# Patient Record
Sex: FEMALE | Race: WHITE | Employment: OTHER | URBAN - NONMETROPOLITAN AREA
[De-identification: names, ages, dates, MRNs, and addresses within clinical notes are randomized per-mention and may not be internally consistent; named-entity substitution may affect disease eponyms.]

---

## 2017-02-09 PROBLEM — E55.9 VITAMIN D DEFICIENCY: Status: ACTIVE | Noted: 2017-02-09

## 2017-02-09 PROBLEM — N39.0 UTI (URINARY TRACT INFECTION): Status: ACTIVE | Noted: 2017-02-09

## 2017-02-09 PROBLEM — R41.89 COGNITIVE IMPAIRMENT: Status: ACTIVE | Noted: 2017-02-09

## 2017-02-09 PROBLEM — R41.82 MENTAL STATUS ALTERATION: Status: ACTIVE | Noted: 2017-02-09

## 2017-02-09 PROBLEM — F32.A DEPRESSION: Status: ACTIVE | Noted: 2017-02-09

## 2017-03-13 ENCOUNTER — OFFICE VISIT (OUTPATIENT)
Dept: PHYSICAL MEDICINE AND REHAB | Age: 67
End: 2017-03-13

## 2017-03-13 VITALS
BODY MASS INDEX: 33.33 KG/M2 | HEIGHT: 69 IN | WEIGHT: 225 LBS | SYSTOLIC BLOOD PRESSURE: 129 MMHG | DIASTOLIC BLOOD PRESSURE: 79 MMHG | HEART RATE: 69 BPM

## 2017-03-13 DIAGNOSIS — R40.4 TRANSIENT ALTERATION OF AWARENESS: Primary | ICD-10-CM

## 2017-03-13 PROCEDURE — G8417 CALC BMI ABV UP PARAM F/U: HCPCS | Performed by: PSYCHIATRY & NEUROLOGY

## 2017-03-13 PROCEDURE — 1111F DSCHRG MED/CURRENT MED MERGE: CPT | Performed by: PSYCHIATRY & NEUROLOGY

## 2017-03-13 PROCEDURE — 99213 OFFICE O/P EST LOW 20 MIN: CPT | Performed by: PSYCHIATRY & NEUROLOGY

## 2017-03-13 PROCEDURE — 3014F SCREEN MAMMO DOC REV: CPT | Performed by: PSYCHIATRY & NEUROLOGY

## 2017-03-13 PROCEDURE — G8427 DOCREV CUR MEDS BY ELIG CLIN: HCPCS | Performed by: PSYCHIATRY & NEUROLOGY

## 2017-03-13 PROCEDURE — 1123F ACP DISCUSS/DSCN MKR DOCD: CPT | Performed by: PSYCHIATRY & NEUROLOGY

## 2017-03-13 PROCEDURE — 1036F TOBACCO NON-USER: CPT | Performed by: PSYCHIATRY & NEUROLOGY

## 2017-03-13 PROCEDURE — G8400 PT W/DXA NO RESULTS DOC: HCPCS | Performed by: PSYCHIATRY & NEUROLOGY

## 2017-03-13 PROCEDURE — 4040F PNEUMOC VAC/ADMIN/RCVD: CPT | Performed by: PSYCHIATRY & NEUROLOGY

## 2017-03-13 PROCEDURE — 3017F COLORECTAL CA SCREEN DOC REV: CPT | Performed by: PSYCHIATRY & NEUROLOGY

## 2017-03-13 PROCEDURE — 1090F PRES/ABSN URINE INCON ASSESS: CPT | Performed by: PSYCHIATRY & NEUROLOGY

## 2017-03-13 PROCEDURE — G8484 FLU IMMUNIZE NO ADMIN: HCPCS | Performed by: PSYCHIATRY & NEUROLOGY

## 2017-06-01 ENCOUNTER — OFFICE VISIT (OUTPATIENT)
Dept: PHYSICAL MEDICINE AND REHAB | Age: 67
End: 2017-06-01

## 2017-06-01 VITALS
SYSTOLIC BLOOD PRESSURE: 120 MMHG | HEIGHT: 69 IN | HEART RATE: 74 BPM | DIASTOLIC BLOOD PRESSURE: 77 MMHG | BODY MASS INDEX: 33.47 KG/M2 | WEIGHT: 226 LBS

## 2017-06-01 DIAGNOSIS — R40.4 TRANSIENT ALTERATION OF AWARENESS: Primary | ICD-10-CM

## 2017-06-01 PROCEDURE — 1090F PRES/ABSN URINE INCON ASSESS: CPT | Performed by: PSYCHIATRY & NEUROLOGY

## 2017-06-01 PROCEDURE — G8427 DOCREV CUR MEDS BY ELIG CLIN: HCPCS | Performed by: PSYCHIATRY & NEUROLOGY

## 2017-06-01 PROCEDURE — 4040F PNEUMOC VAC/ADMIN/RCVD: CPT | Performed by: PSYCHIATRY & NEUROLOGY

## 2017-06-01 PROCEDURE — 99213 OFFICE O/P EST LOW 20 MIN: CPT | Performed by: PSYCHIATRY & NEUROLOGY

## 2017-06-01 PROCEDURE — 1036F TOBACCO NON-USER: CPT | Performed by: PSYCHIATRY & NEUROLOGY

## 2017-06-01 PROCEDURE — 3014F SCREEN MAMMO DOC REV: CPT | Performed by: PSYCHIATRY & NEUROLOGY

## 2017-06-01 PROCEDURE — G8417 CALC BMI ABV UP PARAM F/U: HCPCS | Performed by: PSYCHIATRY & NEUROLOGY

## 2017-06-01 PROCEDURE — 1123F ACP DISCUSS/DSCN MKR DOCD: CPT | Performed by: PSYCHIATRY & NEUROLOGY

## 2017-06-01 PROCEDURE — G8400 PT W/DXA NO RESULTS DOC: HCPCS | Performed by: PSYCHIATRY & NEUROLOGY

## 2017-06-01 PROCEDURE — 3017F COLORECTAL CA SCREEN DOC REV: CPT | Performed by: PSYCHIATRY & NEUROLOGY

## 2018-06-04 ENCOUNTER — HOSPITAL ENCOUNTER (OUTPATIENT)
Dept: WOMENS IMAGING | Age: 68
Discharge: HOME | End: 2018-06-04
Payer: MEDICARE

## 2018-06-04 DIAGNOSIS — Z12.31 VISIT FOR SCREENING MAMMOGRAM: ICD-10-CM

## 2018-06-04 DIAGNOSIS — Z78.0 POST-MENOPAUSAL: ICD-10-CM

## 2018-06-04 PROCEDURE — 77067 SCR MAMMO BI INCL CAD: CPT

## 2018-06-04 PROCEDURE — 77080 DXA BONE DENSITY AXIAL: CPT

## 2018-09-26 PROBLEM — N39.0 UTI (URINARY TRACT INFECTION): Status: RESOLVED | Noted: 2017-02-09 | Resolved: 2018-09-26

## 2019-06-26 ENCOUNTER — HOSPITAL ENCOUNTER (OUTPATIENT)
Dept: WOMENS IMAGING | Age: 69
Discharge: HOME | End: 2019-06-26
Payer: MEDICARE

## 2019-06-26 DIAGNOSIS — Z12.31 VISIT FOR SCREENING MAMMOGRAM: ICD-10-CM

## 2019-06-26 PROCEDURE — 77063 BREAST TOMOSYNTHESIS BI: CPT

## 2020-06-18 ENCOUNTER — HOSPITAL ENCOUNTER (OUTPATIENT)
Dept: MAMMOGRAPHY | Age: 70
Discharge: HOME | End: 2020-06-18
Payer: MEDICARE

## 2020-06-18 PROCEDURE — 77063 BREAST TOMOSYNTHESIS BI: CPT

## 2020-06-24 ENCOUNTER — HOSPITAL ENCOUNTER (OUTPATIENT)
Dept: WOMENS IMAGING | Age: 70
Discharge: HOME | End: 2020-06-24
Payer: MEDICARE

## 2020-06-24 PROCEDURE — 76642 ULTRASOUND BREAST LIMITED: CPT

## 2020-06-25 ENCOUNTER — HOSPITAL ENCOUNTER (OUTPATIENT)
Dept: WOMENS IMAGING | Age: 70
Discharge: HOME | End: 2020-06-25
Payer: MEDICARE

## 2020-06-25 PROCEDURE — 77080 DXA BONE DENSITY AXIAL: CPT

## 2020-06-26 ENCOUNTER — HOSPITAL ENCOUNTER (OUTPATIENT)
Dept: WOMENS IMAGING | Age: 70
Discharge: HOME | End: 2020-06-26
Payer: MEDICARE

## 2020-06-26 PROCEDURE — 19084 BX BREAST ADD LESION US IMAG: CPT

## 2020-06-26 PROCEDURE — 88112 CYTOPATH CELL ENHANCE TECH: CPT

## 2020-06-26 PROCEDURE — 77065 DX MAMMO INCL CAD UNI: CPT

## 2020-06-26 PROCEDURE — 88377 M/PHMTRC ALYS ISHQUANT/SEMIQ: CPT

## 2020-06-26 PROCEDURE — 2709999900 HC NON-CHARGEABLE SUPPLY

## 2020-06-26 PROCEDURE — 88360 TUMOR IMMUNOHISTOCHEM/MANUAL: CPT

## 2020-06-26 PROCEDURE — C1894 INTRO/SHEATH, NON-LASER: HCPCS

## 2020-06-26 PROCEDURE — A4648 IMPLANTABLE TISSUE MARKER: HCPCS

## 2020-06-26 PROCEDURE — 88305 TISSUE EXAM BY PATHOLOGIST: CPT

## 2020-06-26 PROCEDURE — 19083 BX BREAST 1ST LESION US IMAG: CPT

## 2020-06-26 PROCEDURE — 19000 PUNCTURE ASPIR CYST BREAST: CPT

## 2020-06-26 NOTE — PROGRESS NOTES
[facility] [address]  Pre-Biopsy Assessment      Patient Education    Written information about procedure Yes  left X2   Procedural steps explained Yes Ultrasound Biopsy   Post-op potential: bruising, hematoma, pain Yes    Self-care: activity, care of dressing Yes    Patient verbalized understanding Yes    Consent signed and witnessed Yes      Hormone Therapy Status: N/A    Recent Medication: Aspirin Last Dose: 6-24-20                                     Hormone Replacement Therapy: yes - for about 2 years last used age 47    Previous Breast Biopsy: yes - benign 2015    Previous Diagnosis Cancer: no    Hysterectomy:yes - ovaries removed had abnormal cells.     Emotional Status: Calm    Language or Physical Barriers: N/A    Comments: N/A      Electronically signed by Raiza Meng on 6/26/2020 at 9:33 AM

## 2020-06-30 ENCOUNTER — CLINICAL DOCUMENTATION (OUTPATIENT)
Dept: WOMENS IMAGING | Age: 70
End: 2020-06-30

## 2020-07-01 ENCOUNTER — TELEPHONE (OUTPATIENT)
Dept: CASE MANAGEMENT | Age: 70
End: 2020-07-01

## 2020-07-01 NOTE — TELEPHONE ENCOUNTER
Name: Fermin Vann  : 1950  MRN: [id number]    Oncology Navigation Follow-Up Note    Contact Type:  Telephone    Notes: Scheduled patient teaching on 2020 for new breast cancer diagnosis. Coming to [facility] after 9am appt with Dr. Sue Littlejohn. Asked she bring breast cancer packet. Verbalizes understanding of location and contact information.     Electronically signed by Parrish Watson RN on 2020 at 8:56 AM

## 2020-07-01 NOTE — PROGRESS NOTES
[facility] [address] [facility] [address]  Dept: [phone]  Dept Fax: [phone]  Loc: [phone]    Visit Date: 7/2/2020    Donnell Bronson is a 79 y.o. female who presentstoday for:  Chief Complaint   Patient presents with    Surgical Consult     NP- ref 88 Robdulce Prince left breast cancer- requesting Dr. Gibran Lara       HPI:     HPI-year-old white female who recently on mammograms was found to have 2 lesions in the left breast interestingly that she had mammograms in June of last year that were normal although in retrospect may have had beginning of some densities biopsies were performed showing poorly differentiated ductal carcinoma the breast she was ER DC positive but also HER-2/jordan positive. Her Ki-67 was greater than 40%. Patient briefly took birth control pills in her lifetime and briefly took hormonal replacement she cannot remember the exact ages but she said it was a limited period of time.   She has a aunt who had breast cancer otherwise no other family history she has had no nipple discharge denies any pain in the breast    Past Medical History:   Diagnosis Date    Hyperlipidemia       Past Surgical History:   Procedure Laterality Date    BREAST SURGERY      excision mass left breast    CHOLECYSTECTOMY  10/2014    HERNIA REPAIR  10/2014    x4    HYSTERECTOMY      SPLENECTOMY  10/2014    TONSILLECTOMY AND ADENOIDECTOMY      patient was 11years old   Lenetta Ni TUBAL LIGATION          Family History   Problem Relation Age of Onset    Heart Disease Father         cath stent blood too thin and bled    Other Other         blood clot    Breast Cancer Paternal Aunt 62    High Blood Pressure Sister     Cancer Brother 68        skin    Prostate Cancer Brother         had chemotherapy to treat     Prostate Cancer Brother 64        has had for 10 years    Ovarian Cancer Neg Hx     Diabetes Neg Hx     Stroke Neg Hx         Social distress. Breath sounds: Normal breath sounds. No stridor. No wheezing. Chest:      Chest wall: No tenderness. Musculoskeletal: Normal range of motion. Skin:     General: Skin is warm and dry. Coloration: Skin is not pale. Findings: No erythema or rash. Neurological:      Mental Status: She is alert and oriented to person, place, and time. Psychiatric:         Behavior: Behavior normal.         Thought Content:  Thought content normal.         Judgment: Judgment normal.            Results for orders placed or performed during the hospital encounter of 02/09/17   Rapid influenza A/B antigens   Result Value Ref Range    Flu A Antigen NEGATIVE NEGATIVE    Flu B Antigen NEGATIVE NEGATIVE   Urine Culture   Result Value Ref Range    Urine Culture, Routine No growth-preliminary  No growth      D-dimer, quantitative   Result Value Ref Range    D-Dimer, Quant 598.00 (H) 0.00 - 500.0 ng/ml FEU   CBC auto differential   Result Value Ref Range    WBC 12.0 (H) 4.8 - 10.8 thou/mm3    RBC 4.96 4.20 - 5.40 mill/mm3    Hemoglobin 14.7 12.0 - 16.0 gm/dl    Hematocrit 44.9 37.0 - 47.0 %    MCV 90.6 81.0 - 99.0 fL    MCH 29.6 27.0 - 31.0 pg    MCHC 32.7 (L) 33.0 - 37.0 gm/dl    RDW 13.9 11.5 - 14.5 %    Platelets 838 (H) 665 - 400 thou/mm3    MPV 9.4 7.4 - 10.4 mcm    RBC Morphology NORMAL     Seg Neutrophils 57.2 %    Lymphocytes 29.7 %    Monocytes 10.5 %    Eosinophils 2.0 %    Basophils 0.6 %    nRBC 0 /100 wbc    Segs Absolute 6.9 1.8 - 7.7 thou/mm3    Lymphocytes Absolute 3.6 1.0 - 4.8 thou/mm3    Monocytes Absolute 1.3 0.4 - 1.3 thou/mm3    Eosinophils Absolute 0.2 0.0 - 0.4 thou/mm3    Basophils Absolute 0.1 0.0 - 0.1 thou/mm3   Brain Natriuretic Peptide   Result Value Ref Range    Pro-BNP 43.6 0.0 - 900.0 pg/ml   Protime-INR   Result Value Ref Range    INR 0.86 0.85 - 1.13   Blood gas, arterial   Result Value Ref Range    pH, Blood Gas 7.39 7.35 - 7.45    PCO2 41 35 - 45 mmhg    PO2 61 (L) 71 - 104 mmhg    HCO3 24 23 - 28 mmol/l    Base Excess (Calculated) -0.6 -2.5 - 2.5 mmol/l    O2 Sat 91 %    DEVICE Room Air     Nelson Test N/A     Source: SHIRLENE Hutton     COLLECTED BY: [id number]    Comprehensive Metabolic Panel   Result Value Ref Range    Glucose 116 (H) 70 - 108 mg/dl    CREATININE 0.7 0.4 - 1.2 mg/dl    BUN 15 7 - 22 mg/dl    Sodium 139 135 - 145 meq/l    Potassium 5.0 3.5 - 5.2 meq/l    Chloride 99 98 - 111 meq/l    CO2 26 23 - 33 meq/l    Calcium 10.1 8.5 - 10.5 mg/dl    AST 19 5 - 40 U/L    Alkaline Phosphatase 74 38 - 126 U/L    Total Protein 8.1 (H) 6.1 - 8.0 gm/dl    Alb 4.0 3.5 - 5.1 gm/dl    Total Bilirubin 0.3 0.3 - 1.2 mg/dl    ALT 17 11 - 66 U/L   Troponin   Result Value Ref Range    Troponin T < 0.010 ng/ml   Lipase   Result Value Ref Range    Lipase 37.1 5.6 - 51.3 U/L   TSH without Reflex   Result Value Ref Range    TSH 1.380 0.400 - 4.20 mcIU/ml   Lactic acid, plasma   Result Value Ref Range    Lactic Acid 3.1 (H) 0.5 - 2.2 mmol/l   Ethanol   Result Value Ref Range    ETHYL ALCOHOL, SERUM < 0.01 0.00 % (gm/dl)   Urine Drug Screen   Result Value Ref Range    AMPHETAMINE+METHAMPHETAMINE URINE SCREEN Negative NEGATIVE    Barbiturate Quant, Ur Negative NEGATIVE    Benzodiazepine Quant, Ur Negative NEGATIVE    Cannabinoid Quant, Ur Negative NEGATIVE    Cocaine Metab Quant, Ur Negative NEGATIVE    Opiates, Urine Negative NEGATIVE    Oxycodone Negative NEGATIVE    PCP Quant, Ur Negative NEGATIVE   Microscopic Urinalysis   Result Value Ref Range    Glucose, Urine NEGATIVE NEGATIVE mg/dl    Bilirubin Urine NEGATIVE NEGATIVE    Ketones, Urine NEGATIVE NEGATIVE    Specific Gravity, UA 1.014 1.002 - 1.03    Blood, Urine SMALL (A) NEGATIVE    pH, UA 5.0 5.0 - 9.0    Protein, UA NEGATIVE NEGATIVE mg/dl    Urobilinogen, Urine 0.2 0.0 - 1.0 eu/dl    Nitrite, Urine NEGATIVE NEGATIVE    Leukocytes, UA MODERATE (A) NEGATIVE    Color, UA YELLOW YELLOW-STR    Character, Urine CLEAR [id number]    RBC, UA 0-2 0-2/hpf thou/mm3    MPV 9.0 7.4 - 10.4 mcm    RBC Morphology NORMAL     Seg Neutrophils 35.5 %    Lymphocytes 46.2 %    Monocytes 15.5 %    Eosinophils 2.7 %    Basophils 0.1 %    nRBC 0 /100 wbc    Segs Absolute 3.5 1.8 - 7.7 thou/mm3    Lymphocytes Absolute 4.5 1.0 - 4.8 thou/mm3    Monocytes Absolute 1.5 (H) 0.4 - 1.3 thou/mm3    Eosinophils Absolute 0.3 0.0 - 0.4 thou/mm3    Basophils Absolute 0.0 0.0 - 0.1 thou/mm3   Procalcitonin   Result Value Ref Range    Procalcitonin 0.05 0.01 - 0.09 ng/mL   Lactic Acid, Plasma   Result Value Ref Range    Lactic Acid 2.1 0.5 - 2.2 mmol/l   Comprehensive metabolic panel   Result Value Ref Range    Glucose 116 (H) 70 - 108 mg/dl    CREATININE 0.6 0.4 - 1.2 mg/dl    BUN 13 7 - 22 mg/dl    Sodium 140 135 - 145 meq/l    Potassium 4.2 3.5 - 5.2 meq/l    Chloride 99 98 - 111 meq/l    CO2 23 23 - 33 meq/l    Calcium 9.5 8.5 - 10.5 mg/dl    AST 17 5 - 40 U/L    Alkaline Phosphatase 64 38 - 126 U/L    Total Protein 7.0 6.1 - 8.0 gm/dl    Alb 3.6 3.5 - 5.1 gm/dl    Total Bilirubin 0.6 0.3 - 1.2 mg/dl    ALT 13 11 - 66 U/L   Magnesium   Result Value Ref Range    Magnesium 2.0 1.6 - 2.4 mg/dl   CBC auto differential   Result Value Ref Range    WBC 9.8 4.8 - 10.8 thou/mm3    RBC 4.58 4.20 - 5.40 mill/mm3    Hemoglobin 13.7 12.0 - 16.0 gm/dl    Hematocrit 41.4 37.0 - 47.0 %    MCV 90.3 81.0 - 99.0 fL    MCH 29.9 27.0 - 31.0 pg    MCHC 33.1 33.0 - 37.0 gm/dl    RDW 13.6 11.5 - 14.5 %    Platelets 372 239 - 635 thou/mm3    MPV 9.3 7.4 - 10.4 mcm    RBC Morphology NORMAL     Seg Neutrophils 36.4 %    Lymphocytes 42.9 %    Monocytes 15.5 %    Eosinophils 3.9 %    Basophils 1.3 %    nRBC 4 /100 wbc    Segs Absolute 3.6 1.8 - 7.7 thou/mm3    Lymphocytes Absolute 4.2 1.0 - 4.8 thou/mm3    Monocytes Absolute 1.5 (H) 0.4 - 1.3 thou/mm3    Eosinophils Absolute 0.4 0.0 - 0.4 thou/mm3    Basophils Absolute 0.1 0.0 - 0.1 thou/mm3   TSH with Reflex   Result Value Ref Range    TSH 2.480 0.400 - 4.20 mcIU/ml Phosphorus   Result Value Ref Range    Phosphorus 3.1 2.4 - 4.7 mg/dl   Lactic Acid, Plasma   Result Value Ref Range    Lactic Acid 2.7 (H) 0.5 - 2.2 mmol/l   Anion Gap   Result Value Ref Range    Anion Gap 18.0 (H) 8.0 - 16.0 meq/l   Glomerular Filtration Rate, Estimated   Result Value Ref Range    Est, Glom Filt Rate >90 ml/min/1.73m2   Lactic Acid, Plasma   Result Value Ref Range    Lactic Acid 2.4 (H) 0.5 - 2.2 mmol/l   POCT Glucose   Result Value Ref Range    POC Glucose 97 70 - 108 mg/dl   EKG Chest Pain   Result Value Ref Range    Ventricular Rate 82 BPM    Atrial Rate 82 BPM    P-R Interval 178 ms    QRS Duration 92 ms    Q-T Interval 360 ms    QTc Calculation (Bazett) 420 ms    P Axis 32 degrees    R Axis 6 degrees    T Axis 25 degrees   Echocardiogram 2D/ M-Mode/ Colorflow/ Do   Result Value Ref Range    Left Ventricular Ejection Fraction 55     LVEF MODALITY ECHO        Assessment:     Poorly differentiated ductal carcinoma the breast I called   and wanted to know how far apart the 2 lesions were he stated they are close together but from anterior to posterior roughly make up 5 cm long discussion with patient regarding the history of breast cancer treatment and her options of attempted lumpectomy with radiation treatment or mastectomy we also discussed sentinel lymph node biopsy would be performed with either and that she may need radiation to the axilla this patient would most be benefited with a mastectomy however she is going to be presented at breast conference patient is leaning towards preference for mastectomy and sentinel node biopsy will schedule as such    Plan:     1. Schedule Bhargavi for full mastectomy and sentinel lymph node biopsy 2. The risks, benefits and alternatives were discussed with [facility]. She understands and wishes to proceed with surgical intervention. 3. Restrictions discussed with [facility] and she expresses understanding.   4. She is advised to call back directly if there are further questions/concerns, or if her symptoms worsen prior to surgery.             Electronicallysigned by Dhruv Sandoval MD on 6/30/2020 at 11:28 PM

## 2020-07-02 ENCOUNTER — CLINICAL DOCUMENTATION (OUTPATIENT)
Dept: CASE MANAGEMENT | Age: 70
End: 2020-07-02

## 2020-07-02 ENCOUNTER — TELEPHONE (OUTPATIENT)
Dept: SURGERY | Age: 70
End: 2020-07-02

## 2020-07-02 ENCOUNTER — OFFICE VISIT (OUTPATIENT)
Dept: SURGERY | Age: 70
End: 2020-07-02
Payer: MEDICARE

## 2020-07-02 VITALS
RESPIRATION RATE: 14 BRPM | TEMPERATURE: 97.4 F | DIASTOLIC BLOOD PRESSURE: 70 MMHG | BODY MASS INDEX: 34.86 KG/M2 | SYSTOLIC BLOOD PRESSURE: 110 MMHG | OXYGEN SATURATION: 95 % | HEIGHT: 68 IN | WEIGHT: 230 LBS | HEART RATE: 81 BPM

## 2020-07-02 PROCEDURE — 99203 OFFICE O/P NEW LOW 30 MIN: CPT | Performed by: SURGERY

## 2020-07-02 RX ORDER — ALENDRONATE SODIUM 35 MG/1
35 TABLET ORAL
COMMUNITY

## 2020-07-02 NOTE — PROGRESS NOTES
contacted by Tammi Cardenas, knows her well. Notified Saray Andino. Biopsy site status: Denies any concerns with healing at this time. Continuum of Care: Diagnosis/Active Treatment    Notes: Teaching completed and verbalizes understanding. Additional information discussed re: hormone receptors, Triple Positive Breast Cancer, chemotherapy, oncotype dx testing, amended report from Dr. Gregoria Mays measured largest site 3 cm, instead of 2.7 cm. I was notified by Erma More. All questions addressed, Ana took notes. Encouraged patient to call anytime with questions or concerns. Emotional support offered. Contact information given. Already has all provider cards from [facility]. Will continue to follow through continuum of care.      Electronically signed by Gina Brown RN on 7/2/2020 at 11:58 AM

## 2020-07-02 NOTE — TELEPHONE ENCOUNTER
Scheduled Lonita Risk for a left sentinel LN injection on Fri 7/17 at 10am & she will arrive to main radiology nuc med at 9:15. Left simple mastectomy & sentinel LN biopsy is at 12:30. Lonita Risk will be npo after midnight, consent was signed & antibacterial soap was given. Also given were orders for covid & h&h. MRI bilateral breast w wo contrast is scheduled on Wed 7/8 at 2:00pm & she will arrive at 1:30. Creatinine will be done on arrival. Spoke to Adal Burgos, nurse reviewer at [facility] & she authorized the MRI good from 7/2/20-8/30/2020. The auth# is [id number]. All info was given to Zak Natarajan & she voiced understanding.

## 2020-07-03 ASSESSMENT — ENCOUNTER SYMPTOMS
BACK PAIN: 0
CHEST TIGHTNESS: 0
STRIDOR: 0
FACIAL SWELLING: 0
ALLERGIC/IMMUNOLOGIC NEGATIVE: 1
EYES NEGATIVE: 1
TROUBLE SWALLOWING: 0
COLOR CHANGE: 0
SHORTNESS OF BREATH: 0
COUGH: 0
PHOTOPHOBIA: 0
EYE PAIN: 0
EYE ITCHING: 0
SINUS PAIN: 0
APNEA: 0
EYE REDNESS: 0
WHEEZING: 0
CHOKING: 0
VOICE CHANGE: 0
RHINORRHEA: 0
SORE THROAT: 0
ABDOMINAL PAIN: 1
SINUS PRESSURE: 0
EYE DISCHARGE: 0

## 2020-07-08 ENCOUNTER — HOSPITAL ENCOUNTER (OUTPATIENT)
Dept: MRI IMAGING | Age: 70
Discharge: HOME | End: 2020-07-08
Payer: MEDICARE

## 2020-07-08 ENCOUNTER — HOSPITAL ENCOUNTER (OUTPATIENT)
Age: 70
Discharge: HOME | End: 2020-07-08
Payer: MEDICARE

## 2020-07-08 LAB
HCT VFR BLD CALC: 45.9 % (ref 37–47)
HEMOGLOBIN: 14.7 GM/DL (ref 12–16)
POC CREATININE WHOLE BLOOD: 0.7 MG/DL (ref 0.5–1.2)

## 2020-07-08 PROCEDURE — 82565 ASSAY OF CREATININE: CPT

## 2020-07-08 PROCEDURE — 6360000004 HC RX CONTRAST MEDICATION: Performed by: SURGERY

## 2020-07-08 PROCEDURE — 77049 MRI BREAST C-+ W/CAD BI: CPT

## 2020-07-08 PROCEDURE — A9579 GAD-BASE MR CONTRAST NOS,1ML: HCPCS | Performed by: SURGERY

## 2020-07-08 PROCEDURE — 85014 HEMATOCRIT: CPT

## 2020-07-08 PROCEDURE — 36415 COLL VENOUS BLD VENIPUNCTURE: CPT

## 2020-07-08 PROCEDURE — 85018 HEMOGLOBIN: CPT

## 2020-07-08 RX ADMIN — GADOTERIDOL 20 ML: 279.3 INJECTION, SOLUTION INTRAVENOUS at 16:08

## 2020-07-10 ENCOUNTER — HOSPITAL ENCOUNTER (OUTPATIENT)
Age: 70
Discharge: HOME | End: 2020-07-10
Payer: MEDICARE

## 2020-07-10 ENCOUNTER — CLINICAL DOCUMENTATION (OUTPATIENT)
Dept: CASE MANAGEMENT | Age: 70
End: 2020-07-10

## 2020-07-10 ENCOUNTER — TELEPHONE (OUTPATIENT)
Dept: ONCOLOGY | Age: 70
End: 2020-07-10

## 2020-07-10 ENCOUNTER — CLINICAL DOCUMENTATION (OUTPATIENT)
Dept: PHYSICAL THERAPY | Age: 70
End: 2020-07-10

## 2020-07-10 PROBLEM — C50.212 MALIGNANT NEOPLASM OF UPPER-INNER QUADRANT OF LEFT BREAST IN FEMALE, ESTROGEN RECEPTOR POSITIVE (HCC): Chronic | Status: ACTIVE | Noted: 2020-07-10

## 2020-07-10 PROBLEM — Z17.0 MALIGNANT NEOPLASM OF UPPER-INNER QUADRANT OF LEFT BREAST IN FEMALE, ESTROGEN RECEPTOR POSITIVE (HCC): Chronic | Status: ACTIVE | Noted: 2020-07-10

## 2020-07-10 PROCEDURE — U0002 COVID-19 LAB TEST NON-CDC: HCPCS

## 2020-07-10 NOTE — PROGRESS NOTES
Name: Camelia Winters  : 1950  MRN: [id number]    Oncology Navigation Follow-Up Note    Contact Type: [facility]    Subjective: Arrived with friend Ana to meet with Breast Team. See recommendations under Media. Objective: Has Triple Positive left breast cancer. Assistance Needed: Trying to set up family, niece to assist with care during chemo as needed. Care coordination to move new patient consult with Dr. Zay Michelle for next week, to discuss neoadjuvant chemotherapy. Dr. Garcia President will have office change mastectomy 2020 to port placement with Dr. Juancarlos Mendez. Patient in agreement with recommendations from Breast Team.    Receptive to Advanced Care Planning / Palliative Care:  deferred    Referrals: Pending referrals to genetics and oncology rehab if patient agrees in future. Education: Breast Clinic Recommendations. Will call patient with new appt Dr. Zay Michelle today. Notes: Teaching completed and verbalizes understanding. All questions addressed. Emotional support offered. Arm measurements completed by Henrietta Felder PT assistant Will follow through continuum of care.      Electronically signed by Jaime Heard RN on 7/10/2020 at 8:22 AM

## 2020-07-10 NOTE — PROGRESS NOTES
[facility]  OUTPATIENT REHABILITATION  PHYSICAL THERAPY  INTEGRATED BREAST CANCER CLINIC SCREEN      Date: 7/10/2020  Patient Name: Joan Harry        CSN: [id number]   : 1950  (79 y.o.)  Gender: female     Location Left   Type IDC   Hormone Type ER+  IA+   HER2-jordan  Amplified       Strength/ROM Right Left   Shoulder Flexion PROM 160 165   Shoulder Abduction PROM 150 170   Shoulder Strength 4+ 4+   Elbow Strength 4+ 4+   Hand Dominance R        Location Right (cm) Left (cm)   Met Heads 19.8 19.2   Ulnar Styloid Process 19 19   10 cm distal to Lat. Epicondyle 30.4 30.4   Antecubital Fossa 32 31.5   10 cm proximal to Lat.  Epicondyle 41 39.5   Axilla 44 41.5   Total 186.2 181.1     Kathryn Shaw PTA [id number]

## 2020-07-10 NOTE — TELEPHONE ENCOUNTER
Name: Saravanan Jean  : 1950  MRN: [id number]    Oncology Navigation Follow-Up Note    Contact Type:  Telephone    Notes: Called patient with new appt with Dr. Earnest Hansen to discuss chemotherapy 2020 at 4 pm. Unique Tracey understanding.     Electronically signed by Bertha Blanc RN on 7/10/2020 at 9:38 AM

## 2020-07-11 LAB
PERFORMING LAB: NORMAL
REPORT: NORMAL
SARS-COV-2: NOT DETECTED

## 2020-07-13 ENCOUNTER — HOSPITAL ENCOUNTER (OUTPATIENT)
Dept: WOMENS IMAGING | Age: 70
Discharge: HOME | End: 2020-07-13
Payer: MEDICARE

## 2020-07-13 PROCEDURE — 76642 ULTRASOUND BREAST LIMITED: CPT

## 2020-07-14 ENCOUNTER — OFFICE VISIT (OUTPATIENT)
Dept: ONCOLOGY | Age: 70
End: 2020-07-14
Payer: MEDICARE

## 2020-07-14 ENCOUNTER — HOSPITAL ENCOUNTER (OUTPATIENT)
Dept: INFUSION THERAPY | Age: 70
Discharge: HOME | End: 2020-07-14
Payer: MEDICARE

## 2020-07-14 ENCOUNTER — OFFICE VISIT (OUTPATIENT)
Dept: SURGERY | Age: 70
End: 2020-07-14
Payer: MEDICARE

## 2020-07-14 ENCOUNTER — TELEPHONE (OUTPATIENT)
Dept: ONCOLOGY | Age: 70
End: 2020-07-14

## 2020-07-14 VITALS
SYSTOLIC BLOOD PRESSURE: 122 MMHG | HEIGHT: 68 IN | BODY MASS INDEX: 34.53 KG/M2 | TEMPERATURE: 97.1 F | WEIGHT: 227.8 LBS | RESPIRATION RATE: 18 BRPM | OXYGEN SATURATION: 92 % | DIASTOLIC BLOOD PRESSURE: 80 MMHG | HEART RATE: 88 BPM

## 2020-07-14 VITALS
TEMPERATURE: 97.8 F | WEIGHT: 227.6 LBS | RESPIRATION RATE: 18 BRPM | SYSTOLIC BLOOD PRESSURE: 149 MMHG | DIASTOLIC BLOOD PRESSURE: 75 MMHG | OXYGEN SATURATION: 94 % | HEIGHT: 68 IN | HEART RATE: 78 BPM | BODY MASS INDEX: 34.49 KG/M2

## 2020-07-14 PROCEDURE — 99212 OFFICE O/P EST SF 10 MIN: CPT | Performed by: SURGERY

## 2020-07-14 PROCEDURE — 99211 OFF/OP EST MAY X REQ PHY/QHP: CPT

## 2020-07-14 PROCEDURE — 99205 OFFICE O/P NEW HI 60 MIN: CPT | Performed by: INTERNAL MEDICINE

## 2020-07-14 RX ORDER — LEVOTHYROXINE SODIUM 0.03 MG/1
50 TABLET ORAL DAILY
COMMUNITY

## 2020-07-14 NOTE — PROGRESS NOTES
[facility], [address] [facility] [address]  [place] antonio Gabriel  Dept: [phone]  Dept Fax: [phone]: [phone]    Visit Date:7/14/2020     Ken Sanabria is a 79 y.o. female who presents today for:   Chief Complaint   Patient presents with    New Patient     left breast CA        HPI:   Mrs. Ashish Doherty is a 68-year-old female with newly diagnosed triple positive left breast cancer. She had annual screening mammogram on June 18, 2020 that showed 2 lesions in the left breast.  Subsequently she underwent breast ultrasound followed by ultrasound guided biopsy of those 2 lesions. The largest of these lesions is a lobulated mixed cystic and    solid appearing mass measuring approximately 3 x 2.1 x 2.7 cm. This is located at the anterior depth upper inner quadrant. Aspiration of the fluid component of this lesion and biopsy of    the solid component of this lesion was performed on 6/26/2020. The smaller adjacent lesion is located at the middle depth of the     upper inner quadrant left breast measuring approximately 1.6 x    1.1 x 1.6 cm. Biopsy of this lesion was performed on 6/26/2020. Final pathology report showed:   A-B.  Left breast, upper inner quadrant, anterior and middle depth,   barbell and tribell clips, multiple core needle biopsies:   [place], poorly differentiated ductal carcinoma, Pepe score 3.    Estrogen Receptor: (Clone SP1), [employer]    Positive 100 % of cells (>10% of cells)   Intensity of Staining:    Strong   Progesterone Receptor: (Clone 1E2), [employer]    Positive 90 % of cells   Intensity of Staining:    Strong   Ki-67 (clone 30-9)       Percentage of positive nuclei: 42 %               Favorable <10%               Borderline 10-20%               Unfavorable >20%          2018 ASCO/ CAP   Inform HER2 Dual NEW        HER2 dual NEW    [employer]        Group #   ( X  Group 1:         HER2/CEP17 Ratio >=.0 and >=.0 HER2    )                    signals/cell                         HER2 NEW POSITIVE     On 2020 the patient had breast MRI showin. A known biopsy-proven mass demonstrated within the upper     inner left breast anterior to middle depth. This measures 3 cm x     4.1 cm x 3.1 cm middle depth located 4  cm from the nipple, 0.9    cm from the skin, and 6.5 cm from the chest wall.  This shows    heterogeneous enhancement and delayed washout type vascular    enhancement. 2. There are scattered small foci of enhancement demonstrated    bilaterally. The dominant focus of enhancement within the right    breast is located within the lower inner right breast anterior    depth on axial image 105 series 9. Recommend further evaluation    with second look ultrasound. If there is no sonographic    correlate, recommend 6 month follow-up breast MRI to document    stability.         3. Among the small foci of enhancement within the left breast,    the most prominent is located within the lower outer quadrant    posterior depth as seen on axial image 91 series 9. Recommend    further evaluation with second look ultrasound. If there is no    sonographic correlate, recommend 6 month follow-up breast MRI to    document stability. Patient past medical history significant for hypothyroidism and osteoporosis. Patient denies having any new complaints. Specifically she denies having any headaches, no difficulty with balance, no falls. She denies having any focal neurological deficits. No shortness of breath no cough no abdominal pain. She denies having any bone pain. Patient past medical history significant for hypothyroidism and osteoporosis.    HPI   Past Medical History:   Diagnosis Date    Breast cancer ([facility] [address]     left-invasive ductal    Hyperlipidemia     Numbness and tingling     left foot-chronic nerve damage      Past Surgical History:   Procedure Laterality Date    ABDOMINAL MG CAPS Take by mouth daily      Cyanocobalamin (VITAMIN B12 PO) Take 1,000 mcg by mouth daily       ondansetron (ZOFRAN ODT) 4 MG disintegrating tablet Take 1 tablet by mouth every 8 hours as needed for Nausea 20 tablet 0     No current facility-administered medications for this visit. Allergies   Allergen Reactions    Aluminum-Containing Compounds Anaphylaxis      Health Maintenance   Topic Date Due    Hepatitis C screen  1950    Meningococcal (ACWY) vaccine (1 - Risk start before 7 months 4-dose series) 1950    Hib vaccine (1 of 1 - Risk 1-dose series) 09/21/1951    Meningococcal B vaccine (1 of 4 - Increased Risk Bexsero 2-dose series) 06/21/1960    DTaP/Tdap/Td vaccine (1 - Tdap) 06/21/1969    Lipid screen  06/21/1990    Diabetes screen  06/21/1990    Shingles Vaccine (1 of 2) 06/21/2000    Colon cancer screen colonoscopy  06/21/2000    Annual Wellness Visit (AWV)  06/23/2019    Pneumococcal 65+ yrs at Risk Vaccine (2 of 2 - PPSV23) 11/20/2019    Flu vaccine (1) 09/01/2020    Breast cancer screen  07/16/2021    DEXA (modify frequency per FRAX score)  Completed    Hepatitis A vaccine  Aged Out    Hepatitis B vaccine  Aged Out        Subjective:   Review of Systems   Constitutional: Negative for activity change, appetite change, fatigue and fever. HENT: Negative for congestion, dental problem, facial swelling, hearing loss, mouth sores, nosebleeds, sore throat, tinnitus and trouble swallowing. Eyes: Negative for discharge and visual disturbance. Respiratory: Negative for cough, chest tightness, shortness of breath and wheezing. Cardiovascular: Negative for chest pain, palpitations and leg swelling. Gastrointestinal: Negative for abdominal distention, abdominal pain, blood in stool, constipation, diarrhea, nausea, rectal pain and vomiting. Endocrine: Negative for cold intolerance, polydipsia and polyuria.    Genitourinary: Negative for decreased urine volume, difficulty urinating, dysuria, flank pain, hematuria and urgency. Musculoskeletal: Positive for arthralgias. Negative for back pain, gait problem, joint swelling, myalgias and neck stiffness. Skin: Negative for color change, rash and wound. Neurological: Negative for dizziness, tremors, seizures, speech difficulty, weakness, light-headedness, numbness and headaches. Hematological: Negative for adenopathy. Does not bruise/bleed easily. Psychiatric/Behavioral: Negative for confusion and sleep disturbance. The patient is not nervous/anxious. Objective:   Physical Exam  Vitals signs reviewed. Constitutional:       General: She is not in acute distress. Appearance: She is well-developed. HENT:      Head: Normocephalic. Mouth/Throat:      Pharynx: No oropharyngeal exudate. Eyes:      General: No scleral icterus. Right eye: No discharge. Left eye: No discharge. Pupils: Pupils are equal, round, and reactive to light. Neck:      Musculoskeletal: Normal range of motion and neck supple. Thyroid: No thyromegaly. Vascular: No JVD. Trachea: No tracheal deviation. Cardiovascular:      Rate and Rhythm: Normal rate. Heart sounds: Normal heart sounds. No murmur. No friction rub. No gallop. Pulmonary:      Effort: Pulmonary effort is normal. No respiratory distress. Breath sounds: Normal breath sounds. No stridor. No wheezing or rales. Chest:      Chest wall: No tenderness. Breasts:         Left: Mass (Simply 4 cm in size palpable mass in the left upper inner quadrant) present. Abdominal:      General: Bowel sounds are normal. There is no distension. Palpations: Abdomen is soft. There is no mass. Tenderness: There is no abdominal tenderness. There is no rebound. Musculoskeletal: Normal range of motion. Comments: Good range of motion in all four extremities. Lymphadenopathy:      Cervical: No cervical adenopathy.    Skin: General: Skin is warm. Findings: No erythema or rash. Neurological:      Mental Status: She is alert and oriented to person, place, and time. Cranial Nerves: No cranial nerve deficit. Motor: No abnormal muscle tone. Deep Tendon Reflexes: Reflexes are normal and symmetric. Psychiatric:         Behavior: Behavior normal.         Thought Content: Thought content normal.         Judgment: Judgment normal.         BP (!) 149/75 (Site: Right Upper Arm, Position: Sitting, Cuff Size: Medium Adult)   Pulse 78   Temp 97.8 °F (36.6 °C) (Infrared)   Resp 18   Ht 5' 8\" (1.727 m)   Wt 227 lb 9.6 oz (103.2 kg)   SpO2 94%   BMI 34.61 kg/m²      ECOG status is 0    Imaging studies and labs:   Dexa Bone Density Axial Skeleton    Result Date: 6/25/2020  #[id number] - DEXA BONE DENSITY AXIAL SKELETON # BONE DENSITY EVALUATION: 06/25/2020 CLINICAL DATA: Post menopausal and follow-up after initiating bone preserving medications. RISK FACTORS:  race. COMPARISON: 06/04/2018 Right femur neck using a Hologic unit  from [facility] with reported medium fracture risk, BMD of 0.720g/cm2, T-score of -1.20, and Z-score of 0.50.  06/04/2018 AP L1-L4 region of spine using a Hologic unit  from [facility] with reported medium fracture risk, BMD  of 0.908g/cm2, T-score of -1.30, and Z-score of 0.70.  06/04/2018 Left femur neck using a Hologic unit  from [facility] with reported normal fracture risk, BMD of 0.760g/cm2, T-score of -0.80, and Z-score of 0.90. FINDINGS: Bone density evaluation was performed 06/25/2020 on the right femur neck using a Hologic unit. The BMD average for the exam is 0.741  g/cm2. The T-score is -1.00 and the Z-score is 0.80. Since the previous similar exam of 06/04/2018, there has been a +0.021 or +2.9% change in the BMD value  which represents no significant interval change in bone density.  This matches the [facility]'s criteria for normal bone density and places the patient within normal limits of fracture risk. An additional bone density evaluation was performed 06/25/2020 on  the left femur neck using a Hologic unit. The BMD average for the exam is 0.686  g/cm2. The T-score is -1.50 and the Z-score is  0.30. Since the previous similar exam of 06/04/2018, there has been a -0.074 or -9.7% change in the BMD value  which represents no significant interval change in bone density. This matches the [address] [facility] [employer] [employer]'s criteria for osteopenia and places the patient at a medium risk for fracture. An additional bone density evaluation was performed 06/25/2020 on  the AP L1-L4 region of spine using a Hologic unit. The BMD average for the exam is 0.961  g/cm2. The T-score is -0.80 and the Z-score is 1.30. Since the previous similar exam of 06/04/2018, there has been a +0.053 or +5.8% change in the BMD value  which represents no significant interval change in bone density. This matches the [address] [facility] [employer]'s criteria for normal bone density and places the patient within normal limits of fracture risk. BMD change vs previous is -3.6% for the hips. BMD change vs previous is 5.9% for the spine. This is based on dissimilar scan types or analysis methods. IMPRESSION: OSTEOPENIA Patient is at medium risk for fracture. Recheck of BMD in 1-2 years and patient consult w/primary care provider are recommended. Damian Ford M.D., rd/herrera:6/25/2020 13:00:55  Imaging Technologist: Randall HOLGUIN)(LYNDA), [facility]      Mri Breast Bilateral W Wo Contrast    Result Date: 7/10/2020  #[id number] - MRI BREAST BILATERAL W WO CONTRAST BREAST MRI OF BOTH BREASTS : 7/8/2020 CLINICAL: Newly diagnosed invasive ductal carcinoma of left breast.  Interpretation of this MRI was correlated with available mammograms and ultrasounds.  Axial and coronal T2 STIR, sagittal T1, axial T1 fat saturated pre and post contrast dynamic imaging was performed of both breasts. Images were reviewed at a [employer] 3D workstation and time intensity curves were analyzed. The breast parenchyma is heterogeneously dense. There is mild background enhancement. There is a known biopsy-proven mass demonstrated within the upper  inner left breast anterior to middle depth. This measures 3 cm x  4.1 cm x 3.1 cm middle depth located 4  cm from the nipple, 0.9 cm from the skin, and 6.5 cm from the chest wall. This shows heterogeneous enhancement and delayed washout type vascular enhancement. The abnormality is best seen on the post contrast subtraction axial 56 slice. This correlates as palpated. This measures approximately 3.6 x 3.1 cm. There are scattered small foci of enhancement demonstrated bilaterally. The dominant focus of enhancement within the right breast is located within the lower inner right breast anterior depth on axial image 105 series 9. Recommend further evaluation with second look ultrasound. If there is no sonographic correlate, recommend 6 month follow-up breast MRI to document stability. Among the small foci of enhancement within the left breast, the most prominent is located within the lower outer quadrant posterior depth as seen on axial image 91 series 9. Recommend further evaluation with second look ultrasound. If there is no sonographic correlate, recommend 6 month follow-up breast MRI to document stability. Multiple rounded hyperintense T2 weighted lesions are demonstrated within the left upper quadrant on the coronal STIR series of images. This is seen on coronal series image 27. These correlate with previous redemonstrated splenules on prior CT examination from January 2016. No acute osseous findings are demonstrated. No lymphadenopathy is  seen. IMPRESSION: INCOMPLETE: NEEDS ADDITIONAL IMAGING 1. There is a known biopsy-proven mass demonstrated within the upper inner left breast anterior to middle depth.  2. There are scattered small foci of enhancement demonstrated bilaterally. The dominant focus of enhancement within the right breast is located within the lower inner right breast anterior depth on axial image 105 series 9. Recommend further evaluation with second look ultrasound. If there is no sonographic correlate, recommend 6 month follow-up breast MRI to document stability. 3. Among the small foci of enhancement within the left breast, the most prominent is located within the lower outer quadrant posterior depth as seen on axial image 91 series 9. Recommend further evaluation with second look ultrasound. If there is no sonographic correlate, recommend 6 month follow-up breast MRI to document stability. Recommend further evaluation with second look ultrasound. The patient has been or will be contacted. Tony Murillo M.D.  rd/:7/10/2020 00:25:50  copy to: Yo Campa MD, [facility], ph: [phone], fax: [phone] Imaging Technologist: Alphonso SANCHEZ(SHIRLENE)()[facility] letter sent: Additional Tests Needed  MRI BI-RADS: 0: Incomplete: Needs Additional Imaging   [id number]     [facility] Digital Diagnostic W Or Wo Cad Left    Result Date: 6/30/2020  IMPRESSION: Mammogram BI-RADS: Post Procedure Imaging for Marker Placement 1. There was a successful marker clip placement in the left breast upper inner aspect anterior depth. 2. There was a successful marker clip placement in the left breast upper inner aspect middle depth. 3. Of note, the biopsy lobulated masses by mammography measure in  aggregate approximately 4.3 x 3.2 x 2.5 cm in AP, transverse and  craniocaudal dimensions respectively. #[id number] - [facility] DIGITAL DIAGNOSTIC W OR WO CAD LEFT UNILATERAL LEFT DIGITAL DIAGNOSTIC MAMMOGRAM WITH CAD: 6/26/2020 CLINICAL: Post bx clip placement. Mass left breast UIQ anterior depth- barbell clip Mass left breastUIQ middle depth- tribell clip.   Comparison is made to exams dated:  6/26/2020 ultrasound biopsy, 6/26/2020 ultrasound biopsy, 6/26/2020 aspiration - [facility], and 6/18/2020 mammogram - [facility]. The tissue of the left breast is heterogeneously dense. This may lower the sensitivity of mammography. Current study was also evaluated with a Computer Aided Detection (CAD) system. There is a marker clip in the appropriate position in the left breast upper inner aspect anterior depth. This marker clip placement is at biopsy site. There also is a marker clip in the appropriate position in the left breast upper inner aspect middle depth. This marker clip placement is at biopsy site. Trudy Barker M.D.          rd/:6/30/2020 03:69:38  Imaging Technologist: Bhavin Liu RT(R)(LYNDA), [facility]      Us Breast Limited Left    Result Date: 7/13/2020  IMPRESSION: Ultrasound BI-RADS: 4B: Moderate suspicion for malignancy The 0.6 cm x 0.5 cm x 0.9 cm oval lesion in the left breast appears to have an intermediate suspicion for malignancy. An ultrasound guided biopsy is recommended. The Breast Care Navigator was notified. The results were reviewed with the patient. The patient has been or will be contacted. #[id number] - US BREAST LIMITED LEFT ULTRASOUND OF LEFT BREAST: 7/13/2020 CLINICAL: Abnormal Breast MRI, known left breast cancer. Comparison is made to exam dated:  7/8/2020 breast MRI - [facility]. Real-time ultrasound of the left breast was performed on the areas of interest.  Gray scale images of the real-time examination were reviewed. There is a 0.6 cm x 0.5 cm x 0.9 cm oval lesion with an irregular  margin in the left breast lower outer aspect posterior depth 7 cm from the nipple. This oval lesion is hypoechoic. This correlates with breast MRI findings. Dr. Jessica burgos/herrera:7/13/2020 10:19:42  copy to:  Magda Richmond MD, [facility], ph: [phone], fax: [phone] Imaging real-time examination were reviewed. No abnormalities were seen sonographically in the left axilla. There is a lobulated mass with irregular indistinct margins demonstrated within the upper medial left breast anterior depth measuring approximately 1.1 x 1.6 x 1.4 cm. There is an adjacent heterogeneous lobulated mass with both solid  and fluid components. This may represent a mass with adjacent hematoma. This measures approximately 2.7 x 1.9 x 2.3 cm. These masses are highly concerning for malignancy. Recommend biopsy of these 2 lesions along with aspiration of the fluid component of the larger lobulated mass. Wang Mariscal M.D.  rd/:6/24/2020 27:27:66  Imaging Technologist: Hallie Chung RT(R)(M), [id number] [employer] letter sent: Additional Tests Needed     [id number]     Us Breast Limited Right    Result Date: 7/13/2020  IMPRESSION: Ultrasound BI-RADS: 3: Probably Benign There is no ultrasonographic abnormality to correspond with the right breast MRI abnormality. Given the benign appearance on the  MRI, a 6 month follow up MRI is recommended. A follow-up breast MRI in 6 months is recommended to demonstrate stability. The patient has been or will be contacted. #[id number] - US BREAST LIMITED RIGHT ULTRASOUND OF RIGHT BREAST: 7/13/2020 CLINICAL: Abnormal MRI. Comparison is made to exam dated:  7/8/2020 breast MRI - [address] [address]. Real-time ultrasound of the right breast was performed on the areas of interest.  Gray scale images of the real-time examination were reviewed. There is no ultrasonographic abnormality to correspond with the right breast MRI abnormality. Dr. Nikki burgos/herrera:7/13/2020 10:17:19  copy to:  Paras Puga MD, . [facility], ph: [phone], fax: [phone] Imaging Technologist: Kenneth ELAINER)(M)RDMS, [id number] [facility] letter sent: 6 Month Followup Recommended     [id number]     [facility] Us Breast Cyst Aspiration Left    Result Date: 6/26/2020  IMPRESSION: ASPIRATION Aspiration of the fluid component of the lobulated mass located in the left breast anterior depth upper inner quadrant was successful. Post imaging revealed the abnormality to be smaller in size. #[id number] - [facility] US BREAST CYST ASPIRATION LEFT ASPIRATION LEFT BREAST: 6/26/2020 CLINICAL: Left breast mass with fluid UIQ anterior depth. Correlation is made to exam dated:  6/24/2020 ultrasound - [address] An aspiration was performed for the concerning fluid component of  the cystic/solid mass located in the left breast upper inner quadrant anterior depth. This was described on the previous ultrasound report. The skin was prepped in the usual manner. Local anesthetic was administered to the access site. The abnormality was approached from the medial aspect. A 18 gauge needle was percutaneously placed into the abnormality under guidance. Once the needle was documented to be in the correct location, 2 cc of bloody fluid was aspirated. A sterile dressing  was applied to the access site. As a separate procedure in a separate room with a dedicated machine, post procedure imaging demonstrates the abnormality to be smaller in size. The aspirated fluid was sent for cytological analysis. Karis Collet M.D.  rd/:6/26/2020 99:90:08  Imaging Technologist: Sary Young RT(R)(M), [address]    [id number]     [facility] Us Guided Breast Biopsy W Loc Device 1st Lesion Left    Result Date: 6/26/2020  THIS REPORT HAS BEEN AMENDED. AMENDMENT: 7/3/2020   Karis Collet, M.D. The pathology report has been reviewed, revealing INVASIVE DUCTAL  CARCINOMA. Surgical consultation and oncologic is recommended. IMPRESSION: ULTRASOUND GUIDED BIOPSY Ultrasound guided biopsy of the lesion in the left breast upper inner aspect anterior depth was successful. Waiting for pathology results. A final report will be issued when these become available.   #[id number] - [facility] GUID NDL BIOPSY LEFT ULTRASOUND GUIDED BIOPSY LEFT BREAST WITH MARKING DEVICE INSERTED: 6/26/2020 CLINICAL: Mass left breast UIQ anterior depth- Barbell clip. Correlation is made to exam dated:  6/24/2020 ultrasound - [address] An ultrasound guided biopsy using real-time ultrasound was performed for the concerning lobulated lesion located in the left  breast upper inner aspect anterior depth. This was described on  the previous ultrasound report. The skin was prepped in the usual manner. Local anesthetic was administered to the access site. A skin nick was made in the breast.  The abnormality was approached from the medial aspect. A 14 gauge biopsy needle was placed adjacent to the abnormality through an introducer device under ultrasound guidance. Once the needle was documented to be in the correct location, three specimens were obtained using [employer]. A barbell clip was inserted into the biopsy cavity. A skin closure strip and a sterile dressing were applied to the access site. As a separate procedure in a separate room with a dedicated machine, post procedure imaging demonstrates the clip at the targeted area. The specimens were sent to the laboratory for pathological analysis. Mignon Vicente M.D.  rd/pensneha:6/26/2020 12:27:19  Imaging Technologist: Sweetie To RT(R)(M), [address] [address]    [id number]     Us Breast Biopsy W Loc Device Each Addl Lesion Left    Result Date: 6/26/2020  THIS REPORT HAS BEEN AMENDED. AMENDMENT: 7/3/2020   Mignon Vicente M.D. The pathology report has been reviewed, revealing INVASIVE DUCTAL  CARCINOMA. Surgical consultation and oncologic is recommended. IMPRESSION: ULTRASOUND GUIDED BIOPSY Ultrasound guided biopsy of the lesion in the left breast upper inner aspect middle depth was successful. Waiting for pathology results. A final report will be issued when these become available.   #[id number] - US BREAST BIOPSY W LOC DEVICE EACH ADDL LESION LEFT ULTRASOUND GUIDED BIOPSY LEFT BREAST WITH MARKING DEVICE INSERTED: 6/26/2020 CLINICAL: Mass left breast UIQ middle depth- Tribell clip. Correlation is made to exam dated:  6/24/2020 ultrasound - [facility]. An ultrasound guided biopsy using real-time ultrasound was performed for the concerning oval lesion located in the left breast upper inner aspect middle depth. This was described on the previous ultrasound report. The skin was prepped in the usual manner. Local anesthetic was administered to the access site. A skin nick was made in the breast.  The abnormality was approached from the medial aspect. A 14 gauge biopsy needle was placed adjacent to the abnormality through an introducer device under ultrasound guidance. Once the needle was documented to be in the correct location, three specimens were obtained using Sertera. A tribell clip was inserted into the biopsy cavity. A skin closure strip and a sterile dressing were applied to the access site. As a separate procedure in a separate room with a dedicated machine, post procedure imaging demonstrates the clip at the targeted area. The specimens were sent to the laboratory for pathological analysis. Neva Zuniga M.D.  Earnest Gash 86:44:86  Imaging Technologist: Nam Awad RT(R)(M), [facility]    [id number] [place] [address] Digital Screen Self Referral W Or Wo Cad Bilateral    Result Date: 6/18/2020  IMPRESSION: Mammogram BI-RADS: 0: Incomplete: Needs Additional Imaging 1. The 2.5 cm oval equal density mass in the left breast lower inner aspect anterior depth appears indeterminate. An ultrasound  is recommended. 2. The 1.5 cm oval equal density mass in the left breast lower inner aspect middle depth appears indeterminate. An ultrasound is recommended. 3. The patient has been or will be contacted.   #[id number] - [facility] JIGNA DIGITAL SCREEN SELF REFERRAL W OR WO CAD BILATERAL BILATERAL DIGITAL SCREENING MAMMOGRAM 3D/2D WITH CAD: 6/18/2020 CLINICAL: Self referred screening mammogram. Tomosynthesis. Comparison is made to exams dated:  6/26/2019 mammogram and 6/4/2018 mammogram - [facility] Handy. The tissue of both breasts is heterogeneously dense. This may lower the sensitivity of mammography. Current study was also evaluated with a Computer Aided Detection (CAD) system. There are benign nodules both breasts. There also are benign scattered calcifications both breasts. Additionally, there are benign vascular calcifications right breast.  There is a 2.5 cm oval equal density mass with an obscured, circumscribed, and macrolobulated margin in the left breast lower  inner aspect anterior depth. This correlates as palpated. There also is a 1.5 cm oval equal density mass with an obscured and circumscribed margin in the left breast lower inner aspect middle depth. This correlates as palpated. No other significant masses, calcifications, or other findings are seen in either breast.  Riki Durand M.D.          pgk/:6/18/2020 13:05:06  Imaging Technologist: Rene SANCHEZ(SHIRLENE)(LYNDA), [facility] [address] [facility] letter sent: Additional Tests Needed  [id number]     Lab Results   Component Value Date    WBC 9.8 02/10/2017    HGB 14.7 07/08/2020    HCT 45.9 07/08/2020    MCV 90.3 02/10/2017     02/10/2017       Chemistry        Component Value Date/Time     02/10/2017 0608    K 4.2 02/10/2017 0608    CL 99 02/10/2017 0608    CO2 23 02/10/2017 0608    BUN 13 02/10/2017 0608    CREATININE 0.6 02/10/2017 0608        Component Value Date/Time    CALCIUM 9.5 02/10/2017 0608    ALKPHOS 64 02/10/2017 0608    AST 17 02/10/2017 0608    ALT 13 02/10/2017 0608    BILITOT 0.6 02/10/2017 0608            Assessment:   1. Newly diagnosed triple positive left breast cancer. Clinical stage IB (cT2, cN0, cM0, G3, ER+, DE+, HER2+). I presented her with information on the biology of Her-2 positive breast cancer.   Her2 positive BC tends to be more aggressive, however we have very effective treatments that significantly change prognosis of patients with HER-2 positive BC. Systemic chemotherapy is recommended for every patient with HER-2/jordan positive HER-2 more bigger than 5 mm in size. For tumors larger than 2 cm in size recommendation is to consider neoadjuvant chemotherapy. The patient was informed that outcome, defined as overall survival, is the same whether chemotherapy is given before or after breast surgery. Patients with early-stage breast cancer are appropriate candidates for neoadjuvant therapy if breast-conserving surgery is not possible due to a high tumor-to-breast ratio, or if the expected postoperative cosmetic outcome would be suboptimal due to tumor location. In addition, patients with Her2 positive BC may be offered neoadjuvant therapy since these patients would receive chemotherapy in their treatment course anyway, and Her2 positive BC is associated with a high likelihood of response. Additionally neoadjuvant chemotherapy provides a prognostic information. Patients who achieved complete pathologic response to neoadjuvant chemotherapy have improved outcome. For those who still have residual cancer after neoadjuvant chemotherapy, the new recommendation is to proceed with 1 year of adjuvant TDM 1. The chemotherapy schedule, possible side effects were reviewed with the patient. Standard neoadjuvant therapy for patients with HER2-positive disease consists of chemotherapy and HER2-directed therapy, specifically trastuzumab and pertuzumab. Taxotere/Carbo/Herceptin/Pertuzumab is preferred regimen given that it avoids the risks and toxicities associated with anthracyclines. TCHP is given IV every three weeks for six cycles. In preparation for systemic chemotherapy the patient will need 2D echo and my recommendation is to place the port.   The patient is scheduled to have ultrasound of the breast to clarify and finding on breast MRI and possibly biopsy. .       Diagnosis Orders   1. Malignant neoplasm of upper-inner quadrant of left breast in female, estrogen receptor positive ([facility] [address]  Echocardiogram transthoracic   2. Encounter for chemotherapy management  Echocardiogram transthoracic        Plan:   Return in about 13 days (around 7/27/2020). Orders Placed:   Orders Placed This Encounter   Procedures    Echocardiogram transthoracic     Standing Status:   Future     Standing Expiration Date:   7/14/2021     Order Specific Question:   Reason for exam:     Answer:   cardiotoxic chemo        Medications Prescribed:   No orders of the defined types were placed in this encounter. I spent 60 minutes face-to-face with the patient and her family. Greater than 50% of time was spent on counseling and coordinating her care. Face to face counseling included prognosis, risks, benefits of treatment, compliance and risk reduction.        Electronically signed by Salomon Coughlin MD on 7/14/20 at [address] [place] [address] EDT

## 2020-07-14 NOTE — TELEPHONE ENCOUNTER
Met with patient to discuss financial navigation program. Patient may need breast cancer rafael to cover cost of chemotherapy. Will keep watching to see if any grants become available. Patient will call [phone] if she has any questions or needs further financial assistance.

## 2020-07-14 NOTE — PATIENT INSTRUCTIONS
2 D ECHO before 07/27/2020  2. Chemo teaching before RTC  3. RTC on July 27, 2020 for labs: CBC, BMP, LFTs and to start treatment with Taxotere/carbo/Herceptin/Pertuzumab    Patient Education        docetaxel  Pronunciation:  helen crow TAX el  Brand:  Taxotere  What is the most important information I should know about docetaxel? Docetaxel can cause severe side effects including death, especially if you receive high doses, if you have liver disease, or if you have non-small cell lung cancer and you have been treated in the past with chemotherapy that contains platinum (cisplatin, carboplatin, oxaliplatin). You may have a life-threatening allergic reaction. Get emergency medical help if you have hives, a red skin rash, trouble breathing, or swelling of your face, lips, tongue, or throat. You may have swelling in your intestines, which could cause death quickly. Call your doctor right away if you have stomach pain or tenderness, diarrhea, or fever. You will be given steroid medication to help prevent fluid retention. Tell your doctor if you have swelling in your lower legs, rapid weight gain, or shortness of breath. What is docetaxel? Docetaxel is used to treat breast cancer, lung cancer, prostate cancer, stomach cancer, and head/neck cancer. Docetaxel may also be used for purposes not listed in this medication guide. What should I discuss with my healthcare provider before receiving docetaxel? You should not receive this medicine if you have:  · a low white blood cell (WBC) count; or  · a history of severe allergic reaction to docetaxel or to any medicine that contains polysorbate 80. Tell your doctor about your complete health history and all medications you have used.  Docetaxel can cause severe side effects including death, especially:  · if you receive high doses;  · if you have liver disease; or  · if you have non-small cell lung cancer and you have been treated in the past with chemotherapy that contains platinum (cisplatin, carboplatin, oxaliplatin). Tell your doctor if you have ever had:  · liver disease;  · heart disease;  · fluid retention; or  · if you need to limit your alcohol intake. Using docetaxel may increase your risk of developing other types of cancer, such as leukemia. This risk may continue for several months or years after you have received docetaxel. Ask your doctor about your specific risk. Docetaxel can harm an unborn baby or cause birth defects  if the mother or the father is using this medicine. · If you are a woman, do not use docetaxel if you are pregnant. You may need to have a negative pregnancy test before starting this treatment. Use effective birth control to prevent pregnancy while you are using this medicine and for at least 6 months after your last dose. · If you are a man, use effective birth control if your sex partner is able to get pregnant. Keep using birth control for at least 3 months after your last dose. · Tell your doctor right away if a pregnancy occurs while either the mother or the father is using docetaxel. Do not breastfeed while using this medicine,  and for at least 1 week after your last dose. How is docetaxel given? Docetaxel is given as an infusion into a vein. A healthcare provider will give you this injection. Docetaxel can be harmful if it gets on your skin during an IV infusion. If this happens, wash right away with soap and water. Tell your caregivers if you feel any burning, pain, or swelling around the IV needle when docetaxel is injected. You may need frequent medical tests to be sure this medicine is not causing harmful effects. Your cancer treatments may be delayed based on the results. Your vision may also need to be checked. You will be given steroid medication to help prevent fluid retention. Keep using the steroid for as long as your doctor has prescribed. What happens if I miss a dose?   Call your doctor for instructions if you miss an appointment for your docetaxel injection, or if you miss a dose of your steroid medication. What happens if I overdose? Seek emergency medical attention or call the Poison Help line at [phone]. What should I avoid while receiving docetaxel? Avoid driving or hazardous activity until you know how this medicine will affect you. Your reactions could be impaired. Avoid being near people who are sick or have infections. Tell your doctor at once if you develop signs of infection. Avoid activities that may increase your risk of bleeding or injury. Use extra care to prevent bleeding while shaving or brushing your teeth. Docetaxel contains alcohol and may cause a drunken feeling when the medicine is injected into your vein. Avoid drinking alcohol on the day of your docetaxel injection. This medicine can pass into body fluids (urine, feces, vomit). For at least 48 hours after you receive a dose, avoid allowing your body fluids to come into contact with your hands or other surfaces. Caregivers should wear rubber gloves while cleaning up a patient's body fluids, handling contaminated trash or laundry or changing diapers. Wash hands before and after removing gloves. Wash soiled clothing and linens separately from other laundry. What are the possible side effects of docetaxel? Get emergency medical help if you have signs of an allergic reaction:  hives, red skin rash; wheezing, chest tightness, trouble breathing; feeling like you might pass out; swelling of your face, lips, tongue, or throat. You may have swelling in your intestines, which could cause death quickly. Call your doctor right away if you have stomach pain or tenderness, diarrhea, or fever.   Also call your doctor at once if you have:  · pain, burning, irritation, or skin changes where the injection was given;  · sudden vision problems including blurred vision or loss of vision;  · redness or peeling of the skin on your arms or legs;  · numbness, burning, or tingling in your hands or feet;  · muscle weakness in your arms, legs, feet, or hands;  · a feeling of being drunk --confusion, stumbling, extreme drowsiness;  · fluid retention --swelling in your lower legs, rapid weight gain, shortness of breath;  · liver problems --upper stomach pain, loss of appetite, dark urine, raphael-colored stools, jaundice (yellowing of the skin or eyes); or  · low blood cell counts --fever, chills, tiredness, mouth sores, skin sores, easy bruising, unusual bleeding, pale skin, cold hands and feet, feeling light-headed or short of breath. Side effects may be more likely in older adults. Common side effects may include:  · allergic reactions;  · swelling, warmth, tenderness, redness, dryness or darkened skin where the injection was given;  · low blood cell counts, infections;  · mouth sores, altered sense of taste;  · nausea, vomiting, loss of appetite;  · constipation, diarrhea;  · feeling short of breath;  · eye redness, watery eyes;  · feeling weak or tired;  · swelling in your hands, feet, or face;  · muscle or joint pain;  · hair loss (may be permanent in some cases); or  · fingernail or toenail changes. This is not a complete list of side effects and others may occur. Call your doctor for medical advice about side effects. You may report side effects to FDA at 5-691-FDA-8008. What other drugs will affect docetaxel? Docetaxel contains alcohol. Using other drugs that can make you sleepy can worsen the feeling of being drunk. Ask your doctor before using opioid medication, a sleeping pill, a muscle relaxer, or medicine for anxiety or seizures. Other drugs may affect docetaxel, including prescription and over-the-counter medicines, vitamins, and herbal products. Tell your doctor about all your current medicines and any medicine you start or stop using. Where can I get more information?   Your doctor or pharmacist can provide more information about docetaxel. Remember, keep this and all other medicines out of the reach of children, never share your medicines with others, and use this medication only for the indication prescribed. Every effort has been made to ensure that the information provided by [address] is accurate, up-to-date, and complete, but no guarantee is made to that effect. Drug information contained herein may be time sensitive. [facility] information has been compiled for use by healthcare practitioners and consumers in the [place] and therefore [facility] does not warrant that uses outside of the [place] are appropriate, unless specifically indicated otherwise. [facility]'s drug information does not endorse drugs, diagnose patients or recommend therapy. [facility]'s drug information is an informational resource designed to assist licensed healthcare practitioners in caring for their patients and/or to serve consumers viewing this service as a supplement to, and not a substitute for, the expertise, skill, knowledge and judgment of healthcare practitioners. The absence of a warning for a given drug or drug combination in no way should be construed to indicate that the drug or drug combination is safe, effective or appropriate for any given patient. [facility] does not assume any responsibility for any aspect of healthcare administered with the aid of information [facility] provides. The information contained herein is not intended to cover all possible uses, directions, precautions, warnings, drug interactions, allergic reactions, or adverse effects. If you have questions about the drugs you are taking, check with your doctor, nurse or pharmacist.  Copyright 1645-9134 [address]: 18.01. Revision date: 8/12/2019. Care instructions adapted under license by [facility] ([facility]).  If you have questions about a medical condition or this instruction, always ask your healthcare professional. [address] any pregnant during treatment. It is not known whether carboplatin passes into breast milk or if it could harm a nursing baby. You should not breast-feed while being treated with carboplatin. How is carboplatin given? Carboplatin is injected into a vein through an IV. You will receive this injection in a clinic or hospital setting. Carboplatin is usually given once every 4 weeks. Follow your doctor's instructions. You may be given other medications to prevent nausea or vomiting while you are receiving carboplatin. Carboplatin can lower blood cells that help your body fight infections and help your blood to clot. This can make it easier for you to bleed from an injury or get sick from being around others who are ill. Your blood may need to be tested often. Your kidney and liver function may also need to be tested. You may need to receive blood transfusions while you are being treated with carboplatin. What happens if I miss a dose? Call your doctor if you miss an appointment for your carboplatin injection. What happens if I overdose? Seek emergency medical attention or call the [facility] Help line at [phone]. What should I avoid while using carboplatin? Avoid being near people who are sick or have infections. Tell your doctor at once if you develop signs of infection. Carboplatin can cause side effects that may impair your vision. Be careful if you drive or do anything that requires you to be able to see clearly. This medicine can pass into body fluids (urine, feces, vomit). For at least 48 hours after you receive a dose, avoid allowing your body fluids to come into contact with your hands or other surfaces. Caregivers should wear rubber gloves while cleaning up a patient's body fluids, handling contaminated trash or laundry or changing diapers. Wash hands before and after removing gloves. Wash soiled clothing and linens separately from other laundry.   What are the possible side effects of carboplatin? Get emergency medical help if you have signs of an allergic reaction:  hives; difficulty breathing; swelling of your face, lips, tongue, or throat. Call your doctor at once if you have:  · pale skin, feeling light-headed or short of breath, rapid heart rate, trouble concentrating;  · easy bruising, unusual bleeding (nose, mouth, vagina, or rectum), purple or red pinpoint spots under your skin;  · fever, chills, body aches, flu symptoms, sores in your mouth and throat;  · severe or ongoing vomiting;  · stomach pain, dark urine, raphael-colored stools, jaundice (yellowing of the skin or eyes);  · numbness or tingly feeling in your hands or feet;  · hearing or vision problems;  · skin changes where the medicine was injected; or  · low magnesium (confusion, uneven heart rate, jerking muscle movements, muscle weakness or limp feeling). Common side effects may include:  · nausea, vomiting, loss of appetite;  · tired feeling;  · temporary hair loss; or  · pain, swelling or redness where the medicine was injected. This is not a complete list of side effects and others may occur. Call your doctor for medical advice about side effects. You may report side effects to FDA at 5-900-FDA-2087. What other drugs will affect carboplatin? Carboplatin can harm your kidneys. This effect is increased when you also use certain other medicines, including: antivirals, chemotherapy, injected antibiotics, medicine for bowel disorders, medicine to prevent organ transplant rejection, and some pain or arthritis medicines (including aspirin, Tylenol, Advil, and Aleve). This list is not complete. Other drugs may interact with carboplatin, including prescription and over-the-counter medicines, vitamins, and herbal products. Not all possible interactions are listed in this medication guide. Where can I get more information? Your doctor or pharmacist can provide more information about carboplatin.   Remember, keep this and all other information. Patient Education        pertuzumab  Pronunciation:  per TOO zoo mab  Brand:  Jacque  What is the most important information I should know about pertuzumab? Pertuzumab can cause birth defects or death to the unborn baby. Do not use if you are pregnant. Avoid getting pregnant while using this medicine and for at least 7 months after your last dose. Pertuzumab can cause life-threatening heart problems. Call your doctor at once if you have new or worsening cough, shortness of breath, or swelling in your lower legs. What is pertuzumab? Pertuzumab is used together with other medicines (trastuzumab and docetaxel) to treat HER2-positive breast cancer. Pertuzumab may also be used for purposes not listed in this medication guide. What should I discuss with my healthcare provider before receiving pertuzumab? You should not use pertuzumab if you are allergic to it, or if you are pregnant or breast-feeding. Tell your doctor if you have ever had:  · heart disease, congestive heart failure, heart rhythm disorder;  · untreated or uncontrolled high blood pressure (hypertension);  · a heart attack; or  · radiation treatment in your chest area. Do not use pertuzumab if you are pregnant. It can cause birth defects or death to the unborn baby. Use effective birth control to prevent pregnancy while you are using pertuzumab with trastuzumab, and for at least 7 months after your treatment ends. Tell your doctor right away if you become pregnant. If you are pregnant, your name may be listed on a pregnancy registry to track the effects of pertuzumab on the baby. Do not breast-feed while you are using pertuzumab. How is pertuzumab given? Pertuzumab is given as an infusion into a vein. A healthcare provider will give you this injection. Pertuzumab is usually given once every 3 weeks. Follow your doctor's instructions. Pertuzumab can cause life-threatening heart problems.  Your heart function may need to be checked before you start using pertuzumab and during your treatment. What happens if I miss a dose? Call your doctor for instructions if you miss an appointment for your pertuzumab injection. What happens if I overdose? Since this medication is given by a healthcare professional in a medical setting, an overdose is unlikely to occur. What should I avoid while receiving pertuzumab? This medicine can pass into body fluids (urine, feces, vomit). For at least 48 hours after you receive a dose, avoid allowing your body fluids to come into contact with your hands or other surfaces. Caregivers should wear rubber gloves while cleaning up a patient's body fluids, handling contaminated trash or laundry or changing diapers. Wash hands before and after removing gloves. Wash soiled clothing and linens separately from other laundry. What are the possible side effects of pertuzumab? Get emergency medical help if you have signs of an allergic reaction: hives; difficult breathing; swelling of your face, lips, tongue, or throat. Some side effects may occur during the injection. Tell your caregiver right away if you feel weak, tired, or nauseated, or if you have a fast heartbeat, headache, fever, chills, muscle pain, or an unusual taste in your mouth. Call your doctor at once if you have:  · dizziness, pounding heartbeats or fluttering in your chest;  · new or worsening cough or shortness of breath;  · swelling in your lower legs;  · low blood cell counts --fever, chills, mouth sores, skin sores, easy bruising, unusual bleeding, pale skin, cold hands and feet, feeling light-headed; or  · signs of tumor cell breakdown --confusion, weakness, muscle cramps, nausea, vomiting, fast or slow heart rate, decreased urination, tingling in your hands and feet or around your mouth. Your cancer treatments may be delayed or permanently discontinued if you have certain side effects.   Common side effects are more likely to occur, such as:  · nausea, diarrhea;  · temporary hair loss;  · fever, low blood cell counts;  · tiredness;  · rash; or  · numbness, tingling, or burning pain in your hands or feet. This is not a complete list of side effects and others may occur. Call your doctor for medical advice about side effects. You may report side effects to FDA at 8-153-LPX-2169. What other drugs will affect pertuzumab? Other drugs may affect pertuzumab, including prescription and over-the-counter medicines, vitamins, and herbal products. Tell your doctor about all your current medicines and any medicine you start or stop using. Where can I get more information? Your doctor or pharmacist can provide more information about pertuzumab. Remember, keep this and all other medicines out of the reach of children, never share your medicines with others, and use this medication only for the indication prescribed. Every effort has been made to ensure that the information provided by [address] is accurate, up-to-date, and complete, but no guarantee is made to that effect. Drug information contained herein may be time sensitive. [employer] information has been compiled for use by healthcare practitioners and consumers in the [place] and therefore [employer] does not warrant that uses outside of the [place] are appropriate, unless specifically indicated otherwise. [employer] drug information does not endorse drugs, diagnose patients or recommend therapy. [employer] drug information is an informational resource designed to assist licensed healthcare practitioners in caring for their patients and/or to serve consumers viewing this service as a supplement to, and not a substitute for, the expertise, skill, knowledge and judgment of healthcare practitioners.  The absence of a warning for a given drug or drug combination in no way should be construed to indicate that the drug or drug combination is safe, effective or appropriate for any given patient. [facility] does not assume any responsibility for any aspect of healthcare administered with the aid of information [facility] provides. The information contained herein is not intended to cover all possible uses, directions, precautions, warnings, drug interactions, allergic reactions, or adverse effects. If you have questions about the drugs you are taking, check with your doctor, nurse or pharmacist.  Copyright 6472-6587 [address] [place]. Version: 4.01. Revision date: 10/16/2018. Care instructions adapted under license by [facility] ([facility]). If you have questions about a medical condition or this instruction, always ask your healthcare professional. [address] any warranty or liability for your use of this information.

## 2020-07-16 ENCOUNTER — TELEPHONE (OUTPATIENT)
Dept: SURGERY | Age: 70
End: 2020-07-16

## 2020-07-16 ENCOUNTER — TELEPHONE (OUTPATIENT)
Dept: SPIRITUAL SERVICES | Facility: CLINIC | Age: 70
End: 2020-07-16

## 2020-07-16 ENCOUNTER — HOSPITAL ENCOUNTER (OUTPATIENT)
Dept: WOMENS IMAGING | Age: 70
Discharge: HOME | End: 2020-07-16
Payer: MEDICARE

## 2020-07-16 PROCEDURE — 88341 IMHCHEM/IMCYTCHM EA ADD ANTB: CPT

## 2020-07-16 PROCEDURE — 88305 TISSUE EXAM BY PATHOLOGIST: CPT

## 2020-07-16 PROCEDURE — 77065 DX MAMMO INCL CAD UNI: CPT

## 2020-07-16 PROCEDURE — 88342 IMHCHEM/IMCYTCHM 1ST ANTB: CPT

## 2020-07-16 PROCEDURE — 88360 TUMOR IMMUNOHISTOCHEM/MANUAL: CPT

## 2020-07-16 PROCEDURE — C1894 INTRO/SHEATH, NON-LASER: HCPCS

## 2020-07-16 NOTE — PROGRESS NOTES
The patient was counseled at length about the risks of ema Covid-19 during their perioperative period and any recovery window from their procedure. The patient was made aware that ema Covid-19  may worsen their prognosis for recovering from their procedure  and lend to a higher morbidity and/or mortality risk. All material risks, benefits, and reasonable alternatives including postponing the procedure were discussed. The patient does wish to proceed with the procedure at this time. Formulation and discussion of sedation / procedure plans, risks, benefits, side effects and alternatives with patient and/or responsible adult completed.     Electronically signed by Sho Ray MD on 7/16/2020 at 12:28 PM

## 2020-07-16 NOTE — PROGRESS NOTES
[facility] [address]  Pre-Biopsy Assessment      Patient Education    Written information about procedure Yes  left   Procedural steps explained Yes Ultrasound Biopsy   Post-op potential: bruising, hematoma, pain Yes    Self-care: activity, care of dressing No    Patient verbalized understanding Yes    Consent signed and witnessed Yes      Hormone Therapy Status: hx of 2 years usage, stopped at age 47    Recent Medication: Aspirin Last Dose: yesterday                                     Hormone Replacement Therapy: no    Previous Breast Biopsy: yes - recent diagnosis of breast cancer    Previous Diagnosis Cancer: no    Hysterectomy:yes - age 59 for abn cells    Emotional Status: Calm    Language or Physical Barriers: none    Comments: none      Electronically signed by Román Bull on 7/16/2020 at 12:22 PM

## 2020-07-16 NOTE — TELEPHONE ENCOUNTER
Spoke to Phillip Leos she is having a port insertion done tomorrow, no mastectomy. I told her to arrive to the hospital at 11:15 & reminded her to be npo after midnight.  Phillip Leos will sign the consent on arrival.

## 2020-07-16 NOTE — TELEPHONE ENCOUNTER
As a follow up to a referral made by Leo Galeazzi at the [facility]. [employer], for spiritual care, a phone call was made to Leela Frazier. No one answered the call, but a message was left on pt's phone machine. Follow up spiritual care. Another call will be made later.

## 2020-07-16 NOTE — PROGRESS NOTES
Breast Biopsy Flowsheet/Post-Operative Care    Date of Procedure: 7/16/2020  Physician: Dr. Zabrina Hale   Technologist: Breanna Otto RT(R)(M)    Biopsy:ultrasound guided breast biopsy  Lesion type: Non-palpable  Breast: left    Clock face position: Site #1: LOQ        Primary Method of Detection: MRI      Microcalcification's: no   Distribution:n/a    Asymmetry: asymmetric    Biopsy Method:   Sertera:    Site # 1    Gauge: 14    # of Passes: 4     Clip: U      Pre-Op Assessment: (BI-RADS)   4. Suspicious Abnormality    Patient Tolerated Procedure: good  Complications: n/a  Comments: n/a    Post Operative Care  Steri strips: Yes  Dressing: Gauze, Tape   Ice Applied to Site:  Yes  Evidence of Bleeding:  No    Pain Verbalized: No      Written Discharge Instructions: Yes  Condition at Discharge: good  Time of Discharge: 1300    Electronically signed by Hue Romero on 7/16/2020 at 1:13 PM

## 2020-07-17 ENCOUNTER — HOSPITAL ENCOUNTER (OUTPATIENT)
Age: 70
Setting detail: OUTPATIENT SURGERY
Discharge: HOME | End: 2020-07-17
Attending: SURGERY | Admitting: SURGERY
Payer: MEDICARE

## 2020-07-17 ENCOUNTER — ANESTHESIA (OUTPATIENT)
Dept: OPERATING ROOM | Age: 70
End: 2020-07-17
Payer: MEDICARE

## 2020-07-17 ENCOUNTER — CLINICAL DOCUMENTATION (OUTPATIENT)
Dept: WOMENS IMAGING | Age: 70
End: 2020-07-17

## 2020-07-17 ENCOUNTER — APPOINTMENT (OUTPATIENT)
Dept: GENERAL RADIOLOGY | Age: 70
End: 2020-07-17
Attending: SURGERY
Payer: MEDICARE

## 2020-07-17 ENCOUNTER — ANESTHESIA EVENT (OUTPATIENT)
Dept: OPERATING ROOM | Age: 70
End: 2020-07-17
Payer: MEDICARE

## 2020-07-17 VITALS
OXYGEN SATURATION: 93 % | SYSTOLIC BLOOD PRESSURE: 161 MMHG | WEIGHT: 226 LBS | HEART RATE: 74 BPM | RESPIRATION RATE: 16 BRPM | BODY MASS INDEX: 34.25 KG/M2 | DIASTOLIC BLOOD PRESSURE: 77 MMHG | HEIGHT: 68 IN | TEMPERATURE: 98.3 F

## 2020-07-17 VITALS — DIASTOLIC BLOOD PRESSURE: 70 MMHG | OXYGEN SATURATION: 96 % | SYSTOLIC BLOOD PRESSURE: 149 MMHG

## 2020-07-17 PROCEDURE — 36561 INSERT TUNNELED CV CATH: CPT | Performed by: SURGERY

## 2020-07-17 PROCEDURE — 71045 X-RAY EXAM CHEST 1 VIEW: CPT

## 2020-07-17 PROCEDURE — C1788 PORT, INDWELLING, IMP: HCPCS | Performed by: SURGERY

## 2020-07-17 PROCEDURE — 2580000003 HC RX 258: Performed by: SURGERY

## 2020-07-17 PROCEDURE — 6360000002 HC RX W HCPCS: Performed by: NURSE ANESTHETIST, CERTIFIED REGISTERED

## 2020-07-17 PROCEDURE — 3600000002 HC SURGERY LEVEL 2 BASE: Performed by: SURGERY

## 2020-07-17 PROCEDURE — 7100000011 HC PHASE II RECOVERY - ADDTL 15 MIN: Performed by: SURGERY

## 2020-07-17 PROCEDURE — 77001 FLUOROGUIDE FOR VEIN DEVICE: CPT

## 2020-07-17 PROCEDURE — 7100000010 HC PHASE II RECOVERY - FIRST 15 MIN: Performed by: SURGERY

## 2020-07-17 PROCEDURE — 2709999900 HC NON-CHARGEABLE SUPPLY: Performed by: SURGERY

## 2020-07-17 PROCEDURE — 6360000002 HC RX W HCPCS: Performed by: SURGERY

## 2020-07-17 PROCEDURE — 77001 FLUOROGUIDE FOR VEIN DEVICE: CPT | Performed by: SURGERY

## 2020-07-17 PROCEDURE — 3700000000 HC ANESTHESIA ATTENDED CARE: Performed by: SURGERY

## 2020-07-17 PROCEDURE — 2500000003 HC RX 250 WO HCPCS: Performed by: NURSE ANESTHETIST, CERTIFIED REGISTERED

## 2020-07-17 PROCEDURE — 3600000012 HC SURGERY LEVEL 2 ADDTL 15MIN: Performed by: SURGERY

## 2020-07-17 PROCEDURE — 3700000001 HC ADD 15 MINUTES (ANESTHESIA): Performed by: SURGERY

## 2020-07-17 PROCEDURE — 2500000003 HC RX 250 WO HCPCS: Performed by: SURGERY

## 2020-07-17 DEVICE — PORT INFUS SGL LUMN ATTCH POLYUR OPN END CATH 8FR POWERPRT: Type: IMPLANTABLE DEVICE | Site: CHEST | Status: FUNCTIONAL

## 2020-07-17 RX ORDER — OXYCODONE HYDROCHLORIDE AND ACETAMINOPHEN 5; 325 MG/1; MG/1
1 TABLET ORAL EVERY 6 HOURS PRN
Qty: 12 TABLET | Refills: 0 | Status: SHIPPED | OUTPATIENT
Start: 2020-07-17 | End: 2020-07-20

## 2020-07-17 RX ORDER — FENTANYL CITRATE 50 UG/ML
INJECTION, SOLUTION INTRAMUSCULAR; INTRAVENOUS PRN
Status: DISCONTINUED | OUTPATIENT
Start: 2020-07-17 | End: 2020-07-17 | Stop reason: SDUPTHER

## 2020-07-17 RX ORDER — CEFAZOLIN SODIUM 1 G/3ML
INJECTION, POWDER, FOR SOLUTION INTRAMUSCULAR; INTRAVENOUS PRN
Status: DISCONTINUED | OUTPATIENT
Start: 2020-07-17 | End: 2020-07-17 | Stop reason: SDUPTHER

## 2020-07-17 RX ORDER — LIDOCAINE HCL/PF 100 MG/5ML
SYRINGE (ML) INJECTION PRN
Status: DISCONTINUED | OUTPATIENT
Start: 2020-07-17 | End: 2020-07-17 | Stop reason: SDUPTHER

## 2020-07-17 RX ORDER — SODIUM CHLORIDE 9 MG/ML
INJECTION, SOLUTION INTRAVENOUS CONTINUOUS
Status: DISCONTINUED | OUTPATIENT
Start: 2020-07-17 | End: 2020-07-17 | Stop reason: HOSPADM

## 2020-07-17 RX ORDER — MIDAZOLAM HYDROCHLORIDE 1 MG/ML
INJECTION INTRAMUSCULAR; INTRAVENOUS PRN
Status: DISCONTINUED | OUTPATIENT
Start: 2020-07-17 | End: 2020-07-17 | Stop reason: SDUPTHER

## 2020-07-17 RX ORDER — HEPARIN SODIUM (PORCINE) LOCK FLUSH IV SOLN 100 UNIT/ML 100 UNIT/ML
SOLUTION INTRAVENOUS PRN
Status: DISCONTINUED | OUTPATIENT
Start: 2020-07-17 | End: 2020-07-17 | Stop reason: ALTCHOICE

## 2020-07-17 RX ORDER — LIDOCAINE HYDROCHLORIDE AND EPINEPHRINE 10; 10 MG/ML; UG/ML
INJECTION, SOLUTION INFILTRATION; PERINEURAL PRN
Status: DISCONTINUED | OUTPATIENT
Start: 2020-07-17 | End: 2020-07-17 | Stop reason: ALTCHOICE

## 2020-07-17 RX ORDER — PROPOFOL 10 MG/ML
INJECTION, EMULSION INTRAVENOUS PRN
Status: DISCONTINUED | OUTPATIENT
Start: 2020-07-17 | End: 2020-07-17 | Stop reason: SDUPTHER

## 2020-07-17 RX ADMIN — MIDAZOLAM HYDROCHLORIDE 0.5 MG: 1 INJECTION, SOLUTION INTRAMUSCULAR; INTRAVENOUS at 12:28

## 2020-07-17 RX ADMIN — MIDAZOLAM HYDROCHLORIDE 1 MG: 1 INJECTION, SOLUTION INTRAMUSCULAR; INTRAVENOUS at 12:20

## 2020-07-17 RX ADMIN — FENTANYL CITRATE 25 MCG: 50 INJECTION, SOLUTION INTRAMUSCULAR; INTRAVENOUS at 12:25

## 2020-07-17 RX ADMIN — Medication 50 MG: at 12:22

## 2020-07-17 RX ADMIN — CEFAZOLIN 1000 MG: 1 INJECTION, POWDER, FOR SOLUTION INTRAMUSCULAR; INTRAVENOUS; PARENTERAL at 12:30

## 2020-07-17 RX ADMIN — SODIUM CHLORIDE: 9 INJECTION, SOLUTION INTRAVENOUS at 11:55

## 2020-07-17 RX ADMIN — PROPOFOL 20 MG: 10 INJECTION, EMULSION INTRAVENOUS at 12:31

## 2020-07-17 RX ADMIN — FENTANYL CITRATE 50 MCG: 50 INJECTION, SOLUTION INTRAMUSCULAR; INTRAVENOUS at 12:21

## 2020-07-17 RX ADMIN — PROPOFOL 30 MG: 10 INJECTION, EMULSION INTRAVENOUS at 12:29

## 2020-07-17 ASSESSMENT — PULMONARY FUNCTION TESTS
PIF_VALUE: 0
PIF_VALUE: 1
PIF_VALUE: 0
PIF_VALUE: 1
PIF_VALUE: 0
PIF_VALUE: 2
PIF_VALUE: 0
PIF_VALUE: 1
PIF_VALUE: 0
PIF_VALUE: 1

## 2020-07-17 ASSESSMENT — PAIN SCALES - GENERAL
PAINLEVEL_OUTOF10: 0

## 2020-07-17 NOTE — PROGRESS NOTES
Contact Type :    Telephone  Notes :  After consulting with Dr. Monet Barker,  Benjamin Kwan was contacted by telephone. She was informed of the biopsy results from 7/16/2020. She was informed that Dr. Fay Barrientos, her nurse navigator, and Orion Garcia all have the results as well. She voiced understanding. She states she feels good and does not have any problems with her biopsy site.

## 2020-07-17 NOTE — ANESTHESIA PRE PROCEDURE
Department of Anesthesiology  Preprocedure Note       Name:  Donnell Bronson   Age:  79 y.o.  :  1950                                          MRN:  [id number]         Date:  2020      Surgeon: Jose Sheikh):  Rosaura Mota MD    Procedure: Procedure(s):  SINGLE LUMEN SMART PORT INSERTION RIGHT SUBCLAVIAN    Medications prior to admission:   Prior to Admission medications    Medication Sig Start Date End Date Taking? Authorizing Provider   levothyroxine (SYNTHROID) 25 MCG tablet Take 25 mcg by mouth Daily    Historical Provider, MD   alendronate (FOSAMAX) 35 MG tablet Take 35 mg by mouth every 7 days    Historical Provider, MD   aspirin EC 81 MG EC tablet Take 1 tablet by mouth daily 17   Gregoria Martinez MD   ondansetron (ZOFRAN ODT) 4 MG disintegrating tablet Take 1 tablet by mouth every 8 hours as needed for Nausea  Patient not taking: Reported on 2020   Karine Hines MD   PARoxetine (PAXIL) 10 MG tablet Take 10 mg by mouth every morning    Historical Provider, MD   Ascorbic Acid (VITAMIN C) 500 MG tablet Take 500 mg by mouth daily    Historical Provider, MD   Coenzyme Q10-Fish Oil-Vit E (CO-Q 10 OMEGA-3 FISH OIL PO) Take by mouth daily    Historical Provider, MD   Vitamin D (CHOLECALCIFEROL) 1000 UNITS CAPS capsule Take by mouth daily 2 tab    Historical Provider, MD   Cinnamon 500 MG CAPS Take by mouth daily    Historical Provider, MD   Cyanocobalamin (VITAMIN B12 PO) Take 1,000 mcg by mouth daily     Historical Provider, MD       Current medications:    Current Facility-Administered Medications   Medication Dose Route Frequency Provider Last Rate Last Dose    0.9 % sodium chloride infusion   Intravenous Continuous Rosaura Mota MD           Allergies:     Allergies   Allergen Reactions    Aluminum-Containing Compounds Anaphylaxis       Problem List:    Patient Active Problem List   Diagnosis Code    Left leg numbness R20.0    Mental status alteration R41.82    Cognitive impairment  judgement  recent memory R41.89    Depression F32.9    Vitamin D deficiency E55.9    Altered mental status R41.82    Malignant neoplasm of upper-inner quadrant of left breast in female, estrogen receptor positive ([facility] C50.212, Z17.0       Past Medical History:        Diagnosis Date    Breast cancer ([facility] 2020    left-invasive ductal    Hyperlipidemia     Numbness and tingling     left foot-chronic nerve damage       Past Surgical History:        Procedure Laterality Date    ABDOMINAL EXPLORATION SURGERY  2014    Dr butterfield-lysis of adhesions    BREAST SURGERY Left 06/26/2020    biopsy of left breast    CHOLECYSTECTOMY  10/2014    Dr Baldwin Bullion  03/30/2016    Dr Michaela Fernandez  10/2014    x4 abdominal wall    HYSTERECTOMY      INCISIONAL HERNIA REPAIR  2014    Dr Helen Torrez LIPECTOMY  2014    Dr Pyhllis Hoffman LEFT  7/16/2020    [address] LEFT 7/16/2020 MD Michael Bass 51  [facility]      patient was 11years old    TUBAL LIGATION         Social History:    Social History     Tobacco Use    Smoking status: Never Smoker    Smokeless tobacco: Never Used   Substance Use Topics    Alcohol use: No     Alcohol/week: 0.0 standard drinks                                Counseling given: Not Answered      Vital Signs (Current):   Vitals:    07/17/20 1121   BP: (!) 140/74   Pulse: 79   Resp: 16   Temp: 98 °F (36.7 °C)   TempSrc: Temporal   SpO2: 94%   Weight: 226 lb (102.5 kg)                                              BP Readings from Last 3 Encounters:   07/17/20 (!) 140/74   07/14/20 (!) 149/75   07/14/20 122/80       NPO Status:                                                                                 BMI:   Wt Readings from Last 3 Encounters:   07/17/20 226 lb (102.5 kg)   07/14/20 227 lb 9.6 oz (103.2 kg)   07/14/20 227 lb 12.8 oz Ricardo Patel MD   7/17/2020

## 2020-07-17 NOTE — PROGRESS NOTES
Patients son demanding that patient be discharged right now, patient educated on discharge instructions, follow up appointment and medications. Patient voiced good understanding. IV removed without difficulty, dressing applied. Patient offered walker and son states, Josefina Markham is a grown woman, she can walk if she wants to\". Patient and son advised that we are not responsible for an injuries that may occur with her walking to lobby without assistance. Patient and and son voice good understanding and leave unit with all personal belongings.

## 2020-07-17 NOTE — ANESTHESIA POSTPROCEDURE EVALUATION
Department of Anesthesiology  Postprocedure Note    Patient: Zoë Bundy  MRN: [id number]  YOB: 1950  Date of evaluation: 7/17/2020  Time:  1:34 PM     Procedure Summary     Date:  07/17/20 Room / Location:  [address]    Anesthesia Start:  1215 Anesthesia Stop:  1250    Procedure:  SINGLE LUMEN SMART PORT INSERTION RIGHT SUBCLAVIAN (Right Chest) Diagnosis:  (LEFT BREAST CANCER)    Surgeon: Hussein Ramos MD Responsible Provider:  Keerthi Flor MD    Anesthesia Type:  MAC ASA Status:  2          Anesthesia Type: MAC    Lisa Phase I: Lisa Score: 10    Lisa Phase II: Lisa Score: 10    Last vitals: Reviewed and per EMR flowsheets.        Anesthesia Post Evaluation    Patient location during evaluation: bedside  Patient participation: complete - patient participated  Level of consciousness: awake  Airway patency: patent  Nausea & Vomiting: no vomiting and no nausea  Complications: no  Cardiovascular status: hemodynamically stable  Respiratory status: acceptable  Hydration status: stable

## 2020-07-17 NOTE — PROGRESS NOTES
Patient admitted to [facility] room 2 with son at bedside. Bed in low position side rails up call light in reach. Patient denies questions at this time.

## 2020-07-18 NOTE — OP NOTE
[address] [place] [address]                                OPERATIVE REPORT    PATIENT NAME: Venkat Barker                     :        1950  MED REC NO:   [id number]                           ROOM:  ACCOUNT NO:   [de-identified]                           ADMIT DATE: 2020  PROVIDER:     Christoph Kathleen MD    DATE OF PROCEDURE:  2020    PREOPERATIVE DIAGNOSIS:  Left breast cancer, in need of neoadjuvant  chemotherapy. POSTOPERATIVE DIAGNOSIS:  Left breast cancer, in need of neoadjuvant  chemotherapy. OPERATIONS:  1. Right subclavian vein MediPort placement. 2.  Intraoperative fluoroscopy. SURGEON:  Christoph Thompson. MD Porsha    ANESTHESIA:  Diprivan anesthesia and local.    COMPLICATIONS:  None. BLOOD LOSS:  5 mL. INDICATIONS FOR PROCEDURE:  The patient is a 72-year-old white female  with left breast cancer which is HER2/jordan positive. She is felt to need  neoadjuvant treatment prior to surgery and is here for MediPort  placement. DESCRIPTION OF PROCEDURE:  The patient was brought into the operating  suite and placed supine on the operating room table. After adequate  Diprivan anesthesia was administered, the patient's right chest wall was  prepped and draped in the usual sterile fashion. The patient was placed  in Trendelenburg position. Xylocaine was infiltrated into the right  infraclavicular space and a needle was easily placed into the right  subclavian vein and a guidewire was passed through the needle and  fluoroscopy was used to verify good placement of the guidewire. The  patient was brought out of Trendelenburg. Xylocaine was used to  infiltrate into the chest wall. An incision was made, a subcutaneous  pocket was placed. The catheter was passed through the subcutaneous  tunnel to the pocket, cut to the appropriate length. The catheter was  attached to the  [facility].   We then placed an introducer and peel-away  sheath over the guidewire, removed the guidewire and introducer, and  passed the catheter through the peel-away sheath. We then checked  fluoroscopy and appeared to be a good placement near the right atria. We aspirated good blood from the MediPort, it was flushed with heparin. The wound was then closed with interrupted 3-0 Vicryl and 4-0 Vicryl was  used to close the skin. Steri-Strips were applied. CHELSEA CALDERA  [id number] Wallace Calderon MD    D: 07/17/2020 17:58:57       T: 07/17/2020 18:08:47     [email or web address]  Job#: [id number]     Doc#: [id number]    CC:

## 2020-07-20 RX ORDER — PALONOSETRON 0.05 MG/ML
0.25 INJECTION, SOLUTION INTRAVENOUS ONCE
Status: CANCELLED | OUTPATIENT
Start: 2020-07-27

## 2020-07-20 RX ORDER — HEPARIN SODIUM (PORCINE) LOCK FLUSH IV SOLN 100 UNIT/ML 100 UNIT/ML
500 SOLUTION INTRAVENOUS PRN
Status: CANCELLED | OUTPATIENT
Start: 2020-07-27

## 2020-07-20 RX ORDER — EPINEPHRINE 1 MG/ML
0.3 INJECTION, SOLUTION, CONCENTRATE INTRAVENOUS PRN
Status: CANCELLED | OUTPATIENT
Start: 2020-07-27

## 2020-07-20 RX ORDER — DIPHENHYDRAMINE HYDROCHLORIDE 50 MG/ML
50 INJECTION INTRAMUSCULAR; INTRAVENOUS ONCE
Status: CANCELLED | OUTPATIENT
Start: 2020-07-27

## 2020-07-20 RX ORDER — SODIUM CHLORIDE 9 MG/ML
INJECTION, SOLUTION INTRAVENOUS CONTINUOUS
Status: CANCELLED | OUTPATIENT
Start: 2020-07-27

## 2020-07-20 RX ORDER — MEPERIDINE HYDROCHLORIDE 50 MG/ML
12.5 INJECTION INTRAMUSCULAR; INTRAVENOUS; SUBCUTANEOUS ONCE
Status: CANCELLED | OUTPATIENT
Start: 2020-07-27

## 2020-07-20 RX ORDER — SODIUM CHLORIDE 0.9 % (FLUSH) 0.9 %
10 SYRINGE (ML) INJECTION PRN
Status: CANCELLED | OUTPATIENT
Start: 2020-07-27

## 2020-07-20 RX ORDER — SODIUM CHLORIDE 9 MG/ML
20 INJECTION, SOLUTION INTRAVENOUS ONCE
Status: CANCELLED | OUTPATIENT
Start: 2020-07-27

## 2020-07-20 RX ORDER — SODIUM CHLORIDE 0.9 % (FLUSH) 0.9 %
5 SYRINGE (ML) INJECTION PRN
Status: CANCELLED | OUTPATIENT
Start: 2020-07-27

## 2020-07-20 RX ORDER — METHYLPREDNISOLONE SODIUM SUCCINATE 125 MG/2ML
125 INJECTION, POWDER, LYOPHILIZED, FOR SOLUTION INTRAMUSCULAR; INTRAVENOUS ONCE
Status: CANCELLED | OUTPATIENT
Start: 2020-07-27

## 2020-07-21 ENCOUNTER — HOSPITAL ENCOUNTER (OUTPATIENT)
Dept: INFUSION THERAPY | Age: 70
Discharge: HOME | End: 2020-07-21
Payer: MEDICARE

## 2020-07-21 PROCEDURE — 99212 OFFICE O/P EST SF 10 MIN: CPT

## 2020-07-21 NOTE — PLAN OF CARE
Care plan reviewed with patient. Patient verbalized understanding of the plan of care and contribute to goal setting. Problem: Intellectual/Education/Knowledge Deficit  Goal: Teaching initiated upon admission  Outcome: Met This Shift  Note: Patient verbalizes understanding to verbal information given on carboplatin, Taxotere, Herceptin and Perjeta,action and possible side effects. Aware to call MD if develop complications. Intervention: Verbal/written education provided  Note: Chemotherapy Teaching     Nausea [x]   Anorexia []   MAF/[place] diet [x]   The importance of fluids and nutrition [x]   Monitoring labs and lab values [x]   Neutropenia []   Signs / Symptoms of infection (fever, chills) [x]   Thrombocytopenia and bleeding [x]   Alopecia []   Fatigue [x]   Importance of oral hygiene [x]   When to call the physician [x]   Use of supportive services []   Importance of notifying nurse if any pain, redness, or change in IV site [x]   Indications and side effects of medications: carboplatin, Taxotere, Herceptin and Perjeta [x]   Verbalizes understanding of medications being administered, side effects, and notifying physician if any complications. [x]   Other: []         Problem: Discharge Planning  Goal: Knowledge of discharge instructions  Description: Knowledge of discharge instructions  Outcome: Met This Shift  Note: Verbalized understanding of discharge instructions, follow ups and when to call doctor   Intervention: Discharge to appropriate level of care  Note: Discuss discharge instructions, follow ups and when to call doctor. Problem: Falls - Risk of:  Goal: Will remain free from falls  Description: Will remain free from falls  Outcome: Met This Shift  Note: Free from falls while in infusion center.  Verbalized understanding of fall prevention and to ask for assistance with ambulation   Intervention: Assess risk factors for falls  Description: Assess risk factors for falls  Note: Assess for fall risk, instruct to ask for assistance with ambulation

## 2020-07-21 NOTE — PROGRESS NOTES
Patient and family instructed on chemotherapy specifically the drugs carboplatin, hercetpin, taxol and carboplatin and  chemotherapy regimen. The teaching folder along with the following - chemotherapy drug information sheets,chemotherapy side effects handouts,[place] diet,Importance to hydration,when to call physician sheet,reduce risk infection sheet,bleeding precaution. All questions answered satisfactory and support given. Patient given a tour of facility. Approximately 60 minutes spent with patient and family.

## 2020-07-22 ENCOUNTER — HOSPITAL ENCOUNTER (OUTPATIENT)
Dept: NON INVASIVE DIAGNOSTICS | Age: 70
Discharge: HOME | End: 2020-07-22
Payer: MEDICARE

## 2020-07-22 LAB
LV EF: 58 %
LVEF MODALITY: NORMAL

## 2020-07-22 PROCEDURE — 93306 TTE W/DOPPLER COMPLETE: CPT

## 2020-07-23 ASSESSMENT — ENCOUNTER SYMPTOMS
TROUBLE SWALLOWING: 0
BACK PAIN: 0
SHORTNESS OF BREATH: 0
BLOOD IN STOOL: 0
ABDOMINAL DISTENTION: 0
VOMITING: 0
ABDOMINAL PAIN: 0
COUGH: 0
FACIAL SWELLING: 0
WHEEZING: 0
SORE THROAT: 0
RECTAL PAIN: 0
COLOR CHANGE: 0
EYE DISCHARGE: 0
DIARRHEA: 0
NAUSEA: 0
CHEST TIGHTNESS: 0
CONSTIPATION: 0

## 2020-07-24 RX ORDER — HEPARIN SODIUM (PORCINE) LOCK FLUSH IV SOLN 100 UNIT/ML 100 UNIT/ML
500 SOLUTION INTRAVENOUS PRN
Status: CANCELLED | OUTPATIENT
Start: 2020-07-24

## 2020-07-24 RX ORDER — SODIUM CHLORIDE 0.9 % (FLUSH) 0.9 %
20 SYRINGE (ML) INJECTION PRN
Status: CANCELLED | OUTPATIENT
Start: 2020-07-24

## 2020-07-24 RX ORDER — SODIUM CHLORIDE 0.9 % (FLUSH) 0.9 %
10 SYRINGE (ML) INJECTION PRN
Status: CANCELLED | OUTPATIENT
Start: 2020-07-24

## 2020-07-27 ENCOUNTER — OFFICE VISIT (OUTPATIENT)
Dept: ONCOLOGY | Age: 70
End: 2020-07-27
Payer: MEDICARE

## 2020-07-27 ENCOUNTER — HOSPITAL ENCOUNTER (OUTPATIENT)
Dept: INFUSION THERAPY | Age: 70
Discharge: HOME | End: 2020-07-27
Payer: MEDICARE

## 2020-07-27 VITALS
BODY MASS INDEX: 34.77 KG/M2 | DIASTOLIC BLOOD PRESSURE: 65 MMHG | OXYGEN SATURATION: 93 % | RESPIRATION RATE: 18 BRPM | TEMPERATURE: 98.1 F | SYSTOLIC BLOOD PRESSURE: 123 MMHG | HEIGHT: 68 IN | HEART RATE: 72 BPM | WEIGHT: 229.4 LBS

## 2020-07-27 VITALS
HEART RATE: 90 BPM | OXYGEN SATURATION: 92 % | DIASTOLIC BLOOD PRESSURE: 76 MMHG | WEIGHT: 229.4 LBS | BODY MASS INDEX: 34.77 KG/M2 | SYSTOLIC BLOOD PRESSURE: 139 MMHG | TEMPERATURE: 98.1 F | HEIGHT: 68 IN | RESPIRATION RATE: 18 BRPM

## 2020-07-27 DIAGNOSIS — C50.212 MALIGNANT NEOPLASM OF UPPER-INNER QUADRANT OF LEFT BREAST IN FEMALE, ESTROGEN RECEPTOR POSITIVE (HCC): Primary | ICD-10-CM

## 2020-07-27 DIAGNOSIS — Z17.0 MALIGNANT NEOPLASM OF UPPER-INNER QUADRANT OF LEFT BREAST IN FEMALE, ESTROGEN RECEPTOR POSITIVE (HCC): Primary | ICD-10-CM

## 2020-07-27 LAB
ALBUMIN SERPL-MCNC: 3.8 G/DL (ref 3.5–5.1)
ALP BLD-CCNC: 61 U/L (ref 38–126)
ALT SERPL-CCNC: 19 U/L (ref 11–66)
AST SERPL-CCNC: 23 U/L (ref 5–40)
BILIRUB SERPL-MCNC: 0.4 MG/DL (ref 0.3–1.2)
BILIRUBIN DIRECT: < 0.2 MG/DL (ref 0–0.3)
BUN BLDV-MCNC: 12 MG/DL (ref 7–22)
CHLORIDE, WHOLE BLOOD: 108 MEQ/L (ref 98–109)
CO2: 26 MEQ/L (ref 23–33)
CREATININE, WHOLE BLOOD: 0.9 MG/DL (ref 0.5–1.2)
GFR, ESTIMATED: 66 ML/MIN/1.73M2
GLUCOSE, WHOLE BLOOD: 144 MG/DL (ref 70–108)
HCT VFR BLD CALC: 45.9 % (ref 37–47)
HEMOGLOBIN: 14.5 GM/DL (ref 12–16)
IONIZED CALCIUM, WHOLE BLOOD: 1.26 MMOL/L (ref 1.12–1.32)
MCH RBC QN AUTO: 30.8 PG (ref 26–33)
MCHC RBC AUTO-ENTMCNC: 31.6 GM/DL (ref 32.2–35.5)
MCV RBC AUTO: 98 FL (ref 81–99)
PDW BLD-RTO: 12.6 % (ref 11.5–14.5)
PLATELET # BLD: 384 THOU/MM3 (ref 130–400)
PMV BLD AUTO: 10.5 FL (ref 9.4–12.4)
POTASSIUM, WHOLE BLOOD: 4.6 MEQ/L (ref 3.5–4.9)
RBC # BLD: 4.71 MILL/MM3 (ref 4.2–5.4)
SEG NEUTROPHILS: 42 % (ref 43–75)
SEGMENTED NEUTROPHILS ABSOLUTE COUNT: 3.5 THOU/MM3 (ref 1.8–7.7)
SODIUM, WHOLE BLOOD: 144 MEQ/L (ref 138–146)
TOTAL PROTEIN: 7.1 G/DL (ref 6.1–8)
WBC # BLD: 8.4 THOU/MM3 (ref 4.8–10.8)

## 2020-07-27 PROCEDURE — 96417 CHEMO IV INFUS EACH ADDL SEQ: CPT

## 2020-07-27 PROCEDURE — 80076 HEPATIC FUNCTION PANEL: CPT

## 2020-07-27 PROCEDURE — 85027 COMPLETE CBC AUTOMATED: CPT

## 2020-07-27 PROCEDURE — 6360000002 HC RX W HCPCS: Performed by: INTERNAL MEDICINE

## 2020-07-27 PROCEDURE — 80047 BASIC METABLC PNL IONIZED CA: CPT

## 2020-07-27 PROCEDURE — 99215 OFFICE O/P EST HI 40 MIN: CPT | Performed by: INTERNAL MEDICINE

## 2020-07-27 PROCEDURE — 82374 ASSAY BLOOD CARBON DIOXIDE: CPT

## 2020-07-27 PROCEDURE — 99211 OFF/OP EST MAY X REQ PHY/QHP: CPT

## 2020-07-27 PROCEDURE — 2580000003 HC RX 258: Performed by: INTERNAL MEDICINE

## 2020-07-27 PROCEDURE — 96367 TX/PROPH/DG ADDL SEQ IV INF: CPT

## 2020-07-27 PROCEDURE — 96413 CHEMO IV INFUSION 1 HR: CPT

## 2020-07-27 PROCEDURE — 96377 APPLICATON ON-BODY INJECTOR: CPT

## 2020-07-27 PROCEDURE — 36415 COLL VENOUS BLD VENIPUNCTURE: CPT

## 2020-07-27 PROCEDURE — 96415 CHEMO IV INFUSION ADDL HR: CPT

## 2020-07-27 PROCEDURE — 84520 ASSAY OF UREA NITROGEN: CPT

## 2020-07-27 PROCEDURE — 96375 TX/PRO/DX INJ NEW DRUG ADDON: CPT

## 2020-07-27 RX ORDER — IBUPROFEN 800 MG/1
800 TABLET ORAL EVERY 6 HOURS PRN
Status: ON HOLD | COMMUNITY
End: 2020-10-21 | Stop reason: HOSPADM

## 2020-07-27 RX ORDER — DEXAMETHASONE 4 MG/1
8 TABLET ORAL
Qty: 6 TABLET | Refills: 3 | Status: SHIPPED | OUTPATIENT
Start: 2020-07-27 | End: 2020-08-27 | Stop reason: SDUPTHER

## 2020-07-27 RX ORDER — SODIUM CHLORIDE 0.9 % (FLUSH) 0.9 %
10 SYRINGE (ML) INJECTION PRN
Status: DISCONTINUED | OUTPATIENT
Start: 2020-07-27 | End: 2020-07-28 | Stop reason: HOSPADM

## 2020-07-27 RX ORDER — LIDOCAINE AND PRILOCAINE 25; 25 MG/G; MG/G
CREAM TOPICAL
Qty: 30 G | Refills: 1 | Status: SHIPPED | OUTPATIENT
Start: 2020-07-27 | End: 2022-03-04

## 2020-07-27 RX ORDER — SODIUM CHLORIDE 9 MG/ML
20 INJECTION, SOLUTION INTRAVENOUS ONCE
Status: COMPLETED | OUTPATIENT
Start: 2020-07-27 | End: 2020-07-27

## 2020-07-27 RX ORDER — AMOXICILLIN 500 MG/1
500 CAPSULE ORAL 3 TIMES DAILY
COMMUNITY
End: 2020-08-04 | Stop reason: ALTCHOICE

## 2020-07-27 RX ORDER — LOPERAMIDE HYDROCHLORIDE 2 MG/1
2 CAPSULE ORAL 4 TIMES DAILY PRN
Qty: 60 CAPSULE | Refills: 2 | Status: SHIPPED | OUTPATIENT
Start: 2020-07-27 | End: 2020-09-08 | Stop reason: SDUPTHER

## 2020-07-27 RX ORDER — PALONOSETRON 0.05 MG/ML
0.25 INJECTION, SOLUTION INTRAVENOUS ONCE
Status: COMPLETED | OUTPATIENT
Start: 2020-07-27 | End: 2020-07-27

## 2020-07-27 RX ORDER — DIPHENOXYLATE HYDROCHLORIDE AND ATROPINE SULFATE 2.5; .025 MG/1; MG/1
1 TABLET ORAL 4 TIMES DAILY
Qty: 56 TABLET | Refills: 1 | Status: SHIPPED | OUTPATIENT
Start: 2020-07-27 | End: 2020-08-27 | Stop reason: SDUPTHER

## 2020-07-27 RX ORDER — HEPARIN SODIUM (PORCINE) LOCK FLUSH IV SOLN 100 UNIT/ML 100 UNIT/ML
500 SOLUTION INTRAVENOUS PRN
Status: DISCONTINUED | OUTPATIENT
Start: 2020-07-27 | End: 2020-07-28 | Stop reason: HOSPADM

## 2020-07-27 RX ADMIN — Medication 500 UNITS: at 16:50

## 2020-07-27 RX ADMIN — Medication 10 ML: at 16:50

## 2020-07-27 RX ADMIN — DOCETAXEL ANHYDROUS 160 MG: 10 INJECTION, SOLUTION INTRAVENOUS at 14:32

## 2020-07-27 RX ADMIN — CARBOPLATIN 660 MG: 10 INJECTION, SOLUTION INTRAVENOUS at 15:38

## 2020-07-27 RX ADMIN — SODIUM CHLORIDE 150 MG: 9 INJECTION, SOLUTION INTRAVENOUS at 10:57

## 2020-07-27 RX ADMIN — TRASTUZUMAB 826 MG: 150 INJECTION, POWDER, LYOPHILIZED, FOR SOLUTION INTRAVENOUS at 12:47

## 2020-07-27 RX ADMIN — Medication 20 ML: at 10:20

## 2020-07-27 RX ADMIN — PEGFILGRASTIM 6 MG: KIT SUBCUTANEOUS at 16:53

## 2020-07-27 RX ADMIN — PALONOSETRON 0.25 MG: 0.05 INJECTION, SOLUTION INTRAVENOUS at 11:33

## 2020-07-27 RX ADMIN — DEXAMETHASONE SODIUM PHOSPHATE 12 MG: 4 INJECTION, SOLUTION INTRAMUSCULAR; INTRAVENOUS at 10:36

## 2020-07-27 RX ADMIN — PERTUZUMAB 840 MG: 30 INJECTION, SOLUTION, CONCENTRATE INTRAVENOUS at 11:35

## 2020-07-27 RX ADMIN — SODIUM CHLORIDE 20 ML/HR: 9 INJECTION, SOLUTION INTRAVENOUS at 10:20

## 2020-07-27 ASSESSMENT — ENCOUNTER SYMPTOMS
TROUBLE SWALLOWING: 0
EYE DISCHARGE: 0
ABDOMINAL PAIN: 0
SHORTNESS OF BREATH: 0
DIARRHEA: 0
BLOOD IN STOOL: 0
NAUSEA: 0
WHEEZING: 0
BACK PAIN: 0
CHEST TIGHTNESS: 0
CONSTIPATION: 0
COLOR CHANGE: 0
VOMITING: 0
COUGH: 0
ABDOMINAL DISTENTION: 0
RECTAL PAIN: 0
FACIAL SWELLING: 0
SORE THROAT: 0

## 2020-07-27 NOTE — PROGRESS NOTES
Patient assessed for the following post chemotherapy:    Dizziness   No  Lightheadedness  No      Acute nausea/vomiting No  Headache   No  Chest pain/pressure  No  Rash/itching   No  Shortness of breath  No    Patient kept for 20 minutes observation post infusion chemotherapy. Patient tolerated chemotherapy treatment Shilpa oRdriguezcarvandana Bower Major Ni /Carboplatin without any complications. Last vital signs:   /65   Pulse 72   Temp 98.1 °F (36.7 °C) (Infrared)   Resp 18   Ht 5' 8\" (1.727 m)   Wt 229 lb 6.4 oz (104.1 kg)   SpO2 93%   BMI 34.88 kg/m²     Patient instructed if experience any of the above symptoms following today's infusion, she is to notify MD immediately or go to the emergency department. Discharge instructions given to patient. Verbalizes understanding. Ambulated off unit per self, accompanied by daughter, with belongings.

## 2020-07-27 NOTE — PROGRESS NOTES
[facility]  [address]  Odinmarco antonio Dykes  Dept: [phone]  Dept Fax: [phone]: [phone]    Visit Date:7/27/2020     Storm Rubinstein is a 79 y.o. female who presents today for:   Chief Complaint   Patient presents with    Follow-up     left breast CA        HPI:   Mrs. Dionna Rossi is a 68-year-old female with newly diagnosed triple positive left breast cancer. She had annual screening mammogram on June 18, 2020 that showed 2 lesions in the left breast.  Subsequently she underwent breast ultrasound followed by ultrasound guided biopsy of those 2 lesions. The largest of these lesions is a lobulated mixed cystic and    solid appearing mass measuring approximately 3 x 2.1 x 2.7 cm. This is located at the anterior depth upper inner quadrant. Aspiration of the fluid component of this lesion and biopsy of    the solid component of this lesion was performed on 6/26/2020. The smaller adjacent lesion is located at the middle depth of the     upper inner quadrant left breast measuring approximately 1.6 x    1.1 x 1.6 cm. Biopsy of this lesion was performed on 6/26/2020. Final pathology report showed:   A-B.  Left breast, upper inner quadrant, anterior and middle depth,   barbell and tribell clips, multiple core needle biopsies:   [place] Median, poorly differentiated ductal carcinoma, [place] score 3.    Estrogen Receptor: (Clone SP1), [employer]    Positive 100 % of cells (>10% of cells)   Intensity of Staining:    Strong   Progesterone Receptor: (Clone 1E2), [employer]    Positive 90 % of cells   Intensity of Staining:    Strong   Ki-67 (clone 30-9)       Percentage of positive nuclei: 42 %               Favorable <10%               Borderline 10-20%               Unfavorable >20%          2018 ASCO/ CAP   Inform HER2 Dual NEW        HER2 dual NEW    [employer]        Group #   ( X  Group 1:         HER2/CEP17 Ratio >=.0 and >=.0 HER2    )                    signals/cell                         HER2 NEW POSITIVE     On 2020 the patient had breast MRI showin. A known biopsy-proven mass demonstrated within the upper     inner left breast anterior to middle depth. This measures 3 cm x     4.1 cm x 3.1 cm middle depth located 4  cm from the nipple, 0.9    cm from the skin, and 6.5 cm from the chest wall.  This shows    heterogeneous enhancement and delayed washout type vascular    enhancement. 2. There are scattered small foci of enhancement demonstrated    bilaterally. The dominant focus of enhancement within the right    breast is located within the lower inner right breast anterior    depth on axial image 105 series 9. Recommend further evaluation    with second look ultrasound. If there is no sonographic    correlate, recommend 6 month follow-up breast MRI to document    stability.         3. Among the small foci of enhancement within the left breast,    the most prominent is located within the lower outer quadrant    posterior depth as seen on axial image 91 series 9. Recommend    further evaluation with second look ultrasound. If there is no    sonographic correlate, recommend 6 month follow-up breast MRI to    document stability. Patient past medical history significant for hypothyroidism and osteoporosis. Patient denies having any new complaints. Specifically she denies having any headaches, no difficulty with balance, no falls. She denies having any focal neurological deficits. No shortness of breath no cough no abdominal pain. She denies having any bone pain. Interim history on 2020:  Since last visit with me the patient underwent a biopsy of additional nodule in her breast that was seen on an MRI of the breast. The biopsy showed triple negative breast cancer. In preparation for chemotherapy the patient underwent successful and uncomplicated Mediport placement.   Patient denies having any new complaints since last visit with me  HPI   Past Medical History:   Diagnosis Date    Breast cancer ([address] 2020    left-invasive ductal    Hyperlipidemia     Numbness and tingling     left foot-chronic nerve damage      Past Surgical History:   Procedure Laterality Date    ABDOMINAL EXPLORATION SURGERY  2014    Dr butterfield-lysis of adhesions    BREAST SURGERY Left 06/26/2020    biopsy of left breast    CHOLECYSTECTOMY  10/2014    Dr Cha Wolf  03/30/2016    Dr Nancy Casiano  10/2014    x4 abdominal wall    HYSTERECTOMY      INCISIONAL HERNIA REPAIR  2014    Dr Peck Rigby LIPECTOMY  2014    Dr Elmer Portillo LEFT  7/16/2020    [address] LEFT 7/16/2020 Triston Puentes MD [facility]    PORT SURGERY Right 7/17/2020    SINGLE LUMEN SMART PORT INSERTION RIGHT SUBCLAVIAN performed by Susan More MD at [address] [facility]  [facility]      patient was 11[facility]    TUBAL LIGATION        Family History   Problem Relation Age of Onset    Heart Disease Father         cath stent blood to thin and bled    Heart Attack Mother     Other Other         blood clot    Breast Cancer Paternal Aunt 62    High Blood Pressure Sister     Cancer Brother 68        skin    Prostate Cancer Brother         had chemotherapy to treat     Prostate Cancer Brother 64        has had for 10 years    Ovarian Cancer Neg Hx     Diabetes Neg Hx     Stroke Neg Hx       Social History     Tobacco Use    Smoking status: Never Smoker    Smokeless tobacco: Never Used   Substance Use Topics    Alcohol use: No     Alcohol/week: 0.0 standard drinks      Current Outpatient Medications   Medication Sig Dispense Refill    dexamethasone (DECADRON) 4 MG tablet Take 2 tablets by mouth daily (with breakfast) for 3 doses 6 tablet 3    loperamide (IMODIUM) 2 MG capsule Take 1 capsule by mouth 4 times daily as needed for Diarrhea 60 capsule 2    diphenoxylate-atropine (DIPHENATOL) 2.5-0.025 MG per tablet Take 1 tablet by mouth 4 times daily for 14 days. 56 tablet 1    amoxicillin (AMOXIL) 500 MG capsule Take 500 mg by mouth 3 times daily      ibuprofen (ADVIL;MOTRIN) 800 MG tablet Take 800 mg by mouth every 6 hours as needed for Pain      lidocaine-prilocaine (EMLA) 2.5-2.5 % cream Apply topically as needed. 30 g 1    levothyroxine (SYNTHROID) 25 MCG tablet Take 25 mcg by mouth Daily      alendronate (FOSAMAX) 35 MG tablet Take 35 mg by mouth every 7 days      aspirin EC 81 MG EC tablet Take 1 tablet by mouth daily 30 tablet 3    ondansetron (ZOFRAN ODT) 4 MG disintegrating tablet Take 1 tablet by mouth every 8 hours as needed for Nausea 20 tablet 0    PARoxetine (PAXIL) 10 MG tablet Take 10 mg by mouth every morning      Coenzyme Q10-Fish Oil-Vit E (CO-Q 10 OMEGA-3 FISH OIL PO) Take by mouth daily      Vitamin D (CHOLECALCIFEROL) 1000 UNITS CAPS capsule Take by mouth daily 2 tab      Cinnamon 500 MG CAPS Take by mouth daily      Cyanocobalamin (VITAMIN B12 PO) Take 1,000 mcg by mouth daily        No current facility-administered medications for this visit.        Allergies   Allergen Reactions    Aluminum-Containing Compounds Anaphylaxis      Health Maintenance   Topic Date Due    Hepatitis C screen  1950    Meningococcal (ACWY) vaccine (1 - Risk start before 7 months 4-dose series) 1950    Hib vaccine (1 of 1 - Risk 1-dose series) 09/21/1951    Meningococcal B vaccine (1 of 4 - Increased Risk Bexsero 2-dose series) 06/21/1960    DTaP/Tdap/Td vaccine (1 - Tdap) 06/21/1969    Lipid screen  06/21/1990    Diabetes screen  06/21/1990    Shingles Vaccine (1 of 2) 06/21/2000    Colon cancer screen colonoscopy  06/21/2000    Annual Wellness Visit (AWV)  06/23/2019    Pneumococcal 65+ yrs at Risk Vaccine (2 of 2 - PPSV23) 11/20/2019    Flu vaccine (1) 09/01/2020    Breast cancer screen  07/16/2021    DEXA (modify frequency per FRAX score)  Completed    Hepatitis A vaccine  Aged Out    Hepatitis B vaccine  Aged Out        Subjective:   Review of Systems   Constitutional: Negative for activity change, appetite change, fatigue and fever. HENT: Negative for congestion, dental problem, facial swelling, hearing loss, mouth sores, nosebleeds, sore throat, tinnitus and trouble swallowing. Eyes: Negative for discharge and visual disturbance. Respiratory: Negative for cough, chest tightness, shortness of breath and wheezing. Cardiovascular: Negative for chest pain, palpitations and leg swelling. Gastrointestinal: Negative for abdominal distention, abdominal pain, blood in stool, constipation, diarrhea, nausea, rectal pain and vomiting. Endocrine: Negative for cold intolerance, polydipsia and polyuria. Genitourinary: Negative for decreased urine volume, difficulty urinating, dysuria, flank pain, hematuria and urgency. Musculoskeletal: Positive for arthralgias. Negative for back pain, gait problem, joint swelling, myalgias and neck stiffness. Skin: Negative for color change, rash and wound. Neurological: Negative for dizziness, tremors, seizures, speech difficulty, weakness, light-headedness, numbness and headaches. Hematological: Negative for adenopathy. Does not bruise/bleed easily. Psychiatric/Behavioral: Negative for confusion and sleep disturbance. The patient is not nervous/anxious. Objective:   Physical Exam  Vitals signs reviewed. Constitutional:       General: She is not in acute distress. Appearance: She is well-developed. HENT:      Head: Normocephalic. Mouth/Throat:      Pharynx: No oropharyngeal exudate. Eyes:      General: No scleral icterus. Right eye: No discharge. Left eye: No discharge. Pupils: Pupils are equal, round, and reactive to light.    Neck:      Musculoskeletal: Normal range of motion and neck      Mri Breast Bilateral W Wo Contrast  Result Date: 7/10/2020  #[id number] - MRI BREAST BILATERAL W WO CONTRAST BREAST MRI OF BOTH BREASTS : 7/8/2020 CLINICAL: Newly diagnosed invasive ductal carcinoma of left breast.  Interpretation of this MRI was correlated with available mammograms and ultrasounds. Axial and coronal T2 STIR, sagittal T1, axial T1 fat saturated pre and post contrast dynamic imaging was performed of both breasts. Images were reviewed at a Smartvue 3D workstation and time intensity curves were analyzed. The breast parenchyma is heterogeneously dense. There is mild background enhancement. There is a known biopsy-proven mass demonstrated within the upper  inner left breast anterior to middle depth. This measures 3 cm x  4.1 cm x 3.1 cm middle depth located 4  cm from the nipple, 0.9 cm from the skin, and 6.5 cm from the chest wall. This shows heterogeneous enhancement and delayed washout type vascular enhancement. The abnormality is best seen on the post contrast subtraction axial 56 slice. This correlates as palpated. This measures approximately 3.6 x 3.1 cm. There are scattered small foci of enhancement demonstrated bilaterally. The dominant focus of enhancement within the right breast is located within the lower inner right breast anterior depth on axial image 105 series 9. Recommend further evaluation with second look ultrasound. If there is no sonographic correlate, recommend 6 month follow-up breast MRI to document stability. Among the small foci of enhancement within the left breast, the most prominent is located within the lower outer quadrant posterior depth as seen on axial image 91 series 9. Recommend further evaluation with second look ultrasound. If there is no sonographic correlate, recommend 6 month follow-up breast MRI to document stability.  Multiple rounded hyperintense T2 weighted lesions are demonstrated within the left upper quadrant on the coronal STIR series of images. This is seen on coronal series image 27. These correlate with previous redemonstrated splenules on prior CT examination from January 2016. No acute osseous findings are demonstrated. No lymphadenopathy is  seen. [facility] Digital Diagnostic W Or Wo Cad Left    Us Breast Limited Left    Result Date: 7/13/2020  IMPRESSION: Ultrasound BI-RADS: 4B: Moderate suspicion for malignancy The 0.6 cm x 0.5 cm x 0.9 cm oval lesion in the left breast appears to have an intermediate suspicion for malignancy. An ultrasound guided biopsy is recommended. The Breast Care Navigator was notified. The results were reviewed with the patient. The patient has been or will be contacted. #[id number] - US BREAST LIMITED LEFT ULTRASOUND OF LEFT BREAST: 7/13/2020 CLINICAL: Abnormal Breast MRI, known left breast cancer. Comparison is made to exam dated:  7/8/2020 breast MRI - [facility]. Real-time ultrasound of the left breast was performed on the areas of interest.  Gray scale images of the real-time examination were reviewed. There is a 0.6 cm x 0.5 cm x 0.9 cm oval lesion with an irregular  margin in the left breast lower outer aspect posterior depth 7 cm from the nipple. This oval lesion is hypoechoic. This correlates with breast MRI findings. Dr. June Livers  nn/penrad:7/13/2020 10:19:42  copy to:  Noemi Castillo MD, [facility], ph: [phone], fax: [phone] Imaging Technologist: Sherley Pham RT(R)(M)[facility] letter sent:   Us Breast Biopsy W Loc Device Each Addl Lesion Left      Lab Results   Component Value Date    WBC 8.4 07/27/2020    HGB 14.5 07/27/2020    HCT 45.9 07/27/2020    MCV 98 07/27/2020     07/27/2020       Chemistry        Component Value Date/Time     07/27/2020 0908     02/10/2017 0608    K 4.6 07/27/2020 0908    K 4.2 02/10/2017 0608    CL 99 02/10/2017 0608    CO2 26 07/27/2020 0907    BUN 12 07/27/2020 0907 CREATININE 0.9 07/27/2020 0908    CREATININE 0.6 02/10/2017 0608        Component Value Date/Time    CALCIUM 9.5 02/10/2017 0608    ALKPHOS 61 07/27/2020 0907    AST 23 07/27/2020 0907    ALT 19 07/27/2020 0907    BILITOT 0.4 07/27/2020 0907        On July 16, 2020 the patient underwent biopsy of left breast mass, lower outer quadrant, U-Clip, core needle biopsies:    Invasive ductal carcinoma, [place] grade 2 of 3. BREAST BIOMARKERS*   Estrogen Receptor:  Negative (<1% of cells staining)   Progesterone Receptor: Negative (<1% of cells staining)   Ki-67 Percentage of positive nuclei:  40%     HER2 Immunohistochemistry  Negative (0) - No staining observed OR incomplete, faint/barely     perceptible membrane staining in <10% of invasive tumor     Assessment:   1. Newly diagnosed triple positive left breast cancer. Clinical stage IB (cT2, cN0, cM0, G3, ER+, OK+, HER2+) with separate focus of triple negative breast cancer located in the lower outer quadrant. Systemic chemotherapy is recommended for every patient with HER-2/jordan positive HER-2 more bigger than 5 mm in size. For tumors larger than 2 cm in size recommendation is to consider neoadjuvant chemotherapy. The patient was informed that outcome, defined as overall survival, is the same whether chemotherapy is given before or after breast surgery. Patients with early-stage breast cancer are appropriate candidates for neoadjuvant therapy if breast-conserving surgery is not possible due to a high tumor-to-breast ratio, or if the expected postoperative cosmetic outcome would be suboptimal due to tumor location. In addition, patients with Her2 positive BC may be offered neoadjuvant therapy since these patients would receive chemotherapy in their treatment course anyway, and Her2 positive BC is associated with a high likelihood of response. Additionally neoadjuvant chemotherapy provides a prognostic information.   Patients who achieved complete pathologic response to neoadjuvant chemotherapy have improved outcome. For those who still have residual cancer after neoadjuvant chemotherapy, the new recommendation is to proceed with 1 year of adjuvant TDM 1. A chemotherapy regimen that the patient will be treated with containes Taxotere and Rebecca which is very effective combination to treat triple negative breast cancer. 2.  Neoadjuvant chemotherapy withTaxotere/carbo/Herceptin and Pertuzumab. In preparation for systemic chemotherapy the patient had 2D echo that showed normal left ventricular ejection fraction. She also underwent successful and uncomplicated Mediport placement. The possible side effects were reviewed with the patient. Side effects of Taxotere  include:  Low blood counts: increased risk for infection, anemia and/or bleeding. Hair loss  Pain in the joints and muscles. Numbness and tingling of the hands and feet   Nausea, vomiting, diarrhea  Mouth sores Swelling of the feet or ankles (edema). Increases in blood tests measuring liver function. Nail changes (discoloration of nail beds - rare) (see skin reactions). . The main side effect from combination of Herceptin and Pertuzumab is diarrhea. The patient is placed on dexamethasone 8 mg daily for 3 days starting tomorrow to protect from acute chemotherapy-induced  Nausea. Instructed to use Imodium after a second watery bowel movement  Patient was instructed to call us with uncontrolled nausea, vomiting, uncontrolled diarrhea and fever above 100.3 degrees       Diagnosis Orders   1. Malignant neoplasm of upper-inner quadrant of left breast in female, estrogen receptor positive (HCC)  diphenoxylate-atropine (DIPHENATOL) 2.5-0.025 MG per tablet    POC PANEL BMP W/IOCA    Hepatic Function Panel    CBC    Absolute Neutrophil   2. Encounter for chemotherapy management     3. HER2-positive carcinoma of breast ([facility] [address]          Plan:   Return in about 3 weeks (around 8/17/2020).      Orders Placed: Orders Placed This Encounter   Procedures    POC PANEL BMP W/IOCA     Standing Status:   Standing     Number of Occurrences:   4     Standing Expiration Date:   7/27/2021    Hepatic Function Panel     Standing Status:   Standing     Number of Occurrences:   4     Standing Expiration Date:   7/27/2021    CBC     Standing Status:   Standing     Number of Occurrences:   4     Standing Expiration Date:   7/27/2021    Absolute Neutrophil     Standing Status:   Standing     Number of Occurrences:   4     Standing Expiration Date:   7/27/2021        Medications Prescribed:   Orders Placed This Encounter   Medications    dexamethasone (DECADRON) 4 MG tablet     Sig: Take 2 tablets by mouth daily (with breakfast) for 3 doses     Dispense:  6 tablet     Refill:  3    loperamide (IMODIUM) 2 MG capsule     Sig: Take 1 capsule by mouth 4 times daily as needed for Diarrhea     Dispense:  60 capsule     Refill:  2    diphenoxylate-atropine (DIPHENATOL) 2.5-0.025 MG per tablet     Sig: Take 1 tablet by mouth 4 times daily for 14 days. Dispense:  56 tablet     Refill:  1    lidocaine-prilocaine (EMLA) 2.5-2.5 % cream     Sig: Apply topically as needed. Dispense:  30 g     Refill:  1             I spent 40 minutes face-to-face with the patient and her family. Greater than 50% of time was spent on counseling and coordinating her care. Face to face counseling included prognosis, risks, benefits of treatment, compliance and risk reduction.        Electronically signed by Elliot Mckeon MD on 7/14/20 at [address] [place] [address] EDT

## 2020-07-27 NOTE — ONCOLOGY
Chemotherapy Administration    Pre-assessment Data: Antineoplastic Agents  Other:   See toxicity flow sheet for assessment [x]     Physician Notification of Concerns Related to Chemotherapy Administration:   Physician Notified Zbigniew Shayla / Time of Notification      Interventions:   Lab work assessed  [x]   Height / Weight verified for dose [x]   Current MAR reviewed [x]   Emergency drugs available as appropriate [x]   Anaphylaxis assessment completed [x]   Pre-medications administered as ordered [x]   Blood return noted upon initiation of chemotherapy [x]   Blood return noted each 1-2ml of a vesicant medication if given IV push []   Blood return noted each 2-3ml of a non-vesicant medication if given IV push []   Monitor for signs / symptoms of hypersensitivity reaction [x]   Chemotherapy orders (drug/dose/rate) verified by 2 Chemo certified RNs [x]   Monitor IV site and blood return throughout the infusion of the medication [x]   Document IV site checks on the IV assessment form [x]   Document chemotherapy teaching on the Patient Education tab [x]   Document patient verbalizes understanding of medications being administered [x]   If IV infiltration, see ONS Guidelines []   Other:     Perjeta/Herceptin/Taxoetere/Carboplatin []

## 2020-07-27 NOTE — PLAN OF CARE
Problem: Intellectual/Education/Knowledge Deficit  Goal: Teaching initiated upon admission  Outcome: Met This Shift  Note: Patient verbalizes understanding to verbal information given on Perjeta /Herceptin /Taxotere /Carboplatin ,action and possible side effects. Aware to call MD if develop complications. Intervention: Verbal/written education provided  Note: Chemotherapy Teaching     What is Chemotherapy   Drug action [x]   Method of Administration [x]   Handouts given []     Side Effects  Nausea/vomiting [x]   Diarrhea [x]   Fatigue [x]   Signs / Symptoms of infection [x]   Neutropenia [x]   Thrombocytopenia [x]   Alopecia [x]   neuropathy [x]   [place] diet &  the importance of fluids [x]       Micellaneous  Importance of nutrition [x]   Importance of oral hygiene [x]   When to call the MD [x]   Monitoring labs [x]   Use of supportive services []     Explanation of Drug Regimen / Frequency  Perjeta/Herceptin/Taxotere/Carboplatin:  D1C1     Comments  Verbalized understanding to drug,action,side effects and when to call MD         Problem: Discharge Planning  Goal: Knowledge of discharge instructions  Description: Knowledge of discharge instructions  Outcome: Met This Shift  Note: Verbalized understanding of discharge instructions, follow-up appointments, and when to call the physician. Intervention: Discharge to appropriate level of care  Note: Discuss understanding of discharge instructions,follow-up appointments, and when to call the physician. Problem: Falls - Risk of:  Goal: Will remain free from falls  Description: Will remain free from falls  Outcome: Met This Shift  Note: Patient free from falls while in O.P. Oncology. Intervention: Assess risk factors for falls  Description: Assess risk factors for falls  Note: Patient assessed for fall risk on admission to [address] [facility]. Fall band placed on patient. Discussed the need to use the call light for assistance prior to getting up out of chair/bed. Problem: Infection - Central Venous Catheter-Associated Bloodstream Infection:  Goal: Will show no infection signs and symptoms  Description: Will show no infection signs and symptoms  Outcome: Met This Shift  Note: Mediport site with no redness or warmth. Skin over port site intact with no signs of breakdown noted. Patient verbalizes signs/symptoms of port infection and when to notify the physician. Intervention: Infection risk assessment  Description: Infection risk assessment  Note: Discuss port maintenance, infection prevention, signs and when to call Dr Chauncey Feliz reviewed with patient and daughter. Patient and daughter verbalize understanding of the plan of care and contribute to goal setting.

## 2020-07-30 ENCOUNTER — TELEPHONE (OUTPATIENT)
Dept: SPIRITUAL SERVICES | Facility: CLINIC | Age: 70
End: 2020-07-30

## 2020-07-30 ENCOUNTER — TELEPHONE (OUTPATIENT)
Dept: INFUSION THERAPY | Age: 70
End: 2020-07-30

## 2020-07-30 NOTE — TELEPHONE ENCOUNTER
Tolerated chemotherapy treatment with minimal complications. Had start of loose stools today so took imodium. Eating and drinking adequately. Noticed little blood on tissue from hemhorroids- no blood noticed in stools. Will monitor and call if develop problems.

## 2020-07-30 NOTE — TELEPHONE ENCOUNTER
[place] [address] PROGRESS NOTE      Patient: Stanley Santa        YOB: 1950  Age: 79 y.o. Gender: female            Assessment:  Simin Villasenor is a 79year old female who was referred for spiritual care out patient services by  Her provider Kali Bey, at [facility]. This  called Simin Villasenor who was at home and answered the call. Simin Villasenor was in good spirits and talked about filling out the hospital papers thinking she would like spiritual care to contact her if she was a patient at the hospital.  I let her know of our out-patient services. She was pleased. She spoke of her chemo treatment that have started about 5 days ago and is feeling OK because, she said, Jo Valencia gave me extra stuff to reduce the side effects. \"  Pt shared she will have chemo every three weeks. Interventions:   provided a listening ear, words of encouragement and words of blessings as a prayer. Simin Villasenor belongs to [employer], she stated,' I am just taking it one day at a time. \"    Outcomes:  Pt was encouraged by the phone call and the care of the spiritual care staff. Plan:    1. A follow up call will be made after her next chemo treatment to see how she is doing.       Electronically signed by Abdiaziz Wyatt, on 7/30/2020 at 4:55 PM.  [facility]  [phone]

## 2020-08-04 ENCOUNTER — OFFICE VISIT (OUTPATIENT)
Dept: ONCOLOGY | Age: 70
End: 2020-08-04
Payer: MEDICARE

## 2020-08-04 ENCOUNTER — HOSPITAL ENCOUNTER (OUTPATIENT)
Dept: INFUSION THERAPY | Age: 70
Discharge: HOME | End: 2020-08-04
Payer: MEDICARE

## 2020-08-04 ENCOUNTER — TELEPHONE (OUTPATIENT)
Dept: ONCOLOGY | Age: 70
End: 2020-08-04

## 2020-08-04 VITALS
HEART RATE: 80 BPM | DIASTOLIC BLOOD PRESSURE: 67 MMHG | HEIGHT: 68 IN | TEMPERATURE: 97.9 F | OXYGEN SATURATION: 93 % | WEIGHT: 221 LBS | SYSTOLIC BLOOD PRESSURE: 118 MMHG | RESPIRATION RATE: 16 BRPM | BODY MASS INDEX: 33.49 KG/M2

## 2020-08-04 VITALS
BODY MASS INDEX: 33.49 KG/M2 | TEMPERATURE: 97.9 F | SYSTOLIC BLOOD PRESSURE: 118 MMHG | OXYGEN SATURATION: 93 % | WEIGHT: 221 LBS | RESPIRATION RATE: 16 BRPM | DIASTOLIC BLOOD PRESSURE: 67 MMHG | HEART RATE: 80 BPM | HEIGHT: 68 IN

## 2020-08-04 DIAGNOSIS — C50.212 MALIGNANT NEOPLASM OF UPPER-INNER QUADRANT OF LEFT BREAST IN FEMALE, ESTROGEN RECEPTOR POSITIVE (HCC): Primary | ICD-10-CM

## 2020-08-04 DIAGNOSIS — Z17.0 MALIGNANT NEOPLASM OF UPPER-OUTER QUADRANT OF LEFT BREAST IN FEMALE, ESTROGEN RECEPTOR POSITIVE (HCC): ICD-10-CM

## 2020-08-04 DIAGNOSIS — E55.9 VITAMIN D DEFICIENCY: ICD-10-CM

## 2020-08-04 DIAGNOSIS — C50.412 MALIGNANT NEOPLASM OF UPPER-OUTER QUADRANT OF LEFT BREAST IN FEMALE, ESTROGEN RECEPTOR POSITIVE (HCC): ICD-10-CM

## 2020-08-04 DIAGNOSIS — Z17.0 MALIGNANT NEOPLASM OF UPPER-INNER QUADRANT OF LEFT BREAST IN FEMALE, ESTROGEN RECEPTOR POSITIVE (HCC): Primary | ICD-10-CM

## 2020-08-04 LAB
BUN BLDV-MCNC: 18 MG/DL (ref 7–22)
CHLORIDE, WHOLE BLOOD: 101 MEQ/L (ref 98–109)
CO2: 21 MEQ/L (ref 23–33)
CREATININE, WHOLE BLOOD: 0.8 MG/DL (ref 0.5–1.2)
GFR, ESTIMATED: 75 ML/MIN/1.73M2
GLUCOSE, WHOLE BLOOD: 216 MG/DL (ref 70–108)
HCT VFR BLD CALC: 42.5 % (ref 37–47)
HEMOGLOBIN: 13.9 GM/DL (ref 12–16)
IONIZED CALCIUM, WHOLE BLOOD: 1.22 MMOL/L (ref 1.12–1.32)
MCH RBC QN AUTO: 31.2 PG (ref 26–33)
MCHC RBC AUTO-ENTMCNC: 32.7 GM/DL (ref 32.2–35.5)
MCV RBC AUTO: 95 FL (ref 81–99)
PDW BLD-RTO: 12.6 % (ref 11.5–14.5)
PLATELET # BLD: 435 THOU/MM3 (ref 130–400)
PMV BLD AUTO: 11.5 FL (ref 9.4–12.4)
POTASSIUM, WHOLE BLOOD: 4.5 MEQ/L (ref 3.5–4.9)
RBC # BLD: 4.46 MILL/MM3 (ref 4.2–5.4)
SODIUM, WHOLE BLOOD: 135 MEQ/L (ref 138–146)
WBC # BLD: 34.2 THOU/MM3 (ref 4.8–10.8)

## 2020-08-04 PROCEDURE — 6360000002 HC RX W HCPCS: Performed by: INTERNAL MEDICINE

## 2020-08-04 PROCEDURE — 2580000003 HC RX 258: Performed by: INTERNAL MEDICINE

## 2020-08-04 PROCEDURE — 82374 ASSAY BLOOD CARBON DIOXIDE: CPT

## 2020-08-04 PROCEDURE — 99211 OFF/OP EST MAY X REQ PHY/QHP: CPT

## 2020-08-04 PROCEDURE — 85027 COMPLETE CBC AUTOMATED: CPT

## 2020-08-04 PROCEDURE — 84520 ASSAY OF UREA NITROGEN: CPT

## 2020-08-04 PROCEDURE — 80047 BASIC METABLC PNL IONIZED CA: CPT

## 2020-08-04 PROCEDURE — 99214 OFFICE O/P EST MOD 30 MIN: CPT | Performed by: INTERNAL MEDICINE

## 2020-08-04 PROCEDURE — 36591 DRAW BLOOD OFF VENOUS DEVICE: CPT

## 2020-08-04 RX ORDER — HEPARIN SODIUM (PORCINE) LOCK FLUSH IV SOLN 100 UNIT/ML 100 UNIT/ML
500 SOLUTION INTRAVENOUS PRN
Status: DISCONTINUED | OUTPATIENT
Start: 2020-08-04 | End: 2020-08-05 | Stop reason: HOSPADM

## 2020-08-04 RX ORDER — SODIUM CHLORIDE 0.9 % (FLUSH) 0.9 %
10 SYRINGE (ML) INJECTION PRN
Status: DISCONTINUED | OUTPATIENT
Start: 2020-08-04 | End: 2020-08-05 | Stop reason: HOSPADM

## 2020-08-04 RX ORDER — SODIUM CHLORIDE 0.9 % (FLUSH) 0.9 %
20 SYRINGE (ML) INJECTION PRN
Status: DISCONTINUED | OUTPATIENT
Start: 2020-08-04 | End: 2020-08-05 | Stop reason: HOSPADM

## 2020-08-04 RX ORDER — SODIUM CHLORIDE 0.9 % (FLUSH) 0.9 %
10 SYRINGE (ML) INJECTION PRN
Status: CANCELLED | OUTPATIENT
Start: 2020-08-04

## 2020-08-04 RX ORDER — METRONIDAZOLE 500 MG/1
500 TABLET ORAL 3 TIMES DAILY
Qty: 30 TABLET | Refills: 1 | Status: SHIPPED | OUTPATIENT
Start: 2020-08-04 | End: 2020-08-14

## 2020-08-04 RX ORDER — SODIUM CHLORIDE 0.9 % (FLUSH) 0.9 %
20 SYRINGE (ML) INJECTION PRN
Status: CANCELLED | OUTPATIENT
Start: 2020-08-04

## 2020-08-04 RX ORDER — HEPARIN SODIUM (PORCINE) LOCK FLUSH IV SOLN 100 UNIT/ML 100 UNIT/ML
500 SOLUTION INTRAVENOUS PRN
Status: CANCELLED | OUTPATIENT
Start: 2020-08-04

## 2020-08-04 RX ORDER — HYDROCORTISONE 25 MG/G
CREAM TOPICAL
Qty: 28 G | Refills: 1 | Status: ON HOLD | OUTPATIENT
Start: 2020-08-04 | End: 2020-10-10

## 2020-08-04 RX ADMIN — Medication 500 UNITS: at 14:12

## 2020-08-04 RX ADMIN — Medication 10 ML: at 14:12

## 2020-08-04 RX ADMIN — Medication 10 ML: at 13:18

## 2020-08-04 RX ADMIN — Medication 20 ML: at 13:19

## 2020-08-04 ASSESSMENT — ENCOUNTER SYMPTOMS
DIARRHEA: 1
TROUBLE SWALLOWING: 0
BLOOD IN STOOL: 0
ABDOMINAL PAIN: 0
COLOR CHANGE: 0
SHORTNESS OF BREATH: 0
CHEST TIGHTNESS: 0
RECTAL PAIN: 0
FACIAL SWELLING: 0
SORE THROAT: 0
VOMITING: 0
CONSTIPATION: 0
EYE DISCHARGE: 0
WHEEZING: 0
NAUSEA: 0
COUGH: 0
BACK PAIN: 0
ABDOMINAL DISTENTION: 0

## 2020-08-04 NOTE — TELEPHONE ENCOUNTER
Patient called and stated that she has had diarrhea since Friday and the medicine is not working,  Patient also stated that she has bleeding form her rectum. patient did stated that she does hemorrhoids and has a raspy voice,, there is no fever and no pain.

## 2020-08-04 NOTE — TELEPHONE ENCOUNTER
Left a voice message for patient to call back. Per Dr Jeff Beckman she would like patient to be seen today.

## 2020-08-04 NOTE — PROGRESS NOTES
[facility] [facility] [address], [address] [facility] [address]  [place] antonio Gabriel  Dept: [phone]  Dept Fax: [phone]: [phone]    Visit Date:8/4/2020     Jolene Ruby is a 79 y.o. female who presents today for:   Chief Complaint   Patient presents with    Diarrhea     started last Thursday-hemorrhoids bleeding        HPI:   Mrs. Bello Meredith is a 77-year-old female with newly diagnosed triple positive left breast cancer. She had annual screening mammogram on June 18, 2020 that showed 2 lesions in the left breast.  Subsequently she underwent breast ultrasound followed by ultrasound guided biopsy of those 2 lesions. The largest of these lesions is a lobulated mixed cystic and    solid appearing mass measuring approximately 3 x 2.1 x 2.7 cm. This is located at the anterior depth upper inner quadrant. Aspiration of the fluid component of this lesion and biopsy of    the solid component of this lesion was performed on 6/26/2020. The smaller adjacent lesion is located at the middle depth of the     upper inner quadrant left breast measuring approximately 1.6 x    1.1 x 1.6 cm. Biopsy of this lesion was performed on 6/26/2020. Final pathology report showed:   A-B.  Left breast, upper inner quadrant, anterior and middle depth,   barbell and tribell clips, multiple core needle biopsies:   Anamika Buckner, poorly differentiated ductal carcinoma, Pepe score 3.    Estrogen Receptor: (Clone SP1), [employer]    Positive 100 % of cells (>10% of cells)   Intensity of Staining:    Strong   Progesterone Receptor: (Clone 1E2), [employer]    Positive 90 % of cells   Intensity of Staining:    Strong   Ki-67 (clone 30-9)       Percentage of positive nuclei: 42 %               Favorable <10%               Borderline 10-20%               Unfavorable >20%          2018 ASCO/ CAP   Inform HER2 Dual NEW        HER2 dual NEW    [employer]        Group #   ( X  Group 1: controlled with Imodium. She also reports abdominal discomfort and cramping. She denies having any fevers, no melena  She admits to drinking plenty of water. She denies having any lightheadedness dizziness. Diarrhea    Associated symptoms include arthralgias. Pertinent negatives include no abdominal pain, coughing, fever, headaches, myalgias or vomiting.       Past Medical History:   Diagnosis Date    Breast cancer ([address] 2020    left-invasive ductal    Hyperlipidemia     Numbness and tingling     left foot-chronic nerve damage      Past Surgical History:   Procedure Laterality Date    ABDOMINAL EXPLORATION SURGERY  2014    Dr butterfield-lysis of adhesions    BREAST SURGERY Left 06/26/2020    biopsy of left breast    CHOLECYSTECTOMY  10/2014    Dr Kt Shay  03/30/2016    Dr Opal Campbell  10/2014    x4 abdominal wall    HYSTERECTOMY      INCISIONAL HERNIA REPAIR  2014    Dr Yaharia Tony LIPECTOMY  2014    Dr Pamella Whyte LEFT  7/16/2020    [address] LEFT 7/16/2020 Shaylee Garrido MD [facility]    PORT SURGERY Right 7/17/2020    SINGLE LUMEN SMART PORT INSERTION RIGHT SUBCLAVIAN performed by Esther Angulo MD at [address] [facility]  [facility]      patient was 11years old    TUBAL LIGATION        Family History   Problem Relation Age of Onset    Heart Disease Father         cath stent blood to thin and bled    Heart Attack Mother     Other Other         blood clot    Breast Cancer Paternal Aunt 62    High Blood Pressure Sister     Cancer Brother 68        skin    Prostate Cancer Brother         had chemotherapy to treat     Prostate Cancer Brother 64        has had for 10 years    Ovarian Cancer Neg Hx     Diabetes Neg Hx     Stroke Neg Hx       Social History     Tobacco Use    Smoking status: Never Smoker    Smokeless tobacco: Never Used   Substance Use Topics    Alcohol use: No     Alcohol/week: 0.0 standard drinks      Current Outpatient Medications   Medication Sig Dispense Refill    hydrocortisone (ANUSOL-HC) 2.5 % CREA rectal cream Apply twice a day 28 g 1    ibuprofen (ADVIL;MOTRIN) 800 MG tablet Take 800 mg by mouth every 6 hours as needed for Pain      lidocaine-prilocaine (EMLA) 2.5-2.5 % cream Apply topically as needed. 30 g 1    levothyroxine (SYNTHROID) 25 MCG tablet Take 25 mcg by mouth Daily      alendronate (FOSAMAX) 35 MG tablet Take 35 mg by mouth every 7 days      aspirin EC 81 MG EC tablet Take 1 tablet by mouth daily 30 tablet 3    ondansetron (ZOFRAN ODT) 4 MG disintegrating tablet Take 1 tablet by mouth every 8 hours as needed for Nausea 20 tablet 0    PARoxetine (PAXIL) 10 MG tablet Take 10 mg by mouth every morning      Coenzyme Q10-Fish Oil-Vit E (CO-Q 10 OMEGA-3 FISH OIL PO) Take by mouth daily      Vitamin D (CHOLECALCIFEROL) 1000 UNITS CAPS capsule Take by mouth daily 2 tab      Cinnamon 500 MG CAPS Take by mouth daily      Cyanocobalamin (VITAMIN B12 PO) Take 1,000 mcg by mouth daily        No current facility-administered medications for this visit.        Allergies   Allergen Reactions    Aluminum-Containing Compounds Anaphylaxis      Health Maintenance   Topic Date Due    Hepatitis C screen  1950    Meningococcal (ACWY) vaccine (1 - Risk start before 7 months 4-dose series) 1950    Hib vaccine (1 of 1 - Risk 1-dose series) 09/21/1951    Meningococcal B vaccine (1 of 4 - Increased Risk Bexsero 2-dose series) 06/21/1960    DTaP/Tdap/Td vaccine (1 - Tdap) 06/21/1969    Lipid screen  06/21/1990    Diabetes screen  06/21/1990    Shingles Vaccine (1 of 2) 06/21/2000    Colon cancer screen colonoscopy  06/21/2000    Annual Wellness Visit (AWV)  06/23/2019    Pneumococcal 65+ yrs at Risk Vaccine (2 of 2 - PPSV23) 11/20/2019    Flu vaccine (1) 09/01/2020    Breast cancer screen  07/16/2021    TORRIE thyromegaly. Vascular: No JVD. Trachea: No tracheal deviation. Cardiovascular:      Rate and Rhythm: Normal rate. Heart sounds: Normal heart sounds. No murmur. No friction rub. No gallop. Pulmonary:      Effort: Pulmonary effort is normal. No respiratory distress. Breath sounds: Normal breath sounds. No stridor. No wheezing or rales. Chest:      Chest wall: No tenderness. Breasts:         Left: Mass (Simply 4 cm in size palpable mass in the left upper inner quadrant) present. Abdominal:      General: Bowel sounds are normal. There is no distension. Palpations: Abdomen is soft. There is no mass. Tenderness: There is no abdominal tenderness. There is no rebound. Musculoskeletal: Normal range of motion. Comments: Good range of motion in all four extremities. Lymphadenopathy:      Cervical: No cervical adenopathy. Skin:     General: Skin is warm. Findings: No erythema or rash. Neurological:      Mental Status: She is alert and oriented to person, place, and time. Cranial Nerves: No cranial nerve deficit. Motor: No abnormal muscle tone. Deep Tendon Reflexes: Reflexes are normal and symmetric. Psychiatric:         Behavior: Behavior normal.         Thought Content: Thought content normal.         Judgment: Judgment normal.         /67 (Site: Right Upper Arm, Position: Sitting, Cuff Size: Medium Adult)   Pulse 80   Temp 97.9 °F (36.6 °C) (Infrared)   Resp 16   Ht 5' 8\" (1.727 m)   Wt 221 lb (100.2 kg)   SpO2 93%   BMI 33.60 kg/m²      ECOG status is 0    Imaging studies and labs:   Dexa Bone Density Axial Skeleton  Result Date: 6/25/2020  OSTEOPENIA Patient is at medium risk for fracture. Recheck of BMD in 1-2 years and patient consult w/primary care provider are recommended.   Jennifer Reese M.D., rd/herrera:6/25/2020 13:00:55  Imaging Technologist: Cecelia Kwan RT(R)(M), [id number] [place] [address]      Mri Breast Bilateral W Wo Contrast  Result Date: 7/10/2020  #[id number] - MRI BREAST BILATERAL W WO CONTRAST BREAST MRI OF BOTH BREASTS : 7/8/2020 CLINICAL: Newly diagnosed invasive ductal carcinoma of left breast.  Interpretation of this MRI was correlated with available mammograms and ultrasounds. Axial and coronal T2 STIR, sagittal T1, axial T1 fat saturated pre and post contrast dynamic imaging was performed of both breasts. Images were reviewed at a [employer] 3D workstation and time intensity curves were analyzed. The breast parenchyma is heterogeneously dense. There is mild background enhancement. There is a known biopsy-proven mass demonstrated within the upper  inner left breast anterior to middle depth. This measures 3 cm x  4.1 cm x 3.1 cm middle depth located 4  cm from the nipple, 0.9 cm from the skin, and 6.5 cm from the chest wall. This shows heterogeneous enhancement and delayed washout type vascular enhancement. The abnormality is best seen on the post contrast subtraction axial 56 slice. This correlates as palpated. This measures approximately 3.6 x 3.1 cm. There are scattered small foci of enhancement demonstrated bilaterally. The dominant focus of enhancement within the right breast is located within the lower inner right breast anterior depth on axial image 105 series 9. Recommend further evaluation with second look ultrasound. If there is no sonographic correlate, recommend 6 month follow-up breast MRI to document stability. Among the small foci of enhancement within the left breast, the most prominent is located within the lower outer quadrant posterior depth as seen on axial image 91 series 9. Recommend further evaluation with second look ultrasound. If there is no sonographic correlate, recommend 6 month follow-up breast MRI to document stability. Multiple rounded hyperintense T2 weighted lesions are demonstrated within the left upper quadrant on the coronal STIR series of images.  This is seen on coronal series image 27. These correlate with previous redemonstrated splenules on prior CT examination from January 2016. No acute osseous findings are demonstrated. No lymphadenopathy is  seen. Madisyn Digital Diagnostic W Or Wo Cad Left    Us Breast Limited Left    Result Date: 7/13/2020  IMPRESSION: Ultrasound BI-RADS: 4B: Moderate suspicion for malignancy The 0.6 cm x 0.5 cm x 0.9 cm oval lesion in the left breast appears to have an intermediate suspicion for malignancy. An ultrasound guided biopsy is recommended. The Breast Care Navigator was notified. The results were reviewed with the patient. The patient has been or will be contacted. #[id number] - US BREAST LIMITED LEFT ULTRASOUND OF LEFT BREAST: 7/13/2020 CLINICAL: Abnormal Breast MRI, known left breast cancer. Comparison is made to exam dated:  7/8/2020 breast MRI - [address] [place] [address]. Real-time ultrasound of the left breast was performed on the areas of interest.  Gray scale images of the real-time examination were reviewed. There is a 0.6 cm x 0.5 cm x 0.9 cm oval lesion with an irregular  margin in the left breast lower outer aspect posterior depth 7 cm from the nipple. This oval lesion is hypoechoic. This correlates with breast MRI findings. Dr. Gold Carias  nn/penrad:7/13/2020 10:19:42  copy to:  Randal Salter MD, [facility], ph: [phone], fax: [phone] Imaging Technologist: Sabina Rojas RT(R)(M)[facility], [address] [address] letter sent:   Us Breast Biopsy W Loc Device Each Addl Lesion Left      Lab Results   Component Value Date    WBC 34.2 (HH) 08/04/2020    HGB 13.9 08/04/2020    HCT 42.5 08/04/2020    MCV 95 08/04/2020     (H) 08/04/2020       Chemistry        Component Value Date/Time     (L) 08/04/2020 1321     02/10/2017 0608    K 4.5 08/04/2020 1321    K 4.2 02/10/2017 0608    CL 99 02/10/2017 0608    CO2 21 (L) 08/04/2020 1321    BUN 18 08/04/2020 1321    CREATININE 0.8 08/04/2020 1321    CREATININE 0.6 02/10/2017 0608        Component Value Date/Time    CALCIUM 9.5 02/10/2017 0608    ALKPHOS 61 07/27/2020 0907    AST 23 07/27/2020 0907    ALT 19 07/27/2020 0907    BILITOT 0.4 07/27/2020 0907        On July 16, 2020 the patient underwent biopsy of left breast mass, lower outer quadrant, U-Clip, core needle biopsies:    Invasive ductal carcinoma, [place] grade 2 of 3. BREAST BIOMARKERS*   Estrogen Receptor:  Negative (<1% of cells staining)   Progesterone Receptor: Negative (<1% of cells staining)   Ki-67 Percentage of positive nuclei:  40%     HER2 Immunohistochemistry  Negative (0) - No staining observed OR incomplete, faint/barely     perceptible membrane staining in <10% of invasive tumor     Assessment:   1. Newly diagnosed triple positive left breast cancer. Clinical stage IB (cT2, cN0, cM0, G3, ER+, MA+, HER2+) with separate focus of triple negative breast cancer located in the lower outer quadrant. Systemic chemotherapy is recommended for every patient with HER-2/jordan positive HER-2 more bigger than 5 mm in size. For tumors larger than 2 cm in size recommendation is to consider neoadjuvant chemotherapy. The patient was informed that outcome, defined as overall survival, is the same whether chemotherapy is given before or after breast surgery. Patients with early-stage breast cancer are appropriate candidates for neoadjuvant therapy if breast-conserving surgery is not possible due to a high tumor-to-breast ratio, or if the expected postoperative cosmetic outcome would be suboptimal due to tumor location. In addition, patients with Her2 positive BC may be offered neoadjuvant therapy since these patients would receive chemotherapy in their treatment course anyway, and Her2 positive BC is associated with a high likelihood of response. Additionally neoadjuvant chemotherapy provides a prognostic information.   Patients who achieved complete pathologic response to neoadjuvant chemotherapy have improved outcome. For those who still have residual cancer after neoadjuvant chemotherapy, the new recommendation is to proceed with 1 year of adjuvant TDM 1. A chemotherapy regimen that the patient will be treated with containes Taxotere and Rebecca which is very effective combination to treat triple negative breast cancer. 2.  Neoadjuvant chemotherapy withTaxotere/carbo/Herceptin and Pertuzumab. Started on July 27, 2020. The patient developed diarrhea 4 days after cycle #1. Diarrhea did not respond to Imodium although the patient took only 4 tablets of Imodium per day. She reports abdominal cramping and discomfort. She denies having fevers, no melena. Patient states that she did to drink plenty of fluid. Her BUN and creatinine are within normal limits today. She has normal potassium 4.5. Therefore there is no need for hydration. The patient was instructed to take 2 tablets of Imodium on schedule every 6 hours alternating with 1 tablet of Lomotil every 6 hours. In addition she received prescription forl 10-day course of Flagyl. Patient was instructed to call us with uncontrolled nausea, vomiting, uncontrolled diarrhea and fever above 100.3 degrees       Diagnosis Orders   1. Malignant neoplasm of upper-inner quadrant of left breast in female, estrogen receptor positive ([facility]     2. Encounter for chemotherapy management     3. HER2-positive carcinoma of breast ([facility] [address]     4. Diarrhea due to drug          Plan:   No follow-ups on file. Orders Placed:   No orders of the defined types were placed in this encounter.        Medications Prescribed:   Orders Placed This Encounter   Medications    hydrocortisone (ANUSOL-HC) 2.5 % CREA rectal cream     Sig: Apply twice a day     Dispense:  28 g     Refill:  1    metroNIDAZOLE (FLAGYL) 500 MG tablet     Sig: Take 1 tablet by mouth 3 times daily for 10 days     Dispense:  30 tablet     Refill:  1             I spent 40 minutes face-to-face with the patient and her family. Greater than 50% of time was spent on counseling and coordinating her care. Face to face counseling included prognosis, risks, benefits of treatment, compliance and risk reduction.        Electronically signed by Jose Ivy MD on 7/14/20 at [address] [place] [address] EDT

## 2020-08-04 NOTE — PLAN OF CARE
Care plan reviewed with patient. Patient verbalized understanding of the plan of care and contribute to goal setting. Problem: Intellectual/Education/Knowledge Deficit  Goal: Teaching initiated upon admission  8/4/2020 1654 by Susana Diaz RN  Outcome: Met This Shift  8/4/2020 1653 by Susana Diaz RN  Note: Verbalized understanding of lab draw  Intervention: Verbal/written education provided  Note: Discuss lab draw     Problem: Discharge Planning  Goal: Knowledge of discharge instructions  Description: Knowledge of discharge instructions  8/4/2020 1654 by Susana Diaz RN  Outcome: Met This Shift  8/4/2020 1653 by Susana Diaz RN  Note: Verbalized understanding of discharge instructions, follow ups and when to call doctor   Intervention: Discharge to appropriate level of care  Note: Discuss discharge instructions, follow ups and when to call doctor. Problem: Falls - Risk of:  Goal: Will remain free from falls  Description: Will remain free from falls  8/4/2020 1654 by Susana Diaz RN  Outcome: Met This Shift  8/4/2020 1653 by Susana Diaz RN  Note: Free from falls while in infusion center.  Verbalized understanding of fall prevention and to ask for assistance with ambulation   Intervention: Assess risk factors for falls  Description: Assess risk factors for falls  Note: Assess for fall risk, instruct to ask for assistance with ambulation

## 2020-08-04 NOTE — PROGRESS NOTES
Patient tolerated lab draw via [facility] [address] [address]  without any complications. Discharge instructions given to patient-verbalizes understanding. Ambulated off unit per self with belongings.

## 2020-08-07 ENCOUNTER — CLINICAL DOCUMENTATION (OUTPATIENT)
Dept: CASE MANAGEMENT | Age: 70
End: 2020-08-07

## 2020-08-07 NOTE — PROGRESS NOTES
Name: Anne Hall  : 1950  MRN: [id number]    Oncology Navigation Follow-Up Note    Notes: Faxed Breast Conference recommendations for treatment to Dr. Theresa Bailey.     Electronically signed by Hilda Painting RN on 2020 at 11:45 AM

## 2020-08-09 NOTE — H&P
[facility] [address] [facility] [address]  Dept: [phone]  Dept Fax: [phone]  Loc: [phone]    Visit Date: 7/2/2020    Maricruz Roblero is a 79 y.o. female who presentstoday for:  Chief Complaint   Patient presents with    Surgical Consult     NP- ref [employer] left breast cancer- requesting Dr. Cristobal Chamorro       HPI:     HPI-year-old white female who recently on mammograms was found to have 2 lesions in the left breast interestingly that she had mammograms in June of last year that were normal although in retrospect may have had beginning of some densities biopsies were performed showing poorly differentiated ductal carcinoma the breast she was ER VA positive but also HER-2/jordan positive. Her Ki-67 was greater than 40%. Patient briefly took birth control pills in her lifetime and briefly took hormonal replacement she cannot remember the exact ages but she said it was a limited period of time.   She has a aunt who had breast cancer otherwise no other family history she has had no nipple discharge denies any pain in the breast    Past Medical History:   Diagnosis Date    Hyperlipidemia       Past Surgical History:   Procedure Laterality Date    BREAST SURGERY      excision mass left breast    CHOLECYSTECTOMY  10/2014    HERNIA REPAIR  10/2014    x4    HYSTERECTOMY      SPLENECTOMY  10/2014    TONSILLECTOMY AND ADENOIDECTOMY      patient was 11years old   [facility] TUBAL LIGATION          Family History   Problem Relation Age of Onset    Heart Disease Father         cath stent blood too thin and bled    Other Other         blood clot    Breast Cancer Paternal Aunt 62    High Blood Pressure Sister     Cancer Brother 68        skin    Prostate Cancer Brother         had chemotherapy to treat     Prostate Cancer Brother 64        has had for 10 years    Ovarian Cancer Neg Hx     Diabetes Neg Hx     Stroke Neg Hx         Social History     Tobacco Use    Smoking status: Never Smoker    Smokeless tobacco: Never Used   Substance Use Topics    Alcohol use: No     Alcohol/week: 0.0 standard drinks          Current Outpatient Medications   Medication Sig Dispense Refill    levothyroxine (SYNTHROID) 50 MCG tablet Take 1 tablet by mouth Daily 30 tablet 3    aspirin EC 81 MG EC tablet Take 1 tablet by mouth daily 30 tablet 3    ondansetron (ZOFRAN ODT) 4 MG disintegrating tablet Take 1 tablet by mouth every 8 hours as needed for Nausea 20 tablet 0    PARoxetine (PAXIL) 10 MG tablet Take 10 mg by mouth every morning      Ascorbic Acid (VITAMIN C) 500 MG tablet Take 500 mg by mouth daily      Coenzyme Q10-Fish Oil-Vit E (CO-Q 10 OMEGA-3 FISH OIL PO) Take by mouth daily      Vitamin D (CHOLECALCIFEROL) 1000 UNITS CAPS capsule Take by mouth daily 2 tab      Vitamin A 8000 UNITS TABS Take by mouth daily      Cinnamon 500 MG CAPS Take by mouth daily      Cyanocobalamin (VITAMIN B12 PO) Take 1,000 mcg by mouth daily        No current facility-administered medications for this visit. Allergies   Allergen Reactions    Aluminum-Containing Compounds Anaphylaxis       Subjective:      Review of Systems   Constitutional: Negative. Negative for activity change, appetite change, chills, diaphoresis, fatigue, fever and unexpected weight change. HENT: Negative for congestion, dental problem, drooling, ear discharge, ear pain, facial swelling, hearing loss, mouth sores, nosebleeds, postnasal drip, rhinorrhea, sinus pressure, sinus pain, sneezing, sore throat, tinnitus, trouble swallowing and voice change. Eyes: Negative. Negative for photophobia, pain, discharge, redness, itching and visual disturbance. Respiratory: Negative for apnea, cough, choking, chest tightness, shortness of breath, wheezing and stridor. Cardiovascular: Positive for leg swelling. Negative for chest pain and palpitations.    Gastrointestinal: Positive for abdominal pain. Endocrine: Negative. Negative for cold intolerance, heat intolerance, polydipsia, polyphagia and polyuria. Genitourinary: Negative. Negative for decreased urine volume, difficulty urinating, dyspareunia, dysuria, enuresis, flank pain, frequency, genital sores, hematuria, menstrual problem, pelvic pain, urgency, vaginal bleeding, vaginal discharge and vaginal pain. Musculoskeletal: Negative. Negative for arthralgias, back pain, gait problem, joint swelling, myalgias, neck pain and neck stiffness. Skin: Negative. Negative for color change, pallor, rash and wound. Allergic/Immunologic: Negative. Negative for environmental allergies, food allergies and immunocompromised state. Neurological: Negative. Negative for dizziness, tremors, seizures, syncope, facial asymmetry, speech difficulty, weakness, light-headedness, numbness and headaches. Hematological: Negative. Negative for adenopathy. Does not bruise/bleed easily. Psychiatric/Behavioral: Negative. Negative for agitation, behavioral problems, confusion, decreased concentration, dysphoric mood, hallucinations, self-injury, sleep disturbance and suicidal ideas. The patient is not nervous/anxious and is not hyperactive. Objective: There were no vitals taken for this visit. Physical Exam  Constitutional:       Appearance: She is well-developed. HENT:      Head: Normocephalic and atraumatic. Eyes:      General: No scleral icterus. Right eye: No discharge. Left eye: No discharge. Conjunctiva/sclera: Conjunctivae normal.      Pupils: Pupils are equal, round, and reactive to light. Neck:      Musculoskeletal: Normal range of motion and neck supple. Thyroid: No thyromegaly. Vascular: No JVD. Cardiovascular:      Rate and Rhythm: Normal rate and regular rhythm. Heart sounds: No murmur. No friction rub. No gallop.     Pulmonary:      Effort: Pulmonary effort is normal. No respiratory distress. Breath sounds: Normal breath sounds. No stridor. No wheezing. Chest:      Chest wall: No tenderness. Musculoskeletal: Normal range of motion. Skin:     General: Skin is warm and dry. Coloration: Skin is not pale. Findings: No erythema or rash. Neurological:      Mental Status: She is alert and oriented to person, place, and time. Psychiatric:         Behavior: Behavior normal.         Thought Content:  Thought content normal.         Judgment: Judgment normal.            Results for orders placed or performed during the hospital encounter of 02/09/17   Rapid influenza A/B antigens   Result Value Ref Range    Flu A Antigen NEGATIVE NEGATIVE    Flu B Antigen NEGATIVE NEGATIVE   Urine Culture   Result Value Ref Range    Urine Culture, Routine No growth-preliminary  No growth      D-dimer, quantitative   Result Value Ref Range    D-Dimer, Quant 598.00 (H) 0.00 - 500.0 ng/ml FEU   CBC auto differential   Result Value Ref Range    WBC 12.0 (H) 4.8 - 10.8 thou/mm3    RBC 4.96 4.20 - 5.40 mill/mm3    Hemoglobin 14.7 12.0 - 16.0 gm/dl    Hematocrit 44.9 37.0 - 47.0 %    MCV 90.6 81.0 - 99.0 fL    MCH 29.6 27.0 - 31.0 pg    MCHC 32.7 (L) 33.0 - 37.0 gm/dl    RDW 13.9 11.5 - 14.5 %    Platelets 262 (H) 969 - 400 thou/mm3    MPV 9.4 7.4 - 10.4 mcm    RBC Morphology NORMAL     Seg Neutrophils 57.2 %    Lymphocytes 29.7 %    Monocytes 10.5 %    Eosinophils 2.0 %    Basophils 0.6 %    nRBC 0 /100 wbc    Segs Absolute 6.9 1.8 - 7.7 thou/mm3    Lymphocytes Absolute 3.6 1.0 - 4.8 thou/mm3    Monocytes Absolute 1.3 0.4 - 1.3 thou/mm3    Eosinophils Absolute 0.2 0.0 - 0.4 thou/mm3    Basophils Absolute 0.1 0.0 - 0.1 thou/mm3   Brain Natriuretic Peptide   Result Value Ref Range    Pro-BNP 43.6 0.0 - 900.0 pg/ml   Protime-INR   Result Value Ref Range    INR 0.86 0.85 - 1.13   Blood gas, arterial   Result Value Ref Range    pH, Blood Gas 7.39 7.35 - 7.45    PCO2 41 35 - 45 mmhg    PO2 61 (L) 71 - 104 /hpf    WBC, UA 5-10 0-4/hpf /hpf    Epithelial Cells, UA 0-2 3-5/hpf /hpf    Bacteria, UA NONE FEW/NONE S    Casts NONE SEEN NONE SEEN /lpf    Crystals NONE SEEN NONE SEEN    Renal Epithelial, UA NONE SEEN NONE SEEN    Yeast, UA NONE SEEN NONE SEEN    Casts NONE SEEN /lpf    Miscellaneous Lab Test Result NONE SEEN    Glomerular Filtration Rate, Estimated   Result Value Ref Range    Est, Glom Filt Rate 84 (A) ml/min/1.73m2   Osmolality   Result Value Ref Range    Osmolality Calc 279.3 275.0 - 300 mOsmol/kg   Anion Gap   Result Value Ref Range    Anion Gap 14.0 8.0 - 16.0 meq/l   Lactic acid, plasma   Result Value Ref Range    Lactic Acid 3.0 (H) 0.5 - 2.2 mmol/l   Sedimentation Rate   Result Value Ref Range    Sed Rate 21 (H) 0 - 20 mm/hr   C-reactive protein   Result Value Ref Range    CRP 0.80 0.00 - 1.00 mg/dl   Vitamin B12 & folate   Result Value Ref Range    Vitamin B-12 338 211 - 911 pg/mL    Folate 16.5 4.8 - 24.2 ng/mL   T4, Free   Result Value Ref Range    T4 Free 0.92 (L) 0.93 - 1.76 ng/dl   T3, free   Result Value Ref Range    T3, Free 2.52 2.02 - 4.43 pg/mL   Iron   Result Value Ref Range    Iron 92 50 - 170 mcg/dl   Lead, blood   Result Value Ref Range    Lead < 1 0 - 19 ug/dL   Vitamin D 25 hydroxy   Result Value Ref Range    Vit D, 25-Hydroxy 16 (L) 30 - 100 ng/ml   PTH, Intact   Result Value Ref Range    Pth Intact 77.1 (H) 15.0 - 65.0 pg/mL   Calcium, Ionized   Result Value Ref Range    Calcium, Ion 1.18 1.12 - 1.32 mmol/L   Magnesium   Result Value Ref Range    Magnesium 1.9 1.6 - 2.4 mg/dl   Lactic Acid, Plasma   Result Value Ref Range    Lactic Acid 3.9 (H) 0.5 - 2.2 mmol/l   CBC Auto Differential   Result Value Ref Range    WBC 9.8 4.8 - 10.8 thou/mm3    RBC 4.70 4.20 - 5.40 mill/mm3    Hemoglobin 14.1 12.0 - 16.0 gm/dl    Hematocrit 42.9 37.0 - 47.0 %    MCV 91.2 81.0 - 99.0 fL    MCH 29.9 27.0 - 31.0 pg    MCHC 32.8 (L) 33.0 - 37.0 gm/dl    RDW 13.9 11.5 - 14.5 %    Platelets 287 964 - 175 thou/mm3    MPV 9.0 7.4 - 10.4 mcm    RBC Morphology NORMAL     Seg Neutrophils 35.5 %    Lymphocytes 46.2 %    Monocytes 15.5 %    Eosinophils 2.7 %    Basophils 0.1 %    nRBC 0 /100 wbc    Segs Absolute 3.5 1.8 - 7.7 thou/mm3    Lymphocytes Absolute 4.5 1.0 - 4.8 thou/mm3    Monocytes Absolute 1.5 (H) 0.4 - 1.3 thou/mm3    Eosinophils Absolute 0.3 0.0 - 0.4 thou/mm3    Basophils Absolute 0.0 0.0 - 0.1 thou/mm3   Procalcitonin   Result Value Ref Range    Procalcitonin 0.05 0.01 - 0.09 ng/mL   Lactic Acid, Plasma   Result Value Ref Range    Lactic Acid 2.1 0.5 - 2.2 mmol/l   Comprehensive metabolic panel   Result Value Ref Range    Glucose 116 (H) 70 - 108 mg/dl    CREATININE 0.6 0.4 - 1.2 mg/dl    BUN 13 7 - 22 mg/dl    Sodium 140 135 - 145 meq/l    Potassium 4.2 3.5 - 5.2 meq/l    Chloride 99 98 - 111 meq/l    CO2 23 23 - 33 meq/l    Calcium 9.5 8.5 - 10.5 mg/dl    AST 17 5 - 40 U/L    Alkaline Phosphatase 64 38 - 126 U/L    Total Protein 7.0 6.1 - 8.0 gm/dl    Alb 3.6 3.5 - 5.1 gm/dl    Total Bilirubin 0.6 0.3 - 1.2 mg/dl    ALT 13 11 - 66 U/L   Magnesium   Result Value Ref Range    Magnesium 2.0 1.6 - 2.4 mg/dl   CBC auto differential   Result Value Ref Range    WBC 9.8 4.8 - 10.8 thou/mm3    RBC 4.58 4.20 - 5.40 mill/mm3    Hemoglobin 13.7 12.0 - 16.0 gm/dl    Hematocrit 41.4 37.0 - 47.0 %    MCV 90.3 81.0 - 99.0 fL    MCH 29.9 27.0 - 31.0 pg    MCHC 33.1 33.0 - 37.0 gm/dl    RDW 13.6 11.5 - 14.5 %    Platelets 019 702 - 389 thou/mm3    MPV 9.3 7.4 - 10.4 mcm    RBC Morphology NORMAL     Seg Neutrophils 36.4 %    Lymphocytes 42.9 %    Monocytes 15.5 %    Eosinophils 3.9 %    Basophils 1.3 %    nRBC 4 /100 wbc    Segs Absolute 3.6 1.8 - 7.7 thou/mm3    Lymphocytes Absolute 4.2 1.0 - 4.8 thou/mm3    Monocytes Absolute 1.5 (H) 0.4 - 1.3 thou/mm3    Eosinophils Absolute 0.4 0.0 - 0.4 thou/mm3    Basophils Absolute 0.1 0.0 - 0.1 thou/mm3   TSH with Reflex   Result Value Ref Range    TSH 2.480 0.400 - 4.20 mcIU/ml Phosphorus   Result Value Ref Range    Phosphorus 3.1 2.4 - 4.7 mg/dl   Lactic Acid, Plasma   Result Value Ref Range    Lactic Acid 2.7 (H) 0.5 - 2.2 mmol/l   Anion Gap   Result Value Ref Range    Anion Gap 18.0 (H) 8.0 - 16.0 meq/l   Glomerular Filtration Rate, Estimated   Result Value Ref Range    Est, Glom Filt Rate >90 ml/min/1.73m2   Lactic Acid, Plasma   Result Value Ref Range    Lactic Acid 2.4 (H) 0.5 - 2.2 mmol/l   POCT Glucose   Result Value Ref Range    POC Glucose 97 70 - 108 mg/dl   EKG Chest Pain   Result Value Ref Range    Ventricular Rate 82 BPM    Atrial Rate 82 BPM    P-R Interval 178 ms    QRS Duration 92 ms    Q-T Interval 360 ms    QTc Calculation (Bazett) 420 ms    P Axis 32 degrees    R Axis 6 degrees    T Axis 25 degrees   Echocardiogram 2D/ M-Mode/ Colorflow/ Do   Result Value Ref Range    Left Ventricular Ejection Fraction 55     LVEF MODALITY ECHO        Assessment:     Poorly differentiated ductal carcinoma the breast I called   and wanted to know how far apart the 2 lesions were he stated they are close together but from anterior to posterior roughly make up 5 cm long discussion with patient regarding the history of breast cancer treatment and her options of attempted lumpectomy with radiation treatment or mastectomy we also discussed sentinel lymph node biopsy would be performed with either and that she may need radiation to the axilla this patient would most be benefited with a mastectomy however she is going to be presented at breast conference patient is leaning towards preference for mastectomy and sentinel node biopsy will schedule as such    Plan:     1. Schedule Bhargavi for full mastectomy and sentinel lymph node biopsy 2. The risks, benefits and alternatives were discussed with Howard Anthony. She understands and wishes to proceed with surgical intervention. 3. Restrictions discussed with Howard Anthony and she expresses understanding.   4. She is advised to call back directly

## 2020-08-12 ASSESSMENT — ENCOUNTER SYMPTOMS
COUGH: 0
FACIAL SWELLING: 0
APNEA: 0
SINUS PAIN: 0
EYE ITCHING: 0
EYE REDNESS: 0
BACK PAIN: 0
SORE THROAT: 0
COLOR CHANGE: 0
WHEEZING: 0
GASTROINTESTINAL NEGATIVE: 1
RHINORRHEA: 0
STRIDOR: 0
CHOKING: 0
SHORTNESS OF BREATH: 0
EYE DISCHARGE: 0
EYES NEGATIVE: 1
ALLERGIC/IMMUNOLOGIC NEGATIVE: 1
VOICE CHANGE: 0
CHEST TIGHTNESS: 0
EYE PAIN: 0
SINUS PRESSURE: 0
PHOTOPHOBIA: 0
TROUBLE SWALLOWING: 0

## 2020-08-12 NOTE — PROGRESS NOTES
[facility]  [address] [facility] [address]  Dept: [phone]  Dept Fax: [phone]  Loc: [phone]    Visit Date: 7/14/2020    Anne Hall is a 79 y.o. female who presentstoday for:  Chief Complaint   Patient presents with    Surgical Consult     est pt- left breast cancer- last seen-7/2- surgery scheduled for 7/17- discuss mediport -- MRI 7/8       HPI:     HPI 70-year-old white female who I had seen prior to going on vacation who had 2 cancers in the left breast and plan was to perform a mastectomy on the left breast patient was presented at breast conference and it was felt she needed some neoadjuvant treatment prior to surgical treatment she is here to discuss the Mediport    Past Medical History:   Diagnosis Date    Breast cancer ([address] 2020    left-invasive ductal    Hyperlipidemia     Numbness and tingling     left foot-chronic nerve damage      Past Surgical History:   Procedure Laterality Date    ABDOMINAL EXPLORATION SURGERY  2014    Dr butterfield-lysis of adhesions    BREAST SURGERY Left 06/26/2020    biopsy of left breast    CHOLECYSTECTOMY  10/2014    Dr Fry Shallow  03/30/2016    Dr Penelope Pineda  10/2014    x4 abdominal wall    HYSTERECTOMY      INCISIONAL HERNIA REPAIR  2014    Dr Miles Apo LIPECTOMY  2014    Dr Carmona Hidden  7/16/2020    [address] LEFT 7/16/2020 [facility] 7/17/2020    SINGLE LUMEN SMART PORT INSERTION RIGHT SUBCLAVIAN performed by Manas Simmons MD at [address] [facility]  10/2014    Dr Francois[facility]      patient was 11years old    TUBAL LIGATION          Family History   Problem Relation Age of Onset    Heart Disease Father         cath stent blood to thin and bled    Heart Attack Mother     Other Other         blood clot HENT: Negative. Negative for congestion, dental problem, drooling, ear discharge, ear pain, facial swelling, hearing loss, mouth sores, nosebleeds, postnasal drip, rhinorrhea, sinus pressure, sinus pain, sneezing, sore throat, tinnitus, trouble swallowing and voice change. Eyes: Negative. Negative for photophobia, pain, discharge, redness, itching and visual disturbance. Respiratory: Negative for apnea, cough, choking, chest tightness, shortness of breath, wheezing and stridor. Cardiovascular: Positive for leg swelling. Negative for chest pain and palpitations. Gastrointestinal: Negative. Endocrine: Negative. Negative for cold intolerance, heat intolerance, polydipsia, polyphagia and polyuria. Genitourinary: Negative. Negative for decreased urine volume, difficulty urinating, dyspareunia, dysuria, enuresis, flank pain, frequency, genital sores, hematuria, menstrual problem, pelvic pain, urgency, vaginal bleeding, vaginal discharge and vaginal pain. Musculoskeletal: Positive for arthralgias. Negative for back pain, gait problem, joint swelling, myalgias, neck pain and neck stiffness. Skin: Negative for color change, pallor, rash and wound. Allergic/Immunologic: Negative. Negative for environmental allergies, food allergies and immunocompromised state. Neurological: Negative. Negative for dizziness, tremors, seizures, syncope, facial asymmetry, speech difficulty, weakness, light-headedness, numbness and headaches. Hematological: Negative. Negative for adenopathy. Does not bruise/bleed easily. Psychiatric/Behavioral: Negative. Negative for agitation, behavioral problems, confusion, decreased concentration, dysphoric mood, hallucinations, self-injury, sleep disturbance and suicidal ideas. The patient is not nervous/anxious and is not hyperactive.         Objective:   /80 (Site: Right Upper Arm, Position: Sitting, Cuff Size: Medium Adult)   Pulse 88   Temp 97.1 °F (36.2 °C) (Temporal)   Resp 18   Ht 5' 8\" (1.727 m)   Wt 227 lb 12.8 oz (103.3 kg)   SpO2 92%   BMI 34.64 kg/m²     Physical Exam  Constitutional:       Appearance: She is well-developed. HENT:      Head: Normocephalic and atraumatic. Eyes:      General: No scleral icterus. Right eye: No discharge. Left eye: No discharge. Conjunctiva/sclera: Conjunctivae normal.      Pupils: Pupils are equal, round, and reactive to light. Neck:      Musculoskeletal: Normal range of motion and neck supple. Thyroid: No thyromegaly. Vascular: No JVD. Cardiovascular:      Rate and Rhythm: Normal rate and regular rhythm. Heart sounds: No murmur. No friction rub. No gallop. Pulmonary:      Effort: Pulmonary effort is normal. No respiratory distress. Breath sounds: Normal breath sounds. No stridor. No wheezing. Chest:      Chest wall: No tenderness. Musculoskeletal: Normal range of motion. Skin:     General: Skin is warm and dry. Coloration: Skin is not pale. Findings: No erythema or rash. Neurological:      Mental Status: She is alert and oriented to person, place, and time. Psychiatric:         Behavior: Behavior normal.         Thought Content: Thought content normal.         Judgment: Judgment normal.            Results for orders placed or performed during the hospital encounter of 07/10/20   COVID-19   Result Value Ref Range    SARS-CoV-2 NOT DETECTED NOT DETECT    Report SEE REPORT     Performing Lab [facility]        Assessment:     Carcinoma of the left breast in need of neoadjuvant treatment discussed placement of a MediPort with patient we discussed the procedure placement the risk of pneumothorax and need for chest tube patient voices understanding and wants to proceed    Plan:     1. Schedule Bhargavi for placement of Mediport  2. The risks, benefits and alternatives were discussed with Howard Anthony. She understands and wishes to proceed with surgical intervention.   3. Restrictions discussed with Quinton Man and she expresses understanding. 4. She is advised to call back directly if there are further questions/concerns, or if her symptoms worsen prior to surgery.             Electronicallysigned by Rebeca Pelletier MD on 8/12/2020 at 4:54 PM

## 2020-08-16 PROBLEM — Z51.11 CHEMOTHERAPY MANAGEMENT, ENCOUNTER FOR: Status: ACTIVE | Noted: 2020-08-16

## 2020-08-16 RX ORDER — SODIUM CHLORIDE 9 MG/ML
INJECTION, SOLUTION INTRAVENOUS CONTINUOUS
Status: CANCELLED | OUTPATIENT
Start: 2020-08-17

## 2020-08-16 RX ORDER — SODIUM CHLORIDE 0.9 % (FLUSH) 0.9 %
5 SYRINGE (ML) INJECTION PRN
Status: CANCELLED | OUTPATIENT
Start: 2020-08-17

## 2020-08-16 RX ORDER — PALONOSETRON 0.05 MG/ML
0.25 INJECTION, SOLUTION INTRAVENOUS ONCE
Status: CANCELLED | OUTPATIENT
Start: 2020-08-17

## 2020-08-16 RX ORDER — HEPARIN SODIUM (PORCINE) LOCK FLUSH IV SOLN 100 UNIT/ML 100 UNIT/ML
500 SOLUTION INTRAVENOUS PRN
Status: CANCELLED | OUTPATIENT
Start: 2020-08-17

## 2020-08-16 RX ORDER — MEPERIDINE HYDROCHLORIDE 50 MG/ML
12.5 INJECTION INTRAMUSCULAR; INTRAVENOUS; SUBCUTANEOUS ONCE
Status: CANCELLED | OUTPATIENT
Start: 2020-08-17

## 2020-08-16 RX ORDER — EPINEPHRINE 1 MG/ML
0.3 INJECTION, SOLUTION, CONCENTRATE INTRAVENOUS PRN
Status: CANCELLED | OUTPATIENT
Start: 2020-08-17

## 2020-08-16 RX ORDER — SODIUM CHLORIDE 0.9 % (FLUSH) 0.9 %
10 SYRINGE (ML) INJECTION PRN
Status: CANCELLED | OUTPATIENT
Start: 2020-08-17

## 2020-08-16 RX ORDER — SODIUM CHLORIDE 9 MG/ML
20 INJECTION, SOLUTION INTRAVENOUS ONCE
Status: CANCELLED | OUTPATIENT
Start: 2020-08-17

## 2020-08-16 RX ORDER — DIPHENHYDRAMINE HYDROCHLORIDE 50 MG/ML
50 INJECTION INTRAMUSCULAR; INTRAVENOUS ONCE
Status: CANCELLED | OUTPATIENT
Start: 2020-08-17

## 2020-08-16 RX ORDER — METHYLPREDNISOLONE SODIUM SUCCINATE 125 MG/2ML
125 INJECTION, POWDER, LYOPHILIZED, FOR SOLUTION INTRAMUSCULAR; INTRAVENOUS ONCE
Status: CANCELLED | OUTPATIENT
Start: 2020-08-17

## 2020-08-16 ASSESSMENT — ENCOUNTER SYMPTOMS
WHEEZING: 0
CONSTIPATION: 0
DIARRHEA: 1
FACIAL SWELLING: 0
SORE THROAT: 0
NAUSEA: 0
ABDOMINAL DISTENTION: 0
TROUBLE SWALLOWING: 0
BACK PAIN: 0
RECTAL PAIN: 0
COLOR CHANGE: 0
COUGH: 0
CHEST TIGHTNESS: 0
BLOOD IN STOOL: 0
VOMITING: 0
SHORTNESS OF BREATH: 0
ABDOMINAL PAIN: 0
EYE DISCHARGE: 0

## 2020-08-17 ENCOUNTER — HOSPITAL ENCOUNTER (OUTPATIENT)
Dept: INFUSION THERAPY | Age: 70
Discharge: HOME | End: 2020-08-17
Payer: MEDICARE

## 2020-08-17 ENCOUNTER — CLINICAL DOCUMENTATION (OUTPATIENT)
Dept: ONCOLOGY | Age: 70
End: 2020-08-17

## 2020-08-17 ENCOUNTER — OFFICE VISIT (OUTPATIENT)
Dept: ONCOLOGY | Age: 70
End: 2020-08-17
Payer: MEDICARE

## 2020-08-17 VITALS
DIASTOLIC BLOOD PRESSURE: 65 MMHG | HEIGHT: 68 IN | RESPIRATION RATE: 16 BRPM | WEIGHT: 224.6 LBS | TEMPERATURE: 98.6 F | OXYGEN SATURATION: 92 % | SYSTOLIC BLOOD PRESSURE: 120 MMHG | BODY MASS INDEX: 34.04 KG/M2 | HEART RATE: 66 BPM

## 2020-08-17 VITALS
OXYGEN SATURATION: 92 % | RESPIRATION RATE: 16 BRPM | SYSTOLIC BLOOD PRESSURE: 118 MMHG | HEIGHT: 68 IN | TEMPERATURE: 98 F | WEIGHT: 224 LBS | DIASTOLIC BLOOD PRESSURE: 68 MMHG | HEART RATE: 88 BPM | BODY MASS INDEX: 33.95 KG/M2

## 2020-08-17 DIAGNOSIS — E87.6 HYPOKALEMIA: ICD-10-CM

## 2020-08-17 DIAGNOSIS — Z51.11 CHEMOTHERAPY MANAGEMENT, ENCOUNTER FOR: ICD-10-CM

## 2020-08-17 DIAGNOSIS — Z17.0 MALIGNANT NEOPLASM OF UPPER-OUTER QUADRANT OF LEFT BREAST IN FEMALE, ESTROGEN RECEPTOR POSITIVE (HCC): ICD-10-CM

## 2020-08-17 DIAGNOSIS — C50.412 MALIGNANT NEOPLASM OF UPPER-OUTER QUADRANT OF LEFT BREAST IN FEMALE, ESTROGEN RECEPTOR POSITIVE (HCC): ICD-10-CM

## 2020-08-17 DIAGNOSIS — C50.212 MALIGNANT NEOPLASM OF UPPER-INNER QUADRANT OF LEFT BREAST IN FEMALE, ESTROGEN RECEPTOR POSITIVE (HCC): Primary | ICD-10-CM

## 2020-08-17 DIAGNOSIS — E55.9 VITAMIN D DEFICIENCY: ICD-10-CM

## 2020-08-17 DIAGNOSIS — Z17.0 MALIGNANT NEOPLASM OF UPPER-INNER QUADRANT OF LEFT BREAST IN FEMALE, ESTROGEN RECEPTOR POSITIVE (HCC): Primary | ICD-10-CM

## 2020-08-17 LAB
ALBUMIN SERPL-MCNC: 3.6 G/DL (ref 3.5–5.1)
ALP BLD-CCNC: 68 U/L (ref 38–126)
ALT SERPL-CCNC: 23 U/L (ref 11–66)
AST SERPL-CCNC: 25 U/L (ref 5–40)
BILIRUB SERPL-MCNC: 0.4 MG/DL (ref 0.3–1.2)
BILIRUBIN DIRECT: < 0.2 MG/DL (ref 0–0.3)
BUN BLDV-MCNC: 12 MG/DL (ref 7–22)
CHLORIDE, WHOLE BLOOD: 103 MEQ/L (ref 98–109)
CO2: 26 MEQ/L (ref 23–33)
CREATININE, WHOLE BLOOD: 0.6 MG/DL (ref 0.5–1.2)
GFR, ESTIMATED: > 90 ML/MIN/1.73M2
GLUCOSE, WHOLE BLOOD: 168 MG/DL (ref 70–108)
HCT VFR BLD CALC: 41.5 % (ref 37–47)
HEMOGLOBIN: 13.6 GM/DL (ref 12–16)
IONIZED CALCIUM, WHOLE BLOOD: 1.16 MMOL/L (ref 1.12–1.32)
MCH RBC QN AUTO: 31.1 PG (ref 26–33)
MCHC RBC AUTO-ENTMCNC: 32.8 GM/DL (ref 32.2–35.5)
MCV RBC AUTO: 95 FL (ref 81–99)
PDW BLD-RTO: 13.5 % (ref 11.5–14.5)
PLATELET # BLD: 419 THOU/MM3 (ref 130–400)
PMV BLD AUTO: 10.5 FL (ref 9.4–12.4)
POTASSIUM, WHOLE BLOOD: 3.1 MEQ/L (ref 3.5–4.9)
RBC # BLD: 4.38 MILL/MM3 (ref 4.2–5.4)
SEG NEUTROPHILS: 57 % (ref 43–75)
SEGMENTED NEUTROPHILS ABSOLUTE COUNT: 4.9 THOU/MM3 (ref 1.8–7.7)
SODIUM, WHOLE BLOOD: 143 MEQ/L (ref 138–146)
TOTAL PROTEIN: 6.3 G/DL (ref 6.1–8)
WBC # BLD: 8.6 THOU/MM3 (ref 4.8–10.8)

## 2020-08-17 PROCEDURE — 96417 CHEMO IV INFUS EACH ADDL SEQ: CPT

## 2020-08-17 PROCEDURE — 96367 TX/PROPH/DG ADDL SEQ IV INF: CPT

## 2020-08-17 PROCEDURE — 80076 HEPATIC FUNCTION PANEL: CPT

## 2020-08-17 PROCEDURE — 2580000003 HC RX 258: Performed by: INTERNAL MEDICINE

## 2020-08-17 PROCEDURE — 85027 COMPLETE CBC AUTOMATED: CPT

## 2020-08-17 PROCEDURE — 96413 CHEMO IV INFUSION 1 HR: CPT

## 2020-08-17 PROCEDURE — 36591 DRAW BLOOD OFF VENOUS DEVICE: CPT

## 2020-08-17 PROCEDURE — 96377 APPLICATON ON-BODY INJECTOR: CPT

## 2020-08-17 PROCEDURE — 99211 OFF/OP EST MAY X REQ PHY/QHP: CPT

## 2020-08-17 PROCEDURE — 96366 THER/PROPH/DIAG IV INF ADDON: CPT

## 2020-08-17 PROCEDURE — 84520 ASSAY OF UREA NITROGEN: CPT

## 2020-08-17 PROCEDURE — 6360000002 HC RX W HCPCS: Performed by: INTERNAL MEDICINE

## 2020-08-17 PROCEDURE — 82374 ASSAY BLOOD CARBON DIOXIDE: CPT

## 2020-08-17 PROCEDURE — 80047 BASIC METABLC PNL IONIZED CA: CPT

## 2020-08-17 PROCEDURE — 96375 TX/PRO/DX INJ NEW DRUG ADDON: CPT

## 2020-08-17 PROCEDURE — 99214 OFFICE O/P EST MOD 30 MIN: CPT | Performed by: INTERNAL MEDICINE

## 2020-08-17 RX ORDER — SODIUM CHLORIDE 0.9 % (FLUSH) 0.9 %
10 SYRINGE (ML) INJECTION PRN
Status: DISCONTINUED | OUTPATIENT
Start: 2020-08-17 | End: 2020-08-18 | Stop reason: HOSPADM

## 2020-08-17 RX ORDER — SODIUM CHLORIDE 9 MG/ML
20 INJECTION, SOLUTION INTRAVENOUS ONCE
Status: COMPLETED | OUTPATIENT
Start: 2020-08-17 | End: 2020-08-17

## 2020-08-17 RX ORDER — SODIUM CHLORIDE 0.9 % (FLUSH) 0.9 %
5 SYRINGE (ML) INJECTION PRN
Status: CANCELLED | OUTPATIENT
Start: 2020-08-17

## 2020-08-17 RX ORDER — SODIUM CHLORIDE 0.9 % (FLUSH) 0.9 %
10 SYRINGE (ML) INJECTION PRN
Status: CANCELLED | OUTPATIENT
Start: 2020-08-17

## 2020-08-17 RX ORDER — HEPARIN SODIUM (PORCINE) LOCK FLUSH IV SOLN 100 UNIT/ML 100 UNIT/ML
500 SOLUTION INTRAVENOUS PRN
Status: CANCELLED | OUTPATIENT
Start: 2020-08-17

## 2020-08-17 RX ORDER — PALONOSETRON 0.05 MG/ML
0.25 INJECTION, SOLUTION INTRAVENOUS ONCE
Status: COMPLETED | OUTPATIENT
Start: 2020-08-17 | End: 2020-08-17

## 2020-08-17 RX ORDER — HEPARIN SODIUM (PORCINE) LOCK FLUSH IV SOLN 100 UNIT/ML 100 UNIT/ML
500 SOLUTION INTRAVENOUS PRN
Status: DISCONTINUED | OUTPATIENT
Start: 2020-08-17 | End: 2020-08-18 | Stop reason: HOSPADM

## 2020-08-17 RX ORDER — SODIUM CHLORIDE 0.9 % (FLUSH) 0.9 %
20 SYRINGE (ML) INJECTION PRN
Status: CANCELLED | OUTPATIENT
Start: 2020-08-17

## 2020-08-17 RX ORDER — SODIUM CHLORIDE 0.9 % (FLUSH) 0.9 %
20 SYRINGE (ML) INJECTION PRN
Status: DISCONTINUED | OUTPATIENT
Start: 2020-08-17 | End: 2020-08-18 | Stop reason: HOSPADM

## 2020-08-17 RX ADMIN — Medication 10 ML: at 08:42

## 2020-08-17 RX ADMIN — PALONOSETRON 0.25 MG: 0.05 INJECTION, SOLUTION INTRAVENOUS at 10:32

## 2020-08-17 RX ADMIN — Medication 20 ML: at 08:43

## 2020-08-17 RX ADMIN — CARBOPLATIN 654 MG: 10 INJECTION, SOLUTION INTRAVENOUS at 16:15

## 2020-08-17 RX ADMIN — SODIUM CHLORIDE 20 ML/HR: 9 INJECTION, SOLUTION INTRAVENOUS at 10:28

## 2020-08-17 RX ADMIN — Medication 10 ML: at 17:40

## 2020-08-17 RX ADMIN — PEGFILGRASTIM 6 MG: KIT SUBCUTANEOUS at 17:35

## 2020-08-17 RX ADMIN — DOCETAXEL ANHYDROUS 160 MG: 10 INJECTION, SOLUTION INTRAVENOUS at 15:12

## 2020-08-17 RX ADMIN — TRASTUZUMAB 600 MG: 150 INJECTION, POWDER, LYOPHILIZED, FOR SOLUTION INTRAVENOUS at 14:10

## 2020-08-17 RX ADMIN — Medication 500 UNITS: at 17:40

## 2020-08-17 RX ADMIN — DEXAMETHASONE SODIUM PHOSPHATE 12 MG: 4 INJECTION, SOLUTION INTRAMUSCULAR; INTRAVENOUS at 10:35

## 2020-08-17 RX ADMIN — POTASSIUM CHLORIDE: 2 INJECTION, SOLUTION, CONCENTRATE INTRAVENOUS at 10:55

## 2020-08-17 RX ADMIN — PERTUZUMAB 420 MG: 30 INJECTION, SOLUTION, CONCENTRATE INTRAVENOUS at 13:31

## 2020-08-17 RX ADMIN — SODIUM CHLORIDE 150 MG: 9 INJECTION, SOLUTION INTRAVENOUS at 12:58

## 2020-08-17 NOTE — PROGRESS NOTES
Patient assessed for the following post chemotherapy:    Dizziness   No  Lightheadedness  No      Acute nausea/vomiting No  Headache   No  Chest pain/pressure  No  Rash/itching   No  Shortness of breath  No    Patient kept for 20 minutes observation post infusion chemotherapy. Patient tolerated chemotherapy treatment Perjeta, Herceptin, Taxotere, Carboplatin and IV fluids with Potassium without any complications. Neulasta on-body placed on back of right arm, Patent sent home with instructions re: care and removal of On-body injector. Last vital signs:   /65   Pulse 66   Temp 98.6 °F (37 °C) (Oral)   Resp 16   Ht 5' 8\" (1.727 m)   Wt 224 lb 9.6 oz (101.9 kg)   SpO2 92%   BMI 34.15 kg/m²       Patient instructed if experience any of the above symptoms following today's infusion,he/she is to notify MD immediately or go to the emergency department. Discharge instructions given to patient. Verbalizes understanding. Ambulated off unit per self with belongings with belongings.

## 2020-08-17 NOTE — PROGRESS NOTES
[facility], [address] [facility] [address]  Odinmarco antonio [facility]  Dept: [phone]  Dept Fax: [phone]: [phone]    Visit Date:8/17/2020     Joan Harry is a 79 y.o. female who presents today for:   Chief Complaint   Patient presents with    Follow-up     breast CA        HPI:   Mrs. Anton Patel is a 79-year-old female with newly diagnosed triple positive left breast cancer. She had annual screening mammogram on June 18, 2020 that showed 2 lesions in the left breast.  Subsequently she underwent breast ultrasound followed by ultrasound guided biopsy of those 2 lesions. The largest of these lesions is a lobulated mixed cystic and    solid appearing mass measuring approximately 3 x 2.1 x 2.7 cm. This is located at the anterior depth upper inner quadrant. Aspiration of the fluid component of this lesion and biopsy of    the solid component of this lesion was performed on 6/26/2020. The smaller adjacent lesion is located at the middle depth of the     upper inner quadrant left breast measuring approximately 1.6 x    1.1 x 1.6 cm. Biopsy of this lesion was performed on 6/26/2020. Final pathology report showed:   A-B.  Left breast, upper inner quadrant, anterior and middle depth,   barbell and tribell clips, multiple core needle biopsies:   Monia Hayden, poorly differentiated ductal carcinoma, Pepe score 3.    Estrogen Receptor: (Clone SP1), [employer]    Positive 100 % of cells (>10% of cells)   Intensity of Staining:    Strong   Progesterone Receptor: (Clone 1E2), [employer]    Positive 90 % of cells   Intensity of Staining:    Strong   Ki-67 (clone 30-9)       Percentage of positive nuclei: 42 %               Favorable <10%               Borderline 10-20%               Unfavorable >20%          2018 ASCO/ CAP   Inform HER2 Dual NEW        HER2 dual NEW    [employer]        Group #   ( X  Group 1:         HER2/CEP17 Ratio >=.0 and >=.0 HER2    )                    signals/cell                         HER2 NEW POSITIVE     On 2020 the patient had breast MRI showin. A known biopsy-proven mass demonstrated within the upper     inner left breast anterior to middle depth. This measures 3 cm x     4.1 cm x 3.1 cm middle depth located 4  cm from the nipple, 0.9    cm from the skin, and 6.5 cm from the chest wall.  This shows    heterogeneous enhancement and delayed washout type vascular    enhancement. 2. There are scattered small foci of enhancement demonstrated    bilaterally. The dominant focus of enhancement within the right    breast is located within the lower inner right breast anterior    depth on axial image 105 series 9. Recommend further evaluation    with second look ultrasound. If there is no sonographic    correlate, recommend 6 month follow-up breast MRI to document    stability.         3. Among the small foci of enhancement within the left breast,    the most prominent is located within the lower outer quadrant    posterior depth as seen on axial image 91 series 9. Recommend    further evaluation with second look ultrasound. If there is no    sonographic correlate, recommend 6 month follow-up breast MRI to    document stability. Patient past medical history significant for hypothyroidism and osteoporosis. Patient denies having any new complaints. Specifically she denies having any headaches, no difficulty with balance, no falls. She denies having any focal neurological deficits. No shortness of breath no cough no abdominal pain. She denies having any bone pain. Patient started neoadjuvant chemotherapy was Taxotere/carbo/Herceptin/Pertuzumab on 2020    Interim history on 2020:  Patient presents to the medical oncology clinic for cycle #2 of Taxotere/carbo/Herceptin and Pertuzumab. She developed diarrhea 4 days after first cycle of Taxotere carbo Herceptin and Pertuzumab. Used   Substance Use Topics    Alcohol use: No     Alcohol/week: 0.0 standard drinks      Current Outpatient Medications   Medication Sig Dispense Refill    hydrocortisone (ANUSOL-HC) 2.5 % CREA rectal cream Apply twice a day 28 g 1    ibuprofen (ADVIL;MOTRIN) 800 MG tablet Take 800 mg by mouth every 6 hours as needed for Pain      lidocaine-prilocaine (EMLA) 2.5-2.5 % cream Apply topically as needed. 30 g 1    levothyroxine (SYNTHROID) 25 MCG tablet Take 25 mcg by mouth Daily      alendronate (FOSAMAX) 35 MG tablet Take 35 mg by mouth every 7 days      aspirin EC 81 MG EC tablet Take 1 tablet by mouth daily 30 tablet 3    ondansetron (ZOFRAN ODT) 4 MG disintegrating tablet Take 1 tablet by mouth every 8 hours as needed for Nausea 20 tablet 0    PARoxetine (PAXIL) 10 MG tablet Take 10 mg by mouth every morning      Coenzyme Q10-Fish Oil-Vit E (CO-Q 10 OMEGA-3 FISH OIL PO) Take by mouth daily      Vitamin D (CHOLECALCIFEROL) 1000 UNITS CAPS capsule Take by mouth daily 2 tab      Cinnamon 500 MG CAPS Take by mouth daily      Cyanocobalamin (VITAMIN B12 PO) Take 1,000 mcg by mouth daily       cephALEXin (KEFLEX) 500 MG capsule Take 1 capsule by mouth 4 times daily for 7 days 28 capsule 0     No current facility-administered medications for this visit.        Allergies   Allergen Reactions    Aluminum-Containing Compounds Anaphylaxis      Health Maintenance   Topic Date Due    Hepatitis C screen  1950    Meningococcal (ACWY) vaccine (1 - Risk start before 7 months 4-dose series) 1950    Hib vaccine (1 of 1 - Risk 1-dose series) 09/21/1951    Meningococcal B vaccine (1 of 4 - Increased Risk Bexsero 2-dose series) 06/21/1960    DTaP/Tdap/Td vaccine (1 - Tdap) 06/21/1969    Lipid screen  06/21/1990    Diabetes screen  06/21/1990    Shingles Vaccine (1 of 2) 06/21/2000    Colon cancer screen colonoscopy  06/21/2000    Annual Wellness Visit (AWV)  06/23/2019    Pneumococcal 65+ yrs at round, and reactive to light. Neck:      Musculoskeletal: Normal range of motion and neck supple. Thyroid: No thyromegaly. Vascular: No JVD. Trachea: No tracheal deviation. Cardiovascular:      Rate and Rhythm: Normal rate. Heart sounds: Normal heart sounds. No murmur. No friction rub. No gallop. Pulmonary:      Effort: Pulmonary effort is normal. No respiratory distress. Breath sounds: Normal breath sounds. No stridor. No wheezing or rales. Chest:      Chest wall: No tenderness. Breasts:         Left: Mass (Simply 4 cm in size palpable mass in the left upper inner quadrant) present. Abdominal:      General: Bowel sounds are normal. There is no distension. Palpations: Abdomen is soft. There is no mass. Tenderness: There is no abdominal tenderness. There is no rebound. Musculoskeletal: Normal range of motion. Comments: Good range of motion in all four extremities. Lymphadenopathy:      Cervical: No cervical adenopathy. Skin:     General: Skin is warm. Findings: No erythema or rash. Neurological:      Mental Status: She is alert and oriented to person, place, and time. Cranial Nerves: No cranial nerve deficit. Motor: No abnormal muscle tone. Deep Tendon Reflexes: Reflexes are normal and symmetric. Psychiatric:         Behavior: Behavior normal.         Thought Content: Thought content normal.         Judgment: Judgment normal.         /68 (Site: Right Upper Arm, Position: Sitting, Cuff Size: Medium Adult)   Pulse 88   Temp 98 °F (36.7 °C) (Oral)   Resp 16   Ht 5' 8\" (1.727 m)   Wt 224 lb (101.6 kg)   SpO2 92%   BMI 34.06 kg/m²      ECOG status is 0    Imaging studies and labs:   Dexa Bone Density Axial Skeleton  Result Date: 6/25/2020  OSTEOPENIA Patient is at medium risk for fracture. Recheck of BMD in 1-2 years and patient consult w/primary care provider are recommended.   Jair Castellano M.D., rd/herrera:6/25/2020 13:00:55  Imaging Technologist: Hilda Newsome RT(R)(M), [facility]      Mri Breast Bilateral W Wo Contrast  Result Date: 7/10/2020  #[id number] - MRI BREAST BILATERAL W WO CONTRAST BREAST MRI OF BOTH BREASTS : 7/8/2020 CLINICAL: Newly diagnosed invasive ductal carcinoma of left breast.  Interpretation of this MRI was correlated with available mammograms and ultrasounds. Axial and coronal T2 STIR, sagittal T1, axial T1 fat saturated pre and post contrast dynamic imaging was performed of both breasts. Images were reviewed at a [employer] 3D workstation and time intensity curves were analyzed. The breast parenchyma is heterogeneously dense. There is mild background enhancement. There is a known biopsy-proven mass demonstrated within the upper  inner left breast anterior to middle depth. This measures 3 cm x  4.1 cm x 3.1 cm middle depth located 4  cm from the nipple, 0.9 cm from the skin, and 6.5 cm from the chest wall. This shows heterogeneous enhancement and delayed washout type vascular enhancement. The abnormality is best seen on the post contrast subtraction axial 56 slice. This correlates as palpated. This measures approximately 3.6 x 3.1 cm. There are scattered small foci of enhancement demonstrated bilaterally. The dominant focus of enhancement within the right breast is located within the lower inner right breast anterior depth on axial image 105 series 9. Recommend further evaluation with second look ultrasound. If there is no sonographic correlate, recommend 6 month follow-up breast MRI to document stability. Among the small foci of enhancement within the left breast, the most prominent is located within the lower outer quadrant posterior depth as seen on axial image 91 series 9. Recommend further evaluation with second look ultrasound. If there is no sonographic correlate, recommend 6 month follow-up breast MRI to document stability.  Multiple rounded hyperintense T2 weighted lesions are demonstrated within the left upper quadrant on the coronal STIR series of images. This is seen on coronal series image 27. These correlate with previous redemonstrated splenules on prior CT examination from January 2016. No acute osseous findings are demonstrated. No lymphadenopathy is  seen. Madisyn Digital Diagnostic W Or Wo Cad Left    Us Breast Limited Left    Result Date: 7/13/2020  IMPRESSION: Ultrasound BI-RADS: 4B: Moderate suspicion for malignancy The 0.6 cm x 0.5 cm x 0.9 cm oval lesion in the left breast appears to have an intermediate suspicion for malignancy. An ultrasound guided biopsy is recommended. The Breast Care Navigator was notified. The results were reviewed with the patient. The patient has been or will be contacted. #[id number] - US BREAST LIMITED LEFT ULTRASOUND OF LEFT BREAST: 7/13/2020 CLINICAL: Abnormal Breast MRI, known left breast cancer. Comparison is made to exam dated:  7/8/2020 breast MRI - [address] [facility]. Real-time ultrasound of the left breast was performed on the areas of interest.  Gray scale images of the real-time examination were reviewed. There is a 0.6 cm x 0.5 cm x 0.9 cm oval lesion with an irregular  margin in the left breast lower outer aspect posterior depth 7 cm from the nipple. This oval lesion is hypoechoic. This correlates with breast MRI findings. Dr. Shaylee burgos/herrera:7/13/2020 10:19:42  copy to:  Josh Blevins MD, [facility], ph: [phone], fax: [phone] Imaging Technologist: Gay Winter RT(R)(M)[facility], [address] [facility] letter sent:   Us Breast Biopsy W Loc Device Each Addl Lesion Left      Lab Results   Component Value Date    WBC 8.6 08/17/2020    HGB 13.6 08/17/2020    HCT 41.5 08/17/2020    MCV 95 08/17/2020     (H) 08/17/2020       Chemistry        Component Value Date/Time     08/24/2020 1031     02/10/2017 0608    K 4.5 08/24/2020 1031    K 4.2 chemotherapy provides a prognostic information. Patients who achieved complete pathologic response to neoadjuvant chemotherapy have improved outcome. For those who still have residual cancer after neoadjuvant chemotherapy, the new recommendation is to proceed with 1 year of adjuvant TDM 1. A chemotherapy regimen that the patient will be treated with containes Taxotere and Rebecca which is very effective combination to treat triple negative breast cancer. 2.  Neoadjuvant chemotherapy withTaxotere/carbo/Herceptin and Pertuzumab. Started on July 27, 2020. The patient developed diarrhea 4 days after cycle #1. Diarrhea did not respond to Imodium although the patient took only 4 tablets of Imodium per day. She reports abdominal cramping and discomfort. She denies having fevers, no melena. Patient states that she did to drink plenty of fluid. Her BUN and creatinine are within normal limits today. She has normal potassium 4.5. Therefore there is no need for hydration. The patient was instructed to take 2 tablets of Imodium on schedule every 6 hours alternating with 1 tablet of Lomotil every 6 hours. In addition she received prescription forl 10-day course of Flagyl, however she took Flagyl randomly. Today her labs are within normal limits vital signs are stable we will proceed with cycle #2       Diagnosis Orders   1.  Malignant neoplasm of upper-inner quadrant of left breast in female, estrogen receptor positive ([facility] [address]  DISCONTINUED: 0.9 % sodium chloride infusion    DISCONTINUED: fosaprepitant (EMEND) 150 mg in sodium chloride 0.9 % 250 mL IVPB    DISCONTINUED: palonosetron (ALOXI) injection 0.25 mg    DISCONTINUED: dexamethasone (DECADRON) 12 mg in sodium chloride 0.9 % IVPB    DISCONTINUED: pertuzumab (PERJETA) 420 mg in sodium chloride 0.9 % 250 mL chemo IVPB    DISCONTINUED: trastuzumab (HERCEPTIN) 600 mg in sodium chloride 0.9 % 250 mL chemo IVPB    DISCONTINUED: DOCEtaxel (TAXOTERE) 160 mg in sodium chloride 0.9 % 250 mL chemo IVPB    DISCONTINUED: CARBOplatin (PARAPLATIN) in sodium chloride 0.9 % 500 mL chemo IVPB    DISCONTINUED: pegfilgrastim (NEULASTA) on-body injector 6 mg   2. Chemotherapy management, encounter for  Compression Stocking    DISCONTINUED: 0.9 % sodium chloride infusion    DISCONTINUED: fosaprepitant (EMEND) 150 mg in sodium chloride 0.9 % 250 mL IVPB    DISCONTINUED: palonosetron (ALOXI) injection 0.25 mg    DISCONTINUED: dexamethasone (DECADRON) 12 mg in sodium chloride 0.9 % IVPB    DISCONTINUED: pertuzumab (PERJETA) 420 mg in sodium chloride 0.9 % 250 mL chemo IVPB    DISCONTINUED: trastuzumab (HERCEPTIN) 600 mg in sodium chloride 0.9 % 250 mL chemo IVPB    DISCONTINUED: DOCEtaxel (TAXOTERE) 160 mg in sodium chloride 0.9 % 250 mL chemo IVPB    DISCONTINUED: CARBOplatin (PARAPLATIN) in sodium chloride 0.9 % 500 mL chemo IVPB    DISCONTINUED: pegfilgrastim (NEULASTA) on-body injector 6 mg   3. Hypokalemia  Compression Stocking    DISCONTINUED: potassium chloride 20 mEq in sodium chloride 0.9 % 500 mL infusion   4. HER2-positive carcinoma of breast ([facility] [address]     5. Diarrhea due to drug          Plan:   Return in about 1 week (around 8/24/2020). Orders Placed:   Orders Placed This Encounter   Procedures    Compression Stocking     With pressure 10/20 mmHg        Medications Prescribed:   No orders of the defined types were placed in this encounter. I spent 40 minutes face-to-face with the patient and her family. Greater than 50% of time was spent on counseling and coordinating her care. Face to face counseling included prognosis, risks, benefits of treatment, compliance and risk reduction.        Electronically signed by Kiara Dee MD on 7/14/20 at [address] [place] [address] EDT

## 2020-08-17 NOTE — ONCOLOGY
Chemotherapy Administration    Pre-assessment Data: Antineoplastic Agents  Other:   See toxicity flow sheet for assessment [x]     Physician Notification of Concerns Related to Chemotherapy Administration:   Physician Notified Malgorzata Frees / Time of Notification      Interventions:   Lab work assessed  [x]   Height / Weight verified for dose [x]   Current MAR reviewed [x]   Emergency drugs available as appropriate [x]   Anaphylaxis assessment completed [x]   Pre-medications administered as ordered [x]   Blood return noted upon initiation of chemotherapy [x]   Blood return noted each 1-2ml of a vesicant medication if given IV push []   Blood return noted each 2-3ml of a non-vesicant medication if given IV push []   Monitor for signs / symptoms of hypersensitivity reaction [x]   Chemotherapy orders (drug/dose/rate) verified by 2 Chemo certified RNs [x]   Monitor IV site and blood return throughout the infusion of the medication [x]   Document IV site checks on the IV assessment form [x]   Document chemotherapy teaching on the Patient Education tab [x]   Document patient verbalizes understanding of medications being administered- Perjeta, Herceptin, Taxotere and Carboplatin [x]   If IV infiltration, see ONS Guidelines []   Other:      []

## 2020-08-17 NOTE — PROGRESS NOTES
Name: Biagio Bloch  : 1950  MRN: [id number]    Oncology Navigation Follow-Up Note    Contact Type:  Medical Oncology    Notes: Here for cycle 2 of neoadjuvant chemotherapy. Seen in conjunction with Dr. Clay Yuen. Continues to have watery to very soft formed stools despite taking Imodium and Lomotil. After discussion, it is noted patient is not taking antidiarrheal medications as instructed at last visit. Medication calendar provided with instructions to take Imodium 2 tablets every 12 hours (6-12-6-12) and Lomotil 1 tablet every 6 hours to alternate (9-3-9-3). Patient encouraged to kathie off times after taking medications. Understanding voiced. Patient will call for any questions, concerns, or worsening of condition.     Electronically signed by Luisito Figueredo RN on 2020 at 11:01 AM

## 2020-08-17 NOTE — PLAN OF CARE
Problem: Intellectual/Education/Knowledge Deficit  Goal: Teaching initiated upon admission  Outcome: Met This Shift  Note: Patient verbalizes understanding to verbal information given on Perjeta, Herceptin, taxotere and Carboplatin,action and possible side effects. Aware to call MD if develop complications. Intervention: Verbal/written education provided  Note: Chemotherapy Teaching     What is Chemotherapy   Drug action [x]   Method of Administration [x]   Handouts given []     Side Effects  Nausea/vomiting [x]   Diarrhea [x]   Fatigue [x]   Signs / Symptoms of infection [x]   Neutropenia [x]   Thrombocytopenia [x]   Alopecia [x]   neuropathy [x]   Hampton diet &  the importance of fluids [x]       Micellaneous  Importance of nutrition [x]   Importance of oral hygiene [x]   When to call the MD [x]   Monitoring labs [x]   Use of supportive services []     Explanation of Drug Regimen / Frequency  Day 1 cycle 2 Perjeta, Herceptin, taxotere and Carboplatin     Comments  Verbalized understanding to drug,action,side effects and when to call MD         Problem: Discharge Planning  Goal: Knowledge of discharge instructions  Description: Knowledge of discharge instructions  Outcome: Met This Shift  Note: Verbalize understanding of discharge instructions, follow up appointments, and when to call Physician. Intervention: Discharge to appropriate level of care  Note: Discuss understanding of discharge instructions, follow up appointments and when to call Physician. Problem: Falls - Risk of:  Goal: Will remain free from falls  Description: Will remain free from falls  Outcome: Met This Shift  Note: Free from falls while in O.P. Oncology. Intervention: Assess risk factors for falls  Note: Discussed the need to use the call light for assistance when getting up to ambulate.       Problem: Infection - Central Venous Catheter-Associated Bloodstream Infection:  Goal: Will show no infection signs and symptoms  Description: Will show no infection signs and symptoms  Outcome: Met This Shift  Note: Instructed to monitor for signs/symptoms of infection at [facility] and call MD if problems develop. Intervention: Infection risk assessment  Note: Mediport site with no redness or warmth. Skin over port site intact with no signs of breakdown noted. Patient verbalizes signs/symptoms of port infection and when to notify the physician. Care plan reviewed with patient and daughter. Patient and daughter verbalize understanding of the plan of care and contribute to goal setting.

## 2020-08-17 NOTE — PATIENT INSTRUCTIONS
1. Proceed with cycle #2 of Taxotere/carbo/Herceptin/Pertuzumab today  2. Treat with 500 mils of normal saline plus 20 mEq of potassium  3. The patient was instructed to take Imodium 2 tablets on schedule every 6 hours alternating with Lomotil 1 tablet every 6 hours  4.   She will return to clinic in 1 week for labs: BMP and  possible hydration

## 2020-08-20 ENCOUNTER — OFFICE VISIT (OUTPATIENT)
Dept: ONCOLOGY | Age: 70
End: 2020-08-20
Payer: MEDICARE

## 2020-08-20 ENCOUNTER — HOSPITAL ENCOUNTER (OUTPATIENT)
Dept: INFUSION THERAPY | Age: 70
Discharge: HOME | End: 2020-08-20
Payer: MEDICARE

## 2020-08-20 ENCOUNTER — TELEPHONE (OUTPATIENT)
Dept: INFUSION THERAPY | Age: 70
End: 2020-08-20

## 2020-08-20 VITALS
OXYGEN SATURATION: 96 % | WEIGHT: 231 LBS | DIASTOLIC BLOOD PRESSURE: 66 MMHG | HEIGHT: 68 IN | HEART RATE: 88 BPM | RESPIRATION RATE: 18 BRPM | BODY MASS INDEX: 35.01 KG/M2 | TEMPERATURE: 98.4 F | SYSTOLIC BLOOD PRESSURE: 130 MMHG

## 2020-08-20 PROCEDURE — 99211 OFF/OP EST MAY X REQ PHY/QHP: CPT

## 2020-08-20 PROCEDURE — 99213 OFFICE O/P EST LOW 20 MIN: CPT | Performed by: PHYSICIAN ASSISTANT

## 2020-08-20 RX ORDER — CEPHALEXIN 500 MG/1
500 CAPSULE ORAL 4 TIMES DAILY
Qty: 28 CAPSULE | Refills: 0 | Status: SHIPPED | OUTPATIENT
Start: 2020-08-20 | End: 2020-08-27

## 2020-08-20 NOTE — TELEPHONE ENCOUNTER
Spoke with Dr Stephanie Phipps to see today. Spoke with Ellen and she will see at 1430. Patient notified that Dr Earnest Hansen wants her to be seen today by [facility]. Verbalized understanding.

## 2020-08-20 NOTE — PROGRESS NOTES
[facility] ([facility], [address] [facility] [address]  [address] [address]  Dept: [phone]  Dept Fax: [phone]: [phone]      Visit Date:8/20/2020     Fermin Vann is a 79 y.o. female who presents today for:   Chief Complaint   Patient presents with    Follow-up     Malignant neoplasm of upper-inner quadrant of left breast in female, estrogen receptor positive         HPI:   Fermin Vann is a 79 y.o. female followed by Dr. Gasper Castleman for breast cancer. Per Dr. Adele Kelley note on 8/17/20: newly diagnosed triple positive left breast cancer. She had annual screening mammogram on June 18, 2020 that showed 2 lesions in the left breast.  Subsequently she underwent breast ultrasound followed by ultrasound guided biopsy of those 2 lesions. The largest of these lesions is a lobulated mixed cystic and    solid appearing mass measuring approximately 3 x 2.1 x 2.7 cm. This is located at the anterior depth upper inner quadrant. Aspiration of the fluid component of this lesion and biopsy of    the solid component of this lesion was performed on 6/26/2020. The smaller adjacent lesion is located at the middle depth of the     upper inner quadrant left breast measuring approximately 1.6 x    1.1 x 1.6 cm. Biopsy of this lesion was performed on 6/26/2020.       Final pathology report showed:   A-B.  Left breast, upper inner quadrant, anterior and middle depth,   barbell and tribell clips, multiple core needle biopsies:   Caty Garcia, poorly differentiated ductal carcinoma, Pepe score 3.    Estrogen Receptor: (Clone SP1), [employer] [facility]    Positive 100 % of cells (>10% of cells)   Intensity of Staining:    Strong   Progesterone Receptor: (Clone 1E2), [employer] [facility]    Positive 90 % of cells   Intensity of Staining:    Strong   Ki-67 (clone 30-9)       Percentage of positive nuclei: 42 %               Favorable <10%               Borderline 10-20%               Unfavorable >20%           ASCO/ CAP   Inform HER2 Dual NEW        HER2 dual NEW    [employer] [facility]        Group #   ( X  Group 1:         HER2/CEP17 Ratio >=.0 and >=.0 HER2    )                    signals/cell                         HER2 NEW POSITIVE      On 2020 the patient had breast MRI showin. A known biopsy-proven mass demonstrated within the upper     inner left breast anterior to middle depth. This measures 3 cm x     4.1 cm x 3.1 cm middle depth located 4  cm from the nipple, 0.9    cm from the skin, and 6.5 cm from the chest wall.  This shows    heterogeneous enhancement and delayed washout type vascular    enhancement.      2. There are scattered small foci of enhancement demonstrated    bilaterally. The dominant focus of enhancement within the right    breast is located within the lower inner right breast anterior    depth on axial image 105 series 9. Recommend further evaluation    with second look ultrasound. If there is no sonographic    correlate, recommend 6 month follow-up breast MRI to document    stability.         3. Among the small foci of enhancement within the left breast,    the most prominent is located within the lower outer quadrant    posterior depth as seen on axial image 91 series 9. Recommend    further evaluation with second look ultrasound. If there is no    sonographic correlate, recommend 6 month follow-up breast MRI to    document stability.          Patient past medical history significant for hypothyroidism and osteoporosis. Patient denies having any new complaints. Specifically she denies having any headaches, no difficulty with balance, no falls. She denies having any focal neurological deficits. No shortness of breath no cough no abdominal pain. She denies having any bone pain. Patient started neoadjuvant chemotherapy was Taxotere/carbo/Herceptin/Pertuzumab on 2020       Interval History 2020: The patient is here for an acute visit.  She called the clinic today reporting fever of 100.4 and constipation. The patient has had diarrhea following chemotherapy with Herceptin, Perjeta, Taxotere and Carboplatin. She was evaluated by Dr. Clay Yuen on 8/17/20 and was instructed to alternate Imodium every 6 hrs, with Lomotil every 6 hrs. She received cycle #2 of chemotherapy along with 0.9 normal saline with 20 mEq of potassium. The patient states following receiving chemotherapy she began anti-diarrhea medication as instructed and had not had a bowel movement since receiving chemo. She was recommended to take a fleet enema by RN and had successful, large bowel movement today. She states on 8/19/20 evening she had a temperature of 100.4. she has been afebrile today (98.4 in office). She denies chills, chest pain, shortness of breath, cough, abdominal pain, nausea, vomiting, dysuria, frequency, hematuria or oliguria. Her niece with her states the patient has been eating and drinking well over the past three days. The patient does admit that she was scratched by her dog on her abdomen approximately one week ago and has a \"sore\" present. Denies drainage or discharge.        Past Medical History:   Diagnosis Date    Breast cancer ([facility] [address] 2020    left-invasive ductal    Hyperlipidemia     Numbness and tingling     left foot-chronic nerve damage      Past Surgical History:   Procedure Laterality Date    ABDOMINAL EXPLORATION SURGERY  2014    Dr butterfield-lysis of adhesions    BREAST SURGERY Left 06/26/2020    biopsy of left breast    CHOLECYSTECTOMY  10/2014    Dr Raymond Danielson  03/30/2016    Dr Vivien Lizama  10/2014    x4 abdominal wall    HYSTERECTOMY      INCISIONAL HERNIA REPAIR  2014    Dr Aleisha Gonzalez LIPECTOMY  2014    Dr Lyle Baumann  7/16/2020    [address] LEFT 7/16/2020 Raine Duong MD [facility]    PORT SURGERY Right 7/17/2020    SINGLE LUMEN SMART PORT INSERTION RIGHT SUBCLAVIAN performed by Ayana Perez MD at [address]  10/2014    Dr [facility]      patient was 11years old    TUBAL LIGATION        Family History   Problem Relation Age of Onset    Heart Disease Father         cath stent blood to thin and bled    Heart Attack Mother     Other Other         blood clot    Breast Cancer Paternal Aunt 62    High Blood Pressure Sister     Cancer Brother 68        skin    Prostate Cancer Brother         had chemotherapy to treat     Prostate Cancer Brother 64        has had for 10 years    Ovarian Cancer Neg Hx     Diabetes Neg Hx     Stroke Neg Hx       Social History     Tobacco Use    Smoking status: Never Smoker    Smokeless tobacco: Never Used   Substance Use Topics    Alcohol use: No     Alcohol/week: 0.0 standard drinks      Current Outpatient Medications   Medication Sig Dispense Refill    hydrocortisone (ANUSOL-HC) 2.5 % CREA rectal cream Apply twice a day 28 g 1    ibuprofen (ADVIL;MOTRIN) 800 MG tablet Take 800 mg by mouth every 6 hours as needed for Pain      lidocaine-prilocaine (EMLA) 2.5-2.5 % cream Apply topically as needed. 30 g 1    levothyroxine (SYNTHROID) 25 MCG tablet Take 25 mcg by mouth Daily      alendronate (FOSAMAX) 35 MG tablet Take 35 mg by mouth every 7 days      aspirin EC 81 MG EC tablet Take 1 tablet by mouth daily 30 tablet 3    ondansetron (ZOFRAN ODT) 4 MG disintegrating tablet Take 1 tablet by mouth every 8 hours as needed for Nausea 20 tablet 0    PARoxetine (PAXIL) 10 MG tablet Take 10 mg by mouth every morning      Coenzyme Q10-Fish Oil-Vit E (CO-Q 10 OMEGA-3 FISH OIL PO) Take by mouth daily      Vitamin D (CHOLECALCIFEROL) 1000 UNITS CAPS capsule Take by mouth daily 2 tab      Cinnamon 500 MG CAPS Take by mouth daily      Cyanocobalamin (VITAMIN B12 PO) Take 1,000 mcg by mouth daily        No current facility-administered medications for this visit. Allergies   Allergen Reactions    Aluminum-Containing Compounds Anaphylaxis          Review of Systems:   Review of Systems   Pertinent review of systems noted in HPI, all other ROS negative. Objective:   Physical Exam   Vitals:    08/20/20 1432   BP: 130/66   Pulse: 88   Resp: 18   Temp: 98.4 °F (36.9 °C)   SpO2: 96%        General appearance: No apparent distress, well developed, chronically ill appearing, and cooperative. HEENT: Pupils equal, round, and reactive to light. Conjunctivae/corneas clear. Oral mucosa moist.   Neck: Supple, with full range of motion. Trachea midline. Respiratory:  Normal respiratory effort. Clear to auscultation, bilaterally without Rales/Wheezes/Rhonchi. Cardiovascular: Regular rate and rhythm with normal S1/S2  Abdomen: Soft, non-tender, non-distended with active bowel sounds. There is a small, dime sized circular sore to the right of the umbilicus at the waist band level. There is surrounding warmth and erythema present. No pus or drainage noted. A blue ink marker margin was drawn around the redness. Musculoskeletal: No clubbing, cyanosis or edema bilaterally. Full range of motion without deformity. Skin: Skin color, texture, turgor normal.  No rashes or lesions. Neurologic:  Neurovascularly intact without any focal sensory/motor deficits.    Psychiatric: Alert and oriented      Imaging Studies and Labs:   CBC:   Lab Results   Component Value Date    WBC 8.6 08/17/2020    HGB 13.6 08/17/2020    HCT 41.5 08/17/2020    MCV 95 08/17/2020     (H) 08/17/2020     BMP:   Lab Results   Component Value Date     08/17/2020     02/10/2017    K 3.1 08/17/2020    K 4.2 02/10/2017    CL 99 02/10/2017    CO2 26 08/17/2020    BUN 12 08/17/2020    CREATININE 0.6 08/17/2020    CREATININE 0.6 02/10/2017    GLUCOSE 116 02/10/2017    CALCIUM 9.5 02/10/2017      LFT:   Lab Results   Component Value Date    ALT 23 08/17/2020    AST 25 08/17/2020    ALKPHOS 68 08/17/2020 BILITOT 0.4 08/17/2020         Assessment and Plan:   1. Triple Positive Left Breast Cancer with separate focus of triple negative bresat cancer in lower outer quadrant  She is currently receiving neoadjuvant chemotherapy with Taxotere, carboplatin, herceptin and pertuzumab. Received cycle #2 on 8/17/20.   2. Abdominal Wall Cellulitis  Likely source of fever on 8/19/20.   -Prescription for Keflex sent to pharmacy. Instructed to use over-the-counter triple antibiotic ointment and non -adherent bandage if clothing present. Instructed to wear loose fitting clothing to not irritate cellulitis.   -Ink margin drawn on abdomen today and instructed the patient to monitor closely. Instructed to call if erythema is extending past ink margins. 3. Constipation   Likely due to combination of Imodium and Lomotil on scheduled basis. Patient instructed to stop Imodium and Lomotil and resume if loose bowel movements occur. Instructed to drink up to 48 oz of fluid daily. Return to clinic as previously scheduled on 8/24/20. All patient questions answered. Pt voiced understanding. Patient agreed with treatment plan. Follow up as directed. Patient instructed to call for questions or concerns.       Electronically signed by   Kary Mccann PA-C

## 2020-08-20 NOTE — TELEPHONE ENCOUNTER
Akila Rosales called in with complaints of low grade fever started last night 99, then this am is 100.4. States she \"doesn't fell well\" No complaints of burning or changes in urination or  worsening cough. States she is constipated. States she has only had 1 small BM last night  and took a stool softener. She also said she has been faithfully taking her imodium and lomotil around the clock like she was instructed. I instructed her to stop the imodium and lomotil now. And only resume if diarrhea resumes. She states she is passing gas, denies any pain, states she is slightly bloated. I wasn't able to really get a good idea  on fluid intake, but it sounds as if that has been down since yesterday.

## 2020-08-24 ENCOUNTER — OFFICE VISIT (OUTPATIENT)
Dept: ONCOLOGY | Age: 70
End: 2020-08-24
Payer: MEDICARE

## 2020-08-24 ENCOUNTER — HOSPITAL ENCOUNTER (OUTPATIENT)
Dept: INFUSION THERAPY | Age: 70
Discharge: HOME | End: 2020-08-24
Payer: MEDICARE

## 2020-08-24 VITALS
TEMPERATURE: 98.2 F | DIASTOLIC BLOOD PRESSURE: 59 MMHG | BODY MASS INDEX: 32.89 KG/M2 | SYSTOLIC BLOOD PRESSURE: 128 MMHG | RESPIRATION RATE: 16 BRPM | WEIGHT: 217 LBS | HEART RATE: 83 BPM | HEIGHT: 68 IN | OXYGEN SATURATION: 96 %

## 2020-08-24 VITALS
TEMPERATURE: 98.2 F | OXYGEN SATURATION: 95 % | WEIGHT: 217.8 LBS | HEIGHT: 68 IN | DIASTOLIC BLOOD PRESSURE: 59 MMHG | SYSTOLIC BLOOD PRESSURE: 128 MMHG | HEART RATE: 83 BPM | BODY MASS INDEX: 33.01 KG/M2 | RESPIRATION RATE: 18 BRPM

## 2020-08-24 DIAGNOSIS — C50.412 MALIGNANT NEOPLASM OF UPPER-OUTER QUADRANT OF LEFT BREAST IN FEMALE, ESTROGEN RECEPTOR POSITIVE (HCC): ICD-10-CM

## 2020-08-24 DIAGNOSIS — Z51.11 CHEMOTHERAPY MANAGEMENT, ENCOUNTER FOR: ICD-10-CM

## 2020-08-24 DIAGNOSIS — Z17.0 MALIGNANT NEOPLASM OF UPPER-INNER QUADRANT OF LEFT BREAST IN FEMALE, ESTROGEN RECEPTOR POSITIVE (HCC): Primary | ICD-10-CM

## 2020-08-24 DIAGNOSIS — C50.212 MALIGNANT NEOPLASM OF UPPER-INNER QUADRANT OF LEFT BREAST IN FEMALE, ESTROGEN RECEPTOR POSITIVE (HCC): Primary | ICD-10-CM

## 2020-08-24 DIAGNOSIS — E55.9 VITAMIN D DEFICIENCY: ICD-10-CM

## 2020-08-24 DIAGNOSIS — Z17.0 MALIGNANT NEOPLASM OF UPPER-OUTER QUADRANT OF LEFT BREAST IN FEMALE, ESTROGEN RECEPTOR POSITIVE (HCC): ICD-10-CM

## 2020-08-24 LAB
BUN BLDV-MCNC: 9 MG/DL (ref 7–22)
CHLORIDE, WHOLE BLOOD: 97 MEQ/L (ref 98–109)
CO2: 29 MEQ/L (ref 23–33)
CREATININE, WHOLE BLOOD: 0.6 MG/DL (ref 0.5–1.2)
GFR, ESTIMATED: > 90 ML/MIN/1.73M2
GLUCOSE, WHOLE BLOOD: 137 MG/DL (ref 70–108)
IONIZED CALCIUM, WHOLE BLOOD: 1.16 MMOL/L (ref 1.12–1.32)
POTASSIUM, WHOLE BLOOD: 4.5 MEQ/L (ref 3.5–4.9)
SODIUM, WHOLE BLOOD: 138 MEQ/L (ref 138–146)

## 2020-08-24 PROCEDURE — 36591 DRAW BLOOD OFF VENOUS DEVICE: CPT

## 2020-08-24 PROCEDURE — 6360000002 HC RX W HCPCS: Performed by: INTERNAL MEDICINE

## 2020-08-24 PROCEDURE — 84520 ASSAY OF UREA NITROGEN: CPT

## 2020-08-24 PROCEDURE — 80047 BASIC METABLC PNL IONIZED CA: CPT

## 2020-08-24 PROCEDURE — 99211 OFF/OP EST MAY X REQ PHY/QHP: CPT

## 2020-08-24 PROCEDURE — 99213 OFFICE O/P EST LOW 20 MIN: CPT | Performed by: INTERNAL MEDICINE

## 2020-08-24 PROCEDURE — 82374 ASSAY BLOOD CARBON DIOXIDE: CPT

## 2020-08-24 PROCEDURE — 2580000003 HC RX 258: Performed by: INTERNAL MEDICINE

## 2020-08-24 RX ORDER — SODIUM CHLORIDE 0.9 % (FLUSH) 0.9 %
10 SYRINGE (ML) INJECTION PRN
Status: DISCONTINUED | OUTPATIENT
Start: 2020-08-24 | End: 2020-08-25 | Stop reason: HOSPADM

## 2020-08-24 RX ORDER — HEPARIN SODIUM (PORCINE) LOCK FLUSH IV SOLN 100 UNIT/ML 100 UNIT/ML
500 SOLUTION INTRAVENOUS PRN
Status: CANCELLED | OUTPATIENT
Start: 2020-08-24

## 2020-08-24 RX ORDER — HEPARIN SODIUM (PORCINE) LOCK FLUSH IV SOLN 100 UNIT/ML 100 UNIT/ML
500 SOLUTION INTRAVENOUS PRN
Status: DISCONTINUED | OUTPATIENT
Start: 2020-08-24 | End: 2020-08-25 | Stop reason: HOSPADM

## 2020-08-24 RX ORDER — SODIUM CHLORIDE 0.9 % (FLUSH) 0.9 %
20 SYRINGE (ML) INJECTION PRN
Status: DISCONTINUED | OUTPATIENT
Start: 2020-08-24 | End: 2020-08-25 | Stop reason: HOSPADM

## 2020-08-24 RX ORDER — SODIUM CHLORIDE 0.9 % (FLUSH) 0.9 %
10 SYRINGE (ML) INJECTION PRN
Status: CANCELLED | OUTPATIENT
Start: 2020-08-24

## 2020-08-24 RX ORDER — SODIUM CHLORIDE 0.9 % (FLUSH) 0.9 %
20 SYRINGE (ML) INJECTION PRN
Status: CANCELLED | OUTPATIENT
Start: 2020-08-24

## 2020-08-24 RX ADMIN — Medication 10 ML: at 11:48

## 2020-08-24 RX ADMIN — Medication 500 UNITS: at 11:48

## 2020-08-24 RX ADMIN — Medication 10 ML: at 10:25

## 2020-08-24 RX ADMIN — Medication 20 ML: at 10:26

## 2020-08-24 NOTE — PATIENT INSTRUCTIONS
1. RTC on 09/08 to see [facility] and for next cycle of chemo.   On that day labs: CBC, BMP, LFTs

## 2020-08-24 NOTE — PROGRESS NOTES
Labs drawn via central venous catheter, then patient escorted to exam room accompanied by Roland Sanchez

## 2020-08-24 NOTE — PLAN OF CARE
Problem: Discharge Planning  Goal: Knowledge of discharge instructions  Description: Knowledge of discharge instructions  Outcome: Met This Shift  Note: Verbalize understanding of discharge instructions, follow up appointments, and when to call Physician. Intervention: Discharge to appropriate level of care  Note: Provide discharge instructions. Problem: Falls - Risk of:  Goal: Will remain free from falls  Description: Will remain free from falls  Outcome: Met This Shift  Note: Free from falls while in [facility]cology. Intervention: Assess risk factors for falls  Note: Discussed the need to use the call light for assistance when getting up to ambulate. Call light within reach. Problem: Infection - Central Venous Catheter-Associated Bloodstream Infection:  Goal: Will show no infection signs and symptoms  Description: Will show no infection signs and symptoms  Outcome: Met This Shift  Note: Mediport site with no redness or warmth. Skin over port intact with no signs of breakdown noted. Patient verbalizes signs/symptoms of port infection and when to notify the physician. Intervention: Infection risk assessment  Note: Discussed mediport maintenance, infection prevention, and when to call the physician. Lab drawn. Care plan reviewed with patient and family. Patient and family verbalize understanding of the plan of care and contribute to goal setting.

## 2020-08-24 NOTE — PROGRESS NOTES
Patient assessed for the following post lab draw:    Dizziness   No  Lightheadedness  No      Acute nausea/vomiting           No  Headache   No  Chest pain/pressure  No  Rash/itching   No  Shortness of breath  No    Patient tolerated lab draw per mediport without any complications. Last vital signs:   BP (!) 128/59   Pulse 83   Temp 98.2 °F (36.8 °C)   Resp 18   Ht 5' 8\" (1.727 m)   Wt 217 lb 12.8 oz (98.8 kg)   SpO2 95%   BMI 33.12 kg/m²     Patient instructed if experience any of the above symptoms following today's lab draw, he/she is to notify MD immediately or go to the emergency department. Discharge instructions given to patient. Verbalizes understanding. Ambulated off unit per self with family with belongings.

## 2020-08-27 RX ORDER — DIPHENOXYLATE HYDROCHLORIDE AND ATROPINE SULFATE 2.5; .025 MG/1; MG/1
1 TABLET ORAL 4 TIMES DAILY
Qty: 56 TABLET | Refills: 1 | Status: SHIPPED | OUTPATIENT
Start: 2020-08-27 | End: 2020-09-08 | Stop reason: SDUPTHER

## 2020-08-27 RX ORDER — DEXAMETHASONE 4 MG/1
8 TABLET ORAL
Qty: 6 TABLET | Refills: 3 | Status: SHIPPED | OUTPATIENT
Start: 2020-08-27 | End: 2020-08-30

## 2020-09-01 NOTE — PROGRESS NOTES
[facility] [address] [address], [address] [facility] [address]  [address] [address]  Dept: [phone]  Dept Fax: [phone]: [phone]      Visit Date:8/24/2020     Saravanan Jean is a 79 y.o. female who presents today for:   Chief Complaint   Patient presents with    Follow-up     breast CA        HPI:   Saravanan Jean is a 79 y.o. female followed by Dr. Earnest Hansen for breast cancer. Per Dr. Aleisha Hsieh note on 8/17/20: newly diagnosed triple positive left breast cancer. She had annual screening mammogram on June 18, 2020 that showed 2 lesions in the left breast.  Subsequently she underwent breast ultrasound followed by ultrasound guided biopsy of those 2 lesions. The largest of these lesions is a lobulated mixed cystic and    solid appearing mass measuring approximately 3 x 2.1 x 2.7 cm. This is located at the anterior depth upper inner quadrant. Aspiration of the fluid component of this lesion and biopsy of    the solid component of this lesion was performed on 6/26/2020. The smaller adjacent lesion is located at the middle depth of the     upper inner quadrant left breast measuring approximately 1.6 x    1.1 x 1.6 cm. Biopsy of this lesion was performed on 6/26/2020.       Final pathology report showed:   A-B.  Left breast, upper inner quadrant, anterior and middle depth,   barbell and tribell clips, multiple core needle biopsies:   Zeus Correa, poorly differentiated ductal carcinoma, [place] score 3.    Estrogen Receptor: (Clone SP1), [employer]    Positive 100 % of cells (>10% of cells)   Intensity of Staining:    Strong   Progesterone Receptor: (Clone 1E2), [employer]    Positive 90 % of cells   Intensity of Staining:    Strong   Ki-67 (clone 30-9)       Percentage of positive nuclei: 42 %               Favorable <10%               Borderline 10-20%               Unfavorable >20%          2018 ASCO/ CAP   Inform HER2 Dual NEW        HER2 dual NEW   [employer]        Group #   ( X  Group 1:         HER2/CEP17 Ratio >=.0 and >=.0 HER2    )                    signals/cell                         HER2 NEW POSITIVE      On 2020 the patient had breast MRI showin. A known biopsy-proven mass demonstrated within the upper     inner left breast anterior to middle depth. This measures 3 cm x     4.1 cm x 3.1 cm middle depth located 4  cm from the nipple, 0.9    cm from the skin, and 6.5 cm from the chest wall.  This shows    heterogeneous enhancement and delayed washout type vascular    enhancement.      2. There are scattered small foci of enhancement demonstrated    bilaterally. The dominant focus of enhancement within the right    breast is located within the lower inner right breast anterior    depth on axial image 105 series 9. Recommend further evaluation    with second look ultrasound. If there is no sonographic    correlate, recommend 6 month follow-up breast MRI to document    stability.         3. Among the small foci of enhancement within the left breast,    the most prominent is located within the lower outer quadrant    posterior depth as seen on axial image 91 series 9. Recommend    further evaluation with second look ultrasound. If there is no    sonographic correlate, recommend 6 month follow-up breast MRI to    document stability.          Patient past medical history significant for hypothyroidism and osteoporosis. Patient denies having any new complaints. Specifically she denies having any headaches, no difficulty with balance, no falls. She denies having any focal neurological deficits. No shortness of breath no cough no abdominal pain. She denies having any bone pain. Patient started neoadjuvant chemotherapy was Taxotere/carbo/Herceptin/Pertuzumab on 2020       Interval History on 2020:    The patient is here for short-term follow-up visit.  4 days ago the patient was evaluated in our clinic for fever of 100.4 and constipation. The patient states following receiving chemotherapy she began anti-diarrhea medication as instructed and had not had a bowel movement since receiving chemo. She was recommended to take a fleet enema by RN and had successful, large bowel movement today. Evaluation 4 days ago revealed abdominal wall cellulitis after she was scratched by her dog, she was placed on Keflex. She denies chills, chest pain, shortness of breath, cough, abdominal pain, nausea, vomiting, dysuria, frequency, hematuria or oliguria.      Past Medical History:   Diagnosis Date    Breast cancer ([address] 2020    left-invasive ductal    Hyperlipidemia     Numbness and tingling     left foot-chronic nerve damage      Past Surgical History:   Procedure Laterality Date    ABDOMINAL EXPLORATION SURGERY  2014    Dr butterfield-lysis of adhesions    BREAST SURGERY Left 06/26/2020    biopsy of left breast    CHOLECYSTECTOMY  10/2014    Dr Chantal Knapp  03/30/2016    Dr Miroslava Gilbert  10/2014    x4 abdominal wall    HYSTERECTOMY      INCISIONAL HERNIA REPAIR  2014    Dr Chapa Fortuna LIPECTOMY  2014    Dr Dorothea Britt  7/16/2020    [address] LEFT 7/16/2020 Harini Funez MD [facility]    PORT SURGERY Right 7/17/2020    SINGLE LUMEN SMART PORT INSERTION RIGHT SUBCLAVIAN performed by Rosaura Mota MD at [address] [facility]  [facility]      patient was 11years old    TUBAL LIGATION        Family History   Problem Relation Age of Onset    Heart Disease Father         cath stent blood to thin and bled    Heart Attack Mother     Other Other         blood clot    Breast Cancer Paternal Aunt 62    High Blood Pressure Sister     Cancer Brother 68        skin    Prostate Cancer Brother         had chemotherapy to treat     Prostate Cancer Brother 64        has had for 10 years    Ovarian Cancer Neg Hx     Diabetes Neg Hx     Stroke Neg Hx       Social History     Tobacco Use    Smoking status: Never Smoker    Smokeless tobacco: Never Used   Substance Use Topics    Alcohol use: No     Alcohol/week: 0.0 standard drinks      Current Outpatient Medications   Medication Sig Dispense Refill    hydrocortisone (ANUSOL-HC) 2.5 % CREA rectal cream Apply twice a day 28 g 1    ibuprofen (ADVIL;MOTRIN) 800 MG tablet Take 800 mg by mouth every 6 hours as needed for Pain      lidocaine-prilocaine (EMLA) 2.5-2.5 % cream Apply topically as needed. 30 g 1    levothyroxine (SYNTHROID) 25 MCG tablet Take 25 mcg by mouth Daily      alendronate (FOSAMAX) 35 MG tablet Take 35 mg by mouth every 7 days      aspirin EC 81 MG EC tablet Take 1 tablet by mouth daily 30 tablet 3    ondansetron (ZOFRAN ODT) 4 MG disintegrating tablet Take 1 tablet by mouth every 8 hours as needed for Nausea 20 tablet 0    PARoxetine (PAXIL) 10 MG tablet Take 10 mg by mouth every morning      Coenzyme Q10-Fish Oil-Vit E (CO-Q 10 OMEGA-3 FISH OIL PO) Take by mouth daily      Vitamin D (CHOLECALCIFEROL) 1000 UNITS CAPS capsule Take by mouth daily 2 tab      Cinnamon 500 MG CAPS Take by mouth daily      Cyanocobalamin (VITAMIN B12 PO) Take 1,000 mcg by mouth daily       diphenoxylate-atropine (DIPHENATOL) 2.5-0.025 MG per tablet Take 1 tablet by mouth 4 times daily for 14 days. 56 tablet 1     No current facility-administered medications for this visit. Allergies   Allergen Reactions    Aluminum-Containing Compounds Anaphylaxis          Review of Systems:   Review of Systems   Pertinent review of systems noted in HPI, all other ROS negative. Objective:   Physical Exam   Vitals:    08/24/20 1043   BP: (!) 128/59   Pulse: 83   Resp: 16   Temp: 98.2 °F (36.8 °C)   SpO2: 96%        General appearance: No apparent distress, well developed, chronically ill appearing, and cooperative. HEENT: Pupils equal, round, and reactive to light. Conjunctivae/corneas clear. Oral mucosa moist.   Neck: Supple, with full range of motion. Trachea midline. Respiratory:  Normal respiratory effort. Clear to auscultation, bilaterally without Rales/Wheezes/Rhonchi. Cardiovascular: Regular rate and rhythm with normal S1/S2  Abdomen: Soft, non-tender, non-distended with active bowel sounds. There is a small, dime sized circular sore to the right of the umbilicus at the waist band level. There is surrounding warmth and erythema present. No pus or drainage noted. A blue ink marker margin was drawn around the redness. Musculoskeletal: No clubbing, cyanosis or edema bilaterally. Full range of motion without deformity. Skin: Skin color, texture, turgor normal.  No rashes or lesions. Neurologic:  Neurovascularly intact without any focal sensory/motor deficits. Psychiatric: Alert and oriented      Imaging Studies and Labs:   CBC:   Lab Results   Component Value Date    WBC 8.6 08/17/2020    HGB 13.6 08/17/2020    HCT 41.5 08/17/2020    MCV 95 08/17/2020     (H) 08/17/2020     BMP:   Lab Results   Component Value Date     08/24/2020     02/10/2017    K 4.5 08/24/2020    K 4.2 02/10/2017    CL 99 02/10/2017    CO2 29 08/24/2020    BUN 9 08/24/2020    CREATININE 0.6 08/24/2020    CREATININE 0.6 02/10/2017    GLUCOSE 116 02/10/2017    CALCIUM 9.5 02/10/2017      LFT:   Lab Results   Component Value Date    ALT 23 08/17/2020    AST 25 08/17/2020    ALKPHOS 68 08/17/2020    BILITOT 0.4 08/17/2020         Assessment and Plan:   1. Triple Positive Left Breast Cancer with separate focus of triple negative bresat cancer in lower outer quadrant  She is currently receiving neoadjuvant chemotherapy with Taxotere, carboplatin, herceptin and pertuzumab. Received cycle #2 on 8/17/20.   2. Abdominal Wall Cellulitis  Likely source of fever on 8/19/20. The patient was placed on Keflex. Marked area on her abdominal wall is much smaller and less raised.   The patient will finish a planned course of Keflex in addition she is applying over-the-counter triple antibiotic ointment and non -adherent bandage if clothing. 3. Constipation   Likely due to combination of Imodium and Lomotil on scheduled basis. Resolved after she stopped I Imodium and Lomotil. Istructed to drink up to 48 oz of fluid daily. All patient questions answered. Pt voiced understanding. Patient agreed with treatment plan. Follow up as directed. Patient instructed to call for questions or concerns.       Electronically signed by   Kiara Dee MD

## 2020-09-07 RX ORDER — SODIUM CHLORIDE 0.9 % (FLUSH) 0.9 %
10 SYRINGE (ML) INJECTION PRN
Status: CANCELLED | OUTPATIENT
Start: 2020-09-08

## 2020-09-07 RX ORDER — SODIUM CHLORIDE 0.9 % (FLUSH) 0.9 %
5 SYRINGE (ML) INJECTION PRN
Status: CANCELLED | OUTPATIENT
Start: 2020-09-08

## 2020-09-07 RX ORDER — MEPERIDINE HYDROCHLORIDE 25 MG/ML
12.5 INJECTION INTRAMUSCULAR; INTRAVENOUS; SUBCUTANEOUS ONCE
Status: CANCELLED | OUTPATIENT
Start: 2020-09-08

## 2020-09-07 RX ORDER — HEPARIN SODIUM (PORCINE) LOCK FLUSH IV SOLN 100 UNIT/ML 100 UNIT/ML
500 SOLUTION INTRAVENOUS PRN
Status: CANCELLED | OUTPATIENT
Start: 2020-09-08

## 2020-09-07 RX ORDER — METHYLPREDNISOLONE SODIUM SUCCINATE 125 MG/2ML
125 INJECTION, POWDER, LYOPHILIZED, FOR SOLUTION INTRAMUSCULAR; INTRAVENOUS ONCE
Status: CANCELLED | OUTPATIENT
Start: 2020-09-08

## 2020-09-07 RX ORDER — SODIUM CHLORIDE 9 MG/ML
INJECTION, SOLUTION INTRAVENOUS CONTINUOUS
Status: CANCELLED | OUTPATIENT
Start: 2020-09-08

## 2020-09-07 RX ORDER — DIPHENHYDRAMINE HYDROCHLORIDE 50 MG/ML
50 INJECTION INTRAMUSCULAR; INTRAVENOUS ONCE
Status: CANCELLED | OUTPATIENT
Start: 2020-09-08

## 2020-09-08 ENCOUNTER — HOSPITAL ENCOUNTER (OUTPATIENT)
Dept: INFUSION THERAPY | Age: 70
Discharge: HOME | End: 2020-09-08
Payer: MEDICARE

## 2020-09-08 ENCOUNTER — OFFICE VISIT (OUTPATIENT)
Dept: ONCOLOGY | Age: 70
End: 2020-09-08
Payer: MEDICARE

## 2020-09-08 VITALS
OXYGEN SATURATION: 92 % | WEIGHT: 221.4 LBS | SYSTOLIC BLOOD PRESSURE: 132 MMHG | RESPIRATION RATE: 18 BRPM | BODY MASS INDEX: 33.56 KG/M2 | HEART RATE: 82 BPM | DIASTOLIC BLOOD PRESSURE: 69 MMHG | HEIGHT: 68 IN | TEMPERATURE: 97.3 F

## 2020-09-08 VITALS
DIASTOLIC BLOOD PRESSURE: 70 MMHG | HEART RATE: 64 BPM | WEIGHT: 221.4 LBS | OXYGEN SATURATION: 97 % | SYSTOLIC BLOOD PRESSURE: 125 MMHG | HEIGHT: 68 IN | RESPIRATION RATE: 16 BRPM | TEMPERATURE: 98.2 F | BODY MASS INDEX: 33.56 KG/M2

## 2020-09-08 DIAGNOSIS — Z17.0 MALIGNANT NEOPLASM OF UPPER-INNER QUADRANT OF LEFT BREAST IN FEMALE, ESTROGEN RECEPTOR POSITIVE (HCC): Primary | ICD-10-CM

## 2020-09-08 DIAGNOSIS — E55.9 VITAMIN D DEFICIENCY: ICD-10-CM

## 2020-09-08 DIAGNOSIS — Z17.0 MALIGNANT NEOPLASM OF UPPER-OUTER QUADRANT OF LEFT BREAST IN FEMALE, ESTROGEN RECEPTOR POSITIVE (HCC): ICD-10-CM

## 2020-09-08 DIAGNOSIS — C50.412 MALIGNANT NEOPLASM OF UPPER-OUTER QUADRANT OF LEFT BREAST IN FEMALE, ESTROGEN RECEPTOR POSITIVE (HCC): ICD-10-CM

## 2020-09-08 DIAGNOSIS — Z51.11 CHEMOTHERAPY MANAGEMENT, ENCOUNTER FOR: ICD-10-CM

## 2020-09-08 DIAGNOSIS — C50.212 MALIGNANT NEOPLASM OF UPPER-INNER QUADRANT OF LEFT BREAST IN FEMALE, ESTROGEN RECEPTOR POSITIVE (HCC): Primary | ICD-10-CM

## 2020-09-08 LAB
ALBUMIN SERPL-MCNC: 3.3 G/DL (ref 3.5–5.1)
ALP BLD-CCNC: 62 U/L (ref 38–126)
ALT SERPL-CCNC: 20 U/L (ref 11–66)
AST SERPL-CCNC: 20 U/L (ref 5–40)
BILIRUB SERPL-MCNC: 0.3 MG/DL (ref 0.3–1.2)
BILIRUBIN DIRECT: < 0.2 MG/DL (ref 0–0.3)
BUN BLDV-MCNC: 7 MG/DL (ref 7–22)
CHLORIDE, WHOLE BLOOD: 104 MEQ/L (ref 98–109)
CO2: 25 MEQ/L (ref 23–33)
CREATININE, WHOLE BLOOD: 0.6 MG/DL (ref 0.5–1.2)
GFR, ESTIMATED: > 90 ML/MIN/1.73M2
GLUCOSE, WHOLE BLOOD: 129 MG/DL (ref 70–108)
HCT VFR BLD CALC: 39.4 % (ref 37–47)
HEMOGLOBIN: 12.4 GM/DL (ref 12–16)
IONIZED CALCIUM, WHOLE BLOOD: 1.16 MMOL/L (ref 1.12–1.32)
MCH RBC QN AUTO: 31.3 PG (ref 26–33)
MCHC RBC AUTO-ENTMCNC: 31.5 GM/DL (ref 32.2–35.5)
MCV RBC AUTO: 100 FL (ref 81–99)
PDW BLD-RTO: 14.4 % (ref 11.5–14.5)
PLATELET # BLD: 351 THOU/MM3 (ref 130–400)
PMV BLD AUTO: 10.1 FL (ref 9.4–12.4)
POTASSIUM, WHOLE BLOOD: 3.4 MEQ/L (ref 3.5–4.9)
RBC # BLD: 3.96 MILL/MM3 (ref 4.2–5.4)
SEG NEUTROPHILS: 48 % (ref 43–75)
SEGMENTED NEUTROPHILS ABSOLUTE COUNT: 4.5 THOU/MM3 (ref 1.8–7.7)
SODIUM, WHOLE BLOOD: 143 MEQ/L (ref 138–146)
TOTAL PROTEIN: 6.5 G/DL (ref 6.1–8)
WBC # BLD: 9.2 THOU/MM3 (ref 4.8–10.8)

## 2020-09-08 PROCEDURE — 99211 OFF/OP EST MAY X REQ PHY/QHP: CPT

## 2020-09-08 PROCEDURE — 36591 DRAW BLOOD OFF VENOUS DEVICE: CPT

## 2020-09-08 PROCEDURE — 96417 CHEMO IV INFUS EACH ADDL SEQ: CPT

## 2020-09-08 PROCEDURE — 84520 ASSAY OF UREA NITROGEN: CPT

## 2020-09-08 PROCEDURE — 96413 CHEMO IV INFUSION 1 HR: CPT

## 2020-09-08 PROCEDURE — 82374 ASSAY BLOOD CARBON DIOXIDE: CPT

## 2020-09-08 PROCEDURE — 96377 APPLICATON ON-BODY INJECTOR: CPT

## 2020-09-08 PROCEDURE — 80076 HEPATIC FUNCTION PANEL: CPT

## 2020-09-08 PROCEDURE — 99214 OFFICE O/P EST MOD 30 MIN: CPT | Performed by: PHYSICIAN ASSISTANT

## 2020-09-08 PROCEDURE — 6360000002 HC RX W HCPCS: Performed by: INTERNAL MEDICINE

## 2020-09-08 PROCEDURE — 80047 BASIC METABLC PNL IONIZED CA: CPT

## 2020-09-08 PROCEDURE — 96375 TX/PRO/DX INJ NEW DRUG ADDON: CPT

## 2020-09-08 PROCEDURE — 85027 COMPLETE CBC AUTOMATED: CPT

## 2020-09-08 PROCEDURE — 96367 TX/PROPH/DG ADDL SEQ IV INF: CPT

## 2020-09-08 PROCEDURE — 2580000003 HC RX 258: Performed by: INTERNAL MEDICINE

## 2020-09-08 RX ORDER — LOPERAMIDE HYDROCHLORIDE 2 MG/1
2 CAPSULE ORAL 4 TIMES DAILY PRN
Qty: 60 CAPSULE | Refills: 2 | Status: SHIPPED | OUTPATIENT
Start: 2020-09-08 | End: 2020-09-18

## 2020-09-08 RX ORDER — SODIUM CHLORIDE 0.9 % (FLUSH) 0.9 %
10 SYRINGE (ML) INJECTION PRN
Status: CANCELLED | OUTPATIENT
Start: 2020-09-08

## 2020-09-08 RX ORDER — SODIUM CHLORIDE 0.9 % (FLUSH) 0.9 %
20 SYRINGE (ML) INJECTION PRN
Status: CANCELLED | OUTPATIENT
Start: 2020-09-08

## 2020-09-08 RX ORDER — HEPARIN SODIUM (PORCINE) LOCK FLUSH IV SOLN 100 UNIT/ML 100 UNIT/ML
500 SOLUTION INTRAVENOUS PRN
Status: DISCONTINUED | OUTPATIENT
Start: 2020-09-08 | End: 2020-09-09 | Stop reason: HOSPADM

## 2020-09-08 RX ORDER — DIPHENOXYLATE HYDROCHLORIDE AND ATROPINE SULFATE 2.5; .025 MG/1; MG/1
1 TABLET ORAL 4 TIMES DAILY
Qty: 56 TABLET | Refills: 1 | Status: SHIPPED | OUTPATIENT
Start: 2020-09-08 | End: 2020-09-11 | Stop reason: SDUPTHER

## 2020-09-08 RX ORDER — SODIUM CHLORIDE 0.9 % (FLUSH) 0.9 %
20 SYRINGE (ML) INJECTION PRN
Status: DISCONTINUED | OUTPATIENT
Start: 2020-09-08 | End: 2020-09-09 | Stop reason: HOSPADM

## 2020-09-08 RX ORDER — PALONOSETRON 0.05 MG/ML
0.25 INJECTION, SOLUTION INTRAVENOUS ONCE
Status: COMPLETED | OUTPATIENT
Start: 2020-09-08 | End: 2020-09-08

## 2020-09-08 RX ORDER — SODIUM CHLORIDE 0.9 % (FLUSH) 0.9 %
10 SYRINGE (ML) INJECTION PRN
Status: DISCONTINUED | OUTPATIENT
Start: 2020-09-08 | End: 2020-09-09 | Stop reason: HOSPADM

## 2020-09-08 RX ORDER — HEPARIN SODIUM (PORCINE) LOCK FLUSH IV SOLN 100 UNIT/ML 100 UNIT/ML
500 SOLUTION INTRAVENOUS PRN
Status: CANCELLED | OUTPATIENT
Start: 2020-09-08

## 2020-09-08 RX ORDER — SODIUM CHLORIDE 9 MG/ML
20 INJECTION, SOLUTION INTRAVENOUS ONCE
Status: COMPLETED | OUTPATIENT
Start: 2020-09-08 | End: 2020-09-08

## 2020-09-08 RX ADMIN — CARBOPLATIN 648 MG: 10 INJECTION, SOLUTION INTRAVENOUS at 12:26

## 2020-09-08 RX ADMIN — Medication 10 ML: at 08:17

## 2020-09-08 RX ADMIN — FOSAPREPITANT 150 MG: 150 INJECTION, POWDER, LYOPHILIZED, FOR SOLUTION INTRAVENOUS at 09:24

## 2020-09-08 RX ADMIN — DOCETAXEL ANHYDROUS 160 MG: 10 INJECTION, SOLUTION INTRAVENOUS at 11:14

## 2020-09-08 RX ADMIN — Medication 500 UNITS: at 13:44

## 2020-09-08 RX ADMIN — PALONOSETRON 0.25 MG: 0.05 INJECTION, SOLUTION INTRAVENOUS at 08:58

## 2020-09-08 RX ADMIN — Medication 10 ML: at 13:44

## 2020-09-08 RX ADMIN — DEXAMETHASONE SODIUM PHOSPHATE 12 MG: 4 INJECTION, SOLUTION INTRAMUSCULAR; INTRAVENOUS at 09:00

## 2020-09-08 RX ADMIN — SODIUM CHLORIDE 20 ML/HR: 9 INJECTION, SOLUTION INTRAVENOUS at 08:56

## 2020-09-08 RX ADMIN — PERTUZUMAB 420 MG: 30 INJECTION, SOLUTION, CONCENTRATE INTRAVENOUS at 10:03

## 2020-09-08 RX ADMIN — PEGFILGRASTIM 6 MG: KIT SUBCUTANEOUS at 13:41

## 2020-09-08 RX ADMIN — TRASTUZUMAB 600 MG: 150 INJECTION, POWDER, LYOPHILIZED, FOR SOLUTION INTRAVENOUS at 10:38

## 2020-09-08 RX ADMIN — Medication 20 ML: at 08:18

## 2020-09-08 NOTE — PROGRESS NOTES
Patient assessed for the following post chemotherapy:    Dizziness   No  Lightheadedness  No      Acute nausea/vomiting No  Headache   No  Chest pain/pressure  No  Rash/itching   No  Shortness of breath  No    Patient kept for 20 minutes observation post infusion chemotherapy. Patient tolerated chemotherapy treatment Perjeta, Herceptin, Taxotere and Carboplatin without any complications. Last vital signs:   /70   Pulse 64   Temp 98.2 °F (36.8 °C) (Oral)   Resp 16   Ht 5' 8\" (1.727 m)   Wt 221 lb 6.4 oz (100.4 kg)   SpO2 97%   BMI 33.66 kg/m²     Neulasta on-body placed on right arm, Patent sent home with instructions re: care and removal of On-body injector. Patient instructed if experience any of the above symptoms following today's infusion,he/she is to notify MD immediately or go to the emergency department. Discharge instructions given to patient. Verbalizes understanding. Ambulated off unit per self in stable condition with belongings.

## 2020-09-08 NOTE — PLAN OF CARE
Problem: Discharge Planning  Goal: Knowledge of discharge instructions  Description: Knowledge of discharge instructions  Outcome: Met This Shift  Note: Verbalize understanding of discharge instructions, follow up appointments, and when to call Physician. Intervention: Discharge to appropriate level of care  Note: Discuss understanding of discharge instructions, follow up appointments and when to call Physician. Problem: Intellectual/Education/Knowledge Deficit  Goal: Teaching initiated upon admission  Outcome: Met This Shift  Note: Patient verbalizes understanding to verbal information given on Perjeta, Herceptin, Taxotere and Carboplatin,action and possible side effects. Aware to call MD if develop complications. Intervention: Verbal/written education provided  Note: Chemotherapy Teaching     What is Chemotherapy   Drug action [x]   Method of Administration [x]   Handouts given []     Side Effects  Nausea/vomiting [x]   Diarrhea [x]   Fatigue [x]   Signs / Symptoms of infection [x]   Neutropenia [x]   Thrombocytopenia [x]   Alopecia [x]   neuropathy [x]   [place] diet &  the importance of fluids [x]       Micellaneous  Importance of nutrition [x]   Importance of oral hygiene [x]   When to call the MD [x]   Monitoring labs [x]   Use of supportive services []     Explanation of Drug Regimen / Frequency  Day 1 cycle 3 -Perjeta, Herceptin, Taxotere and Carboplatin     Comments  Verbalized understanding to drug,action,side effects and when to call MD         Problem: Falls - Risk of:  Goal: Will remain free from falls  Description: Will remain free from falls  Outcome: Met This Shift  Note: Free from falls while in [facility]. Intervention: Assess risk factors for falls  Note: Discussed the need to use the call light for assistance when getting up to ambulate.       Problem: Infection - Central Venous Catheter-Associated Bloodstream Infection:  Goal: Will show no infection signs and symptoms  Description: Will show no infection signs and symptoms  Outcome: Met This Shift  Note: Instructed to monitor for signs/symptoms of infection at medi-port site and call MD if problems develop. Intervention: Infection risk assessment  Note: Mediport site with no redness or warmth. Skin over port site intact with no signs of breakdown noted. Patient verbalizes signs/symptoms of port infection and when to notify the physician. Care plan reviewed with patient. Patient verbalize understanding of the plan of care and contribute to goal setting.

## 2020-09-08 NOTE — PATIENT INSTRUCTIONS
1. Proceed with chemotherapy today. [facility]. [facility], nurse eval, possible fluids. 3. Return to clinic with Dr. David Gilbert in 3 weeks for next cycle of chemotherapy. 4. Labs on RTC: CBC, BMP, LFT  5. Please call for questions or concerns.

## 2020-09-08 NOTE — ONCOLOGY
Chemotherapy Administration    Pre-assessment Data: Antineoplastic Agents  Other:   See toxicity flow sheet for assessment [x]     Physician Notification of Concerns Related to Chemotherapy Administration:   Physician Notified [place] / Time of Notification      Interventions:   Lab work assessed  [x]   Height / Weight verified for dose [x]   Current MAR reviewed [x]   Emergency drugs available as appropriate [x]   Anaphylaxis assessment completed [x]   Pre-medications administered as ordered [x]   Blood return noted upon initiation of chemotherapy [x]   Blood return noted each 1-2ml of a vesicant medication if given IV push []   Blood return noted each 2-3ml of a non-vesicant medication if given IV push []   Monitor for signs / symptoms of hypersensitivity reaction [x]   Chemotherapy orders (drug/dose/rate) verified by 2 Chemo certified RNs [x]   Monitor IV site and blood return throughout the infusion of the medication [x]   Document IV site checks on the IV assessment form [x]   Document chemotherapy teaching on the Patient Education tab [x]   Document patient verbalizes understanding of medications being administered-Perjeta, Herceptin, Taxotere and Carboplatin [x]   If IV infiltration, see ONS Guidelines []   Other:      []

## 2020-09-08 NOTE — PROGRESS NOTES
[facility] [address] [facility], [address] [facility] [address]  [address] [address]  Dept: [phone]  Dept Fax: [phone]: [phone]      Visit Date:9/8/2020     Chi Crouch is a 79 y.o. female who presents today for:   Chief Complaint   Patient presents with    Follow-up     breast CA        HPI:   Chi Crouch is a 79 y.o. female followed by Dr. Andrews Nelson for breast cancer. Per Dr. Earl Tavares note on 8/17/20: newly diagnosed triple positive left breast cancer.  She had annual screening mammogram on June 18, 2020 that showed 2 lesions in the left breast.  Subsequently she underwent breast ultrasound followed by ultrasound guided biopsy of those 2 lesions. The largest of these lesions is a lobulated mixed cystic and    solid appearing mass measuring approximately 3 x 2.1 x 2.7 cm. This is located at the anterior depth upper inner quadrant. Aspiration of the fluid component of this lesion and biopsy of    the solid component of this lesion was performed on 6/26/2020. The smaller adjacent lesion is located at the middle depth of the     upper inner quadrant left breast measuring approximately 1.6 x    1.1 x 1.6 cm. Biopsy of this lesion was performed on 6/26/2020.       Final pathology report showed:   A-B.  Left breast, upper inner quadrant, anterior and middle depth,   barbell and tribell clips, multiple core needle biopsies:       Invasive, poorly differentiated ductal carcinoma, Pepe score 3.    Estrogen Receptor: (Clone SP1), [employer]    Positive 100 % of cells (>10% of cells)   Intensity of Staining:    Strong   Progesterone Receptor: (Clone 1E2), [employer]    Positive 90 % of cells   Intensity of Staining:    Strong   Ki-67 (clone 30-9)       Percentage of positive nuclei: 42 %               Favorable <10%               Borderline 10-20%               Unfavorable >20%          8294 ASCO/ CAP   Inform HER2 Dual NEW        HER2 dual NEW    [employer]        Group #   ( X  Group 1:         HER2/CEP17 Ratio >=.0 and >=.0 HER2    )                    signals/cell                         HER2 NEW POSITIVE      On 2020 the patient had breast MRI showin. A known biopsy-proven mass demonstrated within the upper     inner left breast anterior to middle depth. This measures 3 cm x     4.1 cm x 3.1 cm middle depth located 4  cm from the nipple, 0.9    cm from the skin, and 6.5 cm from the chest wall.  This shows    heterogeneous enhancement and delayed washout type vascular    enhancement.      2. There are scattered small foci of enhancement demonstrated    bilaterally. The dominant focus of enhancement within the right    breast is located within the lower inner right breast anterior    depth on axial image 105 series 9. Recommend further evaluation    with second look ultrasound. If there is no sonographic    correlate, recommend 6 month follow-up breast MRI to document    stability.         3. Among the small foci of enhancement within the left breast,    the most prominent is located within the lower outer quadrant    posterior depth as seen on axial image 91 series 9. Recommend    further evaluation with second look ultrasound. If there is no    sonographic correlate, recommend 6 month follow-up breast MRI to    document stability.          Patient past medical history significant for hypothyroidism and osteoporosis.  Patient denies having any new complaints.  Specifically she denies having any headaches, no difficulty with balance, no falls.  She denies having any focal neurological deficits.  No shortness of breath no cough no abdominal pain.  She denies having any bone pain. Patient started neoadjuvant chemotherapy was Taxotere/carbo/Herceptin/Pertuzumab on 2020        Interval History 2020:   Patient is here for follow-up evaluation and to receive cycle #3 of neoadjuvant Taxotere, carbo, Herceptin and Pertuzumab.   The patient is here with her son today. She states she has been feeling well. She has had improvement in diarrhea with antidiarrhea medication and is having more regular bowel movements. She denies fever, chills, or signs/symptoms of infection. She denies chest pain, shortness of breath, cough, abdominal pain, nausea, vomiting, or urinary changes. She feels that the known left breast mass is decreasing in size with chemotherapy.       Past Medical History:   Diagnosis Date    Breast cancer ([address] 2020    left-invasive ductal    Hyperlipidemia     Numbness and tingling     left foot-chronic nerve damage      Past Surgical History:   Procedure Laterality Date    ABDOMINAL EXPLORATION SURGERY  2014    Dr butterfield-lysis of adhesions    BREAST SURGERY Left 06/26/2020    biopsy of left breast    CHOLECYSTECTOMY  10/2014    Dr Lexis Swain  03/30/2016    Dr Simona Flores  10/2014    x4 abdominal wall    HYSTERECTOMY      INCISIONAL HERNIA REPAIR  2014    Dr Brayden Rodas LIPECTOMY  2014    Dr Eulalia Cantu  7/16/2020    [address] LEFT 7/16/2020 Kellie Alexis MD [facility]    PORT SURGERY Right 7/17/2020    SINGLE LUMEN SMART PORT INSERTION RIGHT SUBCLAVIAN performed by Jace Alvarez MD at [address] [facility]  10/2014    [facility]      patient was 11years old    TUBAL LIGATION        Family History   Problem Relation Age of Onset    Heart Disease Father         cath stent blood to thin and bled    Heart Attack Mother     Other Other         blood clot    Breast Cancer Paternal Aunt 62    High Blood Pressure Sister     Cancer Brother 68        skin    Prostate Cancer Brother         had chemotherapy to treat     Prostate Cancer Brother 64        has had for 10 years    Ovarian Cancer Neg Hx     Diabetes Neg Hx     Stroke Neg Hx       Social History     Tobacco Use    Smoking review of systems noted in HPI, all other ROS negative. Objective:   Physical Exam   /69 (Site: Right Upper Arm, Position: Sitting, Cuff Size: Medium Adult)   Pulse 82   Temp 97.3 °F (36.3 °C) (Infrared)   Resp 18   Ht 5' 8\" (1.727 m)   Wt 221 lb 6.4 oz (100.4 kg)   SpO2 92%   BMI 33.66 kg/m²    General appearance: No apparent distress, chronically ill appearing and cooperative. HEENT: Pupils equal, round, and reactive to light. Conjunctivae/corneas clear. Oral mucosa moist. No mucositis or thrush. Neck: Supple, with full range of motion. Trachea midline. Respiratory:  Normal respiratory effort. Clear to auscultation, bilaterally without Rales/Wheezes/Rhonchi. Cardiovascular: Regular rate and rhythm with normal S1/S2   Chest: left breast mass to the upper inner quadrant. Abdomen: Soft, non-tender, non-distended with active bowel sounds. Musculoskeletal: No clubbing, cyanosis or edema bilaterally. Skin: Skin color, texture, turgor normal.  No rashes or lesions. Neurologic:  Neurovascularly intact without any focal sensory/motor deficits. Psychiatric: Alert and oriented      Imaging Studies and Labs:   CBC:   Lab Results   Component Value Date    WBC 9.2 09/08/2020    HGB 12.4 09/08/2020    HCT 39.4 09/08/2020     (H) 09/08/2020     09/08/2020     BMP:   Lab Results   Component Value Date     08/24/2020     02/10/2017    K 4.5 08/24/2020    K 4.2 02/10/2017    CL 99 02/10/2017    CO2 29 08/24/2020    BUN 9 08/24/2020    CREATININE 0.6 08/24/2020    CREATININE 0.6 02/10/2017    GLUCOSE 116 02/10/2017    CALCIUM 9.5 02/10/2017      LFT:   Lab Results   Component Value Date    ALT 23 08/17/2020    AST 25 08/17/2020    ALKPHOS 68 08/17/2020    BILITOT 0.4 08/17/2020         Assessment and Plan:   1.  Triple Positive Left Breast Cancer with separate focus of triple negative bresat cancer in lower outer quadrant  She is currently receiving neoadjuvant chemotherapy with Taxotere, carboplatin, herceptin and pertuzumab. Received cycle #2 on 8/17/20.   2. Neoadjuvant Chemotherapy with Taxotere, Carbo, Herceptin, Pertuzumab  She began this regimen on 7/27/2020. She developed diarrhea following cycle #1 of chemotherapy which resolved with use of Imodium and Lomotil on cycle #2. Today, she reports she is feeling well. Vitals and labs reviewed, okay to proceed with cycle #3 of chemotherapy today. 3. Hypokalemia  Mild, K 3.4. Given foods high in potassium list. Will continue to monitor. She will return to [facility] in one week for BMP, nurse eval and possible fluids. Return to clinic in 3 weeks with Dr. Earnest Hansen and for next cycle of chemotherapy. Labs on RTC: CBC, BMP, LFT         All patient questions answered. Pt voiced understanding. Patient agreed with treatment plan. Follow up as directed. Patient instructed to call for questions or concerns.       Electronically signed by   Ethyl Mortimer, PA-C

## 2020-09-11 RX ORDER — DIPHENOXYLATE HYDROCHLORIDE AND ATROPINE SULFATE 2.5; .025 MG/1; MG/1
1 TABLET ORAL 4 TIMES DAILY
Qty: 56 TABLET | Refills: 1 | Status: SHIPPED | OUTPATIENT
Start: 2020-09-11 | End: 2020-09-25

## 2020-09-11 RX ORDER — AMOXICILLIN AND CLAVULANATE POTASSIUM 875; 125 MG/1; MG/1
1 TABLET, FILM COATED ORAL 2 TIMES DAILY
Qty: 14 TABLET | Refills: 0 | Status: SHIPPED | OUTPATIENT
Start: 2020-09-11 | End: 2020-09-18

## 2020-09-15 ENCOUNTER — HOSPITAL ENCOUNTER (OUTPATIENT)
Dept: INFUSION THERAPY | Age: 70
Discharge: HOME | End: 2020-09-15
Payer: MEDICARE

## 2020-09-15 VITALS
WEIGHT: 211.4 LBS | BODY MASS INDEX: 32.04 KG/M2 | SYSTOLIC BLOOD PRESSURE: 121 MMHG | OXYGEN SATURATION: 94 % | HEART RATE: 94 BPM | RESPIRATION RATE: 16 BRPM | HEIGHT: 68 IN | TEMPERATURE: 97.5 F | DIASTOLIC BLOOD PRESSURE: 70 MMHG

## 2020-09-15 DIAGNOSIS — C50.412 MALIGNANT NEOPLASM OF UPPER-OUTER QUADRANT OF LEFT BREAST IN FEMALE, ESTROGEN RECEPTOR POSITIVE (HCC): ICD-10-CM

## 2020-09-15 DIAGNOSIS — Z51.11 CHEMOTHERAPY MANAGEMENT, ENCOUNTER FOR: ICD-10-CM

## 2020-09-15 DIAGNOSIS — E87.6 HYPOKALEMIA: ICD-10-CM

## 2020-09-15 DIAGNOSIS — C50.212 MALIGNANT NEOPLASM OF UPPER-INNER QUADRANT OF LEFT BREAST IN FEMALE, ESTROGEN RECEPTOR POSITIVE (HCC): Primary | ICD-10-CM

## 2020-09-15 DIAGNOSIS — Z17.0 MALIGNANT NEOPLASM OF UPPER-OUTER QUADRANT OF LEFT BREAST IN FEMALE, ESTROGEN RECEPTOR POSITIVE (HCC): ICD-10-CM

## 2020-09-15 DIAGNOSIS — E55.9 VITAMIN D DEFICIENCY: ICD-10-CM

## 2020-09-15 DIAGNOSIS — Z17.0 MALIGNANT NEOPLASM OF UPPER-INNER QUADRANT OF LEFT BREAST IN FEMALE, ESTROGEN RECEPTOR POSITIVE (HCC): Primary | ICD-10-CM

## 2020-09-15 LAB
ABSOLUTE IMMATURE GRANULOCYTE: 0.14 THOU/MM3 (ref 0–0.07)
ALBUMIN SERPL-MCNC: 3.7 G/DL (ref 3.5–5.1)
ALP BLD-CCNC: 100 U/L (ref 38–126)
ALT SERPL-CCNC: 25 U/L (ref 11–66)
AST SERPL-CCNC: 26 U/L (ref 5–40)
BASINOPHIL, AUTOMATED: 0 % (ref 0–3)
BASOPHILS ABSOLUTE: 0 THOU/MM3 (ref 0–0.1)
BILIRUB SERPL-MCNC: 0.3 MG/DL (ref 0.3–1.2)
BILIRUBIN DIRECT: < 0.2 MG/DL (ref 0–0.3)
BUN BLDV-MCNC: 13 MG/DL (ref 7–22)
CHLORIDE, WHOLE BLOOD: 99 MEQ/L (ref 98–109)
CO2: 25 MEQ/L (ref 23–33)
CREATININE, WHOLE BLOOD: 0.6 MG/DL (ref 0.5–1.2)
EOSINOPHILS ABSOLUTE: 0.1 THOU/MM3 (ref 0–0.4)
EOSINOPHILS RELATIVE PERCENT: 1 % (ref 0–4)
GFR, ESTIMATED: > 90 ML/MIN/1.73M2
GLUCOSE, WHOLE BLOOD: 135 MG/DL (ref 70–108)
HCT VFR BLD CALC: 39.2 % (ref 37–47)
HEMOGLOBIN: 12.6 GM/DL (ref 12–16)
IMMATURE GRANULOCYTES: 1 %
IONIZED CALCIUM, WHOLE BLOOD: 1.24 MMOL/L (ref 1.12–1.32)
LYMPHOCYTES # BLD: 28 % (ref 15–47)
LYMPHOCYTES ABSOLUTE: 3 THOU/MM3 (ref 1–4.8)
MCH RBC QN AUTO: 31.4 PG (ref 26–33)
MCHC RBC AUTO-ENTMCNC: 32.1 GM/DL (ref 32.2–35.5)
MCV RBC AUTO: 98 FL (ref 81–99)
MONOCYTES ABSOLUTE: 1 THOU/MM3 (ref 0.4–1.3)
MONOCYTES: 10 % (ref 0–12)
PDW BLD-RTO: 14 % (ref 11.5–14.5)
PLATELET # BLD: 368 THOU/MM3 (ref 130–400)
PMV BLD AUTO: 11.4 FL (ref 9.4–12.4)
POTASSIUM, WHOLE BLOOD: 4 MEQ/L (ref 3.5–4.9)
RBC # BLD: 4.01 MILL/MM3 (ref 4.2–5.4)
SEG NEUTROPHILS: 60 % (ref 43–75)
SEGMENTED NEUTROPHILS ABSOLUTE COUNT: 6.4 THOU/MM3 (ref 1.8–7.7)
SODIUM, WHOLE BLOOD: 137 MEQ/L (ref 138–146)
TOTAL PROTEIN: 6.9 G/DL (ref 6.1–8)
WBC # BLD: 10.6 THOU/MM3 (ref 4.8–10.8)

## 2020-09-15 PROCEDURE — 80076 HEPATIC FUNCTION PANEL: CPT

## 2020-09-15 PROCEDURE — 82374 ASSAY BLOOD CARBON DIOXIDE: CPT

## 2020-09-15 PROCEDURE — 2580000003 HC RX 258: Performed by: INTERNAL MEDICINE

## 2020-09-15 PROCEDURE — 99211 OFF/OP EST MAY X REQ PHY/QHP: CPT

## 2020-09-15 PROCEDURE — 84520 ASSAY OF UREA NITROGEN: CPT

## 2020-09-15 PROCEDURE — 80047 BASIC METABLC PNL IONIZED CA: CPT

## 2020-09-15 PROCEDURE — 6360000002 HC RX W HCPCS: Performed by: INTERNAL MEDICINE

## 2020-09-15 PROCEDURE — 36591 DRAW BLOOD OFF VENOUS DEVICE: CPT

## 2020-09-15 PROCEDURE — 85025 COMPLETE CBC W/AUTO DIFF WBC: CPT

## 2020-09-15 RX ORDER — SODIUM CHLORIDE 0.9 % (FLUSH) 0.9 %
20 SYRINGE (ML) INJECTION PRN
Status: CANCELLED | OUTPATIENT
Start: 2020-09-15

## 2020-09-15 RX ORDER — SODIUM CHLORIDE 0.9 % (FLUSH) 0.9 %
10 SYRINGE (ML) INJECTION PRN
Status: CANCELLED | OUTPATIENT
Start: 2020-09-15

## 2020-09-15 RX ORDER — SODIUM CHLORIDE 0.9 % (FLUSH) 0.9 %
20 SYRINGE (ML) INJECTION PRN
Status: DISCONTINUED | OUTPATIENT
Start: 2020-09-15 | End: 2020-09-16 | Stop reason: HOSPADM

## 2020-09-15 RX ORDER — HEPARIN SODIUM (PORCINE) LOCK FLUSH IV SOLN 100 UNIT/ML 100 UNIT/ML
500 SOLUTION INTRAVENOUS PRN
Status: CANCELLED | OUTPATIENT
Start: 2020-09-15

## 2020-09-15 RX ORDER — HEPARIN SODIUM (PORCINE) LOCK FLUSH IV SOLN 100 UNIT/ML 100 UNIT/ML
500 SOLUTION INTRAVENOUS PRN
Status: DISCONTINUED | OUTPATIENT
Start: 2020-09-15 | End: 2020-09-16 | Stop reason: HOSPADM

## 2020-09-15 RX ORDER — SODIUM CHLORIDE 0.9 % (FLUSH) 0.9 %
10 SYRINGE (ML) INJECTION PRN
Status: DISCONTINUED | OUTPATIENT
Start: 2020-09-15 | End: 2020-09-16 | Stop reason: HOSPADM

## 2020-09-15 RX ADMIN — Medication 10 ML: at 11:38

## 2020-09-15 RX ADMIN — Medication 20 ML: at 11:39

## 2020-09-15 RX ADMIN — Medication 500 UNITS: at 12:34

## 2020-09-15 RX ADMIN — Medication 10 ML: at 12:33

## 2020-09-15 NOTE — PROGRESS NOTES
Patient tolerated lab draw via [id number] without any complications. Discharge instructions given to patient-verbalizes understanding. Ambulated off unit per self with belongings.

## 2020-09-15 NOTE — PLAN OF CARE
Problem: Discharge Planning  Goal: Knowledge of discharge instructions  Description: Knowledge of discharge instructions  Outcome: Met This Shift  Note: Verbalize understanding of discharge instructions, follow up appointments, and when to call Physician. Intervention: Discharge to appropriate level of care  Note: Discuss understanding of discharge instructions, follow up appointments and when to call Physician. Problem: Falls - Risk of:  Goal: Will remain free from falls  Description: Will remain free from falls  Outcome: Met This Shift  Note: Free from falls while in O.P. Oncology. Intervention: Assess risk factors for falls  Note: Discussed the need to use the call light for assistance when getting up to ambulate. Problem: Infection - Central Venous Catheter-Associated Bloodstream Infection:  Goal: Will show no infection signs and symptoms  Description: Will show no infection signs and symptoms  Outcome: Met This Shift  Note: Instructed to monitor for signs/symptoms of infection at medi-port site and call MD if problems develop. Intervention: Infection risk assessment  Note: Mediport site with no redness or warmth. Skin over port site intact with no signs of breakdown noted. Patient verbalizes signs/symptoms of port infection and when to notify the physician. Care plan reviewed with patient. Patient verbalize understanding of the plan of care and contribute to goal setting.

## 2020-09-28 RX ORDER — MEPERIDINE HYDROCHLORIDE 25 MG/ML
12.5 INJECTION INTRAMUSCULAR; INTRAVENOUS; SUBCUTANEOUS ONCE
Status: CANCELLED | OUTPATIENT
Start: 2020-09-29

## 2020-09-28 RX ORDER — SODIUM CHLORIDE 9 MG/ML
INJECTION, SOLUTION INTRAVENOUS CONTINUOUS
Status: CANCELLED | OUTPATIENT
Start: 2020-09-29

## 2020-09-28 RX ORDER — SODIUM CHLORIDE 0.9 % (FLUSH) 0.9 %
5 SYRINGE (ML) INJECTION PRN
Status: CANCELLED | OUTPATIENT
Start: 2020-09-29

## 2020-09-28 RX ORDER — DIPHENHYDRAMINE HYDROCHLORIDE 50 MG/ML
50 INJECTION INTRAMUSCULAR; INTRAVENOUS ONCE
Status: CANCELLED | OUTPATIENT
Start: 2020-09-29

## 2020-09-28 RX ORDER — METHYLPREDNISOLONE SODIUM SUCCINATE 125 MG/2ML
125 INJECTION, POWDER, LYOPHILIZED, FOR SOLUTION INTRAMUSCULAR; INTRAVENOUS ONCE
Status: CANCELLED | OUTPATIENT
Start: 2020-09-29

## 2020-09-29 ENCOUNTER — HOSPITAL ENCOUNTER (OUTPATIENT)
Dept: INFUSION THERAPY | Age: 70
End: 2020-09-29

## 2020-09-29 ENCOUNTER — HOSPITAL ENCOUNTER (OUTPATIENT)
Dept: INFUSION THERAPY | Age: 70
Discharge: HOME | End: 2020-09-29
Payer: MEDICARE

## 2020-09-29 ENCOUNTER — TELEPHONE (OUTPATIENT)
Dept: INFUSION THERAPY | Age: 70
End: 2020-09-29

## 2020-09-29 VITALS
WEIGHT: 214.8 LBS | OXYGEN SATURATION: 95 % | HEIGHT: 68 IN | BODY MASS INDEX: 32.55 KG/M2 | RESPIRATION RATE: 18 BRPM | DIASTOLIC BLOOD PRESSURE: 74 MMHG | TEMPERATURE: 97.9 F | SYSTOLIC BLOOD PRESSURE: 137 MMHG | HEART RATE: 63 BPM

## 2020-09-29 DIAGNOSIS — Z51.11 CHEMOTHERAPY MANAGEMENT, ENCOUNTER FOR: ICD-10-CM

## 2020-09-29 DIAGNOSIS — Z17.0 MALIGNANT NEOPLASM OF UPPER-INNER QUADRANT OF LEFT BREAST IN FEMALE, ESTROGEN RECEPTOR POSITIVE (HCC): Primary | ICD-10-CM

## 2020-09-29 DIAGNOSIS — C50.212 MALIGNANT NEOPLASM OF UPPER-INNER QUADRANT OF LEFT BREAST IN FEMALE, ESTROGEN RECEPTOR POSITIVE (HCC): Primary | ICD-10-CM

## 2020-09-29 LAB
ALBUMIN SERPL-MCNC: 3.2 G/DL (ref 3.5–5.1)
ALP BLD-CCNC: 67 U/L (ref 38–126)
ALT SERPL-CCNC: 20 U/L (ref 11–66)
AST SERPL-CCNC: 21 U/L (ref 5–40)
BILIRUB SERPL-MCNC: 0.4 MG/DL (ref 0.3–1.2)
BILIRUBIN DIRECT: < 0.2 MG/DL (ref 0–0.3)
BUN BLDV-MCNC: 11 MG/DL (ref 7–22)
CHLORIDE, WHOLE BLOOD: 102 MEQ/L (ref 98–109)
CO2: 26 MEQ/L (ref 23–33)
CREATININE, WHOLE BLOOD: 0.6 MG/DL (ref 0.5–1.2)
GFR, ESTIMATED: > 90 ML/MIN/1.73M2
GLUCOSE, WHOLE BLOOD: 199 MG/DL (ref 70–108)
HCT VFR BLD CALC: 39.8 % (ref 37–47)
HEMOGLOBIN: 12.6 GM/DL (ref 12–16)
IONIZED CALCIUM, WHOLE BLOOD: 1.21 MMOL/L (ref 1.12–1.32)
MCH RBC QN AUTO: 31.3 PG (ref 26–33)
MCHC RBC AUTO-ENTMCNC: 31.7 GM/DL (ref 32.2–35.5)
MCV RBC AUTO: 99 FL (ref 81–99)
PDW BLD-RTO: 15.3 % (ref 11.5–14.5)
PLATELET # BLD: 307 THOU/MM3 (ref 130–400)
PMV BLD AUTO: 10.2 FL (ref 9.4–12.4)
POTASSIUM, WHOLE BLOOD: 3.1 MEQ/L (ref 3.5–4.9)
RBC # BLD: 4.02 MILL/MM3 (ref 4.2–5.4)
SEG NEUTROPHILS: 51 % (ref 43–75)
SEGMENTED NEUTROPHILS ABSOLUTE COUNT: 5.2 THOU/MM3 (ref 1.8–7.7)
SODIUM, WHOLE BLOOD: 143 MEQ/L (ref 138–146)
TOTAL PROTEIN: 6.2 G/DL (ref 6.1–8)
WBC # BLD: 10 THOU/MM3 (ref 4.8–10.8)

## 2020-09-29 PROCEDURE — 84520 ASSAY OF UREA NITROGEN: CPT

## 2020-09-29 PROCEDURE — 96367 TX/PROPH/DG ADDL SEQ IV INF: CPT

## 2020-09-29 PROCEDURE — 6360000002 HC RX W HCPCS: Performed by: INTERNAL MEDICINE

## 2020-09-29 PROCEDURE — 80047 BASIC METABLC PNL IONIZED CA: CPT

## 2020-09-29 PROCEDURE — 96417 CHEMO IV INFUS EACH ADDL SEQ: CPT

## 2020-09-29 PROCEDURE — 85027 COMPLETE CBC AUTOMATED: CPT

## 2020-09-29 PROCEDURE — 99211 OFF/OP EST MAY X REQ PHY/QHP: CPT

## 2020-09-29 PROCEDURE — 36591 DRAW BLOOD OFF VENOUS DEVICE: CPT

## 2020-09-29 PROCEDURE — 96377 APPLICATON ON-BODY INJECTOR: CPT

## 2020-09-29 PROCEDURE — 82374 ASSAY BLOOD CARBON DIOXIDE: CPT

## 2020-09-29 PROCEDURE — 2580000003 HC RX 258: Performed by: INTERNAL MEDICINE

## 2020-09-29 PROCEDURE — 96375 TX/PRO/DX INJ NEW DRUG ADDON: CPT

## 2020-09-29 PROCEDURE — 80076 HEPATIC FUNCTION PANEL: CPT

## 2020-09-29 PROCEDURE — 6370000000 HC RX 637 (ALT 250 FOR IP): Performed by: PHYSICIAN ASSISTANT

## 2020-09-29 PROCEDURE — 96413 CHEMO IV INFUSION 1 HR: CPT

## 2020-09-29 RX ORDER — POTASSIUM CHLORIDE 20 MEQ/1
40 TABLET, EXTENDED RELEASE ORAL ONCE
Status: COMPLETED | OUTPATIENT
Start: 2020-09-29 | End: 2020-09-29

## 2020-09-29 RX ORDER — SODIUM CHLORIDE 0.9 % (FLUSH) 0.9 %
10 SYRINGE (ML) INJECTION PRN
Status: DISCONTINUED | OUTPATIENT
Start: 2020-09-29 | End: 2020-09-30 | Stop reason: HOSPADM

## 2020-09-29 RX ORDER — HEPARIN SODIUM (PORCINE) LOCK FLUSH IV SOLN 100 UNIT/ML 100 UNIT/ML
500 SOLUTION INTRAVENOUS PRN
Status: DISCONTINUED | OUTPATIENT
Start: 2020-09-29 | End: 2020-09-30 | Stop reason: HOSPADM

## 2020-09-29 RX ORDER — PALONOSETRON 0.05 MG/ML
0.25 INJECTION, SOLUTION INTRAVENOUS ONCE
Status: COMPLETED | OUTPATIENT
Start: 2020-09-29 | End: 2020-09-29

## 2020-09-29 RX ORDER — SODIUM CHLORIDE 9 MG/ML
20 INJECTION, SOLUTION INTRAVENOUS ONCE
Status: COMPLETED | OUTPATIENT
Start: 2020-09-29 | End: 2020-09-29

## 2020-09-29 RX ADMIN — POTASSIUM CHLORIDE 40 MEQ: 1500 TABLET, EXTENDED RELEASE ORAL at 10:02

## 2020-09-29 RX ADMIN — SODIUM CHLORIDE 20 ML/HR: 9 INJECTION, SOLUTION INTRAVENOUS at 09:35

## 2020-09-29 RX ADMIN — DEXAMETHASONE SODIUM PHOSPHATE 12 MG: 4 INJECTION, SOLUTION INTRAMUSCULAR; INTRAVENOUS at 09:40

## 2020-09-29 RX ADMIN — CARBOPLATIN 630 MG: 10 INJECTION, SOLUTION INTRAVENOUS at 13:01

## 2020-09-29 RX ADMIN — PERTUZUMAB 420 MG: 30 INJECTION, SOLUTION, CONCENTRATE INTRAVENOUS at 10:40

## 2020-09-29 RX ADMIN — Medication 500 UNITS: at 14:28

## 2020-09-29 RX ADMIN — DOCETAXEL ANHYDROUS 160 MG: 10 INJECTION, SOLUTION INTRAVENOUS at 11:55

## 2020-09-29 RX ADMIN — SODIUM CHLORIDE, PRESERVATIVE FREE 10 ML: 5 INJECTION INTRAVENOUS at 09:35

## 2020-09-29 RX ADMIN — SODIUM CHLORIDE, PRESERVATIVE FREE 10 ML: 5 INJECTION INTRAVENOUS at 14:28

## 2020-09-29 RX ADMIN — SODIUM CHLORIDE, PRESERVATIVE FREE 10 ML: 5 INJECTION INTRAVENOUS at 08:55

## 2020-09-29 RX ADMIN — SODIUM CHLORIDE, PRESERVATIVE FREE 10 ML: 5 INJECTION INTRAVENOUS at 08:56

## 2020-09-29 RX ADMIN — PEGFILGRASTIM 6 MG: KIT SUBCUTANEOUS at 14:30

## 2020-09-29 RX ADMIN — PALONOSETRON 0.25 MG: 0.05 INJECTION, SOLUTION INTRAVENOUS at 09:37

## 2020-09-29 RX ADMIN — TRASTUZUMAB 600 MG: 150 INJECTION, POWDER, LYOPHILIZED, FOR SOLUTION INTRAVENOUS at 11:17

## 2020-09-29 RX ADMIN — SODIUM CHLORIDE 150 MG: 9 INJECTION, SOLUTION INTRAVENOUS at 10:04

## 2020-09-29 NOTE — PROGRESS NOTES
Patient assessed for the following post chemotherapy:    Dizziness   No  Lightheadedness  No      Acute nausea/vomiting No  Headache   No  Chest pain/pressure  No  Rash/itching   No  Shortness of breath  No    Patient kept for 20 minutes observation post infusion chemotherapy. Patient tolerated chemotherapy treatment perjeta, herceptin, taxotere and carboplatin without any complications. Last vital signs:   /74   Pulse 63   Temp 97.9 °F (36.6 °C) (Oral)   Resp 18   Ht 5' 8\" (1.727 m)   Wt 214 lb 12.8 oz (97.4 kg)   SpO2 95%   BMI 32.66 kg/m²       Patient instructed if experience any of the above symptoms following today's infusion, she is to notify MD immediately or go to the emergency department. Discharge instructions given to patient. Verbalizes understanding. Ambulated off unit accompanied by family member with belongings.

## 2020-09-29 NOTE — ONCOLOGY
Chemotherapy Administration    Pre-assessment Data: Antineoplastic Agents  Other:   See toxicity flow sheet for assessment [x]     Physician Notification of Concerns Related to Chemotherapy Administration:   Physician Notified Chriss Trinh / Time of Notification      Interventions:   Lab work assessed  [x]   Height / Weight verified for dose [x]   Current MAR reviewed [x]   Emergency drugs available as appropriate [x]   Anaphylaxis assessment completed [x]   Pre-medications administered as ordered [x]   Blood return noted upon initiation of chemotherapy [x]   Blood return noted each 1-2ml of a vesicant medication if given IV push []   Blood return noted each 2-3ml of a non-vesicant medication if given IV push []   Monitor for signs / symptoms of hypersensitivity reaction [x]   Chemotherapy orders (drug/dose/rate) verified by 2 Chemo certified RNs [x]   Monitor IV site and blood return throughout the infusion of the medication [x]   Document IV site checks on the IV assessment form [x]   Document chemotherapy teaching on the Patient Education tab [x]   Document patient verbalizes understanding of medications being administered [x]   If IV infiltration, see ONS Guidelines []   Other:      []

## 2020-09-29 NOTE — PLAN OF CARE
Problem: Discharge Planning  Goal: Knowledge of discharge instructions  Description: Knowledge of discharge instructions  Outcome: Met This Shift  Note: Patient verbalizes understanding of discharge instructions, follow up appointment, and when to call physician if needed   Intervention: Discharge to appropriate level of care  Note: Discharge instructions given to pt and reviewed. Follow up appointments discussed. Problem: Intellectual/Education/Knowledge Deficit  Goal: Teaching initiated upon admission  Outcome: Met This Shift  Note: Patient verbalizes understanding to verbal information given on perjeta, herceptin, taxotere and carboplatin, including action and possible side effects. Aware to call MD if develop complications. Intervention: Verbal/written education provided  Note: Chemotherapy Teaching     What is Chemotherapy   Drug action [x]   Method of Administration [x]   Handouts given []     Side Effects  Nausea/vomiting [x]   Diarrhea [x]   Fatigue [x]   Signs / Symptoms of infection [x]   Neutropenia [x]   Thrombocytopenia [x]   Alopecia [x]   neuropathy [x]   [place] diet &  the importance of fluids [x]       Micellaneous  Importance of nutrition [x]   Importance of oral hygiene [x]   When to call the MD [x]   Monitoring labs [x]   Use of supportive services []     Explanation of Drug Regimen / Frequency  Perjeta, herceptin, taxotere and carboplatin     Comments  Verbalized understanding to drug,action,side effects and when to call MD         Problem: Falls - Risk of:  Goal: Will remain free from falls  Description: Will remain free from falls  Outcome: Met This Shift  Note: Patient free of falls this visit. Intervention: Assess risk factors for falls  Note: Fall risks assessed. Precautions discussed. Call light within reach during visit.       Problem: Infection - Central Venous Catheter-Associated Bloodstream Infection:  Goal: Will show no infection signs and symptoms  Description: Will show no infection signs and symptoms  Outcome: Met This Shift  Note: Mediport site with no redness or warmth. Skin over port site intact with no signs of breakdown noted. Patient verbalizes signs/symptoms of port infection and when to notify the physician. Intervention: Infection risk assessment  Note: Discuss port maintenance, infection prevention, signs of infection, and when to call the doctor. Care plan reviewed with patient. Patient verbalizes understanding of the plan of care and contributes to goal setting.

## 2020-09-30 ENCOUNTER — TELEPHONE (OUTPATIENT)
Dept: ONCOLOGY | Age: 70
End: 2020-09-30

## 2020-09-30 NOTE — TELEPHONE ENCOUNTER
Discussed with Dr. Will Holliday, as long as the patient continues to have clinical response to chemotherapy there is not an indication for ultrasound mid-treatment. I called and discussed this with the patient. She reports the known breast lump has continued to decrease in size with chemotherapy. She denies any new symptoms in her breast. She became upset that an ultrasound will not be ordered. She was under the impression it would be ordered multiple times throughout her treatment course. Discussed I will send this message to Dr. Will Holliday. Also, discussed that Dr. Waylon Rubinstein office has access to the same EMR and are able to read all office notes regarding treatment. I reached out to Dr. Waylon Rubinstein office to clarify if any further information needed and spoke with his staff. All questions answered at this time.

## 2020-09-30 NOTE — TELEPHONE ENCOUNTER
Patient called and said she finished with her 4th treatment yesterday, and said you had talked about doing an US after so many treatments. Wanting to know about getting an US to see if treatment  is even helping. Also, wanting to know if you are keeping in touch with Dr Yo Humphrey about her treatment? Patient would like a call back.  Thanks

## 2020-09-30 NOTE — TELEPHONE ENCOUNTER
Called and discussed with the patient. Discussed plan for diet modifications for low potassium as she felt her diarrhea was well controlled. Discussed increasing foods high in potassium if diarrhea returns and to let office know. She expressed understanding.

## 2020-10-01 ENCOUNTER — TELEPHONE (OUTPATIENT)
Dept: SPIRITUAL SERVICES | Facility: CLINIC | Age: 70
End: 2020-10-01

## 2020-10-01 NOTE — TELEPHONE ENCOUNTER
As a follow up to a call made earlier to [facility] for spiritual care, this  called her phone and was only able to leave a message on the answering machine. Another call will be made soon. Care Plan:  Continue spiritual and emotional care for patient and family. Including prayers.

## 2020-10-06 ENCOUNTER — OFFICE VISIT (OUTPATIENT)
Dept: SURGERY | Age: 70
End: 2020-10-06
Payer: MEDICARE

## 2020-10-06 VITALS
BODY MASS INDEX: 31.37 KG/M2 | HEIGHT: 68 IN | SYSTOLIC BLOOD PRESSURE: 136 MMHG | OXYGEN SATURATION: 98 % | DIASTOLIC BLOOD PRESSURE: 76 MMHG | RESPIRATION RATE: 20 BRPM | HEART RATE: 107 BPM | WEIGHT: 207 LBS | TEMPERATURE: 97.4 F

## 2020-10-06 PROCEDURE — 99214 OFFICE O/P EST MOD 30 MIN: CPT | Performed by: SURGERY

## 2020-10-06 RX ORDER — LOPERAMIDE HYDROCHLORIDE 2 MG/1
2 CAPSULE ORAL 4 TIMES DAILY PRN
COMMUNITY
End: 2022-05-12

## 2020-10-06 ASSESSMENT — ENCOUNTER SYMPTOMS
COLOR CHANGE: 0
EYE PAIN: 0
SORE THROAT: 0
STRIDOR: 0
SINUS PAIN: 0
COUGH: 0
TROUBLE SWALLOWING: 0
PHOTOPHOBIA: 0
FACIAL SWELLING: 0
RHINORRHEA: 1
SINUS PRESSURE: 0
CHEST TIGHTNESS: 0
APNEA: 0
RESPIRATORY NEGATIVE: 1
EYE ITCHING: 0
VOICE CHANGE: 0
EYES NEGATIVE: 1
EYE DISCHARGE: 0
DIARRHEA: 1
SHORTNESS OF BREATH: 0
WHEEZING: 0
EYE REDNESS: 0
BACK PAIN: 0
CHOKING: 0

## 2020-10-06 NOTE — PROGRESS NOTES
[address] [facility]  [address] [facility] [address]  Dept: [phone]  Dept Fax: [phone]  Loc: [phone]    Visit Date: 10/6/2020    Fidencio Mcpherson is a 79 y.o. female who presentstoday for:  Chief Complaint   Patient presents with    Surgical Consult     est pt- last seen 7/2020- invasive ductal left breast- discuss surgery       HPI:     HPI 72-year-old white female who was seen initially on July 2 of this year who had had mammograms showing 2 separate lesions in the left breast both of which proved to be ductal carcinoma in situ they were ER positive WI positive HER-2/jordan positive and patient subsequently underwent MRIs of the breast.  Initially there was a lesion suggested in the right breast and underwent an ultrasound but it did not confirm the mass in the right breast. a third lesion was seen in the left breast and subsequently underwent an ultrasound and then biopsy and then subsequently biopsied on July 16 with again invasive ductal carcinoma of the breast this time she was triple negative i.e. ER negative WI negative HER-2 negative patient was presented at breast conference and was felt to be in need of neoadjuvant treatment patient is undergone 4 cycles out of 6 under the direction of Dr. Yoselin Malone patient was here today as she had some information that she thought I said she did not need the last 2 treatments that is not true but she is here for my evaluation initially she was scheduled to have a mastectomy on the left along with sentinel lymph node biopsy    Past Medical History:   Diagnosis Date    Breast cancer ([address] 2020    left-invasive ductal    Hyperlipidemia     Numbness and tingling     left foot-chronic nerve damage      Past Surgical History:   Procedure Laterality Date    ABDOMINAL EXPLORATION SURGERY  2014    Dr butterfield-lysis of adhesions    BREAST SURGERY Left 06/26/2020    biopsy of left breast    CHOLECYSTECTOMY 10/2014    Dr Dione Merrill  03/30/2016    Dr Yong Disla  10/2014    x4 abdominal wall    HYSTERECTOMY      INCISIONAL HERNIA REPAIR  2014    Dr Moreira Letters LIPECTOMY  2014    Dr Inna Escobar LEFT  7/16/2020    [address] LEFT 7/16/2020 García Alexander MD [facility]    PORT SURGERY Right 7/17/2020    SINGLE LUMEN SMART PORT INSERTION RIGHT SUBCLAVIAN performed by Gume Schmidt MD at [address]  [facility]2014    [facility]      patient was 11years old    TUBAL LIGATION          Family History   Problem Relation Age of Onset    Heart Disease Father         cath stent blood to thin and bled    Heart Attack Mother     Other Other         blood clot    Breast Cancer Paternal Aunt 62    High Blood Pressure Sister     Cancer Brother 68        skin    Prostate Cancer Brother         had chemotherapy to treat     Prostate Cancer Brother 64        has had for 10 years    Ovarian Cancer Neg Hx     Diabetes Neg Hx     Stroke Neg Hx         Social History     Tobacco Use    Smoking status: Never Smoker    Smokeless tobacco: Never Used   Substance Use Topics    Alcohol use: No     Alcohol/week: 0.0 standard drinks          Current Outpatient Medications   Medication Sig Dispense Refill    hydrocortisone (ANUSOL-HC) 2.5 % CREA rectal cream Apply twice a day 28 g 1    ibuprofen (ADVIL;MOTRIN) 800 MG tablet Take 800 mg by mouth every 6 hours as needed for Pain      lidocaine-prilocaine (EMLA) 2.5-2.5 % cream Apply topically as needed.  30 g 1    levothyroxine (SYNTHROID) 25 MCG tablet Take 25 mcg by mouth Daily      alendronate (FOSAMAX) 35 MG tablet Take 35 mg by mouth every 7 days      aspirin EC 81 MG EC tablet Take 1 tablet by mouth daily 30 tablet 3    ondansetron (ZOFRAN ODT) 4 MG disintegrating tablet Take 1 tablet by mouth every 8 hours as needed for Nausea 20 tablet 0  PARoxetine (PAXIL) 10 MG tablet Take 10 mg by mouth every morning      Coenzyme Q10-Fish Oil-Vit E (CO-Q 10 OMEGA-3 FISH OIL PO) Take by mouth daily      Vitamin D (CHOLECALCIFEROL) 1000 UNITS CAPS capsule Take by mouth daily 2 tab      Cinnamon 500 MG CAPS Take by mouth daily      Cyanocobalamin (VITAMIN B12 PO) Take 1,000 mcg by mouth daily        No current facility-administered medications for this visit. Allergies   Allergen Reactions    Aluminum-Containing Compounds Anaphylaxis       Subjective:      Review of Systems   Constitutional: Negative. Negative for activity change, appetite change, chills, diaphoresis, fatigue, fever and unexpected weight change. HENT: Positive for congestion, postnasal drip and rhinorrhea. Negative for dental problem, drooling, ear discharge, ear pain, facial swelling, hearing loss, mouth sores, nosebleeds, sinus pressure, sinus pain, sneezing, sore throat, tinnitus, trouble swallowing and voice change. Eyes: Negative. Negative for photophobia, pain, discharge, redness, itching and visual disturbance. Respiratory: Negative. Negative for apnea, cough, choking, chest tightness, shortness of breath, wheezing and stridor. Cardiovascular: Negative. Negative for chest pain, palpitations and leg swelling. Gastrointestinal: Positive for diarrhea. Endocrine: Negative. Negative for cold intolerance, heat intolerance, polydipsia, polyphagia and polyuria. Genitourinary: Negative. Negative for decreased urine volume, difficulty urinating, dyspareunia, dysuria, enuresis, flank pain, frequency, genital sores, hematuria, menstrual problem, pelvic pain, urgency, vaginal bleeding, vaginal discharge and vaginal pain. Musculoskeletal: Negative. Negative for arthralgias, back pain, gait problem, joint swelling, myalgias, neck pain and neck stiffness. Skin: Negative for color change, pallor, rash and wound.    Allergic/Immunologic: Positive for environmental allergies. Negative for food allergies and immunocompromised state. Neurological: Negative. Negative for dizziness, tremors, seizures, syncope, facial asymmetry, speech difficulty, weakness, light-headedness, numbness and headaches. Hematological: Negative. Negative for adenopathy. Does not bruise/bleed easily. Psychiatric/Behavioral: Negative. Negative for agitation, behavioral problems, confusion, decreased concentration, dysphoric mood, hallucinations, self-injury, sleep disturbance and suicidal ideas. The patient is not nervous/anxious and is not hyperactive. Objective: There were no vitals taken for this visit. Physical Exam  Constitutional:       Appearance: She is well-developed. HENT:      Head: Normocephalic and atraumatic. Eyes:      General: No scleral icterus. Right eye: No discharge. Left eye: No discharge. Conjunctiva/sclera: Conjunctivae normal.      Pupils: Pupils are equal, round, and reactive to light. Neck:      Musculoskeletal: Normal range of motion and neck supple. Thyroid: No thyromegaly. Vascular: No JVD. Cardiovascular:      Rate and Rhythm: Normal rate and regular rhythm. Heart sounds: No murmur. No friction rub. No gallop. Pulmonary:      Effort: Pulmonary effort is normal. No respiratory distress. Breath sounds: Normal breath sounds. No stridor. No wheezing. Chest:      Chest wall: No tenderness. Abdominal:      General: There is no distension. Palpations: There is no mass. Tenderness: There is no abdominal tenderness. There is no guarding or rebound. Hernia: No hernia is present. Musculoskeletal: Normal range of motion. Skin:     General: Skin is warm and dry. Coloration: Skin is not pale. Findings: No erythema or rash. Neurological:      Mental Status: She is alert and oriented to person, place, and time.    Psychiatric:         Behavior: Behavior normal.         Thought Content: Thought content normal.         Judgment: Judgment normal.            Results for orders placed or performed during the hospital encounter of 09/29/20   Absolute Neutrophil   Result Value Ref Range    Segs Absolute 5.2 1.8 - 7.7 thou/mm3   CBC   Result Value Ref Range    WBC 10.0 4.8 - 10.8 thou/mm3    RBC 4.02 (L) 4.20 - 5.40 mill/mm3    Hemoglobin 12.6 12.0 - 16.0 gm/dl    Hematocrit 39.8 37.0 - 47.0 %    MCV 99 81 - 99 fL    MCH 31.3 26.0 - 33.0 pg    MCHC 31.7 (L) 32.2 - 35.5 gm/dl    RDW 15.3 (H) 11.5 - 14.5 %    Platelets 322 683 - 221 thou/mm3    MPV 10.2 9.4 - 12.4 fL   Hepatic Function Panel   Result Value Ref Range    Alb 3.2 (L) 3.5 - 5.1 g/dL    Total Bilirubin 0.4 0.3 - 1.2 mg/dL    Bilirubin, Direct <0.2 0.0 - 0.3 mg/dL    Alkaline Phosphatase 67 38 - 126 U/L    AST 21 5 - 40 U/L    ALT 20 11 - 66 U/L    Total Protein 6.2 6.1 - 8.0 g/dL   POC PANEL BMP W/IOCA   Result Value Ref Range    Sodium, Whole Blood 143 138 - 146 meq/l    Potassium, Whole Blood 3.1 (L) 3.5 - 4.9 meq/l    Chloride, Whole Blood 102 98 - 109 meq/l    Glucose, Whole Blood 199 (H) 70 - 108 mg/dl    CREATININE, WHOLE BLOOD 0.6 0.5 - 1.2 mg/dl    Ionized Calcium, WB 1.21 1.12 - 1.32 mmol/L   Neutrophils, Automated   Result Value Ref Range    Seg Neutrophils 51 43 - 75 %   BUN   Result Value Ref Range    BUN 11 7 - 22 mg/dL   CO2, Total   Result Value Ref Range    CO2 26 23 - 33 meq/L   Glomerular Filtration Rate, Estimated   Result Value Ref Range    GFR, Estimated > 90 ml/min/1.73m2       Assessment:     Cancer of the left breast currently undergoing neoadjuvant treatment patient as I understand it has had 3 separate masses in the left breast all of which are infiltrating ductal carcinoma 2 are ER IL HER-2/jordan positive one is ER IL HER-2/jordan negative again indicated patient needs to continue with completion of neoadjuvant treatment and then in my opinion needs a left simple mastectomy with sentinel node biopsy will further discuss with Dr. Prabha Finnegan:     Return to office after completion of neoadjuvant treatment      Electronicallysigned by Jake Boothe MD on 10/6/2020 at 11:07 AM

## 2020-10-07 ENCOUNTER — TELEPHONE (OUTPATIENT)
Dept: ONCOLOGY | Age: 70
End: 2020-10-07

## 2020-10-07 NOTE — TELEPHONE ENCOUNTER
Spoke with Alray Collet and told her Dr Yoselin Malone wants her to get a COVID test. She was instructed to call her PCP to get order. Patient voiced understanding.

## 2020-10-07 NOTE — TELEPHONE ENCOUNTER
Patient called and said she started running a fever over the weekend and has had a high fever for the last 2 days. 102 this morning-no other symptoms. Was with her friend Friday 10/2/2020 and she tested positive yesterday for COVID and is in the hospital. Patient is wandering if she should be tested for COVID? I spoke with Dr Wendy Maldonado and she said yes she needs to call her PCP and get an order and get tested for COVID. Left message for patient to call me back so I can instruct her.

## 2020-10-09 ENCOUNTER — APPOINTMENT (OUTPATIENT)
Dept: GENERAL RADIOLOGY | Age: 70
DRG: 177 | End: 2020-10-09
Payer: MEDICARE

## 2020-10-09 ENCOUNTER — HOSPITAL ENCOUNTER (INPATIENT)
Age: 70
LOS: 11 days | Discharge: HOME | DRG: 177 | End: 2020-10-21
Attending: EMERGENCY MEDICINE | Admitting: FAMILY MEDICINE
Payer: MEDICARE

## 2020-10-09 LAB
EKG ATRIAL RATE: 106 BPM
EKG P AXIS: 21 DEGREES
EKG P-R INTERVAL: 238 MS
EKG Q-T INTERVAL: 312 MS
EKG QRS DURATION: 92 MS
EKG QTC CALCULATION (BAZETT): 414 MS
EKG R AXIS: -9 DEGREES
EKG T AXIS: -16 DEGREES
EKG VENTRICULAR RATE: 106 BPM

## 2020-10-09 PROCEDURE — 99285 EMERGENCY DEPT VISIT HI MDM: CPT

## 2020-10-09 PROCEDURE — 71045 X-RAY EXAM CHEST 1 VIEW: CPT

## 2020-10-09 PROCEDURE — 93005 ELECTROCARDIOGRAM TRACING: CPT | Performed by: EMERGENCY MEDICINE

## 2020-10-10 ENCOUNTER — APPOINTMENT (OUTPATIENT)
Dept: CT IMAGING | Age: 70
DRG: 177 | End: 2020-10-10
Payer: MEDICARE

## 2020-10-10 PROBLEM — U07.1 COVID-19: Status: ACTIVE | Noted: 2020-10-10

## 2020-10-10 LAB
ABO: NORMAL
ALBUMIN SERPL-MCNC: 3.5 G/DL (ref 3.5–5.1)
ALP BLD-CCNC: 98 U/L (ref 38–126)
ALT SERPL-CCNC: 24 U/L (ref 11–66)
ANION GAP SERPL CALCULATED.3IONS-SCNC: 15 MEQ/L (ref 8–16)
AST SERPL-CCNC: 45 U/L (ref 5–40)
BASOPHILS # BLD: 0.7 %
BASOPHILS ABSOLUTE: 0.1 THOU/MM3 (ref 0–0.1)
BILIRUB SERPL-MCNC: 0.2 MG/DL (ref 0.3–1.2)
BUN BLDV-MCNC: 15 MG/DL (ref 7–22)
CALCIUM SERPL-MCNC: 9.3 MG/DL (ref 8.5–10.5)
CHLORIDE BLD-SCNC: 94 MEQ/L (ref 98–111)
CO2: 22 MEQ/L (ref 23–33)
CREAT SERPL-MCNC: 0.8 MG/DL (ref 0.4–1.2)
D-DIMER QUANTITATIVE: 854 NG/ML FEU (ref 0–500)
EOSINOPHIL # BLD: 0 %
EOSINOPHILS ABSOLUTE: 0 THOU/MM3 (ref 0–0.4)
ERYTHROCYTE [DISTWIDTH] IN BLOOD BY AUTOMATED COUNT: 15.5 % (ref 11.5–14.5)
ERYTHROCYTE [DISTWIDTH] IN BLOOD BY AUTOMATED COUNT: 58.4 FL (ref 35–45)
GFR SERPL CREATININE-BSD FRML MDRD: 71 ML/MIN/1.73M2
GLUCOSE BLD-MCNC: 136 MG/DL (ref 70–108)
GLUCOSE BLD-MCNC: 209 MG/DL (ref 70–108)
HCT VFR BLD CALC: 40.2 % (ref 37–47)
HEMOGLOBIN: 12.4 GM/DL (ref 12–16)
IMMATURE GRANS (ABS): 0.79 THOU/MM3 (ref 0–0.07)
IMMATURE GRANULOCYTES: 6.4 %
LACTIC ACID: 1.6 MMOL/L (ref 0.5–2.2)
LYMPHOCYTES # BLD: 25.3 %
LYMPHOCYTES ABSOLUTE: 3.1 THOU/MM3 (ref 1–4.8)
MCH RBC QN AUTO: 31.6 PG (ref 26–33)
MCHC RBC AUTO-ENTMCNC: 30.8 GM/DL (ref 32.2–35.5)
MCV RBC AUTO: 102.6 FL (ref 81–99)
MONOCYTES # BLD: 6.8 %
MONOCYTES ABSOLUTE: 0.8 THOU/MM3 (ref 0.4–1.3)
NUCLEATED RED BLOOD CELLS: 0 /100 WBC
OSMOLALITY CALCULATION: 265.6 MOSMOL/KG (ref 275–300)
PLATELET # BLD: 373 THOU/MM3 (ref 130–400)
PLATELET ESTIMATE: ADEQUATE
PMV BLD AUTO: 11.4 FL (ref 9.4–12.4)
POTASSIUM REFLEX MAGNESIUM: 3.6 MEQ/L (ref 3.5–5.2)
PROCALCITONIN: 0.22 NG/ML (ref 0.01–0.09)
RBC # BLD: 3.92 MILL/MM3 (ref 4.2–5.4)
RH FACTOR: NORMAL
SCAN OF BLOOD SMEAR: NORMAL
SEG NEUTROPHILS: 60.8 %
SEGMENTED NEUTROPHILS ABSOLUTE COUNT: 7.5 THOU/MM3 (ref 1.8–7.7)
SODIUM BLD-SCNC: 131 MEQ/L (ref 135–145)
TOTAL PROTEIN: 7.2 G/DL (ref 6.1–8)
WBC # BLD: 12.4 THOU/MM3 (ref 4.8–10.8)

## 2020-10-10 PROCEDURE — 71275 CT ANGIOGRAPHY CHEST: CPT

## 2020-10-10 PROCEDURE — 6370000000 HC RX 637 (ALT 250 FOR IP): Performed by: FAMILY MEDICINE

## 2020-10-10 PROCEDURE — 86900 BLOOD TYPING SEROLOGIC ABO: CPT

## 2020-10-10 PROCEDURE — 6370000000 HC RX 637 (ALT 250 FOR IP): Performed by: PHYSICIAN ASSISTANT

## 2020-10-10 PROCEDURE — 1200000000 HC SEMI PRIVATE

## 2020-10-10 PROCEDURE — 99223 1ST HOSP IP/OBS HIGH 75: CPT | Performed by: PHYSICIAN ASSISTANT

## 2020-10-10 PROCEDURE — 36430 TRANSFUSION BLD/BLD COMPNT: CPT

## 2020-10-10 PROCEDURE — 82948 REAGENT STRIP/BLOOD GLUCOSE: CPT

## 2020-10-10 PROCEDURE — 2500000003 HC RX 250 WO HCPCS: Performed by: INTERNAL MEDICINE

## 2020-10-10 PROCEDURE — 80053 COMPREHEN METABOLIC PANEL: CPT

## 2020-10-10 PROCEDURE — 85025 COMPLETE CBC W/AUTO DIFF WBC: CPT

## 2020-10-10 PROCEDURE — 85379 FIBRIN DEGRADATION QUANT: CPT

## 2020-10-10 PROCEDURE — 6360000002 HC RX W HCPCS: Performed by: PHYSICIAN ASSISTANT

## 2020-10-10 PROCEDURE — 2060000000 HC ICU INTERMEDIATE R&B

## 2020-10-10 PROCEDURE — 2580000003 HC RX 258: Performed by: INTERNAL MEDICINE

## 2020-10-10 PROCEDURE — 6360000004 HC RX CONTRAST MEDICATION: Performed by: NURSE PRACTITIONER

## 2020-10-10 PROCEDURE — 84145 PROCALCITONIN (PCT): CPT

## 2020-10-10 PROCEDURE — 2580000003 HC RX 258: Performed by: PHYSICIAN ASSISTANT

## 2020-10-10 PROCEDURE — 93010 ELECTROCARDIOGRAM REPORT: CPT | Performed by: INTERNAL MEDICINE

## 2020-10-10 PROCEDURE — 94761 N-INVAS EAR/PLS OXIMETRY MLT: CPT

## 2020-10-10 PROCEDURE — P9059 PLASMA, FRZ BETWEEN 8-24HOUR: HCPCS

## 2020-10-10 PROCEDURE — 83605 ASSAY OF LACTIC ACID: CPT

## 2020-10-10 PROCEDURE — 2700000000 HC OXYGEN THERAPY PER DAY

## 2020-10-10 PROCEDURE — 86901 BLOOD TYPING SEROLOGIC RH(D): CPT

## 2020-10-10 PROCEDURE — 36415 COLL VENOUS BLD VENIPUNCTURE: CPT

## 2020-10-10 RX ORDER — NICOTINE POLACRILEX 4 MG
15 LOZENGE BUCCAL PRN
Status: DISCONTINUED | OUTPATIENT
Start: 2020-10-10 | End: 2020-10-21 | Stop reason: HOSPADM

## 2020-10-10 RX ORDER — FAMOTIDINE 20 MG/1
20 TABLET, FILM COATED ORAL 2 TIMES DAILY
Status: DISCONTINUED | OUTPATIENT
Start: 2020-10-10 | End: 2020-10-21 | Stop reason: HOSPADM

## 2020-10-10 RX ORDER — PROMETHAZINE HYDROCHLORIDE 25 MG/1
12.5 TABLET ORAL EVERY 6 HOURS PRN
Status: DISCONTINUED | OUTPATIENT
Start: 2020-10-10 | End: 2020-10-21 | Stop reason: HOSPADM

## 2020-10-10 RX ORDER — ACETAMINOPHEN 325 MG/1
650 TABLET ORAL EVERY 6 HOURS PRN
Status: DISCONTINUED | OUTPATIENT
Start: 2020-10-10 | End: 2020-10-21 | Stop reason: HOSPADM

## 2020-10-10 RX ORDER — POTASSIUM CHLORIDE 20 MEQ/1
40 TABLET, EXTENDED RELEASE ORAL PRN
Status: DISCONTINUED | OUTPATIENT
Start: 2020-10-10 | End: 2020-10-21 | Stop reason: HOSPADM

## 2020-10-10 RX ORDER — POLYETHYLENE GLYCOL 3350 17 G/17G
17 POWDER, FOR SOLUTION ORAL DAILY PRN
Status: DISCONTINUED | OUTPATIENT
Start: 2020-10-10 | End: 2020-10-21 | Stop reason: HOSPADM

## 2020-10-10 RX ORDER — 0.9 % SODIUM CHLORIDE 0.9 %
20 INTRAVENOUS SOLUTION INTRAVENOUS ONCE
Status: DISCONTINUED | OUTPATIENT
Start: 2020-10-10 | End: 2020-10-21 | Stop reason: HOSPADM

## 2020-10-10 RX ORDER — ONDANSETRON 2 MG/ML
4 INJECTION INTRAMUSCULAR; INTRAVENOUS EVERY 6 HOURS PRN
Status: DISCONTINUED | OUTPATIENT
Start: 2020-10-10 | End: 2020-10-21 | Stop reason: HOSPADM

## 2020-10-10 RX ORDER — ACETAMINOPHEN 650 MG/1
650 SUPPOSITORY RECTAL EVERY 6 HOURS PRN
Status: DISCONTINUED | OUTPATIENT
Start: 2020-10-10 | End: 2020-10-21 | Stop reason: HOSPADM

## 2020-10-10 RX ORDER — SODIUM CHLORIDE 0.9 % (FLUSH) 0.9 %
10 SYRINGE (ML) INJECTION PRN
Status: DISCONTINUED | OUTPATIENT
Start: 2020-10-10 | End: 2020-10-21 | Stop reason: HOSPADM

## 2020-10-10 RX ORDER — VITAMIN B COMPLEX
1000 TABLET ORAL DAILY
Status: DISCONTINUED | OUTPATIENT
Start: 2020-10-10 | End: 2020-10-21 | Stop reason: HOSPADM

## 2020-10-10 RX ORDER — ASCORBIC ACID 500 MG
500 TABLET ORAL DAILY
Status: DISCONTINUED | OUTPATIENT
Start: 2020-10-10 | End: 2020-10-21 | Stop reason: HOSPADM

## 2020-10-10 RX ORDER — DEXAMETHASONE SODIUM PHOSPHATE 4 MG/ML
6 INJECTION, SOLUTION INTRA-ARTICULAR; INTRALESIONAL; INTRAMUSCULAR; INTRAVENOUS; SOFT TISSUE DAILY
Status: COMPLETED | OUTPATIENT
Start: 2020-10-10 | End: 2020-10-19

## 2020-10-10 RX ORDER — ZINC SULFATE 50(220)MG
50 CAPSULE ORAL DAILY
Status: DISCONTINUED | OUTPATIENT
Start: 2020-10-10 | End: 2020-10-21 | Stop reason: HOSPADM

## 2020-10-10 RX ORDER — ASPIRIN 81 MG/1
81 TABLET ORAL DAILY
Status: DISCONTINUED | OUTPATIENT
Start: 2020-10-10 | End: 2020-10-21 | Stop reason: HOSPADM

## 2020-10-10 RX ORDER — LEVOTHYROXINE SODIUM 0.03 MG/1
25 TABLET ORAL DAILY
Status: DISCONTINUED | OUTPATIENT
Start: 2020-10-10 | End: 2020-10-21 | Stop reason: HOSPADM

## 2020-10-10 RX ORDER — POTASSIUM CHLORIDE 7.45 MG/ML
10 INJECTION INTRAVENOUS PRN
Status: DISCONTINUED | OUTPATIENT
Start: 2020-10-10 | End: 2020-10-21 | Stop reason: HOSPADM

## 2020-10-10 RX ORDER — DEXTROSE MONOHYDRATE 50 MG/ML
100 INJECTION, SOLUTION INTRAVENOUS PRN
Status: DISCONTINUED | OUTPATIENT
Start: 2020-10-10 | End: 2020-10-21 | Stop reason: HOSPADM

## 2020-10-10 RX ORDER — 0.9 % SODIUM CHLORIDE 0.9 %
30 INTRAVENOUS SOLUTION INTRAVENOUS PRN
Status: DISCONTINUED | OUTPATIENT
Start: 2020-10-10 | End: 2020-10-19

## 2020-10-10 RX ORDER — MAGNESIUM SULFATE IN WATER 40 MG/ML
2 INJECTION, SOLUTION INTRAVENOUS PRN
Status: DISCONTINUED | OUTPATIENT
Start: 2020-10-10 | End: 2020-10-21 | Stop reason: HOSPADM

## 2020-10-10 RX ORDER — PAROXETINE HYDROCHLORIDE 20 MG/1
10 TABLET, FILM COATED ORAL EVERY MORNING
Status: DISCONTINUED | OUTPATIENT
Start: 2020-10-10 | End: 2020-10-21 | Stop reason: HOSPADM

## 2020-10-10 RX ORDER — SODIUM CHLORIDE 0.9 % (FLUSH) 0.9 %
10 SYRINGE (ML) INJECTION EVERY 12 HOURS SCHEDULED
Status: DISCONTINUED | OUTPATIENT
Start: 2020-10-10 | End: 2020-10-21 | Stop reason: HOSPADM

## 2020-10-10 RX ORDER — DEXTROSE MONOHYDRATE 25 G/50ML
12.5 INJECTION, SOLUTION INTRAVENOUS PRN
Status: DISCONTINUED | OUTPATIENT
Start: 2020-10-10 | End: 2020-10-21 | Stop reason: HOSPADM

## 2020-10-10 RX ADMIN — ACETAMINOPHEN 650 MG: 325 TABLET ORAL at 13:09

## 2020-10-10 RX ADMIN — DEXAMETHASONE SODIUM PHOSPHATE 6 MG: 4 INJECTION, SOLUTION INTRAMUSCULAR; INTRAVENOUS at 13:11

## 2020-10-10 RX ADMIN — ACETAMINOPHEN 650 MG: 325 TABLET ORAL at 06:28

## 2020-10-10 RX ADMIN — REMDESIVIR 200 MG: 100 INJECTION, POWDER, LYOPHILIZED, FOR SOLUTION INTRAVENOUS at 08:49

## 2020-10-10 RX ADMIN — LEVOTHYROXINE SODIUM 25 MCG: 25 TABLET ORAL at 06:29

## 2020-10-10 RX ADMIN — ENOXAPARIN SODIUM 40 MG: 40 INJECTION SUBCUTANEOUS at 20:57

## 2020-10-10 RX ADMIN — SODIUM CHLORIDE, PRESERVATIVE FREE 10 ML: 5 INJECTION INTRAVENOUS at 20:57

## 2020-10-10 RX ADMIN — FAMOTIDINE 20 MG: 20 TABLET, FILM COATED ORAL at 20:57

## 2020-10-10 RX ADMIN — PAROXETINE HYDROCHLORIDE 10 MG: 20 TABLET, FILM COATED ORAL at 10:00

## 2020-10-10 RX ADMIN — OXYCODONE HYDROCHLORIDE AND ACETAMINOPHEN 500 MG: 500 TABLET ORAL at 08:49

## 2020-10-10 RX ADMIN — IOPAMIDOL 80 ML: 755 INJECTION, SOLUTION INTRAVENOUS at 02:07

## 2020-10-10 RX ADMIN — SODIUM CHLORIDE, PRESERVATIVE FREE 10 ML: 5 INJECTION INTRAVENOUS at 13:12

## 2020-10-10 RX ADMIN — ENOXAPARIN SODIUM 40 MG: 40 INJECTION SUBCUTANEOUS at 08:48

## 2020-10-10 RX ADMIN — INSULIN LISPRO 1 UNITS: 100 INJECTION, SOLUTION INTRAVENOUS; SUBCUTANEOUS at 21:07

## 2020-10-10 RX ADMIN — ASPIRIN 81 MG: 81 TABLET ORAL at 08:48

## 2020-10-10 RX ADMIN — Medication 50 MG: at 08:49

## 2020-10-10 RX ADMIN — Medication 1000 UNITS: at 08:49

## 2020-10-10 ASSESSMENT — ENCOUNTER SYMPTOMS
ABDOMINAL DISTENTION: 0
COLOR CHANGE: 0
VOMITING: 0
DIARRHEA: 0
WHEEZING: 0
SHORTNESS OF BREATH: 1
CONSTIPATION: 0
NAUSEA: 0
SORE THROAT: 0
RHINORRHEA: 0
COUGH: 1

## 2020-10-10 ASSESSMENT — PAIN SCALES - GENERAL
PAINLEVEL_OUTOF10: 0

## 2020-10-10 NOTE — H&P
Hospitalist - History & Physical      Patient: Fabián Gibson    Unit/Bed:04/004A  YOB: 1950  MRN: [id number]   Acct: [de-identified]   PCP: Mony Lo    Date of Service: Pt seen/examined on 10/10/20  and Admitted to Inpatient with expected LOS greater than two midnights due to medical therapy. Chief Complaint:  Shortness of breath, known positive covid    Assessment and Plan:-  1. COVID 19: patient tested positive in the community two days prior, vitamin C, D, and zinc, ASA, Lovenox BID, is a candidate for remdesivir, dexamethasone, and convalescent plasma      History Of Present Illness:    Patient presents to the ED with increasing shortness of breath. The patient states she was exposed to Angy one week ago by a friend. The patient had concerns as she is a cancer patient and had her test in the outpatient setting two days prior to presentation. Over the two days since her diagnosis she has had progressive shortness of breath and fevers. The patient has been able to eat and drink. She has had low grade fevers at home along with loss of taste and smell. The patient is currently taking chemo for breast cancer.       Past Medical History:        Diagnosis Date    Breast cancer ([address] 2020    left-invasive ductal    Hyperlipidemia     Numbness and tingling     left foot-chronic nerve damage       Past Surgical History:        Procedure Laterality Date    ABDOMINAL EXPLORATION SURGERY  2014    Dr butterfield-lysis of adhesions    BREAST LUMPECTOMY Left 11/2015    Left breast lumpectomy, retroareolar with preoperative needle loc-Dr. Edu Hui BREAST SURGERY Left 06/26/2020    biopsy of left breast    CHOLECYSTECTOMY  10/2014    Dr Sayra Grewal  03/30/2016    Dr Levi Veronica  10/2014    x4 abdominal wall    HYSTERECTOMY      INCISIONAL HERNIA REPAIR  2014    Dr Oleg Soto LIPECTOMY  2014    Dr Yesi Cali LEFT  7/16/2020    [facility] [address] [place] BIOPSY LEFT 7/16/2020 Bryan Davis MD [facility]    PORT SURGERY Right 7/17/2020    SINGLE LUMEN SMART PORT INSERTION RIGHT SUBCLAVIAN performed by Lucio Charles MD at [address] [place] [address]  [facility]      patient was 11years old   [facility] Medications:   No current facility-administered medications on file prior to encounter. Current Outpatient Medications on File Prior to Encounter   Medication Sig Dispense Refill    loperamide (IMODIUM) 2 MG capsule Take 2 mg by mouth 4 times daily as needed for Diarrhea      hydrocortisone (ANUSOL-HC) 2.5 % CREA rectal cream Apply twice a day (Patient not taking: Reported on 10/6/2020) 28 g 1    ibuprofen (ADVIL;MOTRIN) 800 MG tablet Take 800 mg by mouth every 6 hours as needed for Pain      lidocaine-prilocaine (EMLA) 2.5-2.5 % cream Apply topically as needed. (Patient not taking: Reported on 10/6/2020) 30 g 1    levothyroxine (SYNTHROID) 25 MCG tablet Take 25 mcg by mouth Daily      alendronate (FOSAMAX) 35 MG tablet Take 35 mg by mouth every 7 days      aspirin EC 81 MG EC tablet Take 1 tablet by mouth daily 30 tablet 3    ondansetron (ZOFRAN ODT) 4 MG disintegrating tablet Take 1 tablet by mouth every 8 hours as needed for Nausea 20 tablet 0    PARoxetine (PAXIL) 10 MG tablet Take 10 mg by mouth every morning      Coenzyme Q10-Fish Oil-Vit E (CO-Q 10 OMEGA-3 FISH OIL PO) Take by mouth daily      Vitamin D (CHOLECALCIFEROL) 1000 UNITS CAPS capsule Take by mouth daily 2 tab      Cinnamon 500 MG CAPS Take by mouth daily      Cyanocobalamin (VITAMIN B12 PO) Take 1,000 mcg by mouth daily          Allergies:    Aluminum-containing compounds    Social History:    reports that she has never smoked. She has never used smokeless tobacco. She reports that she does not drink alcohol or use drugs.     Family History:       Problem Relation Age of Onset    Heart Disease Father         cath stent blood to thin and bled    Heart Attack Mother     Other Other         blood clot    Breast Cancer Paternal Aunt 62    High Blood Pressure Sister     Cancer Brother 68        skin    Prostate Cancer Brother         had chemotherapy to treat     Prostate Cancer Brother 64        has had for 10 years    Ovarian Cancer Neg Hx     Diabetes Neg Hx     Stroke Neg Hx        Diet:  No diet orders on file    Review of systems:   Pertinent positives as noted in the HPI. All other systems reviewed and negative. PHYSICAL EXAM:  /67   Pulse 91   Temp 97.9 °F (36.6 °C) (Oral)   Resp 18   Ht 5' 8\" (1.727 m)   Wt 207 lb (93.9 kg)   SpO2 93%   BMI 31.47 kg/m²   General appearance: No apparent distress, appears stated age and cooperative. HEENT: Normal cephalic, atraumatic without obvious deformity. Pupils equal, round, and reactive to light. Extra ocular muscles intact. Sparse hair growth secondary to chemo alopecia. Conjunctivae/corneas clear. Neck: Supple, with full range of motion. No jugular venous distention. Trachea midline. Respiratory:  Normal respiratory effort. Clear to diminished on auscultation, bilaterally without Rales/Wheezes/Rhonchi. Cardiovascular: Regular rate and rhythm with normal S1/S2 without murmurs, rubs or gallops. Abdomen: Soft, non-tender, non-distended with normal bowel sounds. Musculoskeletal:  No clubbing, cyanosis or edema bilaterally. Skin: Skin color is pale, but texture, turgor normal.  No rashes or lesions. Neurologic:  Neurovascularly intact without any focal sensory/motor deficits.  Cranial nerves: II-XII intact, grossly non-focal.  Psychiatric: Alert and oriented, thought content appropriate, normal insight  Capillary Refill: Brisk,< 3 seconds   Peripheral Pulses: +2 palpable, equal bilaterally     Labs:   Recent Labs     10/10/20  0004   WBC 12.4*   HGB 12.4   HCT 40.2        Recent Labs     10/10/20  0004   *   K 3.6   CL 94*   CO2 22*   BUN 15   CREATININE 0.8   CALCIUM 9.3     Recent Labs     10/10/20  0004   AST 45*   ALT 24   BILITOT 0.2*   ALKPHOS 98     No results for input(s): INR in the last 72 hours. No results for input(s): Nessa Lowers in the last 72 hours. Urinalysis:    Lab Results   Component Value Date    NITRU NEGATIVE 02/08/2017    WBCUA 5-10 02/08/2017    BACTERIA NONE 02/08/2017    RBCUA 0-2 02/08/2017    BLOODU SMALL 02/08/2017    SPECGRAV 1.014 02/08/2017       Radiology:   CTA CHEST W WO CONTRAST   Final Result   1. Negative for acute pulmonary artery embolism. Thoracic aorta of normal    caliber without dissection. 2.  Patchy peripheral groundglass opacities bilaterally suspicious for    viral pneumonitis. 3.  Fatty liver. 4.  Sequelae of prior granulomatous infection. This document has been electronically signed by: Henrik Juarez MD on    10/10/2020 02:54 AM      All CT scans at this facility use dose modulation, iterative    reconstruction, and/or weight-based   dosing when appropriate to reduce radiation dose to as low as reasonably    achievable. XR CHEST PORTABLE   Final Result   Impression:   Patchy right upper lobe and left lower lobe infiltrates may reflect    pneumonitis and/or atelectasis. No CHF. This document has been electronically signed by: Henirk Juarez MD on    10/09/2020 11:46 PM           Cta Chest W Wo Contrast    Result Date: 10/10/2020  CT angiogram of chest with MIP postprocessing: Comparison:  CT  - CTA CHEST  - 02/08/2017 11:48 PM EST FINDINGS: Pulmonary arteries: Pulmonary arteries are well-opacified, free of intraluminal thrombus. Thoracic aorta: Normal caliber without dissection. Mediastinum/heart: Heart size is normal.  No pericardial effusion. Mild mediastinal lymphadenopathy. Calcified mediastinal and left hilar lymph nodes. Lungs/pleura: Patchy peripheral groundglass opacities bilaterally. Mild dependent atelectasis at the posterior sulci. Left upper lobe calcified granuloma. No pleural effusion or pneumothorax. Uppermost abdomen: Hepatic steatosis. No adrenal mass. There are 3 splenules in the left upper quadrant measuring up to 4 cm in diameter, similar to previous. Left kidney superior pole cortical cyst. Bone/MSK: Severe spondylosis of the thoracic spine. No acute vertebral body or rib fracture. No destructive osseous lesion. 1. Negative for acute pulmonary artery embolism. Thoracic aorta of normal caliber without dissection. 2.  Patchy peripheral groundglass opacities bilaterally suspicious for viral pneumonitis. 3.  Fatty liver. 4.  Sequelae of prior granulomatous infection. This document has been electronically signed by: Lauryn Friend MD on 10/10/2020 02:54 AM All CT scans at this facility use dose modulation, iterative reconstruction, and/or weight-based dosing when appropriate to reduce radiation dose to as low as reasonably achievable. Xr Chest Portable    Result Date: 10/9/2020  1 view chest x-ray. Comparison:  SR JAYCE  - XR CHEST PORTABLE  - 07/17/2020 12:44 PM EDT Findings: Stable position of right chest port. Patchy interstitial opacities involve the right upper lobe and the left lower lobe, new compared to previous. Heart size is normal.  No CHF. No sizable pleural effusion. No pneumothorax. Tortuous thoracic aorta. Moderate spondylosis of the thoracic spine. No acute fracture. Impression: Patchy right upper lobe and left lower lobe infiltrates may reflect pneumonitis and/or atelectasis. No CHF.  This document has been electronically signed by: Lauryn Friend MD on 10/09/2020 11:46 PM         EKG: rhythm: sinus tachycardia, iamc=095, ntck=067 bpm, yx=685 ms, qrs=92 ms, ry=369 ms, axis=21 degrees    Electronically signed by Mago Bobo PA-C on 10/10/2020 at 3:27 AM

## 2020-10-10 NOTE — ED NOTES
ED to inpatient nurses report    Chief Complaint   Patient presents with    Shortness of Breath      Present to ED from home  LOC: alert and orientated to name, place, date  Vital signs   Vitals:    10/10/20 0012 10/10/20 0057 10/10/20 0157 10/10/20 0300   BP: 114/70 123/63 122/67 126/67   Pulse: 82 83 84 91   Resp: 20 25 18 18   Temp:       TempSrc:       SpO2: 95% 93% 92% 93%   Weight:       Height:          Oxygen Baseline Room Air    Current needs required 2L via nc Bipap/Cpap No  LDAs:    Mobility: Independent  Pending ED orders: Urine  Present condition: Pt resting on cot in position of comfort. Pt remains A&Ox4, reps easy and unlabored. Pt continues on 2L O2 via n/c. Port accessed with no s/s of infection or infiltration. Pt denies pain.    Electronically signed by Pina Rosenbaum RN on 10/10/2020 at 3:28 AM       Pina Rosenbaum RN  10/10/20 [id number]

## 2020-10-10 NOTE — ED NOTES
Reminded pt of need for urine specimen. Pt initially agreed to straight cath. While attempting, pt tenses up and ultimately decides against straight cath. Pt placed on bedpan at this time to attempt urine specimen.       Gallo Barton RN  10/10/20 [id number]

## 2020-10-10 NOTE — ED PROVIDER NOTES
[place] ENCOUNTER          Pt Name: Tata Hardy  MRN: [id number]  [place] 1950  Date of evaluation: 10/9/2020  Treating Resident Physician: Annalise Kauffman MD  Supervising Physician: Latanya Rich, [address] [place] [address]       Chief Complaint   Patient presents with    Shortness of Breath     History obtained from the patient. HISTORY OF PRESENT ILLNESS    HPI  Tata Hardy is a 79 y.o. female who presents to the emergency department for evaluation of shortness of breath. Patient stated that she was diagnosed with COVID 19 4 days ago at outpatient drive-through clinic. Patient also states that she is receiving chemo and radiation for breast cancer left breast.  Patient states that she had increased shortness of breath today and she took her oxygen with home oxygen monitor that showed oxygen level in the low 80s. Patient called EMS and when they arrived her oxygen level was 90% on room air. EMS started patient on oxygen at 2 L per nasal cannula. Patient states that she is still short of breath but has improved. Shortness of breath is aggravated by any activity and alleviated by nothing. Patient denies any chest pain, syncope or fever. The patient has no other acute complaints at this time. REVIEW OF SYSTEMS   Review of Systems   Constitutional: Negative for fatigue and fever. HENT: Negative for ear pain, rhinorrhea and sore throat. Respiratory: Positive for cough and shortness of breath. Negative for wheezing. Cardiovascular: Negative for chest pain, palpitations and leg swelling. Gastrointestinal: Negative for abdominal distention, constipation, diarrhea, nausea and vomiting. Endocrine: Negative for polydipsia and polyuria. Genitourinary: Negative for difficulty urinating and dysuria. Musculoskeletal: Negative for arthralgias. Skin: Negative for color change, pallor and rash.    Neurological: Negative for dizziness, seizures, syncope, weakness and numbness. PAST MEDICAL AND SURGICAL HISTORY     Past Medical History:   Diagnosis Date    Breast cancer ([facility] [address] [address] 2020    left-invasive ductal    Hyperlipidemia     Numbness and tingling     left foot-chronic nerve damage     Past Surgical History:   Procedure Laterality Date    ABDOMINAL EXPLORATION SURGERY  2014    Dr butterfield-lysis of adhesions    BREAST LUMPECTOMY Left 11/2015    Left breast lumpectomy, retroareolar with preoperative needle loc-Dr. Karen Virk BREAST SURGERY Left 06/26/2020    biopsy of left breast    CHOLECYSTECTOMY  10/2014    Dr Martina Reynolds  03/30/2016    Dr Denette Spurling  10/2014    x4 abdominal wall    HYSTERECTOMY      INCISIONAL HERNIA REPAIR  2014    Dr Yoli Hidalgo LIPECTOMY  2014    Dr Donell Aldana  7/16/2020    [address] LEFT 7/16/2020 Waldemar Rudd MD [facility] PORT SURGERY Right 7/17/2020    SINGLE LUMEN SMART PORT INSERTION RIGHT SUBCLAVIAN performed by Elham Aguillon MD at [address]  10/2014    Dr Katerine Sotomayor      patient was 11years old   [address]   No current facility-administered medications for this encounter. Current Outpatient Medications:     loperamide (IMODIUM) 2 MG capsule, Take 2 mg by mouth 4 times daily as needed for Diarrhea, Disp: , Rfl:     hydrocortisone (ANUSOL-HC) 2.5 % CREA rectal cream, Apply twice a day (Patient not taking: Reported on 10/6/2020), Disp: 28 g, Rfl: 1    ibuprofen (ADVIL;MOTRIN) 800 MG tablet, Take 800 mg by mouth every 6 hours as needed for Pain, Disp: , Rfl:     lidocaine-prilocaine (EMLA) 2.5-2.5 % cream, Apply topically as needed.  (Patient not taking: Reported on 10/6/2020), Disp: 30 g, Rfl: 1    levothyroxine (SYNTHROID) 25 MCG tablet, Take 25 mcg by mouth Daily, Disp: , Rfl:     alendronate (FOSAMAX) 35 MG tablet, Take 35 mg by mouth every 7 days, Disp: , Rfl:     aspirin EC 81 MG EC tablet, Take 1 tablet by mouth daily, Disp: 30 tablet, Rfl: 3    ondansetron (ZOFRAN ODT) 4 MG disintegrating tablet, Take 1 tablet by mouth every 8 hours as needed for Nausea, Disp: 20 tablet, Rfl: 0    PARoxetine (PAXIL) 10 MG tablet, Take 10 mg by mouth every morning, Disp: , Rfl:     Coenzyme Q10-Fish Oil-Vit E (CO-Q 10 OMEGA-3 FISH OIL PO), Take by mouth daily, Disp: , Rfl:     Vitamin D (CHOLECALCIFEROL) 1000 UNITS CAPS capsule, Take by mouth daily 2 tab, Disp: , Rfl:     Cinnamon 500 MG CAPS, Take by mouth daily, Disp: , Rfl:     Cyanocobalamin (VITAMIN B12 PO), Take 1,000 mcg by mouth daily , Disp: , Rfl:       SOCIAL HISTORY     Social History     Social History Narrative    Not on file     Social History     Tobacco Use    Smoking status: Never Smoker    Smokeless tobacco: Never Used   Substance Use Topics    Alcohol use: No     Alcohol/week: 0.0 standard drinks    Drug use: No         ALLERGIES     Allergies   Allergen Reactions    Aluminum-Containing Compounds Anaphylaxis         FAMILY HISTORY     Family History   Problem Relation Age of Onset    Heart Disease Father         cath stent blood to thin and bled    Heart Attack Mother     Other Other         blood clot    Breast Cancer Paternal Aunt 62    High Blood Pressure Sister     Cancer Brother 68        skin    Prostate Cancer Brother         had chemotherapy to treat     Prostate Cancer Brother 64        has had for 10 years    Ovarian Cancer Neg Hx     Diabetes Neg Hx     Stroke Neg Hx          PREVIOUS RECORDS   Previous records reviewed today.         PHYSICAL EXAM     ED Triage Vitals   BP Temp Temp Source Pulse Resp SpO2 Height Weight   10/09/20 2240 10/09/20 2243 10/09/20 2240 10/09/20 2240 10/09/20 2240 10/09/20 2240 10/09/20 2240 10/09/20 2240   105/64 97.9 °F (36.6 °C) Oral 109 12 91 % 5' 8\" (1.727 m) 207 lb (93.9 kg)     Initial vital signs and nursing assessment reviewed and normal. Pulsoximetry is normal per my interpretation. Additional Vital Signs:  Vitals:    10/10/20 0057   BP: 123/63   Pulse: 83   Resp: 25   Temp:    SpO2: 93%       Physical Exam  Constitutional:       Appearance: Normal appearance. HENT:      Head: Normocephalic. Right Ear: External ear normal.      Left Ear: External ear normal.      Nose: Nose normal.      Mouth/Throat:      Mouth: Mucous membranes are moist.      Pharynx: Oropharynx is clear. Eyes:      Extraocular Movements: Extraocular movements intact. Conjunctiva/sclera: Conjunctivae normal.      Pupils: Pupils are equal, round, and reactive to light. Neck:      Musculoskeletal: Normal range of motion and neck supple. Cardiovascular:      Rate and Rhythm: Normal rate and regular rhythm. Pulses: Normal pulses. Heart sounds: Normal heart sounds. Pulmonary:      Effort: Pulmonary effort is normal.      Breath sounds: Examination of the right-lower field reveals decreased breath sounds. Examination of the left-lower field reveals decreased breath sounds. Decreased breath sounds present. Abdominal:      General: Bowel sounds are normal.      Palpations: Abdomen is soft. Musculoskeletal: Normal range of motion. Skin:     General: Skin is warm and dry. Capillary Refill: Capillary refill takes less than 2 seconds. Neurological:      General: No focal deficit present. Mental Status: She is alert and oriented to person, place, and time. MEDICAL DECISION MAKING   Initial Assessment: COVID-19  Plan: Patient was recently diagnosed 3 days prior with COVID-19. Patient has had increasing shortness of breath but noted home pulse ox showing mid 80s tonight. Patient received O2 2 L per nasal cannula with some improvement satting in the low 90s. Patient is also receiving chemo and radiation due to breast cancer.   Patient has increased risk of pulmonary embolism due to history. CTA of chest has been ordered at this time with results pending. Care transferred to Dr. Helen Valdes, [place] [address] physician, at this time. ED RESULTS   Laboratory results:  Labs Reviewed   CBC WITH AUTO DIFFERENTIAL - Abnormal; Notable for the following components:       Result Value    WBC 12.4 (*)     RBC 3.92 (*)     .6 (*)     MCHC 30.8 (*)     RDW-CV 15.5 (*)     RDW-SD 58.4 (*)     All other components within normal limits   COMPREHENSIVE METABOLIC PANEL W/ REFLEX TO MG FOR LOW K - Abnormal; Notable for the following components:    Glucose 136 (*)     Sodium 131 (*)     Chloride 94 (*)     CO2 22 (*)     AST 45 (*)     Total Bilirubin 0.2 (*)     All other components within normal limits   PROCALCITONIN - Abnormal; Notable for the following components:    Procalcitonin 0.22 (*)     All other components within normal limits   GLOMERULAR FILTRATION RATE, ESTIMATED - Abnormal; Notable for the following components:    Est, Glom Filt Rate 71 (*)     All other components within normal limits   OSMOLALITY - Abnormal; Notable for the following components:    Osmolality Calc 265.6 (*)     All other components within normal limits   LACTIC ACID, PLASMA   ANION GAP   URINALYSIS WITH MICROSCOPIC       Radiologic studies results:  XR CHEST PORTABLE   Final Result   Impression:   Patchy right upper lobe and left lower lobe infiltrates may reflect    pneumonitis and/or atelectasis. No CHF. This document has been electronically signed by: Krystyna Gaffney MD on    10/09/2020 11:46 PM         CTA [address]    (Results Pending)       ED Medications administered this visit: Medications - No data to display      ED COURSE      Strict return precautions and follow up instructions were discussed with the patient prior to discharge, with which the patient agrees.       MEDICATION CHANGES     New Prescriptions    No medications on file         FINAL DISPOSITION     Final diagnoses:   COVID-19   Hypoxia Condition: Fair   dispo: Admit to telemetry      This transcription was electronically signed. Parts of this transcriptions may have been dictated by use of voice recognition software and electronically transcribed, and parts may have been transcribed with the assistance of an ED scribe. The transcription may contain errors not detected in proofreading. Please refer to my supervising physician's documentation if my documentation differs.     Electronically Signed: Rubén Draper, 10/10/20, 1:52 AM         Rubén Draper MD  Resident  10/11/20 5061

## 2020-10-10 NOTE — PROGRESS NOTES
Pt admitted to  6A18. Complaints: SOB       Medi port free of s/s of infection or infiltration. Vital signs obtained. Assessment and data collection initiated. Two nurse skin assessment performed by Gina RN and Alison RN. Oriented to room. Explained patients right to have family, representative or physician notified of their admission. Patient has requested for physician to be notified. Patient has declined for family/representative to be notified. The patient is interested in [facility]. [facility]s meds to beds program?:  NO    Policies and procedures for 6A explained. All questions answered with no further questions at this time. Fall prevention and safety brochure discussed with patient. Bed alarm on. Call light in reach.

## 2020-10-10 NOTE — ED TRIAGE NOTES
Patient presents to ED via EMS with complaints of shortness of breath. Patient states she has a home pulse ox monitor and had saturations in the 80's. EMS states pulse ox was 90% on room air en route and was placed on 2L nasal cannula. Saturations were reassessed at 94% en route. Upon arrival, patient is alert and oriented with oxygen saturation at 91% at room air. Patient placed on 2L nasal cannula, and oxygen saturations reassessed at 95%.  Patient resting on cot, respirations easy and unlabored

## 2020-10-10 NOTE — ED NOTES
ED nurse-to-nurse bedside report    Chief Complaint   Patient presents with    Shortness of Breath      LOC: alert and orientated to name, place, date  Vital signs   Vitals:    10/09/20 2243 10/09/20 2312 10/10/20 0012 10/10/20 0057   BP:  117/75 114/70 123/63   Pulse:  111 82 83   Resp:  23 20 25   Temp: 97.9 °F (36.6 °C)      TempSrc: Oral      SpO2:  92% 95% 93%   Weight:       Height:          Pain:    Pain Interventions: nonpharmacological  Pain Goal: 0  Oxygen: Yes    Current needs required 2L nasal cannula   Telemetry: Yes  LDAs:    Continuous Infusions:   Mobility: Requires assistance * 1  [place] Fall Risk Score: No flowsheet data found.   Fall Interventions: side rails up x2, call light in reach   Report given to: Valentin Douglass RN  10/10/20 0906

## 2020-10-10 NOTE — ED NOTES
Upon first contact with patient this RN receives bedside shift report from Terri Jean RN. Pt resting on cot in position of comfort. Pt is A&Ox4, resps easy and unlabored. Denies pain. Monitor remains in place. Will monitor.      Gallo Barton RN  10/10/20 [id number]

## 2020-10-10 NOTE — ED NOTES
Bed: 004A  Expected date: 10/9/20  Expected time: 10:19 PM  Means of arrival:   Comments:     Esme Hewitt RN  10/09/20 2783

## 2020-10-10 NOTE — PROGRESS NOTES
Hospitalist Progress Note    Patient:  Felix General      Unit/Bed:6A-18/018-A    YOB: 1950    MRN: [id number]       Acct: [de-identified]     PCP: Deangelo Guerrero    Date of Admission: 10/9/2020    Chief Complaint: Shortness of breath    Hospital Course:     Briefly, this is a 80-year-old female, with past medical history of breast cancer, hyperlipidemia, who presented to [facility] on 10/19/2020 due to shortness of breath. The patient was exposed to her ran out a week ago was diagnosed with COVID-19. Per H&P note, \"The patient had concerns as she is a cancer patient and had her test in the outpatient setting two days prior to presentation. Over the two days since her diagnosis she has had progressive shortness of breath and fevers. The patient has been able to eat and drink. She has had low grade fevers at home along with loss of taste and smell. The patient is currently taking chemo for breast cancer. \"     The patient was tested positive for COVID-19 2 days prior to admission in outside facility. Patient was admitted under hospital medicine service for COVID-19 infection. Subjective:     Patient seen and examined. Pt reports her sob is starting to get better, more on exertion. Still having productive cough, also getting better per pt. Denies chest pain, abd pain, N/V. Has chronic diarrhea due to chemo.        Medications:  Reviewed    Infusion Medications    dextrose       Scheduled Medications    aspirin EC  81 mg Oral Daily    levothyroxine  25 mcg Oral Daily    PARoxetine  10 mg Oral QAM    vitamin C  500 mg Oral Daily    Vitamin D  1,000 Units Oral Daily    zinc sulfate  50 mg Oral Daily    sodium chloride flush  10 mL Intravenous 2 times per day    enoxaparin  40 mg Subcutaneous BID    sodium chloride  20 mL Intravenous Once    dexamethasone  6 mg Intravenous Daily    [START ON 10/11/2020] remdesivir IVPB  100 mg Intravenous Q24H    insulin lispro 0-6 Units Subcutaneous TID WC    insulin lispro  0-3 Units Subcutaneous Nightly    famotidine  20 mg Oral BID     PRN Meds: sodium chloride flush, acetaminophen **OR** acetaminophen, polyethylene glycol, promethazine **OR** ondansetron, potassium chloride **OR** potassium alternative oral replacement **OR** potassium chloride, magnesium sulfate, sodium chloride, glucose, dextrose, glucagon (rDNA), dextrose    No intake or output data in the 24 hours ending 10/10/20 1648    Diet:  DIET GENERAL;    Exam:  /64   Pulse 84   Temp 100.5 °F (38.1 °C) (Oral)   Resp 19   Ht 5' 8\" (1.727 m)   Wt 207 lb (93.9 kg)   SpO2 97%   BMI 31.47 kg/m²     General appearance: alert, not in acute distress  HEENT: PERRLA, clear oral mucosa  Chest: on 6 lpm O2 via NC, clear to auscultation, no wheezes, rales or rhonchi, symmetric air entry. CVS exam: normal rate, regular rhythm, normal S1, S2, no murmurs, rubs, clicks or gallops. Abdominal exam: non-distended, soft, non-tender, no guarding, no masses or organomegaly. Neurological exam reveals alert, oriented, normal speech, no focal findings or movement disorder noted  Exam of extremities: peripheral pulses normal, no pedal edema, no clubbing or cyanosis        Labs:   Recent Labs     10/10/20  0004   WBC 12.4*   HGB 12.4   HCT 40.2        Recent Labs     10/10/20  0004   *   K 3.6   CL 94*   CO2 22*   BUN 15   CREATININE 0.8   CALCIUM 9.3     Recent Labs     10/10/20  0004   AST 45*   ALT 24   BILITOT 0.2*   ALKPHOS 98     No results for input(s): INR in the last 72 hours. No results for input(s): Olga Irlanda in the last 72 hours. Urinalysis:      Lab Results   Component Value Date    NITRU NEGATIVE 02/08/2017    WBCUA 5-10 02/08/2017    BACTERIA NONE 02/08/2017    RBCUA 0-2 02/08/2017    BLOODU SMALL 02/08/2017    SPECGRAV 1.014 02/08/2017       Radiology:  CTA CHEST W WO CONTRAST   Final Result   1. Negative for acute pulmonary artery embolism. SCDs                                 [] SQ Heparin                                 [] Encourage ambulation           [] Already on Anticoagulation     Disposition:    [] Home       [] TCU       [] Rehab       [] Psych       [] SNF       [] [place]       [x] Other-Plan as above    Code Status: Full Code        Electronically signed by Trell Reyes MD on 10/10/2020 at 4:48 PM

## 2020-10-11 ENCOUNTER — APPOINTMENT (OUTPATIENT)
Dept: GENERAL RADIOLOGY | Age: 70
DRG: 177 | End: 2020-10-11
Payer: MEDICARE

## 2020-10-11 LAB
ALBUMIN SERPL-MCNC: 3.2 G/DL (ref 3.5–5.1)
ALLEN TEST: POSITIVE
ALP BLD-CCNC: 77 U/L (ref 38–126)
ALT SERPL-CCNC: 17 U/L (ref 11–66)
ANION GAP SERPL CALCULATED.3IONS-SCNC: 13 MEQ/L (ref 8–16)
ANISOCYTOSIS: PRESENT
AST SERPL-CCNC: 30 U/L (ref 5–40)
ATYPICAL LYMPHOCYTES: ABNORMAL %
BASE EXCESS (CALCULATED): 2.3 MMOL/L (ref -2.5–2.5)
BASOPHILS # BLD: 0.4 %
BASOPHILS ABSOLUTE: 0 THOU/MM3 (ref 0–0.1)
BILIRUB SERPL-MCNC: 0.2 MG/DL (ref 0.3–1.2)
BILIRUBIN DIRECT: < 0.2 MG/DL (ref 0–0.3)
BUN BLDV-MCNC: 16 MG/DL (ref 7–22)
CALCIUM SERPL-MCNC: 8.8 MG/DL (ref 8.5–10.5)
CHLORIDE BLD-SCNC: 100 MEQ/L (ref 98–111)
CO2: 26 MEQ/L (ref 23–33)
COLLECTED BY:: ABNORMAL
COMMENT: ABNORMAL
CREAT SERPL-MCNC: 0.7 MG/DL (ref 0.4–1.2)
DEVICE: ABNORMAL
EOSINOPHIL # BLD: 0 %
EOSINOPHILS ABSOLUTE: 0 THOU/MM3 (ref 0–0.4)
ERYTHROCYTE [DISTWIDTH] IN BLOOD BY AUTOMATED COUNT: 15.2 % (ref 11.5–14.5)
ERYTHROCYTE [DISTWIDTH] IN BLOOD BY AUTOMATED COUNT: 54.9 FL (ref 35–45)
GFR SERPL CREATININE-BSD FRML MDRD: 83 ML/MIN/1.73M2
GLUCOSE BLD-MCNC: 124 MG/DL (ref 70–108)
GLUCOSE BLD-MCNC: 126 MG/DL (ref 70–108)
GLUCOSE BLD-MCNC: 144 MG/DL (ref 70–108)
GLUCOSE BLD-MCNC: 152 MG/DL (ref 70–108)
GLUCOSE BLD-MCNC: 195 MG/DL (ref 70–108)
HCO3: 27 MMOL/L (ref 23–28)
HCT VFR BLD CALC: 37.4 % (ref 37–47)
HEMOGLOBIN: 12.1 GM/DL (ref 12–16)
IFIO2: 50
IMMATURE GRANS (ABS): 0.41 THOU/MM3 (ref 0–0.07)
IMMATURE GRANULOCYTES: 4.1 %
LYMPHOCYTES # BLD: 29 %
LYMPHOCYTES ABSOLUTE: 2.9 THOU/MM3 (ref 1–4.8)
MAGNESIUM: 1.6 MG/DL (ref 1.6–2.4)
MCH RBC QN AUTO: 32 PG (ref 26–33)
MCHC RBC AUTO-ENTMCNC: 32.4 GM/DL (ref 32.2–35.5)
MCV RBC AUTO: 98.9 FL (ref 81–99)
MONOCYTES # BLD: 7.2 %
MONOCYTES ABSOLUTE: 0.7 THOU/MM3 (ref 0.4–1.3)
NUCLEATED RED BLOOD CELLS: 0 /100 WBC
O2 SATURATION: 94 %
PCO2: 43 MMHG (ref 35–45)
PH BLOOD GAS: 7.42 (ref 7.35–7.45)
PLATELET # BLD: 264 THOU/MM3 (ref 130–400)
PLATELET ESTIMATE: ADEQUATE
PMV BLD AUTO: 11 FL (ref 9.4–12.4)
PO2: 70 MMHG (ref 71–104)
POTASSIUM REFLEX MAGNESIUM: 3 MEQ/L (ref 3.5–5.2)
PROCALCITONIN: 0.19 NG/ML (ref 0.01–0.09)
RBC # BLD: 3.78 MILL/MM3 (ref 4.2–5.4)
SCAN OF BLOOD SMEAR: NORMAL
SEG NEUTROPHILS: 59.3 %
SEGMENTED NEUTROPHILS ABSOLUTE COUNT: 5.9 THOU/MM3 (ref 1.8–7.7)
SODIUM BLD-SCNC: 139 MEQ/L (ref 135–145)
SOURCE, BLOOD GAS: ABNORMAL
TOTAL PROTEIN: 6.4 G/DL (ref 6.1–8)
TOXIC GRANULATION: PRESENT
WBC # BLD: 9.9 THOU/MM3 (ref 4.8–10.8)

## 2020-10-11 PROCEDURE — 6360000002 HC RX W HCPCS: Performed by: PHYSICIAN ASSISTANT

## 2020-10-11 PROCEDURE — 36600 WITHDRAWAL OF ARTERIAL BLOOD: CPT

## 2020-10-11 PROCEDURE — 83735 ASSAY OF MAGNESIUM: CPT

## 2020-10-11 PROCEDURE — 36415 COLL VENOUS BLD VENIPUNCTURE: CPT

## 2020-10-11 PROCEDURE — 6370000000 HC RX 637 (ALT 250 FOR IP): Performed by: FAMILY MEDICINE

## 2020-10-11 PROCEDURE — 2580000003 HC RX 258: Performed by: PHYSICIAN ASSISTANT

## 2020-10-11 PROCEDURE — 80048 BASIC METABOLIC PNL TOTAL CA: CPT

## 2020-10-11 PROCEDURE — 6370000000 HC RX 637 (ALT 250 FOR IP): Performed by: PHYSICIAN ASSISTANT

## 2020-10-11 PROCEDURE — XW13325 TRANSFUSION OF CONVALESCENT PLASMA (NONAUTOLOGOUS) INTO PERIPHERAL VEIN, PERCUTANEOUS APPROACH, NEW TECHNOLOGY GROUP 5: ICD-10-PCS | Performed by: FAMILY MEDICINE

## 2020-10-11 PROCEDURE — 2700000000 HC OXYGEN THERAPY PER DAY

## 2020-10-11 PROCEDURE — 36430 TRANSFUSION BLD/BLD COMPNT: CPT

## 2020-10-11 PROCEDURE — 94761 N-INVAS EAR/PLS OXIMETRY MLT: CPT

## 2020-10-11 PROCEDURE — 84145 PROCALCITONIN (PCT): CPT

## 2020-10-11 PROCEDURE — 80076 HEPATIC FUNCTION PANEL: CPT

## 2020-10-11 PROCEDURE — 2500000003 HC RX 250 WO HCPCS: Performed by: INTERNAL MEDICINE

## 2020-10-11 PROCEDURE — 82803 BLOOD GASES ANY COMBINATION: CPT

## 2020-10-11 PROCEDURE — 2580000003 HC RX 258: Performed by: INTERNAL MEDICINE

## 2020-10-11 PROCEDURE — 99232 SBSQ HOSP IP/OBS MODERATE 35: CPT | Performed by: FAMILY MEDICINE

## 2020-10-11 PROCEDURE — 87040 BLOOD CULTURE FOR BACTERIA: CPT

## 2020-10-11 PROCEDURE — 2060000000 HC ICU INTERMEDIATE R&B

## 2020-10-11 PROCEDURE — XW033E5 INTRODUCTION OF REMDESIVIR ANTI-INFECTIVE INTO PERIPHERAL VEIN, PERCUTANEOUS APPROACH, NEW TECHNOLOGY GROUP 5: ICD-10-PCS | Performed by: FAMILY MEDICINE

## 2020-10-11 PROCEDURE — 82948 REAGENT STRIP/BLOOD GLUCOSE: CPT

## 2020-10-11 PROCEDURE — P9059 PLASMA, FRZ BETWEEN 8-24HOUR: HCPCS

## 2020-10-11 PROCEDURE — 99223 1ST HOSP IP/OBS HIGH 75: CPT | Performed by: INTERNAL MEDICINE

## 2020-10-11 PROCEDURE — 71045 X-RAY EXAM CHEST 1 VIEW: CPT

## 2020-10-11 PROCEDURE — 85025 COMPLETE CBC W/AUTO DIFF WBC: CPT

## 2020-10-11 RX ORDER — IBUPROFEN 200 MG
CAPSULE ORAL 2 TIMES DAILY
Status: DISCONTINUED | OUTPATIENT
Start: 2020-10-11 | End: 2020-10-21 | Stop reason: HOSPADM

## 2020-10-11 RX ORDER — LOPERAMIDE HYDROCHLORIDE 2 MG/1
2 CAPSULE ORAL 4 TIMES DAILY PRN
Status: DISCONTINUED | OUTPATIENT
Start: 2020-10-11 | End: 2020-10-21 | Stop reason: HOSPADM

## 2020-10-11 RX ORDER — 0.9 % SODIUM CHLORIDE 0.9 %
20 INTRAVENOUS SOLUTION INTRAVENOUS ONCE
Status: DISCONTINUED | OUTPATIENT
Start: 2020-10-11 | End: 2020-10-21 | Stop reason: HOSPADM

## 2020-10-11 RX ADMIN — FAMOTIDINE 20 MG: 20 TABLET, FILM COATED ORAL at 21:53

## 2020-10-11 RX ADMIN — ACETAMINOPHEN 650 MG: 325 TABLET ORAL at 01:16

## 2020-10-11 RX ADMIN — REMDESIVIR 100 MG: 5 INJECTION INTRAVENOUS at 08:56

## 2020-10-11 RX ADMIN — FAMOTIDINE 20 MG: 20 TABLET, FILM COATED ORAL at 08:37

## 2020-10-11 RX ADMIN — DEXAMETHASONE SODIUM PHOSPHATE 6 MG: 4 INJECTION, SOLUTION INTRAMUSCULAR; INTRAVENOUS at 08:38

## 2020-10-11 RX ADMIN — SODIUM CHLORIDE, PRESERVATIVE FREE 10 ML: 5 INJECTION INTRAVENOUS at 08:51

## 2020-10-11 RX ADMIN — Medication 10 ML: at 00:16

## 2020-10-11 RX ADMIN — ASPIRIN 81 MG: 81 TABLET ORAL at 08:38

## 2020-10-11 RX ADMIN — ONDANSETRON 4 MG: 2 INJECTION INTRAMUSCULAR; INTRAVENOUS at 00:16

## 2020-10-11 RX ADMIN — Medication 1000 UNITS: at 08:37

## 2020-10-11 RX ADMIN — SODIUM CHLORIDE, PRESERVATIVE FREE 10 ML: 5 INJECTION INTRAVENOUS at 21:59

## 2020-10-11 RX ADMIN — Medication 50 MG: at 08:38

## 2020-10-11 RX ADMIN — ENOXAPARIN SODIUM 40 MG: 40 INJECTION SUBCUTANEOUS at 21:53

## 2020-10-11 RX ADMIN — MAGNESIUM GLUCONATE 500 MG ORAL TABLET 400 MG: 500 TABLET ORAL at 08:56

## 2020-10-11 RX ADMIN — POTASSIUM CHLORIDE 10 MEQ: 7.46 INJECTION, SOLUTION INTRAVENOUS at 11:18

## 2020-10-11 RX ADMIN — POTASSIUM CHLORIDE 10 MEQ: 7.46 INJECTION, SOLUTION INTRAVENOUS at 17:08

## 2020-10-11 RX ADMIN — INSULIN LISPRO 1 UNITS: 100 INJECTION, SOLUTION INTRAVENOUS; SUBCUTANEOUS at 14:59

## 2020-10-11 RX ADMIN — POTASSIUM CHLORIDE 10 MEQ: 7.46 INJECTION, SOLUTION INTRAVENOUS at 08:01

## 2020-10-11 RX ADMIN — OXYCODONE HYDROCHLORIDE AND ACETAMINOPHEN 500 MG: 500 TABLET ORAL at 08:37

## 2020-10-11 RX ADMIN — PAROXETINE HYDROCHLORIDE 10 MG: 20 TABLET, FILM COATED ORAL at 08:38

## 2020-10-11 RX ADMIN — POTASSIUM CHLORIDE 10 MEQ: 7.46 INJECTION, SOLUTION INTRAVENOUS at 06:39

## 2020-10-11 RX ADMIN — POTASSIUM CHLORIDE 10 MEQ: 7.46 INJECTION, SOLUTION INTRAVENOUS at 18:45

## 2020-10-11 RX ADMIN — LEVOTHYROXINE SODIUM 25 MCG: 25 TABLET ORAL at 05:59

## 2020-10-11 ASSESSMENT — PAIN SCALES - GENERAL
PAINLEVEL_OUTOF10: 0

## 2020-10-11 ASSESSMENT — ENCOUNTER SYMPTOMS
RHINORRHEA: 0
DIARRHEA: 1
COUGH: 1
SORE THROAT: 0
SHORTNESS OF BREATH: 0
EYE REDNESS: 0
ABDOMINAL PAIN: 0
EYE PAIN: 0
BACK PAIN: 0

## 2020-10-11 NOTE — PROGRESS NOTES
Hospitalist Progress Note    Patient:  Nii Thomas      Unit/Bed:6A-18/018-A    YOB: 1950    MRN: [id number]       Acct: [de-identified]     PCP: Margie Ramirez    Date of Admission: 10/9/2020    Chief Complaint: Shortness of breath    Hospital Course:     Briefly, this is a 42-year-old female, with past medical history of breast cancer, hyperlipidemia, who presented to [facility] on 10/19/2020 due to shortness of breath. The patient was exposed to her ran out a week ago was diagnosed with COVID-19. Per H&P note, \"The patient had concerns as she is a cancer patient and had her test in the outpatient setting two days prior to presentation. Over the two days since her diagnosis she has had progressive shortness of breath and fevers. The patient has been able to eat and drink. She has had low grade fevers at home along with loss of taste and smell. The patient is currently taking chemo for breast cancer. \"     The patient was tested positive for COVID-19 2 days prior to admission in outside facility. Patient was admitted under hospital medicine service for COVID-19 infection. 10/11: overnight, pt desaturated, now on high flow O2    Subjective:     Patient seen and examined. Pt reports that she still coughing. Still having productive cough, and had some blood-streaked this morning. She still short of breath though she said its not worsening. Denies chest pain, abd pain, N/V. Has chronic diarrhea due to chemo. Last BM was yesterday, still diarrhea.        Medications:  Reviewed    Infusion Medications    dextrose       Scheduled Medications    magnesium oxide  400 mg Oral Daily    aspirin EC  81 mg Oral Daily    levothyroxine  25 mcg Oral Daily    PARoxetine  10 mg Oral QAM    vitamin C  500 mg Oral Daily    Vitamin D  1,000 Units Oral Daily    zinc sulfate  50 mg Oral Daily    sodium chloride flush  10 mL Intravenous 2 times per day    enoxaparin  40 mg Subcutaneous BID    sodium chloride  20 mL Intravenous Once    dexamethasone  6 mg Intravenous Daily    remdesivir IVPB  100 mg Intravenous Q24H    insulin lispro  0-6 Units Subcutaneous TID WC    insulin lispro  0-3 Units Subcutaneous Nightly    famotidine  20 mg Oral BID     PRN Meds: sodium chloride flush, acetaminophen **OR** acetaminophen, polyethylene glycol, promethazine **OR** ondansetron, potassium chloride **OR** potassium alternative oral replacement **OR** potassium chloride, magnesium sulfate, sodium chloride, glucose, dextrose, glucagon (rDNA), dextrose      Intake/Output Summary (Last 24 hours) at 10/11/2020 0919  Last data filed at 10/11/2020 0016  Gross per 24 hour   Intake 10 ml   Output --   Net 10 ml       Diet:  DIET GENERAL;    Exam:  BP (!) 114/59   Pulse 79   Temp 100.5 °F (38.1 °C) (Oral)   Resp 20   Ht 5' 8\" (1.727 m)   Wt 207 lb (93.9 kg)   SpO2 93%   BMI 31.47 kg/m²     General appearance: alert, not in acute distress  HEENT: PERRLA, clear oral mucosa  Chest: on high flow O2, diminished air entry  CVS exam: normal rate, regular rhythm, normal S1, S2, no murmurs, rubs, clicks or gallops. Abdominal exam: non-distended, soft, non-tender, no guarding, no masses or organomegaly. Neurological exam reveals alert, oriented, normal speech, no focal findings or movement disorder noted  Exam of extremities: peripheral pulses normal, no pedal edema, no clubbing or cyanosis        Labs:   Recent Labs     10/10/20  0004 10/11/20  0420   WBC 12.4* 9.9   HGB 12.4 12.1   HCT 40.2 37.4    264     Recent Labs     10/10/20  0004 10/11/20  0420   * 139   K 3.6 3.0*   CL 94* 100   CO2 22* 26   BUN 15 16   CREATININE 0.8 0.7   CALCIUM 9.3 8.8     Recent Labs     10/10/20  0004 10/11/20  0420   AST 45* 30   ALT 24 17   BILIDIR  --  <0.2   BILITOT 0.2* 0.2*   ALKPHOS 98 77     No results for input(s): INR in the last 72 hours.   No results for input(s): Maria E Still in the last 72 hours.    Urinalysis:      Lab Results   Component Value Date    NITRU NEGATIVE 02/08/2017    WBCUA 5-10 02/08/2017    BACTERIA NONE 02/08/2017    RBCUA 0-2 02/08/2017    BLOODU SMALL 02/08/2017    SPECGRAV 1.014 02/08/2017       Radiology:  XR CHEST PORTABLE   Final Result   1. Minimal reticular linear opacities are seen laterally at the left lung base. This may represent mild atelectasis or pneumonia. Otherwise stable portable chest.            **This report has been created using voice recognition software. It may contain minor errors which are inherent in voice recognition technology. **      Final report electronically signed by Dr. Radha Daly on 10/11/2020 8:58 AM      CTA CHEST [address]   Final Result   1. Negative for acute pulmonary artery embolism. Thoracic aorta of normal    caliber without dissection. 2.  Patchy peripheral groundglass opacities bilaterally suspicious for    viral pneumonitis. 3.  Fatty liver. 4.  Sequelae of prior granulomatous infection. This document has been electronically signed by: Cristofer Grajeda MD on    10/10/2020 02:54 AM      All CT scans at this facility use dose modulation, iterative    reconstruction, and/or weight-based   dosing when appropriate to reduce radiation dose to as low as reasonably    achievable. XR CHEST PORTABLE   Final Result   Impression:   Patchy right upper lobe and left lower lobe infiltrates may reflect    pneumonitis and/or atelectasis. No CHF.       This document has been electronically signed by: Cristofer Grajeda MD on    10/09/2020 11:46 PM               Assessment/Plan:       Acute hypoxic respiratory failure secondary to COVID-19 pneumonia, worsening     -Overnight, patient desaturated, was placed on high flow oxygen 60 L/min, 70% FiO2, today down to 50 L/min, 50% FiO2 ABG today showed pH of 7.42,  -PCO2 43, PO2 70, bicarbonate 27  -Repeat chest x-ray today reviewed  -please see below for COVID-19 pneumonia management  -wean O2 as tolerated       COVID-19 pneumonia     -Chest CTA negative for PE, patchy peripheral groundglass opacities bilaterally suspicious for viral pneumonitis  -still having intermittent fever, Tmax overnight 101.2F  -received 1 unit convalescent plasma on admission. Will give another unit of convalescent plasma today.  -cont decadron and remdesevir  -discussed to patient given history of breast cancer and now having COVID-19 infarction, she's at risk for VTE, recommend eliquis 2.5 mg PO BID x 30 days on discharge per [facility] COVID-19 treatment algorithm. Continue Lovenox for VTE prophylaxis  -cont vitamin C, D, and zinc, ASA  -cont SSI, accu-check and pepcid for GI prophylaxis while on Decadron    Hypokalemia due to diarrhea      -replace per protocol  -BMP in am    -magnesium 1.6, start magnesium oxide ( will closely monitor diarrhea)     Hyponatremia likely from hyperglycemia, resolved     -BMP in am     Leukocytosis likely reactive due to steroids, resolved    -CBC in am      Hyperglycemia due to steroids    -accu-check  -SSI     Anion gap metabolic acidosis, resolved    Left breast cancer    -on chemo     Hyperlipidemia     -not on statin    Hx of hypothyroidism    -cont synthroid     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  Transfer to      Code Status: Full Code        Electronically signed by Aracely Morel MD on 10/11/2020 at 9:19 AM

## 2020-10-11 NOTE — PROGRESS NOTES
Oxygen saturations 85% increased nasal cannula flow rate and switched to non re-breather for saturations remaining 88% and RR 26-32, patient is not reporting SOB, doesn't appear in distress, and no hives or s/s of a transfusion reaction. Patient currently on Venturi mask 50% 12L, and [employer] [facility] aware. RT notified of new order for high flow nasal cannula. Patient is currently 92% and RR of 28.

## 2020-10-11 NOTE — PLAN OF CARE
Problem: Falls - Risk of:  Goal: Will remain free from falls  Description: Will remain free from falls  Outcome: Ongoing  Goal: Absence of physical injury  Description: Absence of physical injury  Outcome: Ongoing     Problem: Respiratory  Goal: O2 Sat > 90%  Outcome: Ongoing     Problem: Respiratory  Goal: No pulmonary complications  Outcome: Not Met This Shift  Goal: Supplemental O2 requirements decreased  Outcome: Not Met This Shift  Note: Patient had desaturation episodes and had to be placed on HFNC currently on 60L 70%. Saturations >94% and RR 18-22.

## 2020-10-11 NOTE — CONSULTS
[facility]      Patient - Ailyn Bermudez   MRN -  [id number]   [facility]   - 1950      Date of Admission -  10/9/2020 10:35 PM  Date of evaluation -  10/11/2020  Room - [address] [place] [address] Abel Gore MD Primary Care Physician - Eunice Landin     Problem List      Active Hospital Problems    Diagnosis Date Noted    COVID-19 [U07.1] 10/10/2020     Reason for Consult    Acute toxic respiratory failure complicated by MRQNA-40 infection  HPI   History Obtained From: Patient and electronic medical record. Ailyn Bermudez is a 79 y.o. female with a past medical history of cancer of the left breast who presented to [facility] on 10/10/2020 with shortness of breath with known exposure to COVID positive patient. Patient states she was exposed to COVID 1 week prior to admission by a friend. Per chart review patient had tested positive for COVID-19 as an outpatient 2 days prior to admission. She states that 2 days prior to admission she noticed she was short of breath and experiencing fevers. He states that the symptoms got worse in the 2 days prior to admission. She also states that she had been having loss of taste and smell. She also reports that she has been having a productive cough during this time as well. She reports the sputum is green in color. She states that her oxygen level was in the low 80s at home prior to calling EMS. She stated that her shortness of breath at that time was made worse by any activity and that there were no relieving factors. Of note patient received her last chemotherapy infusion on 2020. Shaylee Garduno was then admitted to the [facility] 121 unit on 6A. She was started on remdesivir, dexamethasone and convalescent plasma. CTA chest on admission showed patchy peripheral groundglass opacities bilaterally suspicious for viral pneumonia.   Overnight patient had desaturation episodes and had to be placed on high flow nasal cannula. She was then transferred from  to  for higher acuity of care. This morning Sangeeta Talbot states that she is doing well. She reports that she is not short of breath. She continues to report that she has a productive cough. She denies having any chest pain. Her SPO2 is 93% on 50% FiO2 at 50 L/min heated high flow nasal cannula. She was febrile to 100.5 degrees this morning.     PMHx   Past Medical History      Diagnosis Date    Breast cancer ([facility] [address] 2020    left-invasive ductal    Hyperlipidemia     Numbness and tingling     left foot-chronic nerve damage      Past Surgical History        Procedure Laterality Date    ABDOMINAL EXPLORATION SURGERY  2014    Dr butterfield-lysis of adhesions    BREAST LUMPECTOMY Left 11/2015    Left breast lumpectomy, retroareolar with preoperative needle loc-Dr. Floyd Close BREAST SURGERY Left 06/26/2020    biopsy of left breast    CHOLECYSTECTOMY  10/2014    Dr Abdon Wu  03/30/2016    Dr Marlon Villarreal  10/2014    x4 abdominal wall    HYSTERECTOMY      INCISIONAL HERNIA REPAIR  2014    Dr Mejía President LIPECTOMY  2014    Dr Thorne Holding  7/16/2020    [address] LEFT [facility]16/[facility]    PORT SURGERY Right 7/17/2020    SINGLE LUMEN SMART PORT INSERTION RIGHT SUBCLAVIAN performed by Lyle Marroquin MD at [address] [place]  10/2014    Dr Francois-[facility]      patient was 11years old    TUBAL LIGATION       Meds    Current Medications    magnesium oxide  400 mg Oral Daily    sodium chloride  20 mL Intravenous Once    aspirin EC  81 mg Oral Daily    levothyroxine  25 mcg Oral Daily    PARoxetine  10 mg Oral QAM    vitamin C  500 mg Oral Daily    Vitamin D  1,000 Units Oral Daily    zinc sulfate  50 mg Oral Daily    sodium chloride flush  10 mL Intravenous 2 times per day    enoxaparin  40 mg Subcutaneous BID    sodium chloride  20 mL Intravenous Once    dexamethasone  6 mg Intravenous Daily    remdesivir IVPB  100 mg Intravenous Q24H    insulin lispro  0-6 Units Subcutaneous TID WC    insulin lispro  0-3 Units Subcutaneous Nightly    famotidine  20 mg Oral BID     sodium chloride flush, acetaminophen **OR** acetaminophen, polyethylene glycol, promethazine **OR** ondansetron, potassium chloride **OR** potassium alternative oral replacement **OR** potassium chloride, magnesium sulfate, sodium chloride, glucose, dextrose, glucagon (rDNA), dextrose  IV Drips/Infusions   dextrose       Home Medications  Medications Prior to Admission: loperamide (IMODIUM) 2 MG capsule, Take 2 mg by mouth 4 times daily as needed for Diarrhea  lidocaine-prilocaine (EMLA) 2.5-2.5 % cream, Apply topically as needed. levothyroxine (SYNTHROID) 25 MCG tablet, Take 25 mcg by mouth Daily  alendronate (FOSAMAX) 35 MG tablet, Take 35 mg by mouth every 7 days  ondansetron (ZOFRAN ODT) 4 MG disintegrating tablet, Take 1 tablet by mouth every 8 hours as needed for Nausea  PARoxetine (PAXIL) 10 MG tablet, Take 10 mg by mouth every morning  Coenzyme Q10-Fish Oil-Vit E (CO-Q 10 OMEGA-3 FISH OIL PO), Take by mouth daily  Vitamin D (CHOLECALCIFEROL) 1000 UNITS CAPS capsule, Take by mouth daily 2 tab  Cinnamon 500 MG CAPS, Take by mouth daily  Cyanocobalamin (VITAMIN B12 PO), Take 1,000 mcg by mouth daily   [DISCONTINUED] hydrocortisone (ANUSOL-HC) 2.5 % CREA rectal cream, Apply twice a day (Patient not taking: Reported on 10/6/2020)  ibuprofen (ADVIL;MOTRIN) 800 MG tablet, Take 800 mg by mouth every 6 hours as needed for Pain  aspirin EC 81 MG EC tablet, Take 1 tablet by mouth daily  Diet    DIET GENERAL;  Allergies    Aluminum-containing compounds  Social History     Social History     Socioeconomic History    Marital status:       Spouse name: Not on file    Number of children: 4    Years of education: Not on file    Highest education level: Not on file   Occupational History    Not on file   Social Needs    Financial resource strain: Not on file    Food insecurity     Worry: Not on file     Inability: Not on file    Transportation needs     Medical: Not on file     Non-medical: Not on file   Tobacco Use    Smoking status: Never Smoker    Smokeless tobacco: Never Used   Substance and Sexual Activity    Alcohol use: No     Alcohol/week: 0.0 standard drinks    Drug use: No    Sexual activity: Not on file   Lifestyle    Physical activity     Days per week: Not on file     Minutes per session: Not on file    Stress: Not on file   Relationships    Social connections     Talks on phone: Not on file     Gets together: Not on file     Attends [employer]ism service: Not on file     Active member of club or organization: Not on file     Attends meetings of clubs or organizations: Not on file     Relationship status: Not on file    Intimate partner violence     Fear of current or ex partner: Not on file     Emotionally abused: Not on file     Physically abused: Not on file     Forced sexual activity: Not on file   Other Topics Concern    Not on file   Social History Narrative    Not on file     Family History          Problem Relation Age of Onset    Heart Disease Father         cath stent blood to thin and bled    Heart Attack Mother     Other Other         blood clot    Breast Cancer Paternal Aunt 62    High Blood Pressure Sister     Cancer Brother 68        skin    Prostate Cancer Brother         had chemotherapy to treat     Prostate Cancer Brother 64        has had for 10 years    Ovarian Cancer Neg Hx     Diabetes Neg Hx     Stroke Neg Hx      Sleep History    Never diagnosed with sleep apnea in the past.  Occupational history   Occupation:  She is current working: No  Type of profession: retired.                      History of tobacco smoking:No  History of recreational or IV drug use in the past:NO     History of exposure to coal mines/coal dust: NO  History of exposure to foundry dust/welding: NO  History of exposure to quarry/silica/sandblasting: NO  History of exposure to asbestos/working with breaks/ships: NO  History of exposure to farm dust: NO  History of recent travel to long distances: NO  History of exposure to birds, pigeons, or chickens in the past:NO    History of pulmonary embolism in the past: No            History of DVT in the past:No        [] Right lower extremity   [] Left lower extremity. Review of systems   Review of Systems   Constitutional: Positive for fatigue. Negative for chills and fever. HENT: Negative for congestion, postnasal drip, rhinorrhea and sore throat. Eyes: Negative for pain and redness. Respiratory: Positive for cough. Negative for shortness of breath. Cardiovascular: Negative for chest pain and palpitations. Gastrointestinal: Positive for diarrhea (Chronic). Negative for abdominal pain. Genitourinary: Positive for difficulty urinating. Negative for dysuria. Musculoskeletal: Negative for back pain and neck pain. Neurological: Negative for dizziness, weakness, light-headedness, numbness and headaches. Psychiatric/Behavioral: Negative for sleep disturbance. The patient is not nervous/anxious. Vitals     height is 5' 8\" (1.727 m) and weight is 207 lb (93.9 kg). Her oral temperature is 100.5 °F (38.1 °C). Her blood pressure is 114/59 (abnormal) and her pulse is 79. Her respiration is 20 and oxygen saturation is 93%. Body mass index is 31.47 kg/m². SUPPLEMENTAL O2: O2 Flow Rate (L/min): 50 L/min     I/O        Intake/Output Summary (Last 24 hours) at 10/11/2020 1115  Last data filed at 10/11/2020 0016  Gross per 24 hour   Intake 10 ml   Output --   Net 10 ml     I/O last 3 completed shifts:   In: 10 [I.V.:10]  Out: -    Patient Vitals for the past 96 hrs (Last 3 readings):   Weight   10/09/20 2240 207 lb (93.9 kg)       Exam Physical Exam  Vitals signs and nursing note reviewed. Constitutional:       General: She is not in acute distress. Appearance: She is obese. She is ill-appearing. Interventions: Nasal cannula in place. HENT:      Head: Normocephalic and atraumatic. Right Ear: External ear normal.      Left Ear: External ear normal.      Nose: Nose normal. No congestion or rhinorrhea. Mouth/Throat:      Mouth: Mucous membranes are moist.      Pharynx: Oropharynx is clear. Eyes:      General: No scleral icterus. Right eye: No discharge. Left eye: No discharge. Extraocular Movements: Extraocular movements intact. Conjunctiva/sclera: Conjunctivae normal.      Pupils: Pupils are equal, round, and reactive to light. Neck:      Musculoskeletal: Normal range of motion and neck supple. Cardiovascular:      Rate and Rhythm: Normal rate and regular rhythm. Pulses: Normal pulses. Heart sounds: Normal heart sounds. No murmur. Pulmonary:      Effort: Pulmonary effort is normal. No respiratory distress. Breath sounds: Normal breath sounds. No stridor. No wheezing, rhonchi or rales. Abdominal:      General: Abdomen is flat. Bowel sounds are normal. There is no distension. Palpations: Abdomen is soft. Tenderness: There is no abdominal tenderness. Musculoskeletal:      Right lower leg: No edema. Left lower leg: No edema. Skin:     General: Skin is warm and dry. Findings: No erythema or rash. Neurological:      General: No focal deficit present. Mental Status: She is alert and oriented to person, place, and time. Mental status is at baseline. Cranial Nerves: No cranial nerve deficit.    Psychiatric:         Mood and Affect: Mood normal.         Behavior: Behavior normal.         Judgment: Judgment normal.         Labs  - Old records and notes have been reviewed in [facility]   AB  Lab Results   Component Value Date    PH 7.39 02/08/2017 PO2 61 02/08/2017    PCO2 41 02/08/2017    HCO3 24 02/08/2017    O2SAT 91 02/08/2017     No results found for: IFIO2, MODE, SETTIDVOL, SETPEEP  CBC  Recent Labs     10/10/20  0004 10/11/20  0420   WBC 12.4* 9.9   RBC 3.92* 3.78*   HGB 12.4 12.1   HCT 40.2 37.4   .6* 98.9   MCH 31.6 32.0   MCHC 30.8* 32.4    264   MPV 11.4 11.0      BMP  Recent Labs     10/10/20  0004 10/11/20  0420   * 139   K 3.6 3.0*   CL 94* 100   CO2 22* 26   BUN 15 16   CREATININE 0.8 0.7   GLUCOSE 136* 144*   MG  --  1.6   CALCIUM 9.3 8.8     LFT  Recent Labs     10/10/20  0004 10/11/20  0420   AST 45* 30   ALT 24 17   BILITOT 0.2* 0.2*   ALKPHOS 98 77     TROP  Lab Results   Component Value Date    TROPONINT < 0.010 02/08/2017    TROPONINT < 0.010 01/23/2016     BNP  No results for input(s): BNP in the last 72 hours. Lactic Acid  Recent Labs     10/10/20  0004   LACTA 1.6     INR  No results for input(s): INR, PROTIME in the last 72 hours. PTT  No results for input(s): APTT in the last 72 hours. Glucose  Recent Labs     10/10/20  2056 10/11/20  0842   POCGLU 209* 124*     UA No results for input(s): SPECGRAV, PHUR, COLORU, CLARITYU, MUCUS, PROTEINU, BLOODU, RBCUA, WBCUA, BACTERIA, NITRU, GLUCOSEU, BILIRUBINUR, UROBILINOGEN, KETUA, LABCAST, LABCASTTY, AMORPHOS in the last 72 hours. Invalid input(s): CRYSTALS.     PFTs   Pulmonary function test in epic    Sleep studies   No sleep studies in epic    Cultures    Blood cultures pending  Respiratory culture pending  Pro calcitonin: 0.19 10/11/2020  EKG   10/9/2020    Sinus tachycardia with 1st degree A-V block  Minimal voltage criteria for LVH, may be normal variant  Nonspecific T wave abnormality  Abnormal ECG  When compared with ECG of 08-FEB-2017 20:22,  RI interval has increased  Inverted T waves have replaced nonspecific T wave abnormality in Inferior leads  Nonspecific T wave abnormality now evident in Anterolateral leads  Confirmed by Manny Glaser MD, Bessy Mckee ([id number]) on 10/10/2020 11:33:12 PM  Echocardiogram   7/22/2020  Conclusions      Summary   Normal left ventricle size and systolic function. Ejection fraction was   estimated at 55 to 60 %. There were no regional left ventricular wall   motion abnormalities and wall thickness was within normal limits. Doppler parameters were consistent with abnormal left ventricular   relaxation (grade 1 diastolic dysfunction). Signature      ----------------------------------------------------------------   Electronically signed by Mark Florian MD (Interpreting   physician) on 07/22/2020 at 06:45 PM   ----------------------------------------------------------------      Radiology    CXR  10/11/2020  FINDINGS: There is a right subclavian port present with the tip overlying the SVC. The heart size is normal. There is a calcified granuloma at the left midlung. No pleural effusion or pneumothorax is seen. There is a possible small calcified granuloma at the right    lung base. Minimal reticular linear opacities are seen laterally at the left lung base. Impression   1.  Minimal reticular linear opacities are seen laterally at the left lung base. This may represent mild atelectasis or pneumonia. Otherwise stable portable chest.         CT Scans  CTA 10/10/2020  Impression   1. Negative for acute pulmonary artery embolism.  Thoracic aorta of normal   caliber without dissection. 2.  Patchy peripheral groundglass opacities bilaterally suspicious for   viral pneumonitis. 3.  Fatty liver. 4.  Sequelae of prior granulomatous infection. (See actual reports for details)    Assessment   Acute hypoxic respiratory failure secondary to COVID-19 pneumonia- Patient's SPO2 stable at 94% on 50% FiO2 and 50 L/min heated high flow nasal cannula. COVID-19 pneumonia- Given patient's active cancer and current treatment patient is at high risk for a severe course of COVID-19 pneumonia.   Left breast cancer, most recent chemotherapy infusion 9/29/2020, follows with Dr. Lisandra Tamayo  Full code    Plan   Titrate supplemental oxygen for goal SPO2 greater than 90%  Vital signs per unit protocol  Treatment of COVID-19 per protocol with Remdesivir, vitamin D vitamin C and zinc  Continue Decadron  Patient has received convalescent plasma  Follow-up blood and respiratory cultures  DVT prophylaxis with Lovenox  We will continue to follow this patient while in-house      \"Thank you for asking us to see this patient\"    Questions and concerns addressed.     Electronically signed by   Stalin Crouch MD on 10/11/2020 at 11:15 AM

## 2020-10-12 LAB
ALBUMIN SERPL-MCNC: 3.2 G/DL (ref 3.5–5.1)
ALP BLD-CCNC: 71 U/L (ref 38–126)
ALT SERPL-CCNC: 17 U/L (ref 11–66)
ANION GAP SERPL CALCULATED.3IONS-SCNC: 10 MEQ/L (ref 8–16)
AST SERPL-CCNC: 28 U/L (ref 5–40)
AVERAGE GLUCOSE: 156 MG/DL (ref 70–126)
BASOPHILS # BLD: 0.4 %
BASOPHILS ABSOLUTE: 0 THOU/MM3 (ref 0–0.1)
BILIRUB SERPL-MCNC: 0.2 MG/DL (ref 0.3–1.2)
BILIRUBIN DIRECT: < 0.2 MG/DL (ref 0–0.3)
BUN BLDV-MCNC: 16 MG/DL (ref 7–22)
CALCIUM SERPL-MCNC: 8.7 MG/DL (ref 8.5–10.5)
CHLORIDE BLD-SCNC: 104 MEQ/L (ref 98–111)
CO2: 26 MEQ/L (ref 23–33)
CREAT SERPL-MCNC: 0.5 MG/DL (ref 0.4–1.2)
EOSINOPHIL # BLD: 0 %
EOSINOPHILS ABSOLUTE: 0 THOU/MM3 (ref 0–0.4)
ERYTHROCYTE [DISTWIDTH] IN BLOOD BY AUTOMATED COUNT: 15.3 % (ref 11.5–14.5)
ERYTHROCYTE [DISTWIDTH] IN BLOOD BY AUTOMATED COUNT: 55.1 FL (ref 35–45)
GFR SERPL CREATININE-BSD FRML MDRD: > 90 ML/MIN/1.73M2
GLUCOSE BLD-MCNC: 118 MG/DL (ref 70–108)
GLUCOSE BLD-MCNC: 122 MG/DL (ref 70–108)
GLUCOSE BLD-MCNC: 212 MG/DL (ref 70–108)
HBA1C MFR BLD: 7.2 % (ref 4.4–6.4)
HCT VFR BLD CALC: 36.1 % (ref 37–47)
HEMOGLOBIN: 11.7 GM/DL (ref 12–16)
IMMATURE GRANS (ABS): 0.27 THOU/MM3 (ref 0–0.07)
IMMATURE GRANULOCYTES: 2.6 %
LYMPHOCYTES # BLD: 34.7 %
LYMPHOCYTES ABSOLUTE: 3.6 THOU/MM3 (ref 1–4.8)
MCH RBC QN AUTO: 31.9 PG (ref 26–33)
MCHC RBC AUTO-ENTMCNC: 32.4 GM/DL (ref 32.2–35.5)
MCV RBC AUTO: 98.4 FL (ref 81–99)
MONOCYTES # BLD: 6.5 %
MONOCYTES ABSOLUTE: 0.7 THOU/MM3 (ref 0.4–1.3)
NUCLEATED RED BLOOD CELLS: 0 /100 WBC
PLATELET # BLD: 267 THOU/MM3 (ref 130–400)
PMV BLD AUTO: 11.3 FL (ref 9.4–12.4)
POTASSIUM SERPL-SCNC: 3.3 MEQ/L (ref 3.5–5.2)
RBC # BLD: 3.67 MILL/MM3 (ref 4.2–5.4)
SEG NEUTROPHILS: 55.8 %
SEGMENTED NEUTROPHILS ABSOLUTE COUNT: 5.7 THOU/MM3 (ref 1.8–7.7)
SODIUM BLD-SCNC: 140 MEQ/L (ref 135–145)
TOTAL PROTEIN: 6 G/DL (ref 6.1–8)
WBC # BLD: 10.3 THOU/MM3 (ref 4.8–10.8)

## 2020-10-12 PROCEDURE — 2580000003 HC RX 258: Performed by: FAMILY MEDICINE

## 2020-10-12 PROCEDURE — 2700000000 HC OXYGEN THERAPY PER DAY

## 2020-10-12 PROCEDURE — 6360000002 HC RX W HCPCS: Performed by: PHYSICIAN ASSISTANT

## 2020-10-12 PROCEDURE — 6370000000 HC RX 637 (ALT 250 FOR IP): Performed by: FAMILY MEDICINE

## 2020-10-12 PROCEDURE — 2060000000 HC ICU INTERMEDIATE R&B

## 2020-10-12 PROCEDURE — 6370000000 HC RX 637 (ALT 250 FOR IP): Performed by: PHYSICIAN ASSISTANT

## 2020-10-12 PROCEDURE — 85025 COMPLETE CBC W/AUTO DIFF WBC: CPT

## 2020-10-12 PROCEDURE — 94761 N-INVAS EAR/PLS OXIMETRY MLT: CPT

## 2020-10-12 PROCEDURE — 2580000003 HC RX 258: Performed by: INTERNAL MEDICINE

## 2020-10-12 PROCEDURE — 87040 BLOOD CULTURE FOR BACTERIA: CPT

## 2020-10-12 PROCEDURE — 99232 SBSQ HOSP IP/OBS MODERATE 35: CPT | Performed by: INTERNAL MEDICINE

## 2020-10-12 PROCEDURE — 82248 BILIRUBIN DIRECT: CPT

## 2020-10-12 PROCEDURE — 80053 COMPREHEN METABOLIC PANEL: CPT

## 2020-10-12 PROCEDURE — 99232 SBSQ HOSP IP/OBS MODERATE 35: CPT | Performed by: FAMILY MEDICINE

## 2020-10-12 PROCEDURE — 82948 REAGENT STRIP/BLOOD GLUCOSE: CPT

## 2020-10-12 PROCEDURE — 2500000003 HC RX 250 WO HCPCS: Performed by: INTERNAL MEDICINE

## 2020-10-12 PROCEDURE — 83036 HEMOGLOBIN GLYCOSYLATED A1C: CPT

## 2020-10-12 PROCEDURE — 36415 COLL VENOUS BLD VENIPUNCTURE: CPT

## 2020-10-12 RX ORDER — ALENDRONATE SODIUM 35 MG/1
35 TABLET ORAL
Status: DISCONTINUED | OUTPATIENT
Start: 2020-10-12 | End: 2020-10-12 | Stop reason: CLARIF

## 2020-10-12 RX ORDER — SODIUM CHLORIDE 9 MG/ML
INJECTION, SOLUTION INTRAVENOUS CONTINUOUS
Status: DISCONTINUED | OUTPATIENT
Start: 2020-10-12 | End: 2020-10-13

## 2020-10-12 RX ADMIN — Medication 1000 UNITS: at 09:38

## 2020-10-12 RX ADMIN — DEXAMETHASONE SODIUM PHOSPHATE 6 MG: 4 INJECTION, SOLUTION INTRAMUSCULAR; INTRAVENOUS at 09:39

## 2020-10-12 RX ADMIN — OXYCODONE HYDROCHLORIDE AND ACETAMINOPHEN 500 MG: 500 TABLET ORAL at 09:48

## 2020-10-12 RX ADMIN — PAROXETINE HYDROCHLORIDE 10 MG: 20 TABLET, FILM COATED ORAL at 09:38

## 2020-10-12 RX ADMIN — ENOXAPARIN SODIUM 40 MG: 40 INJECTION SUBCUTANEOUS at 09:48

## 2020-10-12 RX ADMIN — BACITRACIN, NEOMYCIN, POLYMYXIN B 1 G: 400; 3.5; 5 OINTMENT TOPICAL at 20:04

## 2020-10-12 RX ADMIN — FAMOTIDINE 20 MG: 20 TABLET, FILM COATED ORAL at 20:03

## 2020-10-12 RX ADMIN — Medication 50 MG: at 09:38

## 2020-10-12 RX ADMIN — FAMOTIDINE 20 MG: 20 TABLET, FILM COATED ORAL at 09:39

## 2020-10-12 RX ADMIN — ENOXAPARIN SODIUM 40 MG: 40 INJECTION SUBCUTANEOUS at 20:03

## 2020-10-12 RX ADMIN — REMDESIVIR 100 MG: 5 INJECTION INTRAVENOUS at 09:46

## 2020-10-12 RX ADMIN — BACITRACIN, NEOMYCIN, POLYMYXIN B 1 G: 400; 3.5; 5 OINTMENT TOPICAL at 09:52

## 2020-10-12 RX ADMIN — LEVOTHYROXINE SODIUM 25 MCG: 25 TABLET ORAL at 07:02

## 2020-10-12 RX ADMIN — ASPIRIN 81 MG: 81 TABLET ORAL at 09:39

## 2020-10-12 RX ADMIN — ACETAMINOPHEN 650 MG: 325 TABLET ORAL at 01:27

## 2020-10-12 RX ADMIN — INSULIN LISPRO 1 UNITS: 100 INJECTION, SOLUTION INTRAVENOUS; SUBCUTANEOUS at 20:05

## 2020-10-12 RX ADMIN — SODIUM CHLORIDE: 9 INJECTION, SOLUTION INTRAVENOUS at 09:15

## 2020-10-12 RX ADMIN — MAGNESIUM GLUCONATE 500 MG ORAL TABLET 400 MG: 500 TABLET ORAL at 09:39

## 2020-10-12 ASSESSMENT — PAIN SCALES - GENERAL
PAINLEVEL_OUTOF10: 0
PAINLEVEL_OUTOF10: 4
PAINLEVEL_OUTOF10: 0
PAINLEVEL_OUTOF10: 0

## 2020-10-12 NOTE — CONSULTS
[facility]      Patient - Annabelle Henning   MRN -  [id number]   Acct # - [de-identified]   - 1950      Date of Admission -  10/9/2020 10:35 PM  Date of evaluation -  10/12/2020  Room - [facility] [address], MD Primary Care Physician - [facility]     Problem List      Active Hospital Problems    Diagnosis Date Noted    Acute respiratory failure with hypoxia ([facility] [address] [J96.01]     Acute respiratory failure due to COVID-19 ([address] [U07.1, J96.00]     Pneumonia due to COVID-19 virus [U07.1, J12.89]     Hyponatremia [E87.1]     Leukocytosis [D72.829]     Hyperglycemia [R73.9]     High anion gap metabolic acidosis [E69.2]     History of left breast cancer [Z85.3]     Hyperlipidemia [E78.5]     History of hypothyroidism [Z86.39]     Chronic diarrhea [K52.9]     COVID-19 virus infection [U07.1] 10/10/2020     Reason for Consult    To follow acute hypoxic respiratory failure due to COVID infection. HPI   History Obtained From: Patient and electronic medical record. Annabelle Henning is a 79 y.o. female with a past medical history of cancer of the left breast who presented to [facility] on 10/10/2020 with shortness of breath with known exposure to COVID positive patient. Patient states she was exposed to COVID 1 week prior to admission by a friend. Per chart review patient had tested positive for COVID-19 as an outpatient 2 days prior to admission. She states that 2 days prior to admission she noticed she was short of breath and experiencing fevers. He states that the symptoms got worse in the 2 days prior to admission. She also states that she had been having loss of taste and smell. She also reports that she has been having a productive cough during this time as well. She reports the sputum is green in color. She states that her oxygen level was in the low 80s at home prior to calling EMS.   She stated that her shortness of breath at that time was made worse by any activity and that there were no relieving factors. Of note patient received her last chemotherapy infusion on 9/29/2020. Griselda Maya was then admitted to the [place] [address] unit on 6A. She was started on remdesivir, dexamethasone and convalescent plasma. CTA chest on admission showed patchy peripheral groundglass opacities bilaterally suspicious for viral pneumonia. Overnight patient had desaturation episodes and had to be placed on high flow nasal cannula. She was then transferred from  to  for higher acuity of care. This morning Griselda Maya states that she is doing well. She reports that she is not short of breath. She continues to report that she has a productive cough. She denies having any chest pain. Her SPO2 is 93% on 50% FiO2 at 50 L/min heated high flow nasal cannula. She was febrile to 100.5 degrees this morning. Subjective/Events Past 24 hours/ROS   She still remain on HI Flow. Not in any distress. No chest pain. Tmax in the last 24h:100.5F  Rest of the review of systems performed.     PMHx   Past Medical History      Diagnosis Date    Breast cancer ([facility] [address] 2020    left-invasive ductal    Hyperlipidemia     Numbness and tingling     left foot-chronic nerve damage      Past Surgical History        Procedure Laterality Date    ABDOMINAL EXPLORATION SURGERY  2014    Dr butterfield-lysis of adhesions    BREAST LUMPECTOMY Left 11/2015    Left breast lumpectomy, retroareolar with preoperative needle loc-Dr. Swain Ace BREAST SURGERY Left 06/26/2020    biopsy of left breast    CHOLECYSTECTOMY  10/2014    Dr Blaze Jacques  03/30/2016    Dr Adriel Canas  10/2014    x4 abdominal wall    HYSTERECTOMY      INCISIONAL HERNIA REPAIR  2014    Dr Vinicius Buchanan LIPECTOMY  2014    Dr Cullen Villar  7/16/2020    [address] LEFT 7/16/2020 Raheem Samaniego MD [id number] SURGERY Right 7/17/2020    SINGLE LUMEN SMART PORT INSERTION RIGHT SUBCLAVIAN performed by Everett Ordonez MD at [address] [facility]  10/2014    Dr Franc[facility]      patient was 11years old    TUBAL LIGATION       Meds    Current Medications    magnesium oxide  400 mg Oral Daily    sodium chloride  20 mL Intravenous Once    neomycin-bacitracin-polymyxin   Topical BID    aspirin EC  81 mg Oral Daily    levothyroxine  25 mcg Oral Daily    PARoxetine  10 mg Oral QAM    vitamin C  500 mg Oral Daily    Vitamin D  1,000 Units Oral Daily    zinc sulfate  50 mg Oral Daily    sodium chloride flush  10 mL Intravenous 2 times per day    enoxaparin  40 mg Subcutaneous BID    sodium chloride  20 mL Intravenous Once    dexamethasone  6 mg Intravenous Daily    remdesivir IVPB  100 mg Intravenous Q24H    insulin lispro  0-6 Units Subcutaneous TID WC    insulin lispro  0-3 Units Subcutaneous Nightly    famotidine  20 mg Oral BID     loperamide, sodium chloride flush, acetaminophen **OR** acetaminophen, polyethylene glycol, promethazine **OR** ondansetron, potassium chloride **OR** potassium alternative oral replacement **OR** potassium chloride, magnesium sulfate, sodium chloride, glucose, dextrose, glucagon (rDNA), dextrose  IV Drips/Infusions   sodium chloride 50 mL/hr at 10/12/20 0915    dextrose       Home Medications  Medications Prior to Admission: loperamide (IMODIUM) 2 MG capsule, Take 2 mg by mouth 4 times daily as needed for Diarrhea  lidocaine-prilocaine (EMLA) 2.5-2.5 % cream, Apply topically as needed.   levothyroxine (SYNTHROID) 25 MCG tablet, Take 25 mcg by mouth Daily  alendronate (FOSAMAX) 35 MG tablet, Take 35 mg by mouth every 7 days  ondansetron (ZOFRAN ODT) 4 MG disintegrating tablet, Take 1 tablet by mouth every 8 hours as needed for Nausea  PARoxetine (PAXIL) 10 MG tablet, Take 10 mg by mouth every morning  Coenzyme Q10-Fish Oil-Vit E (CO-Q 10 OMEGA-3 FISH OIL PO), Take by mouth daily  Vitamin D (CHOLECALCIFEROL) 1000 UNITS CAPS capsule, Take by mouth daily 2 tab  Cinnamon 500 MG CAPS, Take by mouth daily  Cyanocobalamin (VITAMIN B12 PO), Take 1,000 mcg by mouth daily   [DISCONTINUED] hydrocortisone (ANUSOL-HC) 2.5 % CREA rectal cream, Apply twice a day (Patient not taking: Reported on 10/6/2020)  ibuprofen (ADVIL;MOTRIN) 800 MG tablet, Take 800 mg by mouth every 6 hours as needed for Pain  aspirin EC 81 MG EC tablet, Take 1 tablet by mouth daily  Diet    DIET GENERAL;  Allergies    Aluminum-containing compounds  Social History     Social History     Socioeconomic History    Marital status:       Spouse name: Not on file    Number of children: 3    Years of education: Not on file    Highest education level: Not on file   Occupational History    Not on file   Social Needs    Financial resource strain: Not on file    Food insecurity     Worry: Not on file     Inability: Not on file    Transportation needs     Medical: Not on file     Non-medical: Not on file   Tobacco Use    Smoking status: Never Smoker    Smokeless tobacco: Never Used   Substance and Sexual Activity    Alcohol use: No     Alcohol/week: 0.0 standard drinks    Drug use: No    Sexual activity: Not on file   Lifestyle    Physical activity     Days per week: Not on file     Minutes per session: Not on file    Stress: Not on file   Relationships    Social connections     Talks on phone: Not on file     Gets together: Not on file     Attends Sikhism service: Not on file     Active member of club or organization: Not on file     Attends meetings of clubs or organizations: Not on file     Relationship status: Not on file    Intimate partner violence     Fear of current or ex partner: Not on file     Emotionally abused: Not on file     Physically abused: Not on file     Forced sexual activity: Not on file   Other Topics Concern    Not on file   Social History Narrative    Not on file     Family History          Problem Relation Age of Onset    Heart Disease Father         cath stent blood to thin and bled    Heart Attack Mother     Other Other         blood clot    Breast Cancer Paternal Aunt 62    High Blood Pressure Sister     Cancer Brother 68        skin    Prostate Cancer Brother         had chemotherapy to treat     Prostate Cancer Brother 64        has had for 10 years    Ovarian Cancer Neg Hx     Diabetes Neg Hx     Stroke Neg Hx      Sleep History    Never diagnosed with sleep apnea in the past.  Occupational history   Occupation:  She is current working: No  Type of profession: retired. History of tobacco smoking:No  History of recreational or IV drug use in the past:NO     History of exposure to coal mines/coal dust: NO  History of exposure to foundry dust/welding: NO  History of exposure to quarry/silica/sandblasting: NO  History of exposure to asbestos/working with breaks/ships: NO  History of exposure to farm dust: NO  History of recent travel to long distances: NO  History of exposure to birds, pigeons, or chickens in the past:NO    History of pulmonary embolism in the past: No            History of DVT in the past:No        [] Right lower extremity   [] Left lower extremity. Vitals     height is 5' 8\" (1.727 m) and weight is 207 lb (93.9 kg). Her oral temperature is 97.9 °F (36.6 °C). Her blood pressure is 95/54 (abnormal) and her pulse is 62. Her respiration is 22 and oxygen saturation is 99%. Body mass index is 31.47 kg/m². SUPPLEMENTAL O2: O2 Flow Rate (L/min): 50 L/min     I/O        Intake/Output Summary (Last 24 hours) at 10/12/2020 1300  Last data filed at 10/12/2020 1030  Gross per 24 hour   Intake 120 ml   Output --   Net 120 ml     No intake/output data recorded.    Patient Vitals for the past 96 hrs (Last 3 readings):   Weight   10/09/20 2240 207 lb (93.9 kg)       Exam   Constitutional: Patient appears in no distress on Hi Flow. Mouth/Throat: Oropharynx is clear and moist.  No oral thrush. Neck: Neck supple. Cardiovascular: S1 and S2 heard. No murmurs. Pulmonary/Chest: Bilateral air entry present. Good breath sounds on both sides. No wheezes. No rales. Abdominal: Soft. No tenderness. Extremities: Patient exhibits no edema. Neurological: Patient is awake and alert. Labs  - Old records and notes have been reviewed in [facility]   ABG  Lab Results   Component Value Date    PH 7.42 10/11/2020    PO2 70 10/11/2020    PCO2 43 10/11/2020    HCO3 27 10/11/2020    O2SAT 94 10/11/2020     Lab Results   Component Value Date    IFIO2 50 10/11/2020     CBC  Recent Labs     10/10/20  0004 10/11/20  0420 10/12/20  0533   WBC 12.4* 9.9 10.3   RBC 3.92* 3.78* 3.67*   HGB 12.4 12.1 11.7*   HCT 40.2 37.4 36.1*   .6* 98.9 98.4   MCH 31.6 32.0 31.9   MCHC 30.8* 32.4 32.4    264 267   MPV 11.4 11.0 11.3      BMP  Recent Labs     10/10/20  0004 10/11/20  0420 10/12/20  0533   * 139 140   K 3.6 3.0* 3.3*   CL 94* 100 104   CO2 22* 26 26   BUN 15 16 16   CREATININE 0.8 0.7 0.5   GLUCOSE 136* 144* 118*   MG  --  1.6  --    CALCIUM 9.3 8.8 8.7     LFT  Recent Labs     10/10/20  0004 10/11/20  0420 10/12/20  0533   AST 45* 30 28   ALT 24 17 17   BILITOT 0.2* 0.2* 0.2*   ALKPHOS 98 77 71     TROP  Lab Results   Component Value Date    TROPONINT < 0.010 02/08/2017    TROPONINT < 0.010 01/23/2016     BNP  No results for input(s): BNP in the last 72 hours. Lactic Acid  Recent Labs     10/10/20  0004   LACTA 1.6     INR  No results for input(s): INR, PROTIME in the last 72 hours. PTT  No results for input(s): APTT in the last 72 hours.   Glucose  Recent Labs     10/11/20  1817 10/11/20  2152 10/12/20  0912   POCGLU 152* 126* 122*     UA No results for input(s): Papo Gabriel, COLORU, CLARITYU, MUCUS, PROTEINU, BLOODU, RBCUA, WBCUA, BACTERIA, NITRU, GLUCOSEU, BILIRUBINUR, UROBILINOGEN, KETUA, LABCAST, LABCASTTY, AMORPHOS in the last 72 hours. Invalid input(s): CRYSTALS. PFTs   Pulmonary function test in epic    Sleep studies   No sleep studies in epic    Cultures    Blood cultures pending  Respiratory culture pending  Pro calcitonin: 0.19 10/11/2020  EKG   10/9/2020    Sinus tachycardia with 1st degree A-V block  Minimal voltage criteria for LVH, may be normal variant  Nonspecific T wave abnormality  Abnormal ECG  When compared with ECG of 08-FEB-2017 20:22,  AL interval has increased  Inverted T waves have replaced nonspecific T wave abnormality in Inferior leads  Nonspecific T wave abnormality now evident in Anterolateral leads  Confirmed by Corinne Gerardo MD, Naren Rowland ([id number]) on 10/10/2020 11:33:12 PM  Echocardiogram   7/22/2020  Conclusions      Summary   Normal left ventricle size and systolic function. Ejection fraction was   estimated at 55 to 60 %. There were no regional left ventricular wall   motion abnormalities and wall thickness was within normal limits. Doppler parameters were consistent with abnormal left ventricular   relaxation (grade 1 diastolic dysfunction). Signature      ----------------------------------------------------------------   Electronically signed by Jayden Shah MD (Interpreting   physician) on 07/22/2020 at 06:45 PM   ----------------------------------------------------------------      Radiology    CXR  10/11/2020  FINDINGS: There is a right subclavian port present with the tip overlying the SVC. The heart size is normal. There is a calcified granuloma at the left midlung. No pleural effusion or pneumothorax is seen. There is a possible small calcified granuloma at the right    lung base. Minimal reticular linear opacities are seen laterally at the left lung base. Impression   1.  Minimal reticular linear opacities are seen laterally at the left lung base. This may represent mild atelectasis or pneumonia.  Otherwise stable portable chest.         CT Scans  CTA 10/10/2020  Impression 1. Negative for acute pulmonary artery embolism.  Thoracic aorta of normal   caliber without dissection. 2.  Patchy peripheral groundglass opacities bilaterally suspicious for   viral pneumonitis. 3.  Fatty liver. 4.  Sequelae of prior granulomatous infection. (See actual reports for details)    Assessment   Acute hypoxic respiratory failure secondary to COVID-19 pneumonia. She is clinically stable. Not in any distresss. COVID-19 pneumonia- Given patient's active cancer and current treatment patient is at high risk for a severe course of COVID-19 pneumonia. Left breast cancer, most recent chemotherapy infusion 9/29/2020, follows with Dr. Sandy Colon   Titrate supplemental oxygen for goal SPO2 greater than 90%  Vital signs per unit protocol  Treatment of COVID-19 per protocol with Remdesivir  Continue Decadron  Patient has received convalescent plasma  Follow-up blood and respiratory cultures  DVT prophylaxis with Lovenox  We will continue to follow this patient while in-house.  -Discussed with MD Nicole regarding patient condition and management plan. Nayely Johana educated about my impression and plan. She verbalizes understanding. Questions and concerns addressed.     Electronically signed by   Sotero Vegas MD on 10/12/2020 at 1:00 PM

## 2020-10-12 NOTE — CARE COORDINATION
10/12/20, 7:27 AM EDT  DISCHARGE PLANNING EVALUATION:    Kailyn Marv       Admitted from: ED 10/9/2020/ Galileo Raza. day: 2   Location: [facility]/009-A Reason for admit: COVID-19 [U07.1] Status: IP  Admit order signed?: yes  PMH:  has a past medical history of Breast cancer ([address] Hyperlipidemia, and Numbness and tingling. Procedure: na  Pertinent abnormal Imaging:  10/9 CXR  Patchy right upper lobe and left lower lobe infiltrates may reflect   pneumonitis and/or atelectasis  10/10  CTA chest  Patchy peripheral groundglass opacities bilaterally suspicious for   viral pneumonitis. 10/11 CXR  Minimal reticular linear opacities are seen laterally at the left lung base. This may represent mild atelectasis or pneumonia. Medications:  Scheduled Meds:   magnesium oxide  400 mg Oral Daily    sodium chloride  20 mL Intravenous Once    neomycin-bacitracin-polymyxin   Topical BID    aspirin EC  81 mg Oral Daily    levothyroxine  25 mcg Oral Daily    PARoxetine  10 mg Oral QAM    vitamin C  500 mg Oral Daily    Vitamin D  1,000 Units Oral Daily    zinc sulfate  50 mg Oral Daily    sodium chloride flush  10 mL Intravenous 2 times per day    enoxaparin  40 mg Subcutaneous BID    sodium chloride  20 mL Intravenous Once    dexamethasone  6 mg Intravenous Daily    remdesivir IVPB  100 mg Intravenous Q24H    insulin lispro  0-6 Units Subcutaneous TID WC    insulin lispro  0-3 Units Subcutaneous Nightly    famotidine  20 mg Oral BID     Continuous Infusions:   dextrose        Pertinent Info/Orders/Treatment Plan: To ED with SOB; know chemo and radiation patient for left breast CA, Covid +    Telemetry, droplet + precautions, high flow oxygen 50L/min, 50%, sats 97%,  Vitamin C, Vit D, and zinc, ASA, Lovenox, diabetic management with ISS. Diet: DIET GENERAL;   Smoking status:  reports that she has never smoked.  She has never used smokeless tobacco.   PCP: Augusto Alonso  Readmission 30 days or less: no  Readmission Risk Score: 14%    Discharge Planning Evaluation  Current Residence:  Private Residence  Living Arrangements:  Alone   Support Systems:  Children, Family Members  Current Services PTA:     Potential Assistance Needed:  N/A  Potential Assistance Purchasing Medications:  No  Does patient want to participate in local refill/ meds to beds program?  No  Type of Home Care Services:  None  Patient expects to be discharged to:  Home  Expected Discharge date:  10/13/20  Follow Up Appointment: Best Day/ Time: Monday AM    Patient Goals/Plan/Treatment Preferences: Met with Mynor Gilbert; she lives home alone, has niece Alvin Zuleta close-by who provides transportation. Has local PCP, is able to afford medicines, and has home oxygen at 2L/min thru [employer]. She declines HH or needs currently. Transportation/Food Security/Housekeeping Addressed:  No issues identified.     Evaluation: no

## 2020-10-12 NOTE — PROGRESS NOTES
Hospitalist Progress Note    Patient:  Mary Lao      Unit/Bed:4B-09/009-A    YOB: 1950    MRN: [id number]       Acct: [de-identified]     PCP: Maxim Trent    Date of Admission: 10/9/2020    Chief Complaint: Shortness of breath    Hospital Course:     Briefly, this is a 68-year-old female, with past medical history of breast cancer, hyperlipidemia, who presented to [facility] on 10/19/2020 due to shortness of breath. The patient was exposed to her ran out a week ago was diagnosed with COVID-19. Per H&P note, \"The patient had concerns as she is a cancer patient and had her test in the outpatient setting two days prior to presentation. Over the two days since her diagnosis she has had progressive shortness of breath and fevers. The patient has been able to eat and drink. She has had low grade fevers at home along with loss of taste and smell. The patient is currently taking chemo for breast cancer. \"     The patient was tested positive for COVID-19 2 days prior to admission in outside facility. Patient was admitted under hospital medicine service for COVID-19 infection. 10/11: overnight, pt desaturated, now on high flow O2    10/12: still on high flow     Subjective:     Patient seen and examined. Pt reports that she still coughing. She still short of breath though she said its not worsenin. Denies sob, chest pain, abd pain, N/V. Has chronic diarrhea due to chemo. Last BM was last night, still diarrhea.        Medications:  Reviewed    Infusion Medications    sodium chloride      dextrose       Scheduled Medications    magnesium oxide  400 mg Oral Daily    sodium chloride  20 mL Intravenous Once    neomycin-bacitracin-polymyxin   Topical BID    aspirin EC  81 mg Oral Daily    levothyroxine  25 mcg Oral Daily    PARoxetine  10 mg Oral QAM    vitamin C  500 mg Oral Daily    Vitamin D  1,000 Units Oral Daily    zinc sulfate  50 mg Oral Daily    sodium chloride flush  10 mL Intravenous 2 times per day    enoxaparin  40 mg Subcutaneous BID    sodium chloride  20 mL Intravenous Once    dexamethasone  6 mg Intravenous Daily    remdesivir IVPB  100 mg Intravenous Q24H    insulin lispro  0-6 Units Subcutaneous TID     insulin lispro  0-3 Units Subcutaneous Nightly    famotidine  20 mg Oral BID     PRN Meds: loperamide, sodium chloride flush, acetaminophen **OR** acetaminophen, polyethylene glycol, promethazine **OR** ondansetron, potassium chloride **OR** potassium alternative oral replacement **OR** potassium chloride, magnesium sulfate, sodium chloride, glucose, dextrose, glucagon (rDNA), dextrose    No intake or output data in the 24 hours ending 10/12/20 0837    Diet:  DIET GENERAL;    Exam:  BP (!) 95/53   Pulse 62   Temp 96.7 °F (35.9 °C) (Oral)   Resp 20   Ht 5' 8\" (1.727 m)   Wt 207 lb (93.9 kg)   SpO2 97%   BMI 31.47 kg/m²     General appearance: alert, not in acute distress  HEENT: PERRLA, clear oral mucosa  Chest: on high flow O2, clear to auscultation, fine rales on LLL, no wheezing, no rhonchi  CVS exam: normal rate, regular rhythm, normal S1, S2, no murmurs, rubs, clicks or gallops. Abdominal exam: non-distended, soft, non-tender, no guarding, no masses or organomegaly.   Neurological exam reveals alert, oriented, normal speech, no focal findings or movement disorder noted  Exam of extremities: peripheral pulses normal, no pedal edema, no clubbing or cyanosis        Labs:   Recent Labs     10/10/20  0004 10/11/20  0420 10/12/20  0533   WBC 12.4* 9.9 10.3   HGB 12.4 12.1 11.7*   HCT 40.2 37.4 36.1*    264 267     Recent Labs     10/10/20  0004 10/11/20  0420 10/12/20  0533   * 139 140   K 3.6 3.0* 3.3*   CL 94* 100 104   CO2 22* 26 26   BUN 15 16 16   CREATININE 0.8 0.7 0.5   CALCIUM 9.3 8.8 8.7     Recent Labs     10/10/20  0004 10/11/20  0420 10/12/20  0533   AST 45* 30 28   ALT 24 17 17   BILIDIR  --  <0.2 <0.2   BILITOT 0.2* 0.2* 0.2*   ALKPHOS 98 77 71     No results for input(s): INR in the last 72 hours. No results for input(s): Filemon Beery in the last 72 hours. Urinalysis:      Lab Results   Component Value Date    NITRU NEGATIVE 02/08/2017    WBCUA 5-10 02/08/2017    BACTERIA NONE 02/08/2017    RBCUA 0-2 02/08/2017    BLOODU SMALL 02/08/2017    SPECGRAV 1.014 02/08/2017       Radiology:  XR CHEST PORTABLE   Final Result   1. Minimal reticular linear opacities are seen laterally at the left lung base. This may represent mild atelectasis or pneumonia. Otherwise stable portable chest.            **This report has been created using voice recognition software. It may contain minor errors which are inherent in voice recognition technology. **      Final report electronically signed by Dr. Meaghan Kim on 10/11/2020 8:58 AM      CTA CHEST [address]   Final Result   1. Negative for acute pulmonary artery embolism. Thoracic aorta of normal    caliber without dissection. 2.  Patchy peripheral groundglass opacities bilaterally suspicious for    viral pneumonitis. 3.  Fatty liver. 4.  Sequelae of prior granulomatous infection. This document has been electronically signed by: Thomas Rosenbaum MD on    10/10/2020 02:54 AM      All CT scans at this facility use dose modulation, iterative    reconstruction, and/or weight-based   dosing when appropriate to reduce radiation dose to as low as reasonably    achievable. XR CHEST PORTABLE   Final Result   Impression:   Patchy right upper lobe and left lower lobe infiltrates may reflect    pneumonitis and/or atelectasis. No CHF.       This document has been electronically signed by: Thomas Rosenbaum MD on    10/09/2020 11:46 PM         XR CHEST PORTABLE    (Results Pending)         Assessment/Plan:       Acute hypoxic respiratory failure secondary to COVID-19 pneumonia, worsening     -on 10/10 Overnight, patient desaturated, was placed on high flow oxygen 60 L/min, 70% FiO2, is able to wean down to 50 L/min to 50% FiO2 this morning, now back to 60 L/min O2, 70% FiO2 again. -ABG on 10/11: pH 7.42, PCO2 43, PO2 70, bicarbonate 27  -Repeat chest x-ray reviewed  -please see below for COVID-19 pneumonia management  -wean O2 as tolerated   -cxr in am       COVID-19 pneumonia     -Chest CTA negative for PE, patchy peripheral groundglass opacities bilaterally suspicious for viral pneumonitis  -afebrile overnight   -received 1 unit convalescent plasma on admission. Received another unit of convalescent plasma on 10/11.  -cont decadron and remdesevir  -discussed to patient given history of breast cancer and now having COVID-19 infarction, she's at risk for VTE, recommend eliquis 2.5 mg PO BID x 30 days on discharge per [facility] COVID-19 treatment algorithm.   Continue Lovenox for VTE prophylaxis  -cont vitamin C, D, and zinc, ASA  -cont SSI, accu-check and pepcid for GI prophylaxis while on Decadron    Hypokalemia due to diarrhea      -replace per protocol  -BMP in am    -magnesium 1.6, start magnesium oxide ( will closely monitor diarrhea)     Hypertension, likely due to dehydration secondary to diarrhea and poor oral intake    -Start gentle IV fluid hydration  -Vital signs per protocol    Hyponatremia likely from hyperglycemia, resolved     -BMP in am     Leukocytosis likely reactive due to steroids, resolved    -CBC in am      Hyperglycemia due to steroids    -accu-check  -SSI    Elevated A1c    -A1c 7.2%  -Recheck in 1 week to confirm DM   -diabetic diet       Anion gap metabolic acidosis, resolved    Left breast cancer    -on chemo     Hyperlipidemia     -not on statin    Hx of hypothyroidism    -cont synthroid     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

## 2020-10-13 ENCOUNTER — APPOINTMENT (OUTPATIENT)
Dept: GENERAL RADIOLOGY | Age: 70
DRG: 177 | End: 2020-10-13
Payer: MEDICARE

## 2020-10-13 VITALS — WEIGHT: 207.01 LBS | HEIGHT: 68 IN | BODY MASS INDEX: 31.37 KG/M2

## 2020-10-13 PROBLEM — E43 SEVERE MALNUTRITION (HCC): Status: ACTIVE | Noted: 2020-10-13

## 2020-10-13 LAB
ALBUMIN SERPL-MCNC: 3.1 G/DL (ref 3.5–5.1)
ALLEN TEST: POSITIVE
ALP BLD-CCNC: 68 U/L (ref 38–126)
ALT SERPL-CCNC: 13 U/L (ref 11–66)
ANION GAP SERPL CALCULATED.3IONS-SCNC: 11 MEQ/L (ref 8–16)
AST SERPL-CCNC: 23 U/L (ref 5–40)
BASE EXCESS (CALCULATED): 2.5 MMOL/L (ref -2.5–2.5)
BASOPHILS # BLD: 0.3 %
BASOPHILS ABSOLUTE: 0 THOU/MM3 (ref 0–0.1)
BILIRUB SERPL-MCNC: 0.2 MG/DL (ref 0.3–1.2)
BILIRUBIN DIRECT: < 0.2 MG/DL (ref 0–0.3)
BUN BLDV-MCNC: 12 MG/DL (ref 7–22)
CALCIUM SERPL-MCNC: 8.9 MG/DL (ref 8.5–10.5)
CHLORIDE BLD-SCNC: 103 MEQ/L (ref 98–111)
CO2: 26 MEQ/L (ref 23–33)
COLLECTED BY:: ABNORMAL
CREAT SERPL-MCNC: 0.4 MG/DL (ref 0.4–1.2)
DEVICE: ABNORMAL
EOSINOPHIL # BLD: 0 %
EOSINOPHILS ABSOLUTE: 0 THOU/MM3 (ref 0–0.4)
ERYTHROCYTE [DISTWIDTH] IN BLOOD BY AUTOMATED COUNT: 15.2 % (ref 11.5–14.5)
ERYTHROCYTE [DISTWIDTH] IN BLOOD BY AUTOMATED COUNT: 54.8 FL (ref 35–45)
GFR SERPL CREATININE-BSD FRML MDRD: > 90 ML/MIN/1.73M2
GLUCOSE BLD-MCNC: 126 MG/DL (ref 70–108)
GLUCOSE BLD-MCNC: 130 MG/DL (ref 70–108)
GLUCOSE BLD-MCNC: 182 MG/DL (ref 70–108)
GLUCOSE BLD-MCNC: 189 MG/DL (ref 70–108)
GLUCOSE BLD-MCNC: 189 MG/DL (ref 70–108)
HCO3: 26 MMOL/L (ref 23–28)
HCT VFR BLD CALC: 36.2 % (ref 37–47)
HEMOGLOBIN: 11.7 GM/DL (ref 12–16)
IFIO2: 60
IMMATURE GRANS (ABS): 0.14 THOU/MM3 (ref 0–0.07)
IMMATURE GRANULOCYTES: 1.4 %
LYMPHOCYTES # BLD: 36.5 %
LYMPHOCYTES ABSOLUTE: 3.5 THOU/MM3 (ref 1–4.8)
MAGNESIUM: 1.6 MG/DL (ref 1.6–2.4)
MCH RBC QN AUTO: 31.8 PG (ref 26–33)
MCHC RBC AUTO-ENTMCNC: 32.3 GM/DL (ref 32.2–35.5)
MCV RBC AUTO: 98.4 FL (ref 81–99)
MONOCYTES # BLD: 6.1 %
MONOCYTES ABSOLUTE: 0.6 THOU/MM3 (ref 0.4–1.3)
NUCLEATED RED BLOOD CELLS: 0 /100 WBC
O2 SATURATION: 94 %
PCO2: 34 MMHG (ref 35–45)
PH BLOOD GAS: 7.49 (ref 7.35–7.45)
PLATELET # BLD: 292 THOU/MM3 (ref 130–400)
PMV BLD AUTO: 11.4 FL (ref 9.4–12.4)
PO2: 65 MMHG (ref 71–104)
POTASSIUM SERPL-SCNC: 3.2 MEQ/L (ref 3.5–5.2)
PROCALCITONIN: 0.11 NG/ML (ref 0.01–0.09)
RBC # BLD: 3.68 MILL/MM3 (ref 4.2–5.4)
SEG NEUTROPHILS: 55.7 %
SEGMENTED NEUTROPHILS ABSOLUTE COUNT: 5.4 THOU/MM3 (ref 1.8–7.7)
SODIUM BLD-SCNC: 140 MEQ/L (ref 135–145)
SOURCE, BLOOD GAS: ABNORMAL
TOTAL PROTEIN: 5.9 G/DL (ref 6.1–8)
WBC # BLD: 9.7 THOU/MM3 (ref 4.8–10.8)

## 2020-10-13 PROCEDURE — 36600 WITHDRAWAL OF ARTERIAL BLOOD: CPT

## 2020-10-13 PROCEDURE — 85025 COMPLETE CBC W/AUTO DIFF WBC: CPT

## 2020-10-13 PROCEDURE — 2580000003 HC RX 258: Performed by: PHYSICIAN ASSISTANT

## 2020-10-13 PROCEDURE — 82248 BILIRUBIN DIRECT: CPT

## 2020-10-13 PROCEDURE — 6360000002 HC RX W HCPCS: Performed by: PHYSICIAN ASSISTANT

## 2020-10-13 PROCEDURE — 2060000000 HC ICU INTERMEDIATE R&B

## 2020-10-13 PROCEDURE — 6370000000 HC RX 637 (ALT 250 FOR IP): Performed by: FAMILY MEDICINE

## 2020-10-13 PROCEDURE — 82948 REAGENT STRIP/BLOOD GLUCOSE: CPT

## 2020-10-13 PROCEDURE — 2500000003 HC RX 250 WO HCPCS: Performed by: INTERNAL MEDICINE

## 2020-10-13 PROCEDURE — 6370000000 HC RX 637 (ALT 250 FOR IP): Performed by: PHYSICIAN ASSISTANT

## 2020-10-13 PROCEDURE — 2580000003 HC RX 258: Performed by: INTERNAL MEDICINE

## 2020-10-13 PROCEDURE — 2580000003 HC RX 258: Performed by: FAMILY MEDICINE

## 2020-10-13 PROCEDURE — 82803 BLOOD GASES ANY COMBINATION: CPT

## 2020-10-13 PROCEDURE — 2700000000 HC OXYGEN THERAPY PER DAY

## 2020-10-13 PROCEDURE — 36591 DRAW BLOOD OFF VENOUS DEVICE: CPT

## 2020-10-13 PROCEDURE — 83735 ASSAY OF MAGNESIUM: CPT

## 2020-10-13 PROCEDURE — 80053 COMPREHEN METABOLIC PANEL: CPT

## 2020-10-13 PROCEDURE — 71045 X-RAY EXAM CHEST 1 VIEW: CPT

## 2020-10-13 PROCEDURE — 99232 SBSQ HOSP IP/OBS MODERATE 35: CPT | Performed by: FAMILY MEDICINE

## 2020-10-13 PROCEDURE — 84145 PROCALCITONIN (PCT): CPT

## 2020-10-13 PROCEDURE — 94761 N-INVAS EAR/PLS OXIMETRY MLT: CPT

## 2020-10-13 RX ORDER — POTASSIUM CHLORIDE 20 MEQ/1
10 TABLET, EXTENDED RELEASE ORAL
Status: DISCONTINUED | OUTPATIENT
Start: 2020-10-13 | End: 2020-10-16

## 2020-10-13 RX ADMIN — ENOXAPARIN SODIUM 40 MG: 40 INJECTION SUBCUTANEOUS at 20:33

## 2020-10-13 RX ADMIN — SODIUM CHLORIDE, PRESERVATIVE FREE 10 ML: 5 INJECTION INTRAVENOUS at 09:54

## 2020-10-13 RX ADMIN — ACETAMINOPHEN 650 MG: 325 TABLET ORAL at 23:58

## 2020-10-13 RX ADMIN — Medication 50 MG: at 09:55

## 2020-10-13 RX ADMIN — POTASSIUM CHLORIDE 10 MEQ: 1500 TABLET, EXTENDED RELEASE ORAL at 09:56

## 2020-10-13 RX ADMIN — MAGNESIUM GLUCONATE 500 MG ORAL TABLET 400 MG: 500 TABLET ORAL at 09:56

## 2020-10-13 RX ADMIN — REMDESIVIR 100 MG: 5 INJECTION INTRAVENOUS at 10:13

## 2020-10-13 RX ADMIN — INSULIN LISPRO 1 UNITS: 100 INJECTION, SOLUTION INTRAVENOUS; SUBCUTANEOUS at 13:42

## 2020-10-13 RX ADMIN — OXYCODONE HYDROCHLORIDE AND ACETAMINOPHEN 500 MG: 500 TABLET ORAL at 09:55

## 2020-10-13 RX ADMIN — ASPIRIN 81 MG: 81 TABLET ORAL at 09:57

## 2020-10-13 RX ADMIN — PAROXETINE HYDROCHLORIDE 10 MG: 20 TABLET, FILM COATED ORAL at 09:56

## 2020-10-13 RX ADMIN — SODIUM CHLORIDE, PRESERVATIVE FREE 10 ML: 5 INJECTION INTRAVENOUS at 20:32

## 2020-10-13 RX ADMIN — SODIUM CHLORIDE: 9 INJECTION, SOLUTION INTRAVENOUS at 06:59

## 2020-10-13 RX ADMIN — FAMOTIDINE 20 MG: 20 TABLET, FILM COATED ORAL at 20:33

## 2020-10-13 RX ADMIN — POTASSIUM CHLORIDE 40 MEQ: 1500 TABLET, EXTENDED RELEASE ORAL at 06:56

## 2020-10-13 RX ADMIN — ENOXAPARIN SODIUM 40 MG: 40 INJECTION SUBCUTANEOUS at 09:55

## 2020-10-13 RX ADMIN — Medication 1000 UNITS: at 09:55

## 2020-10-13 RX ADMIN — LEVOTHYROXINE SODIUM 25 MCG: 25 TABLET ORAL at 05:17

## 2020-10-13 RX ADMIN — INSULIN LISPRO 1 UNITS: 100 INJECTION, SOLUTION INTRAVENOUS; SUBCUTANEOUS at 20:32

## 2020-10-13 RX ADMIN — ACETAMINOPHEN 650 MG: 325 TABLET ORAL at 02:07

## 2020-10-13 RX ADMIN — FAMOTIDINE 20 MG: 20 TABLET, FILM COATED ORAL at 09:56

## 2020-10-13 RX ADMIN — DEXAMETHASONE SODIUM PHOSPHATE 6 MG: 4 INJECTION, SOLUTION INTRAMUSCULAR; INTRAVENOUS at 09:56

## 2020-10-13 ASSESSMENT — PAIN DESCRIPTION - PAIN TYPE: TYPE: ACUTE PAIN

## 2020-10-13 ASSESSMENT — PAIN DESCRIPTION - FREQUENCY: FREQUENCY: INTERMITTENT

## 2020-10-13 ASSESSMENT — PAIN SCALES - GENERAL
PAINLEVEL_OUTOF10: 6
PAINLEVEL_OUTOF10: 0
PAINLEVEL_OUTOF10: 6

## 2020-10-13 ASSESSMENT — PAIN DESCRIPTION - PROGRESSION: CLINICAL_PROGRESSION: GRADUALLY WORSENING

## 2020-10-13 ASSESSMENT — PAIN DESCRIPTION - LOCATION: LOCATION: LEG

## 2020-10-13 ASSESSMENT — PAIN - FUNCTIONAL ASSESSMENT: PAIN_FUNCTIONAL_ASSESSMENT: ACTIVITIES ARE NOT PREVENTED

## 2020-10-13 ASSESSMENT — PAIN DESCRIPTION - ORIENTATION: ORIENTATION: LEFT;RIGHT

## 2020-10-13 ASSESSMENT — PAIN DESCRIPTION - DESCRIPTORS: DESCRIPTORS: OTHER (COMMENT)

## 2020-10-13 ASSESSMENT — PAIN DESCRIPTION - ONSET: ONSET: AWAKENED FROM SLEEP

## 2020-10-13 NOTE — PLAN OF CARE
Problem: Neurological  Goal: Maximum potential motor/sensory/cognitive function  Outcome: Met This Shift  Note: Patient alert and oriented x4. Speech clear. No signs of aphasia/dysarthria at this time. Responding to commands appropriately. Neuro checks q4hrs. Will continue to monitor and assess. Ongoing until discharge. Problem: Respiratory  Goal: O2 Sat > 90%  10/13/2020 1942 by Giovanny Caldwell RN  Outcome: Met This Shift  Note: Pt is on hiflow with   Vitals:    10/13/20 1711   BP: 108/60   Pulse: 66   Resp: 20   Temp: 98.1 °F (36.7 °C)   SpO2: 95%          Problem: Falls - Risk of:  Goal: Will remain free from falls  Description: Will remain free from falls  Outcome: Ongoing  Note: Call light in reach, bed in lowest position, bed alarm activated. Educated on use of call light before ambulation and when in need of assistance. Patient expressed understanding. Hourly visual checks performed and charted. Toileting offered to patient. No falls during shift, at this time. Arm band & falling star in place. Pt up with one assist and tolerating well at this time. Continuing to monitor       Problem: Falls - Risk of:  Goal: Absence of physical injury  Description: Absence of physical injury  Outcome: Ongoing  Note: Pt is absent of physical injury this shift. Will continue to monitor. Problem: Skin Integrity:  Goal: Will show no infection signs and symptoms  Description: Will show no infection signs and symptoms  Outcome: Ongoing  Note: Pt has no new signs and symptoms of infections at this time. Will continue to assess and monitor. Problem: Skin Integrity:  Goal: Absence of new skin breakdown  Description: Absence of new skin breakdown  Outcome: Ongoing  Note: Pt has no new skin breakdown this shift. Will continue to assess and monitor.      Problem: Respiratory  Goal: No pulmonary complications  Outcome: Ongoing  Note: Pt's lung sounds are clear and diminished bilaterally this shift will continue to assess

## 2020-10-13 NOTE — PLAN OF CARE
Problem: Respiratory  Goal: O2 Sat > 90%  10/13/2020 1327 by Jose Bay RCP  Outcome: Ongoing  Note: Continue to attempt to wean HFNC.  Pt currently on 60L @ 65%

## 2020-10-13 NOTE — PLAN OF CARE
Continue the use of HFNC to help improve pt's respiratory status.   Problem: Respiratory  Goal: O2 Sat > 90%  10/13/2020 0628 by Gracie Najjar, RCP  Outcome: Ongoing

## 2020-10-13 NOTE — PROGRESS NOTES
Hospitalist Progress Note    Patient:  Nii Thomas      Unit/Bed:4B-09/009-A    YOB: 1950    MRN: [id number]       Acct: [de-identified]     PCP: Margie Ramirez    Date of Admission: 10/9/2020    Chief Complaint: Shortness of breath    Hospital Course:     Briefly, this is a 70-year-old female, with past medical history of breast cancer, hyperlipidemia, who presented to [facility] on 10/19/2020 due to shortness of breath. The patient was exposed to her ran out a week ago was diagnosed with COVID-19. Per H&P note, \"The patient had concerns as she is a cancer patient and had her test in the outpatient setting two days prior to presentation. Over the two days since her diagnosis she has had progressive shortness of breath and fevers. The patient has been able to eat and drink. She has had low grade fevers at home along with loss of taste and smell. The patient is currently taking chemo for breast cancer. \"     The patient was tested positive for COVID-19 2 days prior to admission in outside facility. Patient was admitted under hospital medicine service for COVID-19 infection. 10/11: overnight, pt desaturated, now on high flow O2    10/12: still on high flow     10/13: still on high flow    Subjective:     Patient seen and examined. Pt reports that she still having dry cough once in a while. She reports sob mostly on exertion. Denies chest pain, abd pain, N/V. Has chronic diarrhea due to chemo. Last BM was 2 nights ago, still diarrhea.        Medications:  Reviewed    Infusion Medications    dextrose       Scheduled Medications    potassium chloride  10 mEq Oral Daily with breakfast    magnesium oxide  400 mg Oral Daily    sodium chloride  20 mL Intravenous Once    neomycin-bacitracin-polymyxin   Topical BID    aspirin EC  81 mg Oral Daily    levothyroxine  25 mcg Oral Daily    PARoxetine  10 mg Oral QAM    vitamin C  500 mg Oral Daily    Vitamin D  1,000 Units Oral Daily    zinc sulfate  50 mg Oral Daily    sodium chloride flush  10 mL Intravenous 2 times per day    enoxaparin  40 mg Subcutaneous BID    sodium chloride  20 mL Intravenous Once    dexamethasone  6 mg Intravenous Daily    remdesivir IVPB  100 mg Intravenous Q24H    insulin lispro  0-6 Units Subcutaneous TID     insulin lispro  0-3 Units Subcutaneous Nightly    famotidine  20 mg Oral BID     PRN Meds: loperamide, sodium chloride flush, acetaminophen **OR** acetaminophen, polyethylene glycol, promethazine **OR** ondansetron, potassium chloride **OR** potassium alternative oral replacement **OR** potassium chloride, magnesium sulfate, sodium chloride, glucose, dextrose, glucagon (rDNA), dextrose      Intake/Output Summary (Last 24 hours) at 10/13/2020 0841  Last data filed at 10/13/2020 0514  Gross per 24 hour   Intake 1235 ml   Output --   Net 1235 ml       Diet:  DIET CARB CONTROL; Exam:  /62   Pulse 52   Temp 97.6 °F (36.4 °C) (Oral)   Resp 20   Ht 5' 8\" (1.727 m)   Wt 207 lb (93.9 kg)   SpO2 98%   BMI 31.47 kg/m²     General appearance: alert, not in acute distress  HEENT: PERRLA, clear oral mucosa  Chest: on high flow O2, fine rales on both lower lung fields, no wheezing, no rhonchi  CVS exam: normal rate, regular rhythm, normal S1, S2, no murmurs, rubs, clicks or gallops. Abdominal exam: non-distended, soft, non-tender, no guarding, no masses or organomegaly.   Neurological exam reveals alert, oriented, normal speech, no focal findings or movement disorder noted  Exam of extremities: peripheral pulses normal, no pedal or leg edema, no clubbing or cyanosis        Labs:   Recent Labs     10/11/20  0420 10/12/20  0533 10/13/20  0520   WBC 9.9 10.3 9.7   HGB 12.1 11.7* 11.7*   HCT 37.4 36.1* 36.2*    267 292     Recent Labs     10/11/20  0420 10/12/20  0533 10/13/20  0520    140 140   K 3.0* 3.3* 3.2*    104 103   CO2 26 26 26   BUN 16 16 12   CREATININE 0.7 0.5 0. 4   CALCIUM 8.8 8.7 8.9     Recent Labs     10/11/20  0420 10/12/20  0533 10/13/20  0520   AST 30 28 23   ALT 17 17 13   BILIDIR <0.2 <0.2 <0.2   BILITOT 0.2* 0.2* 0.2*   ALKPHOS 77 71 68     No results for input(s): INR in the last 72 hours. No results for input(s): Shellia Bugler in the last 72 hours. Urinalysis:      Lab Results   Component Value Date    NITRU NEGATIVE 02/08/2017    WBCUA 5-10 02/08/2017    BACTERIA NONE 02/08/2017    RBCUA 0-2 02/08/2017    BLOODU SMALL 02/08/2017    SPECGRAV 1.014 02/08/2017       Radiology:  XR CHEST PORTABLE   Final Result   Impression:      Developing right perihilar and left basilar consolidation. This document has been electronically signed by: Nga Camejo MD on    10/13/2020 02:49 AM         XR CHEST PORTABLE   Final Result   1. Minimal reticular linear opacities are seen laterally at the left lung base. This may represent mild atelectasis or pneumonia. Otherwise stable portable chest.            **This report has been created using voice recognition software. It may contain minor errors which are inherent in voice recognition technology. **      Final report electronically signed by Dr. Kym Pruett on 10/11/2020 8:58 AM      CTA CHEST [address]   Final Result   1. Negative for acute pulmonary artery embolism. Thoracic aorta of normal    caliber without dissection. 2.  Patchy peripheral groundglass opacities bilaterally suspicious for    viral pneumonitis. 3.  Fatty liver. 4.  Sequelae of prior granulomatous infection. This document has been electronically signed by: Devika Lerner MD on    10/10/2020 02:54 AM      All CT scans at this facility use dose modulation, iterative    reconstruction, and/or weight-based   dosing when appropriate to reduce radiation dose to as low as reasonably    achievable.          XR CHEST PORTABLE   Final Result   Impression:   Patchy right upper lobe and left lower lobe infiltrates may reflect pneumonitis and/or atelectasis. No CHF. This document has been electronically signed by: Trudi Frankel MD on    10/09/2020 11:46 PM               Assessment/Plan:       Acute hypoxic respiratory failure secondary to COVID-19 pneumonia, worsening     -on 10/10 Overnight, patient desaturated, was placed on high flow oxygen, still on high flow O2  -ABG on 10/11: pH 7.42, PCO2 43, PO2 70, bicarbonate 27  -Repeat chest x-ray today showed developing right perihilar and left basilar consolidation. Procalcitonin not significantly elevated. BNP pending  -please see below for COVID-19 pneumonia management  -wean O2 as tolerated   -cxr in am   -Repeat ABG today showed respiratory alkalosis, low PaO2      COVID-19 pneumonia     -Chest CTA negative for PE, patchy peripheral groundglass opacities bilaterally suspicious for viral pneumonitis  -afebrile overnight   -received 1 unit convalescent plasma on admission. Received another unit of convalescent plasma on 10/11.  -cont decadron and remdesevir  -discussed to patient given history of breast cancer and now having COVID-19 infarction, she's at risk for VTE, recommend eliquis 2.5 mg PO BID x 30 days on discharge per [facility]  COVID-19 treatment algorithm.   Continue Lovenox for VTE prophylaxis  -cont vitamin C, D, and zinc, ASA  -cont SSI, accu-check and pepcid for GI prophylaxis while on Decadron    Hypokalemia due to diarrhea      -Start KCl 10 mEq p.o. daily  -Potassium replacement protocol in place  -BMP in am    -magnesium 1.6, continue magnesium oxide ( will closely monitor diarrhea)     Hypotension, likely due to dehydration secondary to diarrhea and poor oral intake, resolved    -DC gentle IV fluid hydration  -Vital signs per protocol    Hyponatremia likely from hyperglycemia, resolved     -BMP in am     Leukocytosis likely reactive due to steroids, resolved    -CBC in am      Hyperglycemia due to steroids    -accu-check  -SSI    Elevated A1c    -A1c 7.2%  -Recheck in 1 week to confirm DM   -diabetic diet       Anion gap metabolic acidosis, resolved    Left breast cancer    -on chemo     Hyperlipidemia     -not on statin    Hx of hypothyroidism    -cont synthroid     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, stable    -monitor for dehydration    -monitor lytes and replace prn   -loperamide prn     Chronic HFpEF    -echo in 7/2020: EF 55-60%, grade 1 DHF  -check BNP   -Daily weights, I/Os, fluid and salt restrictions        Anticipated Discharge in : pending       Diet: Carb control, low sodium, 2 L fluid restriction    DVT prophylaxis: [x] Lovenox                                 [] SCDs                                 [] SQ Heparin                                 [] Encourage ambulation           [] Already on Anticoagulation     Disposition:    [] Home       [] TCU       [] Rehab       [] Psych       [] SNF        [] [place]       [x] Other-Plan as above.       Code Status: Full Code        Electronically signed by Dakota Marsh MD on 10/13/2020 at 8:41 AM

## 2020-10-13 NOTE — PLAN OF CARE
Supplemental O2 requirements decreased  Outcome: Ongoing   Care plan reviewed with patient. Patient verbalize understanding of the plan of care and contribute to goal setting.

## 2020-10-13 NOTE — PROGRESS NOTES
Comprehensive Nutrition Assessment    Type and Reason for Visit:  Initial, Positive Nutrition Screen, Wound(Weight Loss/Decreased Appetite/Intake/Wound)    Nutrition Recommendations/Plan:   *Recommend a Multivitamin w/minerals daily. *Started Glucerna TID. *Continue Vitamin C, Vitamin D and Zinc as ordered. *Continue current diet. Nutrition Assessment: Pt. severely malnourished AEB criteria listed below. At risk for further nutritional compromise r/t admit d/t COVID+, chronic diarrhea d/t chemotherapy, and underlying medical condition (hx possible new diabetes diagnosis, hyperglycemia d/t steroids, left breast cancer on chemotherapy and HLD) and need for nutrition support. Nutrition recommendations/interventions as per above. Malnutrition Assessment:  Malnutrition Status:  Severe malnutrition    Context:  Acute Illness     Findings of the 6 clinical characteristics of malnutrition:  Energy Intake:  7 - 50% or less of estimated energy requirements for 5 or more days  Weight Loss:  1 - 7.5% over 3 months(-9.6% weight loss in 3 months per EMR)     Body Fat Loss:  Unable to assess(pt on COVID unit)     Muscle Mass Loss:  Unable to assess(pt on COVID unit)    Fluid Accumulation:  No significant fluid accumulation Extremities   Strength:  Not Performed    Estimated Daily Nutrient Needs:  Energy (kcal):  6746-9544 kcal/day (11-13)- pt in hypometabolic phase; Weight Used for Energy Requirements:  (93.9 kg on 10/13)     Protein (g):  127+ g/day (2 g/kg); Weight Used for Protein Requirements:  Ideal(63.6 kg IBW)    Nutrition Related Findings:  COVID+; spoke with patient on phone; she reports poor oral intake over the past week consuming ~50% of most meals and dislikes the food here and states it is cold which doesn't help; pt reports unplanned weight loss of 22 lbs in 3 months since starting chemotherapy; last chemotherapy was on 9/29/20; pt denies N/V; +diarrhea - last BM x2 on 10/12.  Pt amenable to Glucerna TID during LOS. HgA1C 7.2% on 10/12/20. Rx includes: Vitamin C, Vitamin D, Zinc and Humalog      Wounds:  Skin Tears(on abdomen)       Current Nutrition Therapies:    DIET CARB CONTROL; Anthropometric Measures:  · Height: 5' 8\" (172.7 cm)  · Current Body Weight: 207 lb 0.2 oz (93.9 kg)(10/9; stated weight; no edema noted)   · Admission Body Weight: 207 lb 0.2 oz (93.9 kg)(10/9; stated weight; no edema noted)    · Usual Body Weight: (Per EMR: 211 lb 6.4 oz on 9/15/20; 229 lb 6.4 oz on 7/27/20;)     · Ideal Body Weight: 140 lbs  · BMI: 31.5  · BMI Categories: Obese Class 1 (BMI 30.0-34. 9)       Nutrition Diagnosis:   · Severe malnutrition, In context of acute illness or injury related to inadequate protein-energy intake, catabolic illness(COVID+) as evidenced by poor intake prior to admission, weight loss greater than or equal to 5% in 1 month    Nutrition Interventions:   Food and/or Nutrient Delivery:  Continue Current Diet, Start Oral Nutrition Supplement, Vitamin Supplement  Nutrition Education/Counseling:  Education initiated(Encouraged po intake of meals and ONS at best effort during LOS)   Coordination of Nutrition Care:  Continued Inpatient Monitoring    Goals:  Pt will consume 75% or more of meals during LOS       Nutrition Monitoring and Evaluation:   Food/Nutrient Intake Outcomes:  Food and Nutrient Intake, Supplement Intake, Vitamin/Mineral Intake  Physical Signs/Symptoms Outcomes:  Weight, Skin, Nutrition Focused Physical Findings, GI Status, Diarrhea, Biochemical Data     Discharge Planning:    Continue current diet     Electronically signed by Adelina Mitchell RD, LD on 10/13/20 at 2:31 PM EDT    Contact: [phone]

## 2020-10-13 NOTE — CARE COORDINATION
10/13/20, 11:33 AM EDT    DISCHARGE ON GOING [facility] day: 3  Location: [facility]09/009-A Reason for admit: COVID-19 [U07.1]   Procedure: CXR   Impression:          Impression:     Developing right perihilar and left basilar consolidation. Treatment Plan of Care: High flow oxygen 60% FiO2, 60 liters with sats at 91%. Tmax 98.2, IV Decadron, diabetes management, electrolyte replacement protocols, IV Remdesivir, Vitamin C,D, zinc. Pulmonology following. Barriers to Discharge: medical stability. PCP: Rica Sandoval  Readmission Risk Score: 14%  Patient Goals/Plan/Treatment Preferences: Plan is to return home alone with family support. Will follow for potential needs as clinical course progresses.

## 2020-10-13 NOTE — PLAN OF CARE
Problem: Nutrition  Goal: Optimal nutrition therapy  Outcome: Ongoing   Nutrition Problem #1: Severe malnutrition, In context of acute illness or injury  Intervention: Food and/or Nutrient Delivery: Continue Current Diet, Start Oral Nutrition Supplement, Vitamin Supplement  Nutritional Goals: Pt will consume 75% or more of meals during LOS

## 2020-10-14 LAB
ALBUMIN SERPL-MCNC: 3.3 G/DL (ref 3.5–5.1)
ALP BLD-CCNC: 76 U/L (ref 38–126)
ALT SERPL-CCNC: 14 U/L (ref 11–66)
ANION GAP SERPL CALCULATED.3IONS-SCNC: 12 MEQ/L (ref 8–16)
AST SERPL-CCNC: 27 U/L (ref 5–40)
BILIRUB SERPL-MCNC: 0.5 MG/DL (ref 0.3–1.2)
BILIRUBIN DIRECT: < 0.2 MG/DL (ref 0–0.3)
BUN BLDV-MCNC: 10 MG/DL (ref 7–22)
CALCIUM SERPL-MCNC: 9.2 MG/DL (ref 8.5–10.5)
CHLORIDE BLD-SCNC: 105 MEQ/L (ref 98–111)
CO2: 25 MEQ/L (ref 23–33)
CREAT SERPL-MCNC: 0.4 MG/DL (ref 0.4–1.2)
ERYTHROCYTE [DISTWIDTH] IN BLOOD BY AUTOMATED COUNT: 15.1 % (ref 11.5–14.5)
ERYTHROCYTE [DISTWIDTH] IN BLOOD BY AUTOMATED COUNT: 54.1 FL (ref 35–45)
GFR SERPL CREATININE-BSD FRML MDRD: > 90 ML/MIN/1.73M2
GLUCOSE BLD-MCNC: 126 MG/DL (ref 70–108)
GLUCOSE BLD-MCNC: 128 MG/DL (ref 70–108)
GLUCOSE BLD-MCNC: 173 MG/DL (ref 70–108)
GLUCOSE BLD-MCNC: 181 MG/DL (ref 70–108)
GLUCOSE BLD-MCNC: 222 MG/DL (ref 70–108)
HCT VFR BLD CALC: 39.3 % (ref 37–47)
HEMOGLOBIN: 12.9 GM/DL (ref 12–16)
MAGNESIUM: 1.5 MG/DL (ref 1.6–2.4)
MAGNESIUM: 2.1 MG/DL (ref 1.6–2.4)
MCH RBC QN AUTO: 31.9 PG (ref 26–33)
MCHC RBC AUTO-ENTMCNC: 32.8 GM/DL (ref 32.2–35.5)
MCV RBC AUTO: 97.3 FL (ref 81–99)
PLATELET # BLD: 335 THOU/MM3 (ref 130–400)
PMV BLD AUTO: 11.2 FL (ref 9.4–12.4)
POTASSIUM SERPL-SCNC: 3.2 MEQ/L (ref 3.5–5.2)
POTASSIUM SERPL-SCNC: 3.7 MEQ/L (ref 3.5–5.2)
RBC # BLD: 4.04 MILL/MM3 (ref 4.2–5.4)
SODIUM BLD-SCNC: 142 MEQ/L (ref 135–145)
TOTAL PROTEIN: 6.4 G/DL (ref 6.1–8)
WBC # BLD: 14.5 THOU/MM3 (ref 4.8–10.8)

## 2020-10-14 PROCEDURE — 99233 SBSQ HOSP IP/OBS HIGH 50: CPT | Performed by: PHYSICIAN ASSISTANT

## 2020-10-14 PROCEDURE — 2060000000 HC ICU INTERMEDIATE R&B

## 2020-10-14 PROCEDURE — 85027 COMPLETE CBC AUTOMATED: CPT

## 2020-10-14 PROCEDURE — 2500000003 HC RX 250 WO HCPCS: Performed by: INTERNAL MEDICINE

## 2020-10-14 PROCEDURE — 82248 BILIRUBIN DIRECT: CPT

## 2020-10-14 PROCEDURE — 84132 ASSAY OF SERUM POTASSIUM: CPT

## 2020-10-14 PROCEDURE — 83735 ASSAY OF MAGNESIUM: CPT

## 2020-10-14 PROCEDURE — 82948 REAGENT STRIP/BLOOD GLUCOSE: CPT

## 2020-10-14 PROCEDURE — 6370000000 HC RX 637 (ALT 250 FOR IP): Performed by: PHYSICIAN ASSISTANT

## 2020-10-14 PROCEDURE — 36591 DRAW BLOOD OFF VENOUS DEVICE: CPT

## 2020-10-14 PROCEDURE — 2580000003 HC RX 258: Performed by: PHYSICIAN ASSISTANT

## 2020-10-14 PROCEDURE — 6360000002 HC RX W HCPCS: Performed by: PHYSICIAN ASSISTANT

## 2020-10-14 PROCEDURE — 80053 COMPREHEN METABOLIC PANEL: CPT

## 2020-10-14 PROCEDURE — 94761 N-INVAS EAR/PLS OXIMETRY MLT: CPT

## 2020-10-14 PROCEDURE — 2700000000 HC OXYGEN THERAPY PER DAY

## 2020-10-14 PROCEDURE — 6370000000 HC RX 637 (ALT 250 FOR IP): Performed by: FAMILY MEDICINE

## 2020-10-14 PROCEDURE — 2580000003 HC RX 258: Performed by: INTERNAL MEDICINE

## 2020-10-14 RX ADMIN — PAROXETINE HYDROCHLORIDE 10 MG: 20 TABLET, FILM COATED ORAL at 12:17

## 2020-10-14 RX ADMIN — MAGNESIUM SULFATE HEPTAHYDRATE 2 G: 40 INJECTION, SOLUTION INTRAVENOUS at 13:22

## 2020-10-14 RX ADMIN — ENOXAPARIN SODIUM 40 MG: 40 INJECTION SUBCUTANEOUS at 09:45

## 2020-10-14 RX ADMIN — Medication 1000 UNITS: at 12:16

## 2020-10-14 RX ADMIN — MAGNESIUM GLUCONATE 500 MG ORAL TABLET 400 MG: 500 TABLET ORAL at 12:16

## 2020-10-14 RX ADMIN — LEVOTHYROXINE SODIUM 25 MCG: 25 TABLET ORAL at 06:27

## 2020-10-14 RX ADMIN — Medication 50 MG: at 12:16

## 2020-10-14 RX ADMIN — BACITRACIN, NEOMYCIN, POLYMYXIN B: 400; 3.5; 5 OINTMENT TOPICAL at 12:17

## 2020-10-14 RX ADMIN — SODIUM CHLORIDE, PRESERVATIVE FREE 10 ML: 5 INJECTION INTRAVENOUS at 20:44

## 2020-10-14 RX ADMIN — BACITRACIN, NEOMYCIN, POLYMYXIN B: 400; 3.5; 5 OINTMENT TOPICAL at 00:42

## 2020-10-14 RX ADMIN — INSULIN LISPRO 2 UNITS: 100 INJECTION, SOLUTION INTRAVENOUS; SUBCUTANEOUS at 17:06

## 2020-10-14 RX ADMIN — FAMOTIDINE 20 MG: 20 TABLET, FILM COATED ORAL at 12:16

## 2020-10-14 RX ADMIN — DEXAMETHASONE SODIUM PHOSPHATE 6 MG: 4 INJECTION, SOLUTION INTRAMUSCULAR; INTRAVENOUS at 09:41

## 2020-10-14 RX ADMIN — ENOXAPARIN SODIUM 40 MG: 40 INJECTION SUBCUTANEOUS at 20:43

## 2020-10-14 RX ADMIN — ASPIRIN 81 MG: 81 TABLET ORAL at 12:17

## 2020-10-14 RX ADMIN — POTASSIUM CHLORIDE 40 MEQ: 1500 TABLET, EXTENDED RELEASE ORAL at 13:22

## 2020-10-14 RX ADMIN — REMDESIVIR 100 MG: 5 INJECTION INTRAVENOUS at 09:40

## 2020-10-14 RX ADMIN — SODIUM CHLORIDE, PRESERVATIVE FREE 10 ML: 5 INJECTION INTRAVENOUS at 09:44

## 2020-10-14 RX ADMIN — OXYCODONE HYDROCHLORIDE AND ACETAMINOPHEN 500 MG: 500 TABLET ORAL at 12:17

## 2020-10-14 RX ADMIN — POTASSIUM CHLORIDE 10 MEQ: 1500 TABLET, EXTENDED RELEASE ORAL at 12:16

## 2020-10-14 RX ADMIN — BACITRACIN, NEOMYCIN, POLYMYXIN B 1 G: 400; 3.5; 5 OINTMENT TOPICAL at 20:44

## 2020-10-14 RX ADMIN — INSULIN LISPRO 1 UNITS: 100 INJECTION, SOLUTION INTRAVENOUS; SUBCUTANEOUS at 12:37

## 2020-10-14 RX ADMIN — FAMOTIDINE 20 MG: 20 TABLET, FILM COATED ORAL at 20:44

## 2020-10-14 RX ADMIN — INSULIN LISPRO 1 UNITS: 100 INJECTION, SOLUTION INTRAVENOUS; SUBCUTANEOUS at 20:42

## 2020-10-14 ASSESSMENT — PAIN SCALES - GENERAL
PAINLEVEL_OUTOF10: 0

## 2020-10-14 NOTE — FLOWSHEET NOTE
This  placed a phone call to Enoc Beal who is patient's brother. Phone call is placed at 2040 GMT to provide emotional and spiritual support. There is no response at that time.  will follow up with continue emotional support.

## 2020-10-14 NOTE — PROGRESS NOTES
Hospitalist Progress Note      Patient:  Kathleen Kothari    Unit/Bed:4B-09/009-A  YOB: 1950  MRN: [id number]   Acct: [de-identified]   PCP: Rhonda Wilburn  Date of Admission: 10/9/2020    Assessment/Plan:    1. Acute Hypoxic Respiratory Failure 2/2 COVID-19 PNA:   1. Convalescent Plasma x2 doses 10/9 and 10/11/20. 10/10/20 placed on high flow O2. ABG 10/11/20 pH 7.42, PCO2 43, PO2 70, bicarb 27. CXR showed developing right perihilar and left basilar consolidation, procal not elevated. Repeat ABG showed respiratory alkalosis, low PaO2.   2. Currently on high flow. Continue COVID-19 management and wean O2 as tolerated. 2. Covid-19 PNA: CTA chest negative for PE, patchy peripheral groundglass opacities BL suspiciur for viral pneumonitis. Convalescent plasma x2 doses as above. Decadron/Remdesivit initiated 10/10/20. 1. Decadron/Remdesivir currently day #5. Continue Vit C, Vit D, Zinc and ASA. 2. Hx of breast cancer and Covid-19 increases risk for VTE. Eliquis 2.5mg PO BID x30 days recommended on discharge. Patient agreeable. 3. Hypokalemia 2/2 Diarrhea: KCl 10mEq PO daily. Potassium replacement protocol. 1. K 3.2 today. Replace per protocol. Recheck this evening. 4. Hypomagnesemia: Continue Mag Oxide. Mg 1.5 today. Replace per protocol. Recheck this evening. 5. Hypotension: Likely 2/2 dehydration, resolved. IVF hydration discontinued. Monitor BP closely. 6. Type 2 Diabetes: A1c 7.2% on 10/12/20. Low dose correctional SSI while inpatient and on steroids. Recommendations to repeat on 10/19/20 to confirm diabetes. May need to begin Metformin on discharge. Carb control diet. 7. Left Breast Cancer: currently on chemotherapy. 8. HLD: Not on statin OP. 9. Hypothyroidism: continue Synthroid. 10. Diarrhea 2/2 Chemotherapy: Appears stable. Replacing electrolytes as above. Immodium prn.    11. Chronic HFpEF: Echo 7/2020 with EF 55-60%, grade 1 diastolic dysfunction. BNP pending. Disposition: Recheck K/Mg pending s/p replacement. Weaning high flow, currently on Remdesivir/Decadron day #5. Monitor response. Chief Complaint: SOB    Initial H and P:-    \"Briefly, this is a 70-year-old female, with past medical history of breast cancer, hyperlipidemia, who presented to [facility] on 10/19/2020 due to shortness of breath. The patient was exposed to her ran out a week ago was diagnosed with COVID-19. Per H&P note, \"The patient had concerns as she is a cancer patient and had her test in the outpatient setting two days prior to presentation.  Over the two days since her diagnosis she has had progressive shortness of breath and fevers.  The patient has been able to eat and drink. Tara Owusu has had low grade fevers at home along with loss of taste and smell.  The patient is currently taking chemo for breast cancer. \"      The patient was tested positive for COVID-19 2 days prior to admission in outside facility. Patient was admitted under hospital medicine service for COVID-19 infection. 10/11: overnight, pt desaturated, now on high flow O2   10/12: still on high flow   10/13: still on high flow\"    Subjective (past 24 hours):   Patient evaluated resting in her chair. She states that she has had improving shortness of breath but still has intermittent productive cough. Denies any fever chills overnight. Denies any nausea, vomiting, constipation. Reports chronic diarrhea with chemotherapy appears to be stable. Appetite at baseline. Discussed current plan of care. No further questions at this time. Past medical history, family history, social history and allergies reviewed again and is unchanged since admission. ROS (12 point review of systems completed. Pertinent positives noted.  Otherwise ROS is negative)     Medications:  Reviewed    Infusion Medications    dextrose       Scheduled Medications    potassium chloride  10 mEq Oral Daily with breakfast    magnesium oxide  400 mg Oral Daily    sodium chloride  20 mL Intravenous Once    neomycin-bacitracin-polymyxin   Topical BID    aspirin EC  81 mg Oral Daily    levothyroxine  25 mcg Oral Daily    PARoxetine  10 mg Oral QAM    vitamin C  500 mg Oral Daily    Vitamin D  1,000 Units Oral Daily    zinc sulfate  50 mg Oral Daily    sodium chloride flush  10 mL Intravenous 2 times per day    enoxaparin  40 mg Subcutaneous BID    sodium chloride  20 mL Intravenous Once    dexamethasone  6 mg Intravenous Daily    remdesivir IVPB  100 mg Intravenous Q24H    insulin lispro  0-6 Units Subcutaneous TID WC    insulin lispro  0-3 Units Subcutaneous Nightly    famotidine  20 mg Oral BID     PRN Meds: loperamide, sodium chloride flush, acetaminophen **OR** acetaminophen, polyethylene glycol, promethazine **OR** ondansetron, potassium chloride **OR** potassium alternative oral replacement **OR** potassium chloride, magnesium sulfate, sodium chloride, glucose, dextrose, glucagon (rDNA), dextrose      Intake/Output Summary (Last 24 hours) at 10/14/2020 0945  Last data filed at 10/14/2020 0400  Gross per 24 hour   Intake 1108.96 ml   Output --   Net 1108.96 ml       Diet:  Dietary Nutrition Supplements: Diabetic Oral Supplement  DIET CARB CONTROL; Low Sodium (2 GM); Daily Fluid Restriction: 2000 ml    Exam:  BP (!) 119/58   Pulse 64   Temp 97.6 °F (36.4 °C) (Oral)   Resp 20   Ht 5' 8\" (1.727 m)   Wt 211 lb (95.7 kg)   SpO2 92%   BMI 32.08 kg/m²   General appearance: No apparent distress, appears stated age and cooperative. HEENT: Pupils equal, round, and reactive to light. Conjunctivae/corneas clear. Neck: Supple, with full range of motion. No jugular venous distention. Trachea midline. Respiratory: On high flow O2. Diminished breath sounds bilaterally without wheezing, rales, or rhonchi.    Cardiovascular: Regular rate and rhythm with normal S1/S2 without murmurs, rubs or gallops. Abdomen: Soft, non-tender, non-distended with normal bowel sounds. Musculoskeletal: passive and active ROM x 4 extremities. No lower extremity edema  Skin: Skin color, texture, turgor normal.  No rashes or lesions. Neurologic:  Neurovascularly intact without any focal sensory/motor deficits. Cranial nerves: II-XII intact, grossly non-focal.  Psychiatric: Alert and oriented, thought content appropriate, normal insight  Capillary Refill: Brisk,< 3 seconds   Peripheral Pulses: +2 palpable, equal bilaterally     Labs:   Recent Labs     10/12/20  0533 10/13/20  0520 10/14/20  0847   WBC 10.3 9.7 14.5*   HGB 11.7* 11.7* 12.9   HCT 36.1* 36.2* 39.3    292 335     Recent Labs     10/12/20  0533 10/13/20  0520 10/14/20  0847    140 142   K 3.3* 3.2* 3.2*    103 105   CO2 26 26 25   BUN 16 12 10   CREATININE 0.5 0.4 0.4   CALCIUM 8.7 8.9 9.2     Recent Labs     10/12/20  0533 10/13/20  0520 10/14/20  0847   AST 28 23 27   ALT 17 13 14   BILIDIR <0.2 <0.2 <0.2   BILITOT 0.2* 0.2* 0.5   ALKPHOS 71 68 76     No results for input(s): INR in the last 72 hours. No results for input(s): Miki Seed in the last 72 hours.     Microbiology:    Blood culture #1:   Lab Results   Component Value Date    BC No growth-preliminary  10/12/2020       Blood culture #2:No results found for: Ceasar Elkins    Organism:No results found for: ORG    No results found for: LABGRAM    MRSA culture only:No results found for: [facility]    Urine culture:   Lab Results   Component Value Date    LABURIN No growth-preliminary  No growth   02/08/2017       Respiratory culture: No results found for: CULTRESP    Aerobic and Anaerobic :  No results found for: LABAERO  No results found for: LABANAE    Urinalysis:      Lab Results   Component Value Date    NITRU NEGATIVE 02/08/2017    WBCUA 5-10 02/08/2017    BACTERIA NONE 02/08/2017    RBCUA 0-2 02/08/2017    BLOODU SMALL 02/08/2017    SPECGRAV 1.014 02/08/2017       Radiology:  XR CHEST PORTABLE   Final Result   Impression:      Developing right perihilar and left basilar consolidation. This document has been electronically signed by: Kellie Ceballos MD on    10/13/2020 02:49 AM         XR CHEST PORTABLE   Final Result   1. Minimal reticular linear opacities are seen laterally at the left lung base. This may represent mild atelectasis or pneumonia. Otherwise stable portable chest.            **This report has been created using voice recognition software. It may contain minor errors which are inherent in voice recognition technology. **      Final report electronically signed by Dr. Trevon Melara on 10/11/2020 8:58 AM      CTA CHEST [address]   Final Result   1. Negative for acute pulmonary artery embolism. Thoracic aorta of normal    caliber without dissection. 2.  Patchy peripheral groundglass opacities bilaterally suspicious for    viral pneumonitis. 3.  Fatty liver. 4.  Sequelae of prior granulomatous infection. This document has been electronically signed by: Micki Johnston MD on    10/10/2020 02:54 AM      All CT scans at this facility use dose modulation, iterative    reconstruction, and/or weight-based   dosing when appropriate to reduce radiation dose to as low as reasonably    achievable. XR CHEST PORTABLE   Final Result   Impression:   Patchy right upper lobe and left lower lobe infiltrates may reflect    pneumonitis and/or atelectasis. No CHF. This document has been electronically signed by: Micki Johnston MD on    10/09/2020 11:46 PM           Cta Chest W Wo Contrast    Result Date: 10/10/2020  CT angiogram of chest with MIP postprocessing: Comparison:  CT  - CTA CHEST  - 02/08/2017 11:48 PM EST FINDINGS: Pulmonary arteries: Pulmonary arteries are well-opacified, free of intraluminal thrombus. Thoracic aorta: Normal caliber without dissection. Mediastinum/heart: Heart size is normal.  No pericardial effusion. Mild mediastinal lymphadenopathy. Calcified mediastinal and left hilar lymph nodes. Lungs/pleura: Patchy peripheral groundglass opacities bilaterally. Mild dependent atelectasis at the posterior sulci. Left upper lobe calcified granuloma. No pleural effusion or pneumothorax. Uppermost abdomen: Hepatic steatosis. No adrenal mass. There are 3 splenules in the left upper quadrant measuring up to 4 cm in diameter, similar to previous. Left kidney superior pole cortical cyst. Bone/MSK: Severe spondylosis of the thoracic spine. No acute vertebral body or rib fracture. No destructive osseous lesion. 1. Negative for acute pulmonary artery embolism. Thoracic aorta of normal caliber without dissection. 2.  Patchy peripheral groundglass opacities bilaterally suspicious for viral pneumonitis. 3.  Fatty liver. 4.  Sequelae of prior granulomatous infection. This document has been electronically signed by: Sneha Seaman MD on 10/10/2020 02:54 AM All CT scans at this facility use dose modulation, iterative reconstruction, and/or weight-based dosing when appropriate to reduce radiation dose to as low as reasonably achievable. Xr Chest Portable    Result Date: 10/9/2020  1 view chest x-ray. Comparison:  CR,SR  - XR CHEST PORTABLE  - 07/17/2020 12:44 PM EDT Findings: Stable position of right chest port. Patchy interstitial opacities involve the right upper lobe and the left lower lobe, new compared to previous. Heart size is normal.  No CHF. No sizable pleural effusion. No pneumothorax. Tortuous thoracic aorta. Moderate spondylosis of the thoracic spine. No acute fracture. Impression: Patchy right upper lobe and left lower lobe infiltrates may reflect pneumonitis and/or atelectasis. No CHF.  This document has been electronically signed by: Sneha Seaman MD on 10/09/2020 11:46 PM       Electronically signed by Emerson Nagel PA-C on 10/14/2020 at 9:45 AM

## 2020-10-14 NOTE — PLAN OF CARE
Problem: OXYGENATION/RESPIRATORY FUNCTION  Goal: Ability to maintain adequate oxygenation will improve  Description: Ability to maintain adequate oxygenation will improve  Outcome: Ongoing  Note: Patient remains on High Flow Nasal Cannula oxygen 60L and 60%. Will continue to wean as patient tolerates.

## 2020-10-14 NOTE — PLAN OF CARE
Problem: Falls - Risk of:  Goal: Will remain free from falls  Description: Will remain free from falls  10/14/2020 1023 by Rachna Barragan RN  Outcome: Ongoing  Note: No falls this shift. Falling star program and alarms in use. Patient uses call light appropriately. Problem: Falls - Risk of:  Goal: Absence of physical injury  Description: Absence of physical injury  10/14/2020 1023 by Rachna Barragan RN  Outcome: Ongoing  Note: Patient free from falls. No injuries noted. Problem: Skin Integrity:  Goal: Will show no infection signs and symptoms  Description: Will show no infection signs and symptoms  10/14/2020 1023 by Rachna Barragan RN  Outcome: Ongoing  Note: Patient continues to be treated for COVID-19 infection. Antiviral medications as ordered. Afebrile. Problem: Skin Integrity:  Goal: Absence of new skin breakdown  Description: Absence of new skin breakdown  10/14/2020 1023 by Rachna Barragan RN  Outcome: Ongoing  Note: Skin assessed every shift. Encouraging repositioning for pressure ulcer prevention. Purple area to buttocks, EPC cream applied, turning every 2 hours. Neosporin cream to abd wound. Problem: Neurological  Goal: Maximum potential motor/sensory/cognitive function  10/14/2020 1023 by Rachna Barragan RN  Outcome: Ongoing  Note: Patient alert and oriented x4. No confusion noted. Problem: Respiratory  Goal: No pulmonary complications  22/31/0233 1023 by Rachna Barragan RN  Outcome: Ongoing  Note: Patient very SOB this AM. O2 sats 86-89 %. High flow oxygen increased per respiratory. Patient currently 94% on 70L via nasal cannula. Problem: Respiratory  Goal: O2 Sat > 90%  10/14/2020 1023 by Rachna Barragan RN  Outcome: Ongoing  Note: Sats <90%, O2 levels adjusted per respiratory therapy.      Problem: Respiratory  Goal: Supplemental O2 requirements decreased  10/14/2020 1023 by Rachna Barragan RN  Outcome: Ongoing  Note: O2 weaned down this AM. Patient stated short of breath, O2 increased to 70L NC. Will wean as able per respiratory. Problem: Nutrition  Goal: Optimal nutrition therapy  10/14/2020 1023 by True Olszewski, RN  Outcome: Ongoing  Note: Patient with very poor appetite this AM. 0% of breakfast eaten. Encouraging patient to drink to say hydrated. Problem: Cardiovascular  Goal: No DVT, peripheral vascular complications  07/28/5017 1023 by True Olszewski, RN  Outcome: Ongoing  Note: No redness, swelling, or warmth noted to bilateral calves. Patient denies calf pain. Problem: Discharge Planning:  Goal: Discharged to appropriate level of care  Description: Discharged to appropriate level of care  10/14/2020 1023 by True Olszewski, RN  Outcome: Ongoing  Note: Patient planning home alone. Patient denies need for further discharge planning at this time. Care plan reviewed with patient. Patient verbalizes understanding of the plan of care and contributes to goal setting.

## 2020-10-14 NOTE — PLAN OF CARE
Problem: Falls - Risk of:  Goal: Will remain free from falls  Description: Will remain free from falls  10/14/2020 0626 by Melisa Blas RN  Outcome: Ongoing  Note: Patient remained free of falls this shift. Fall precautions in place. Items within reach, call light within reach and side rails up x2. Bed alarm is on. Hourly rounding in place. Patient verbalizes understanding of using call light to ask for assistance as needed. Will monitor. Problem: Falls - Risk of:  Goal: Absence of physical injury  Description: Absence of physical injury  10/14/2020 0626 by Melisa Blas RN  Outcome: Ongoing  Note: No injury has occurred this shift. Fall and safety precautions in place. Assisting patient to turn and reposition frequently to prevent skin breakdown. Will monitor. Problem: Skin Integrity:  Goal: Will show no infection signs and symptoms  Description: Will show no infection signs and symptoms  10/14/2020 0626 by Melisa Blas RN  Outcome: Ongoing  Note: Positive for COVID-19  infection. No fevers, tachycardia, hypotension noted. Pt denies any complaints other than intermittent leg spasms. Will monitor. Problem: Skin Integrity:  Goal: Absence of new skin breakdown  Description: Absence of new skin breakdown  10/14/2020 0626 by Melisa Blas RN  Outcome: Ongoing  Note: New skin issue noted to intergluteal cleft. Skin is purple with blanchable redness in waldo wound assessment. EPC cream in use. No other skin breakdown noted. LDA added to flowsheets. EPC cream  in use. Turning patient every 2 hours to prevent further skin breakdown. Heels elevated. Will monitor. Problem: Neurological  Goal: Maximum potential motor/sensory/cognitive function  10/14/2020 0626 by Melisa Blas RN  Outcome: Ongoing  Note: Patient is alert and oriented x4. Denies any numbness/tingling. Up with 1 assist to [facility]. Will monitor.       Problem: Respiratory  Goal: No pulmonary complications  84/92/2210 0626 by Christa Keene Jagdish Azul RN  Outcome: Ongoing  Note: COVID-19 positive requiring high flow oxygen. Decrease in setting this AM. 60 fiO2 on 60L. Will monitor. Problem: Respiratory  Goal: O2 Sat > 90%  10/14/2020 0626 by Felicita Boss RN  Outcome: Ongoing  Note: COVID-19 positive requiring high flow oxygen. Oxygen saturation above 90% on high flow. Decrease in setting this AM. 60 fiO2 on 60L. Will monitor. Problem: Respiratory  Goal: Supplemental O2 requirements decreased  10/14/2020 0626 by Felicita Boss RN  Note: COVID-19 positive requiring high flow oxygen. Decrease in setting this AM. 60 fiO2 on 60L. Will monitor. Problem: Nutrition  Goal: Optimal nutrition therapy  10/14/2020 0626 by Felicita Boss RN  Outcome: Ongoing  Note: Encouraged oral intake throughout shift. Denies any nausea with intake. Will monitor. Problem: Discharge Planning:  Goal: Discharged to appropriate level of care  Description: Discharged to appropriate level of care  Outcome: Ongoing  Note: Patient is planning discharge home with family support when stable. High flow will need to be weaned off prior to DC.  working towards discharge. Will monitor. Problem: Gas Exchange - Impaired  Goal: Absence of hypoxia  Note: Patient did not experience any episodes of hypoxia this shift. Oxygen saturation stayed above 90% on high flow. Decrease in fio2 this AM. Will monitor. Problem: Isolation Precautions - Risk of Spread of Infection  Goal: Prevent transmission of infection  Note: Droplet plus precautions in place. Encouraging patient to wash/sanitize hands frequently. Will monitor. Problem: Loneliness or Risk for Loneliness  Goal: Demonstrate positive use of time alone when socialization is not possible  Note: Patient denies any episodes of loneliness at this time. Will monitor. Problem: Fatigue  Goal: Verbalize increase energy and improved vitality  Note: Patient states she is fatigued with weakness.  Patient

## 2020-10-14 NOTE — PLAN OF CARE
Problem: OXYGENATION/RESPIRATORY FUNCTION  Goal: Ability to maintain adequate oxygenation will improve  Description: Ability to maintain adequate oxygenation will improve  10/14/2020 1715 by Shelley Schroeder RCP  Outcome: Ongoing  Note: HFNC to improve O2 saturation and decrease work of breathing.

## 2020-10-15 LAB
ALBUMIN SERPL-MCNC: 2.8 G/DL (ref 3.5–5.1)
ALP BLD-CCNC: 67 U/L (ref 38–126)
ALT SERPL-CCNC: 12 U/L (ref 11–66)
ANION GAP SERPL CALCULATED.3IONS-SCNC: 8 MEQ/L (ref 8–16)
AST SERPL-CCNC: 19 U/L (ref 5–40)
BILIRUB SERPL-MCNC: 0.5 MG/DL (ref 0.3–1.2)
BILIRUBIN DIRECT: < 0.2 MG/DL (ref 0–0.3)
BUN BLDV-MCNC: 13 MG/DL (ref 7–22)
CALCIUM SERPL-MCNC: 9.2 MG/DL (ref 8.5–10.5)
CHLORIDE BLD-SCNC: 105 MEQ/L (ref 98–111)
CO2: 26 MEQ/L (ref 23–33)
CREAT SERPL-MCNC: 0.4 MG/DL (ref 0.4–1.2)
ERYTHROCYTE [DISTWIDTH] IN BLOOD BY AUTOMATED COUNT: 15.1 % (ref 11.5–14.5)
ERYTHROCYTE [DISTWIDTH] IN BLOOD BY AUTOMATED COUNT: 53.7 FL (ref 35–45)
GFR SERPL CREATININE-BSD FRML MDRD: > 90 ML/MIN/1.73M2
GLUCOSE BLD-MCNC: 120 MG/DL (ref 70–108)
GLUCOSE BLD-MCNC: 171 MG/DL (ref 70–108)
GLUCOSE BLD-MCNC: 172 MG/DL (ref 70–108)
GLUCOSE BLD-MCNC: 207 MG/DL (ref 70–108)
HCT VFR BLD CALC: 34.3 % (ref 37–47)
HEMOGLOBIN: 11.2 GM/DL (ref 12–16)
MAGNESIUM: 1.7 MG/DL (ref 1.6–2.4)
MCH RBC QN AUTO: 31.9 PG (ref 26–33)
MCHC RBC AUTO-ENTMCNC: 32.7 GM/DL (ref 32.2–35.5)
MCV RBC AUTO: 97.7 FL (ref 81–99)
PLATELET # BLD: 341 THOU/MM3 (ref 130–400)
PMV BLD AUTO: 11 FL (ref 9.4–12.4)
POTASSIUM SERPL-SCNC: 3.5 MEQ/L (ref 3.5–5.2)
PRO-BNP: 179.4 PG/ML (ref 0–900)
RBC # BLD: 3.51 MILL/MM3 (ref 4.2–5.4)
REASON FOR REJECTION: NORMAL
REJECTED TEST: NORMAL
SODIUM BLD-SCNC: 139 MEQ/L (ref 135–145)
TOTAL PROTEIN: 5.9 G/DL (ref 6.1–8)
WBC # BLD: 12.5 THOU/MM3 (ref 4.8–10.8)

## 2020-10-15 PROCEDURE — 82948 REAGENT STRIP/BLOOD GLUCOSE: CPT

## 2020-10-15 PROCEDURE — 80053 COMPREHEN METABOLIC PANEL: CPT

## 2020-10-15 PROCEDURE — 94761 N-INVAS EAR/PLS OXIMETRY MLT: CPT

## 2020-10-15 PROCEDURE — 85027 COMPLETE CBC AUTOMATED: CPT

## 2020-10-15 PROCEDURE — 83880 ASSAY OF NATRIURETIC PEPTIDE: CPT

## 2020-10-15 PROCEDURE — 6360000002 HC RX W HCPCS: Performed by: PHYSICIAN ASSISTANT

## 2020-10-15 PROCEDURE — 2580000003 HC RX 258: Performed by: PHYSICIAN ASSISTANT

## 2020-10-15 PROCEDURE — 83735 ASSAY OF MAGNESIUM: CPT

## 2020-10-15 PROCEDURE — 6370000000 HC RX 637 (ALT 250 FOR IP): Performed by: FAMILY MEDICINE

## 2020-10-15 PROCEDURE — 6370000000 HC RX 637 (ALT 250 FOR IP): Performed by: PHYSICIAN ASSISTANT

## 2020-10-15 PROCEDURE — 99233 SBSQ HOSP IP/OBS HIGH 50: CPT | Performed by: PHYSICIAN ASSISTANT

## 2020-10-15 PROCEDURE — 87205 SMEAR GRAM STAIN: CPT

## 2020-10-15 PROCEDURE — 2060000000 HC ICU INTERMEDIATE R&B

## 2020-10-15 PROCEDURE — 82248 BILIRUBIN DIRECT: CPT

## 2020-10-15 RX ADMIN — Medication 1000 UNITS: at 09:49

## 2020-10-15 RX ADMIN — ASPIRIN 81 MG: 81 TABLET ORAL at 09:50

## 2020-10-15 RX ADMIN — ACETAMINOPHEN 650 MG: 325 TABLET ORAL at 00:22

## 2020-10-15 RX ADMIN — FAMOTIDINE 20 MG: 20 TABLET, FILM COATED ORAL at 20:49

## 2020-10-15 RX ADMIN — BACITRACIN, NEOMYCIN, POLYMYXIN B 1 G: 400; 3.5; 5 OINTMENT TOPICAL at 09:51

## 2020-10-15 RX ADMIN — FAMOTIDINE 20 MG: 20 TABLET, FILM COATED ORAL at 09:50

## 2020-10-15 RX ADMIN — BACITRACIN, NEOMYCIN, POLYMYXIN B 1 G: 400; 3.5; 5 OINTMENT TOPICAL at 22:20

## 2020-10-15 RX ADMIN — LEVOTHYROXINE SODIUM 25 MCG: 25 TABLET ORAL at 05:19

## 2020-10-15 RX ADMIN — ENOXAPARIN SODIUM 40 MG: 40 INJECTION SUBCUTANEOUS at 09:53

## 2020-10-15 RX ADMIN — DEXAMETHASONE SODIUM PHOSPHATE 6 MG: 4 INJECTION, SOLUTION INTRAMUSCULAR; INTRAVENOUS at 09:50

## 2020-10-15 RX ADMIN — ENOXAPARIN SODIUM 40 MG: 40 INJECTION SUBCUTANEOUS at 20:49

## 2020-10-15 RX ADMIN — PAROXETINE HYDROCHLORIDE 10 MG: 20 TABLET, FILM COATED ORAL at 09:50

## 2020-10-15 RX ADMIN — INSULIN LISPRO 2 UNITS: 100 INJECTION, SOLUTION INTRAVENOUS; SUBCUTANEOUS at 16:53

## 2020-10-15 RX ADMIN — INSULIN LISPRO 1 UNITS: 100 INJECTION, SOLUTION INTRAVENOUS; SUBCUTANEOUS at 21:32

## 2020-10-15 RX ADMIN — Medication 50 MG: at 09:49

## 2020-10-15 RX ADMIN — SODIUM CHLORIDE, PRESERVATIVE FREE 10 ML: 5 INJECTION INTRAVENOUS at 20:49

## 2020-10-15 RX ADMIN — POTASSIUM CHLORIDE 10 MEQ: 1500 TABLET, EXTENDED RELEASE ORAL at 09:50

## 2020-10-15 RX ADMIN — SODIUM CHLORIDE, PRESERVATIVE FREE 10 ML: 5 INJECTION INTRAVENOUS at 09:51

## 2020-10-15 RX ADMIN — MAGNESIUM GLUCONATE 500 MG ORAL TABLET 400 MG: 500 TABLET ORAL at 09:50

## 2020-10-15 RX ADMIN — OXYCODONE HYDROCHLORIDE AND ACETAMINOPHEN 500 MG: 500 TABLET ORAL at 09:49

## 2020-10-15 RX ADMIN — Medication 10 ML: at 09:57

## 2020-10-15 ASSESSMENT — PAIN DESCRIPTION - PROGRESSION
CLINICAL_PROGRESSION: NOT CHANGED
CLINICAL_PROGRESSION: NOT CHANGED

## 2020-10-15 ASSESSMENT — PAIN DESCRIPTION - DIRECTION: RADIATING_TOWARDS: NO

## 2020-10-15 ASSESSMENT — PAIN SCALES - GENERAL
PAINLEVEL_OUTOF10: 0
PAINLEVEL_OUTOF10: 3
PAINLEVEL_OUTOF10: 0
PAINLEVEL_OUTOF10: 3

## 2020-10-15 ASSESSMENT — PAIN DESCRIPTION - FREQUENCY
FREQUENCY: INTERMITTENT
FREQUENCY: INTERMITTENT

## 2020-10-15 ASSESSMENT — PAIN DESCRIPTION - ORIENTATION
ORIENTATION: RIGHT;LEFT
ORIENTATION: LOWER

## 2020-10-15 ASSESSMENT — PAIN DESCRIPTION - PAIN TYPE
TYPE: ACUTE PAIN
TYPE: CHRONIC PAIN

## 2020-10-15 ASSESSMENT — PAIN DESCRIPTION - ONSET
ONSET: AWAKENED FROM SLEEP
ONSET: AWAKENED FROM SLEEP

## 2020-10-15 ASSESSMENT — PAIN DESCRIPTION - DESCRIPTORS
DESCRIPTORS: OTHER (COMMENT)
DESCRIPTORS: ACHING

## 2020-10-15 ASSESSMENT — PAIN - FUNCTIONAL ASSESSMENT
PAIN_FUNCTIONAL_ASSESSMENT: PREVENTS OR INTERFERES SOME ACTIVE ACTIVITIES AND ADLS
PAIN_FUNCTIONAL_ASSESSMENT: ACTIVITIES ARE NOT PREVENTED

## 2020-10-15 ASSESSMENT — PAIN DESCRIPTION - LOCATION
LOCATION: LEG
LOCATION: BACK

## 2020-10-15 NOTE — PROGRESS NOTES
Hospitalist Progress Note      Patient:  Arvin Mayer    Unit/Bed:4B-09/009-A  YOB: 1950  MRN: [id number]   Acct: [de-identified]   PCP: Michelle Dotson  Date of Admission: 10/9/2020    Assessment/Plan:    1. Acute Hypoxic Respiratory Failure 2/2 COVID-19 PNA:   1. Convalescent Plasma x2 doses 10/9 and 10/11/20. 10/10/20 placed on high flow O2. ABG 10/11/20 pH 7.42, PCO2 43, PO2 70, bicarb 27. CXR showed developing right perihilar and left basilar consolidation, procal not elevated. Repeat ABG showed respiratory alkalosis, low PaO2.   2. Remains on high flow, 50%/50L/min. Continue COVID-19 management as below and wean O2 as tolerated. 2. Covid-19 PNA: CTA chest negative for PE, patchy peripheral groundglass opacities BL suspiciur for viral pneumonitis. Convalescent plasma x2 doses as above. Decadron/Remdesivir initiated 10/10/20. 1. Decadron day #6/10. Completed five day course of Remdesivir. . Continue Vit C, Vit D, Zinc and ASA. 2. Hx of breast cancer and Covid-19 increases risk for VTE. Eliquis 2.5mg PO BID x30 days recommended on discharge. Patient agreeable. 3. Hypokalemia 2/2 Diarrhea: KCl 10mEq PO daily. Potassium replacement protocol. 1. K 3.6 today. Replace per protocol. Recheck in AM.    4. Hypomagnesemia: Continue Mag Oxide. Mg 1.7 today. Replace per protocol. Recheck in AM.   5. Hypotension: Likely 2/2 dehydration, resolved. IVF hydration discontinued. Monitor BP closely. 6. Type 2 Diabetes: A1c 7.2% on 10/12/20. Low dose correctional SSI while inpatient and on steroids. Recommendations to repeat on 10/19/20 to confirm diabetes. May need to begin Metformin on discharge. Carb control diet. 7. Left Breast Cancer: currently on chemotherapy. 8. HLD: Not on statin OP. 9. Hypothyroidism: continue Synthroid. 10. Diarrhea 2/2 Chemotherapy: Appears stable. Replacing electrolytes as above. Immodium prn.    11. Chronic HFpEF: Echo 7/2020 with EF 34-24%, grade 1 diastolic dysfunction. BNP wnl. Disposition: Weaning high flow O2, has completed five days of remdesivir, day #6/10 of Decadron, completed two units convalescent plasma. Code status changed to Do Not Inutabated. Palliative care consulted to discuss code status and goals of care as she feels depressed and states she simply wants to go home. Chief Complaint: SOB    Initial H and P:-    \"Briefly, this is a 66-year-old female, with past medical history of breast cancer, hyperlipidemia, who presented to [facility] on 10/19/2020 due to shortness of breath.  The patient was exposed to her ran out a week ago was diagnosed with COVID-19.  Per H&P note, \"The patient had concerns as she is a cancer patient and had her test in the outpatient setting two days prior to presentation.  Over the two days since her diagnosis she has had progressive shortness of breath and fevers.  The patient has been able to eat and drink. Tara Owusu has had low grade fevers at home along with loss of taste and smell.  The patient is currently taking chemo for breast cancer. \"      The patient was tested positive for COVID-19 2 days prior to admission in outside facility. Leatha Maldonado was admitted under hospital medicine service for COVID-19 infection.    10/11: overnight, pt desaturated, now on high flow O2   10/12: still on high flow   10/13: still on high flow\"    Subjective (past 24 hours):   Patient evaluated in her bed. Her affect is flat and she states she is sad and tired. Upset over minimal improvement with current treatment regimen. Patient states that she simply wants to go home. Adamant that she does not want to be intubated. Attempted to discuss further code status options and patient would like to meet with palliative. No fever or chills overnight. No nausea, vomiting, constipation, or diarrhea.        Past medical history, family history, social history and allergies reviewed again and is unchanged since admission. ROS (12 point review of systems completed. Pertinent positives noted. Otherwise ROS is negative)     Medications:  Reviewed    Infusion Medications    dextrose       Scheduled Medications    potassium chloride  10 mEq Oral Daily with breakfast    magnesium oxide  400 mg Oral Daily    sodium chloride  20 mL Intravenous Once    neomycin-bacitracin-polymyxin   Topical BID    aspirin EC  81 mg Oral Daily    levothyroxine  25 mcg Oral Daily    PARoxetine  10 mg Oral QAM    vitamin C  500 mg Oral Daily    Vitamin D  1,000 Units Oral Daily    zinc sulfate  50 mg Oral Daily    sodium chloride flush  10 mL Intravenous 2 times per day    enoxaparin  40 mg Subcutaneous BID    sodium chloride  20 mL Intravenous Once    dexamethasone  6 mg Intravenous Daily    insulin lispro  0-6 Units Subcutaneous TID WC    insulin lispro  0-3 Units Subcutaneous Nightly    famotidine  20 mg Oral BID     PRN Meds: loperamide, sodium chloride flush, acetaminophen **OR** acetaminophen, polyethylene glycol, promethazine **OR** ondansetron, potassium chloride **OR** potassium alternative oral replacement **OR** potassium chloride, magnesium sulfate, sodium chloride, glucose, dextrose, glucagon (rDNA), dextrose      Intake/Output Summary (Last 24 hours) at 10/15/2020 0841  Last data filed at 10/15/2020 0532  Gross per 24 hour   Intake 1075 ml   Output --   Net 1075 ml       Diet:  Dietary Nutrition Supplements: Diabetic Oral Supplement  DIET CARB CONTROL; Low Sodium (2 GM); Daily Fluid Restriction: 2000 ml    Exam:  /65   Pulse 77   Temp 98.7 °F (37.1 °C) (Oral)   Resp 20   Ht 5' 8\" (1.727 m)   Wt 211 lb (95.7 kg)   SpO2 92%   BMI 32.08 kg/m²   General appearance: No apparent distress, appears stated age and cooperative. HEENT: Pupils equal, round, and reactive to light. Conjunctivae/corneas clear. Neck: Supple, with full range of motion. No jugular venous distention.  Trachea midline. Respiratory: On high flow O2. Diminished breath sounds bilaterally without wheezing, rales, or rhonchi. Cardiovascular: Regular rate and rhythm with normal S1/S2 without murmurs, rubs or gallops. Abdomen: Soft, non-tender, non-distended with normal bowel sounds. Musculoskeletal: passive and active ROM x 4 extremities. No lower extremity edema  Skin: Skin color, texture, turgor normal.  No rashes or lesions. Neurologic:  Neurovascularly intact without any focal sensory/motor deficits. Cranial nerves: II-XII intact, grossly non-focal.  Psychiatric: Alert and oriented, thought content appropriate, normal insight, flat affect  Capillary Refill: Brisk,< 3 seconds   Peripheral Pulses: +2 palpable, equal bilaterally    Labs:   Recent Labs     10/13/20  0520 10/14/20  0847 10/15/20  0445   WBC 9.7 14.5* 12.5*   HGB 11.7* 12.9 11.2*   HCT 36.2* 39.3 34.3*    335 341     Recent Labs     10/13/20  0520 10/14/20  0847 10/14/20  1842 10/15/20  0445    142  --  139   K 3.2* 3.2* 3.7 3.5    105  --  105   CO2 26 25  --  26   BUN 12 10  --  13   CREATININE 0.4 0.4  --  0.4   CALCIUM 8.9 9.2  --  9.2     Recent Labs     10/13/20  0520 10/14/20  0847 10/15/20  0445   AST 23 27 19   ALT 13 14 12   BILIDIR <0.2 <0.2 <0.2   BILITOT 0.2* 0.5 0.5   ALKPHOS 68 76 67     No results for input(s): INR in the last 72 hours. No results for input(s): Joeline Reeks in the last 72 hours.     Microbiology:    Blood culture #1:   Lab Results   Component Value Date    BC No growth-preliminary  10/12/2020       Blood culture #2:No results found for: Beto Middleton    Organism:No results found for: ORG    No results found for: LABGRAM    MRSA culture only:No results found for: [facility]    Urine culture:   Lab Results   Component Value Date    LABURIN No growth-preliminary  No growth   02/08/2017       Respiratory culture: No results found for: CULTRESP    Aerobic and Anaerobic :  No results found for: LABAERO  No This document has been electronically signed by: Darien Delgadillo MD on 10/09/2020 11:46 PM       Electronically signed by Husam Michelle PA-C on 10/15/2020 at 8:41 AM

## 2020-10-15 NOTE — PROGRESS NOTES
Pt reports some restlessness of legs bilat. Requested Tylenol. States that it has helped her with that problem. Given PRN tylenol.      Electronically signed by Maryuri Whitlock RN on 10/15/2020 at 12:43 AM

## 2020-10-15 NOTE — PROGRESS NOTES
At [facility] pt returned to bed from commode. Checked pulse ox it was 85% o2 sat. Rechecked in couple mins when she settled down in bed and it was 91% and seemed out of breath. Contacted Respiratory to see if we can bump up the high flow cannula.     Electronically signed by Keeley Dennis RN on 10/15/2020 at 12:41 AM

## 2020-10-15 NOTE — CARE COORDINATION
10/15/20, 12:14 PM EDT    DISCHARGE ONGOING EVALUATION:     Teddy Korireina day: 5  Location: [facility]09/009-A Reason for admit: COVID-19 [U07.1]   Treatment Plan of Care: : Tmax 98.7, high flow nasal oxgen at 50% FiO2, 50 liters with saturations at 95%. Palliative Care Eval, Decadron IV, diabetes management, electrolyte replacement protocols, IV Remdesivir, Vitamin C,D,and zinc.    Barriers to Discharge: medical stability. PCP: Tong Rivera  Readmission Risk Score: 15%  Patient Goals/Plan/Treatment Preferences: Plan is to return home alone with family support. Will follow for discharge needs as clinical course progresses.

## 2020-10-15 NOTE — PLAN OF CARE
Problem: Falls - Risk of:  Goal: Will remain free from falls  Description: Will remain free from falls  10/14/2020 2119 by Siria Franco RN  Outcome: Ongoing  Note: Patient absent of falls this shift, fall band intact, bed alarm set, falling star magnet in place. Problem: Falls - Risk of:  Goal: Absence of physical injury  Description: Absence of physical injury  10/14/2020 2119 by Siria Franco RN  Outcome: Ongoing  Note: No physical injury this shift. Problem: Skin Integrity:  Goal: Will show no infection signs and symptoms  Description: Will show no infection signs and symptoms  10/14/2020 2119 by Siria Franco RN  Outcome: Ongoing  Note: No s/s of infection. Neosporin topical ATB is being used on the scrape open area near umbilicus/abdomen. Problem: Skin Integrity:  Goal: Absence of new skin breakdown  Description: Absence of new skin breakdown  10/14/2020 2119 by Siria Franco RN  Outcome: Ongoing  Note: No new skin breakdown. Problem: Neurological  Goal: Maximum potential motor/sensory/cognitive function  10/14/2020 2119 by Siria Franco RN  Outcome: Ongoing  Note: Pt is alert and oriented X4. Problem: Respiratory  Goal: No pulmonary complications  69/90/3466 2119 by Siria Franco RN  Outcome: Ongoing  Note: Pt states no SOB or dyspnea at this time. Problem: Respiratory  Goal: O2 Sat > 90%  10/14/2020 2119 by Siria Franco RN  Outcome: Ongoing  Note: Oxygen saturation is above 90%. Pt is 93% right now on a high flow cannula.       Problem: Respiratory  Goal: Supplemental O2 requirements decreased  10/14/2020 2119 by Siria Franco RN  Outcome: Ongoing     Problem: Cardiovascular  Goal: No DVT, peripheral vascular complications  53/11/0402 2119 by Siria Franco RN  Outcome: Ongoing     Problem: Discharge Planning:  Goal: Discharged to appropriate level of care  Description: Discharged to appropriate level of care  10/14/2020 2119 by Abbi Abel RN  Outcome: Ongoing  Note: Plan is to return home alone. Problem: Gas Exchange - Impaired  Goal: Absence of hypoxia  Outcome: Ongoing  Note: Absence of hypoxia. Pt saturation is greater than 90%     Problem: Isolation Precautions - Risk of Spread of Infection  Goal: Prevent transmission of infection  10/14/2020 2119 by Abbi Abel RN  Outcome: Ongoing  Note: Patient isolation for Covid. Droplet plus precautions in place. Proper PPE being administered. Problem: Loneliness or Risk for Loneliness  Goal: Demonstrate positive use of time alone when socialization is not possible  10/14/2020 2119 by Abbi Abel RN  Outcome: Ongoing     Problem: Fatigue  Goal: Verbalize increase energy and improved vitality  Outcome: Ongoing  Note: Pt denies fatigue. Problem: Patient Education: Go to Patient Education Activity  Goal: Patient/Family Education  Outcome: Ongoing  Note: Educated patient on night medication informing about what is the purpose and side effects of the meds using lexicomp. Care plan reviewed with patient. Patient verbalizes understanding of the plan of care and contributes to goal setting.

## 2020-10-16 LAB
ANION GAP SERPL CALCULATED.3IONS-SCNC: 9 MEQ/L (ref 8–16)
BUN BLDV-MCNC: 12 MG/DL (ref 7–22)
CALCIUM SERPL-MCNC: 9.6 MG/DL (ref 8.5–10.5)
CHLORIDE BLD-SCNC: 103 MEQ/L (ref 98–111)
CO2: 27 MEQ/L (ref 23–33)
CREAT SERPL-MCNC: 0.5 MG/DL (ref 0.4–1.2)
ERYTHROCYTE [DISTWIDTH] IN BLOOD BY AUTOMATED COUNT: 14.9 % (ref 11.5–14.5)
ERYTHROCYTE [DISTWIDTH] IN BLOOD BY AUTOMATED COUNT: 54.2 FL (ref 35–45)
GFR SERPL CREATININE-BSD FRML MDRD: > 90 ML/MIN/1.73M2
GLUCOSE BLD-MCNC: 127 MG/DL (ref 70–108)
GLUCOSE BLD-MCNC: 138 MG/DL (ref 70–108)
GLUCOSE BLD-MCNC: 184 MG/DL (ref 70–108)
GLUCOSE BLD-MCNC: 218 MG/DL (ref 70–108)
GLUCOSE BLD-MCNC: 264 MG/DL (ref 70–108)
HCT VFR BLD CALC: 36.2 % (ref 37–47)
HEMOGLOBIN: 11.6 GM/DL (ref 12–16)
MCH RBC QN AUTO: 31.5 PG (ref 26–33)
MCHC RBC AUTO-ENTMCNC: 32 GM/DL (ref 32.2–35.5)
MCV RBC AUTO: 98.4 FL (ref 81–99)
PLATELET # BLD: 368 THOU/MM3 (ref 130–400)
PMV BLD AUTO: 11 FL (ref 9.4–12.4)
POTASSIUM SERPL-SCNC: 3.4 MEQ/L (ref 3.5–5.2)
RBC # BLD: 3.68 MILL/MM3 (ref 4.2–5.4)
SODIUM BLD-SCNC: 139 MEQ/L (ref 135–145)
WBC # BLD: 10.1 THOU/MM3 (ref 4.8–10.8)

## 2020-10-16 PROCEDURE — 6370000000 HC RX 637 (ALT 250 FOR IP): Performed by: PHYSICIAN ASSISTANT

## 2020-10-16 PROCEDURE — 2060000000 HC ICU INTERMEDIATE R&B

## 2020-10-16 PROCEDURE — 6360000002 HC RX W HCPCS: Performed by: PHYSICIAN ASSISTANT

## 2020-10-16 PROCEDURE — 2700000000 HC OXYGEN THERAPY PER DAY

## 2020-10-16 PROCEDURE — 6370000000 HC RX 637 (ALT 250 FOR IP): Performed by: FAMILY MEDICINE

## 2020-10-16 PROCEDURE — 2580000003 HC RX 258: Performed by: PHYSICIAN ASSISTANT

## 2020-10-16 PROCEDURE — 85027 COMPLETE CBC AUTOMATED: CPT

## 2020-10-16 PROCEDURE — 94761 N-INVAS EAR/PLS OXIMETRY MLT: CPT

## 2020-10-16 PROCEDURE — 82948 REAGENT STRIP/BLOOD GLUCOSE: CPT

## 2020-10-16 PROCEDURE — 99232 SBSQ HOSP IP/OBS MODERATE 35: CPT | Performed by: PHYSICIAN ASSISTANT

## 2020-10-16 PROCEDURE — 80048 BASIC METABOLIC PNL TOTAL CA: CPT

## 2020-10-16 RX ORDER — POTASSIUM CHLORIDE 20 MEQ/1
20 TABLET, EXTENDED RELEASE ORAL
Status: DISCONTINUED | OUTPATIENT
Start: 2020-10-16 | End: 2020-10-21 | Stop reason: HOSPADM

## 2020-10-16 RX ADMIN — Medication 1000 UNITS: at 08:28

## 2020-10-16 RX ADMIN — DEXAMETHASONE SODIUM PHOSPHATE 6 MG: 4 INJECTION, SOLUTION INTRAMUSCULAR; INTRAVENOUS at 08:29

## 2020-10-16 RX ADMIN — BACITRACIN, NEOMYCIN, POLYMYXIN B: 400; 3.5; 5 OINTMENT TOPICAL at 08:28

## 2020-10-16 RX ADMIN — PAROXETINE HYDROCHLORIDE 10 MG: 20 TABLET, FILM COATED ORAL at 08:28

## 2020-10-16 RX ADMIN — ASPIRIN 81 MG: 81 TABLET ORAL at 08:29

## 2020-10-16 RX ADMIN — SODIUM CHLORIDE, PRESERVATIVE FREE 10 ML: 5 INJECTION INTRAVENOUS at 20:48

## 2020-10-16 RX ADMIN — LEVOTHYROXINE SODIUM 25 MCG: 25 TABLET ORAL at 04:39

## 2020-10-16 RX ADMIN — POTASSIUM CHLORIDE 20 MEQ: 1500 TABLET, EXTENDED RELEASE ORAL at 08:30

## 2020-10-16 RX ADMIN — INSULIN LISPRO 3 UNITS: 100 INJECTION, SOLUTION INTRAVENOUS; SUBCUTANEOUS at 13:10

## 2020-10-16 RX ADMIN — INSULIN LISPRO 1 UNITS: 100 INJECTION, SOLUTION INTRAVENOUS; SUBCUTANEOUS at 20:42

## 2020-10-16 RX ADMIN — ENOXAPARIN SODIUM 40 MG: 40 INJECTION SUBCUTANEOUS at 08:30

## 2020-10-16 RX ADMIN — FAMOTIDINE 20 MG: 20 TABLET, FILM COATED ORAL at 08:29

## 2020-10-16 RX ADMIN — INSULIN LISPRO 2 UNITS: 100 INJECTION, SOLUTION INTRAVENOUS; SUBCUTANEOUS at 17:42

## 2020-10-16 RX ADMIN — Medication 50 MG: at 08:28

## 2020-10-16 RX ADMIN — ENOXAPARIN SODIUM 40 MG: 40 INJECTION SUBCUTANEOUS at 20:42

## 2020-10-16 RX ADMIN — SODIUM CHLORIDE, PRESERVATIVE FREE 10 ML: 5 INJECTION INTRAVENOUS at 08:29

## 2020-10-16 RX ADMIN — OXYCODONE HYDROCHLORIDE AND ACETAMINOPHEN 500 MG: 500 TABLET ORAL at 08:28

## 2020-10-16 RX ADMIN — BACITRACIN, NEOMYCIN, POLYMYXIN B 1 G: 400; 3.5; 5 OINTMENT TOPICAL at 20:43

## 2020-10-16 RX ADMIN — FAMOTIDINE 20 MG: 20 TABLET, FILM COATED ORAL at 20:42

## 2020-10-16 RX ADMIN — MAGNESIUM GLUCONATE 500 MG ORAL TABLET 400 MG: 500 TABLET ORAL at 08:29

## 2020-10-16 ASSESSMENT — PAIN SCALES - GENERAL
PAINLEVEL_OUTOF10: 0

## 2020-10-16 NOTE — PROGRESS NOTES
Palliative care consult received to discuss code status options and goals of care. Met with patient. She is alert and oriented x3. Remains on high flow, appears tired. She denies pain. Code status options discussed. Patient shares that she wishes for Full resuscitative measures to be done if she experienced cardio/pulmonary arrest.  Current code status is for \"No intubation\" but she now wants to be intubated if needed. She would like me to call her son to inform. Call placed to patient's son, Godfrey Mcfarlane. Updated on full code status and he supports her decision. Nikolas GARZA updated and order placed for Full Code. Primary RN Yessi updated.

## 2020-10-16 NOTE — PLAN OF CARE
Problem: Falls - Risk of:  Goal: Will remain free from falls  Description: Will remain free from falls  Outcome: Ongoing  Note: Patient remained free of falls. Call light within reach and used appropriately. Bed alarmed and in lowest position, side rails up x2, personal items within reach and non skid socks worn when ambulating. Up with 1A stand pivot to commode. Problem: Falls - Risk of:  Goal: Absence of physical injury  Description: Absence of physical injury  Outcome: Ongoing  Note: Patient remains free of physical injury. Problem: Skin Integrity:  Goal: Will show no infection signs and symptoms  Description: Will show no infection signs and symptoms  Outcome: Ongoing  Note: No signs of new skin breakdown with each assessment. Skin remains warm, dry, intact. Mucous membranes pink & moist. Patient understands the importance of frequent repositioning in order to prevent any skin breakdown. Problem: Skin Integrity:  Goal: Absence of new skin breakdown  Description: Absence of new skin breakdown  Outcome: Ongoing  Note: Patient remains free of any new skin breakdown. Problem: Neurological  Goal: Maximum potential motor/sensory/cognitive function  Outcome: Ongoing  Note: Patient is alert and oriented x4. All extremities have pulses of +1 or +2. Denies any numbness and tingling. All extremities active. Problem: Respiratory  Goal: No pulmonary complications  Outcome: Ongoing  Note: Lung sounds clear and chest expansion symmetrical. O2 sats are 92% or greater on high flow 50%/50L/min. Problem: Nutrition  Goal: Optimal nutrition therapy  Outcome: Ongoing  Note: Patient on general diet tolerating well. Denies any n/v.     Problem: Cardiovascular  Goal: No DVT, peripheral vascular complications  Outcome: Ongoing  Note: No signs and symptoms of DVT. Calves soft and nontender. Patient compliant with medication to help in prevention of DVTs.      Problem: Discharge Planning:  Goal: Discharged to appropriate level of care  Description: Discharged to appropriate level of care  Outcome: Ongoing  Note:   Discharge planning in progress.  and  assisting with discharge needs. Problem: Gas Exchange - Impaired  Goal: Absence of hypoxia  Outcome: Ongoing  Note: Patient free of hypoxia       Problem: Isolation Precautions - Risk of Spread of Infection  Goal: Prevent transmission of infection  Outcome: Ongoing  Note:   Patient isolation for Covid. Droplet plus precautions in place. Proper PPE being administered. Problem: Pain:  Goal: Pain level will decrease  Description: Pain level will decrease  Outcome: Ongoing  Note: Patient denies any pain     Care plan reviewed with patient. Patient verbalize understanding of the plan of care and contribute to goal setting.

## 2020-10-16 NOTE — PLAN OF CARE
Problem: Nutrition  Goal: Optimal nutrition therapy  10/16/2020 1437 by Hoda Burgos, HUY, LD  Outcome: Ongoing   Nutrition Problem #1: Severe malnutrition, In context of acute illness or injury  Intervention: Food and/or Nutrient Delivery: Continue Current Diet, Continue Oral Nutrition Supplement  Nutritional Goals: Pt will consume 75% or more of meals during LOS

## 2020-10-16 NOTE — PROGRESS NOTES
Comprehensive Nutrition Assessment    Type and Reason for Visit:  Reassess    Nutrition Recommendations/Plan:   Continue current diet and ONS. Consider MVI. Nutrition Assessment:    Pt improving from a nutritional standpoint AEB reports pt consumed most of breakfast this morning and 75% of her glucerna shake. Further nutritional compromise r/t some meal intake less than 75%, dislikes hospital food, skin integrity issues, and underlying medical condition (covid 19, acute hypoxic respiratory failure, high flow oxygen-difficult to wean, pneumonia, previous diarrhea, ? New diagnosed DB, history of left breast cancer-on chemotherapy, HLD, CHF), and need for continued nutrition support. Nutrition recommendations/interventions as per above. Malnutrition Assessment:  Malnutrition Status:  Severe malnutrition    Context:  Acute Illness     Findings of the 6 clinical characteristics of malnutrition:  Energy Intake:  7 - 50% or less of estimated energy requirements for 5 or more days  Weight Loss:  1 - 7.5% over 3 months(-9.6% weight loss in 3 months per EMR)     Body Fat Loss:  Unable to assess(pt on COVID unit)     Muscle Mass Loss:  Unable to assess(pt on COVID unit)    Fluid Accumulation:  No significant fluid accumulation Extremities   Strength:  Not Performed    Estimated Daily Nutrient Needs:  Energy (kcal):  4379-1478 kcal/day (11-13)- pt in hypometabolic phase; Weight Used for Energy Requirements:  (93.9 kg on 10/13)     Protein (g):  127+ g/day (2 g/kg); Weight Used for Protein Requirements:  Ideal(63.6 kg IBW)          Nutrition Related Findings:  Covid positive. High flow oxygen. MAP 75. Attempted to call pt, no answer. Nursing reports pt ate 75% of breakast and drank 75% of glucerna shake. Intake has been variable 1-25%, 26-50%, 51-75% of meals. Previously stated she dislikes the food here and unplanned weight loss d/t starting chemotherapy for breast cancer. Last BM 10/15/20.   Potassium

## 2020-10-16 NOTE — PROGRESS NOTES
Hospitalist Progress Note      Patient:  Kathleen Kothari    Unit/Bed:4B-09/009-A  YOB: 1950  MRN: [id number]   Acct: [de-identified]   PCP: Rhonda Wilburn  Date of Admission: 10/9/2020    Assessment/Plan:    1. Acute Hypoxic Respiratory Failure 2/2 COVID-19 PNA:   1. Convalescent Plasma x2 doses 10/9 and 10/11/20. 10/10/20 placed on high flow O2. ABG 10/11/20 pH 7.42, PCO2 43, PO2 70, bicarb 27. CXR showed developing right perihilar and left basilar consolidation, procal not elevated. Repeat ABG showed respiratory alkalosis, low PaO2.   2. Remains on high flow, 50%, 50L/min. Wean O2 as tolerated. 2. Covid-19 PNA: CTA chest negative for PE, patchy peripheral groundglass opacities BL suspiciur for viral pneumonitis. Convalescent plasma x2 doses as above. Decadron/Remdesivir initiated 10/10/20. 1. Decadron day #7/10. Completed five day course of Remdesivir. . Continue Vit C, Vit D, Zinc and ASA. 2. Hx of breast cancer and Covid-19 increases risk for VTE. Currently on Lovenox 40mg BID. Eliquis 2.5mg PO BID x30 days recommended on discharge. Patient agreeable.   3. Hypokalemia 2/2 Diarrhea: Daily KCl supplementation. Potassium replacement protocol. 1. K 3.4 today. Increase supplementation to 20mEq qd.    4. Hypomagnesemia: Continue Mag Oxide. Mg 1.7 today. Replace per protocol. Recheck in AM.   5. Hypotension: Likely 2/2 dehydration, resolved.  IVF hydration discontinued. Monitor BP closely. 6. Type 2 Diabetes: A1c 7.2% on 10/12/20. Low dose correctional SSI while inpatient and on steroids. Recommendations to repeat on 10/19/20 to confirm diabetes. May need to begin Metformin on discharge. Carb control diet. 7. Left Breast Cancer: currently on chemotherapy. 8. HLD: Not on statin OP. 9. Hypothyroidism: continue Synthroid. 10. Diarrhea 2/2 Chemotherapy: Appears stable. Replacing electrolytes as above. Immodium prn.    11. Chronic HFpEF: Echo 7/2020 with EF 19-42%, grade 1 diastolic dysfunction. BNP wnl. Disposition: Palliative care still to see. Remains on high flow, difficult to wean despite completing Remdesivir, Convalescent plasma x2 doses, and currently day #7/10 of Decadron. Chief Complaint: SOB    Initial H and P:-    \"Briefly, this is a 79-year-old female, with past medical history of breast cancer, hyperlipidemia, who presented to [facility] on 10/19/2020 due to shortness of breath.  The patient was exposed to her ran out a week ago was diagnosed with COVID-19.  Per H&P note, \"The patient had concerns as she is a cancer patient and had her test in the outpatient setting two days prior to presentation.  Over the two days since her diagnosis she has had progressive shortness of breath and fevers.  The patient has been able to eat and drink. Pia Girard has had low grade fevers at home along with loss of taste and smell.  The patient is currently taking chemo for breast cancer. \"      The patient was tested positive for COVID-19 2 days prior to admission in outside facility. Marquez Juan was admitted under hospital medicine service for COVID-19 infection.    10/11: overnight, pt desaturated, now on high flow O2   10/12: still on high flow   10/13: still on high flow\"    Subjective (past 24 hours):   Patient evaluated resting comfortably in her chair. She states that her shortness of breath has been minimally improved. She has been coughing, worse when she sits up in the mornings. Has no chest pain. No nausea, vomiting, constipation. Initial diarrhea has improved significantly. No lower extremity edema. Past medical history, family history, social history and allergies reviewed again and is unchanged since admission. ROS (12 point review of systems completed. Pertinent positives noted.  Otherwise ROS is negative)     Medications:  Reviewed    Infusion Medications    dextrose       Scheduled Medications    potassium chloride non-distended with normal bowel sounds. Musculoskeletal: passive and active ROM x 4 extremities.  No lower extremity edema  Skin: Skin color, texture, turgor normal.  No rashes or lesions. Neurologic:  Neurovascularly intact without any focal sensory/motor deficits. Cranial nerves: II-XII intact, grossly non-focal.  Psychiatric: Alert and oriented, thought content appropriate, normal insight, flat affect  Capillary Refill: Brisk,< 3 seconds   Peripheral Pulses: +2 palpable, equal bilaterally    Labs:   Recent Labs     10/14/20  0847 10/15/20  0445 10/16/20  0615   WBC 14.5* 12.5* 10.1   HGB 12.9 11.2* 11.6*   HCT 39.3 34.3* 36.2*    341 368     Recent Labs     10/14/20  0847 10/14/20  1842 10/15/20  0445 10/16/20  0615     --  139 139   K 3.2* 3.7 3.5 3.4*     --  105 103   CO2 25  --  26 27   BUN 10  --  13 12   CREATININE 0.4  --  0.4 0.5   CALCIUM 9.2  --  9.2 9.6     Recent Labs     10/14/20  0847 10/15/20  0445   AST 27 19   ALT 14 12   BILIDIR <0.2 <0.2   BILITOT 0.5 0.5   ALKPHOS 76 67     No results for input(s): INR in the last 72 hours. No results for input(s): Jaimee Kay in the last 72 hours.     Microbiology:    Blood culture #1:   Lab Results   Component Value Date    BC No growth-preliminary  10/12/2020       Blood culture #2:No results found for: Cisco Medeiros    Organism:No results found for: ORG    No results found for: LABGRAM    MRSA culture only:No results found for: [facility]    Urine culture:   Lab Results   Component Value Date    LABURIN No growth-preliminary  No growth   02/08/2017       Respiratory culture: No results found for: CULTRESP    Aerobic and Anaerobic :  No results found for: LABAERO  No results found for: LABANAE    Urinalysis:      Lab Results   Component Value Date    NITRU NEGATIVE 02/08/2017    WBCUA 5-10 02/08/2017    BACTERIA NONE 02/08/2017    RBCUA 0-2 02/08/2017    BLOODU SMALL 02/08/2017    SPECGRAV 1.014 02/08/2017       Radiology:  XR CHEST PORTABLE   Final Result   Impression:      Developing right perihilar and left basilar consolidation. This document has been electronically signed by: Nga Camejo MD on    10/13/2020 02:49 AM         XR CHEST PORTABLE   Final Result   1. Minimal reticular linear opacities are seen laterally at the left lung base. This may represent mild atelectasis or pneumonia. Otherwise stable portable chest.            **This report has been created using voice recognition software. It may contain minor errors which are inherent in voice recognition technology. **      Final report electronically signed by Dr. Kym Pruett on 10/11/2020 8:58 AM      CTA CHEST [address]   Final Result   1. Negative for acute pulmonary artery embolism. Thoracic aorta of normal    caliber without dissection. 2.  Patchy peripheral groundglass opacities bilaterally suspicious for    viral pneumonitis. 3.  Fatty liver. 4.  Sequelae of prior granulomatous infection. This document has been electronically signed by: Devika Lerner MD on    10/10/2020 02:54 AM      All CT scans at this facility use dose modulation, iterative    reconstruction, and/or weight-based   dosing when appropriate to reduce radiation dose to as low as reasonably    achievable. XR CHEST PORTABLE   Final Result   Impression:   Patchy right upper lobe and left lower lobe infiltrates may reflect    pneumonitis and/or atelectasis. No CHF. This document has been electronically signed by: Devika Lerner MD on    10/09/2020 11:46 PM           Cta Chest W Wo Contrast    Result Date: 10/10/2020  CT angiogram of chest with MIP postprocessing: Comparison:  CT  - CTA CHEST  - 02/08/2017 11:48 PM EST FINDINGS: Pulmonary arteries: Pulmonary arteries are well-opacified, free of intraluminal thrombus. Thoracic aorta: Normal caliber without dissection. Mediastinum/heart: Heart size is normal.  No pericardial effusion. Mild mediastinal lymphadenopathy. Calcified mediastinal and left hilar lymph nodes. Lungs/pleura: Patchy peripheral groundglass opacities bilaterally. Mild dependent atelectasis at the posterior sulci. Left upper lobe calcified granuloma. No pleural effusion or pneumothorax. Uppermost abdomen: Hepatic steatosis. No adrenal mass. There are 3 splenules in the left upper quadrant measuring up to 4 cm in diameter, similar to previous. Left kidney superior pole cortical cyst. Bone/MSK: Severe spondylosis of the thoracic spine. No acute vertebral body or rib fracture. No destructive osseous lesion. 1. Negative for acute pulmonary artery embolism. Thoracic aorta of normal caliber without dissection. 2.  Patchy peripheral groundglass opacities bilaterally suspicious for viral pneumonitis. 3.  Fatty liver. 4.  Sequelae of prior granulomatous infection. This document has been electronically signed by: Trudi Frankel MD on 10/10/2020 02:54 AM All CT scans at this facility use dose modulation, iterative reconstruction, and/or weight-based dosing when appropriate to reduce radiation dose to as low as reasonably achievable. Xr Chest Portable    Result Date: 10/9/2020  1 view chest x-ray. Comparison:  CR,SR  - XR CHEST PORTABLE  - 07/17/2020 12:44 PM EDT Findings: Stable position of right chest port. Patchy interstitial opacities involve the right upper lobe and the left lower lobe, new compared to previous. Heart size is normal.  No CHF. No sizable pleural effusion. No pneumothorax. Tortuous thoracic aorta. Moderate spondylosis of the thoracic spine. No acute fracture. Impression: Patchy right upper lobe and left lower lobe infiltrates may reflect pneumonitis and/or atelectasis. No CHF.  This document has been electronically signed by: Trudi Frankel MD on 10/09/2020 11:46 PM       Electronically signed by Camden Alcazar PA-C on 10/16/2020 at 9:06 AM

## 2020-10-16 NOTE — CARE COORDINATION
10/16/20, 12:25 PM EDT    DISCHARGE ONGOING EVALUATION:     [facility] day: 6  Location: [facility]09/009-A Reason for admit: COVID-19 [U07.1]   Treatment Plan of Care: Tmax 98.5, high flow oxygen at 60% FiO2 and 50 liters with saturations at 96%. IV Decadron, electrolyte replacement protocols, diabetes management, Vitamin C,D and zinc.  Barriers to Discharge: medical stability  PCP: Arnulfo Clay  Readmission Risk Score: 15%  Patient Goals/Plan/Treatment Preferences: Plan is to return home alone with family support. Will follow for potential needs as clinical course progresses.

## 2020-10-17 ENCOUNTER — APPOINTMENT (OUTPATIENT)
Dept: GENERAL RADIOLOGY | Age: 70
DRG: 177 | End: 2020-10-17
Payer: MEDICARE

## 2020-10-17 LAB
ANION GAP SERPL CALCULATED.3IONS-SCNC: 11 MEQ/L (ref 8–16)
BLOOD CULTURE, ROUTINE: NORMAL
BLOOD CULTURE, ROUTINE: NORMAL
BUN BLDV-MCNC: 13 MG/DL (ref 7–22)
CALCIUM SERPL-MCNC: 9.5 MG/DL (ref 8.5–10.5)
CHLORIDE BLD-SCNC: 102 MEQ/L (ref 98–111)
CO2: 25 MEQ/L (ref 23–33)
CREAT SERPL-MCNC: 0.3 MG/DL (ref 0.4–1.2)
ERYTHROCYTE [DISTWIDTH] IN BLOOD BY AUTOMATED COUNT: 15 % (ref 11.5–14.5)
ERYTHROCYTE [DISTWIDTH] IN BLOOD BY AUTOMATED COUNT: 54.9 FL (ref 35–45)
GFR SERPL CREATININE-BSD FRML MDRD: > 90 ML/MIN/1.73M2
GLUCOSE BLD-MCNC: 126 MG/DL (ref 70–108)
GLUCOSE BLD-MCNC: 168 MG/DL (ref 70–108)
GLUCOSE BLD-MCNC: 212 MG/DL (ref 70–108)
GLUCOSE BLD-MCNC: 235 MG/DL (ref 70–108)
HCT VFR BLD CALC: 37 % (ref 37–47)
HEMOGLOBIN: 11.8 GM/DL (ref 12–16)
MAGNESIUM: 1.7 MG/DL (ref 1.6–2.4)
MCH RBC QN AUTO: 31.8 PG (ref 26–33)
MCHC RBC AUTO-ENTMCNC: 31.9 GM/DL (ref 32.2–35.5)
MCV RBC AUTO: 99.7 FL (ref 81–99)
PLATELET # BLD: 407 THOU/MM3 (ref 130–400)
PMV BLD AUTO: 10.7 FL (ref 9.4–12.4)
POTASSIUM SERPL-SCNC: 3.8 MEQ/L (ref 3.5–5.2)
PRO-BNP: 78.5 PG/ML (ref 0–900)
PROCALCITONIN: 0.07 NG/ML (ref 0.01–0.09)
RBC # BLD: 3.71 MILL/MM3 (ref 4.2–5.4)
SODIUM BLD-SCNC: 138 MEQ/L (ref 135–145)
WBC # BLD: 14.2 THOU/MM3 (ref 4.8–10.8)

## 2020-10-17 PROCEDURE — 6370000000 HC RX 637 (ALT 250 FOR IP): Performed by: PHYSICIAN ASSISTANT

## 2020-10-17 PROCEDURE — 2580000003 HC RX 258: Performed by: PHYSICIAN ASSISTANT

## 2020-10-17 PROCEDURE — 6360000002 HC RX W HCPCS: Performed by: PHYSICIAN ASSISTANT

## 2020-10-17 PROCEDURE — 71045 X-RAY EXAM CHEST 1 VIEW: CPT

## 2020-10-17 PROCEDURE — 82948 REAGENT STRIP/BLOOD GLUCOSE: CPT

## 2020-10-17 PROCEDURE — 6370000000 HC RX 637 (ALT 250 FOR IP): Performed by: FAMILY MEDICINE

## 2020-10-17 PROCEDURE — 99233 SBSQ HOSP IP/OBS HIGH 50: CPT | Performed by: PHYSICIAN ASSISTANT

## 2020-10-17 PROCEDURE — 80048 BASIC METABOLIC PNL TOTAL CA: CPT

## 2020-10-17 PROCEDURE — 2700000000 HC OXYGEN THERAPY PER DAY

## 2020-10-17 PROCEDURE — 94761 N-INVAS EAR/PLS OXIMETRY MLT: CPT

## 2020-10-17 PROCEDURE — 83880 ASSAY OF NATRIURETIC PEPTIDE: CPT

## 2020-10-17 PROCEDURE — 85027 COMPLETE CBC AUTOMATED: CPT

## 2020-10-17 PROCEDURE — 84145 PROCALCITONIN (PCT): CPT

## 2020-10-17 PROCEDURE — 36415 COLL VENOUS BLD VENIPUNCTURE: CPT

## 2020-10-17 PROCEDURE — 99223 1ST HOSP IP/OBS HIGH 75: CPT | Performed by: INTERNAL MEDICINE

## 2020-10-17 PROCEDURE — 83735 ASSAY OF MAGNESIUM: CPT

## 2020-10-17 PROCEDURE — 2060000000 HC ICU INTERMEDIATE R&B

## 2020-10-17 RX ADMIN — ENOXAPARIN SODIUM 40 MG: 40 INJECTION SUBCUTANEOUS at 21:14

## 2020-10-17 RX ADMIN — DEXAMETHASONE SODIUM PHOSPHATE 6 MG: 4 INJECTION, SOLUTION INTRAMUSCULAR; INTRAVENOUS at 08:24

## 2020-10-17 RX ADMIN — ASPIRIN 81 MG: 81 TABLET ORAL at 08:24

## 2020-10-17 RX ADMIN — FAMOTIDINE 20 MG: 20 TABLET, FILM COATED ORAL at 21:14

## 2020-10-17 RX ADMIN — INSULIN LISPRO 2 UNITS: 100 INJECTION, SOLUTION INTRAVENOUS; SUBCUTANEOUS at 18:06

## 2020-10-17 RX ADMIN — SODIUM CHLORIDE, PRESERVATIVE FREE 10 ML: 5 INJECTION INTRAVENOUS at 21:11

## 2020-10-17 RX ADMIN — OXYCODONE HYDROCHLORIDE AND ACETAMINOPHEN 500 MG: 500 TABLET ORAL at 08:25

## 2020-10-17 RX ADMIN — ENOXAPARIN SODIUM 40 MG: 40 INJECTION SUBCUTANEOUS at 08:24

## 2020-10-17 RX ADMIN — POTASSIUM CHLORIDE 20 MEQ: 1500 TABLET, EXTENDED RELEASE ORAL at 08:24

## 2020-10-17 RX ADMIN — BACITRACIN, NEOMYCIN, POLYMYXIN B 1 G: 400; 3.5; 5 OINTMENT TOPICAL at 08:25

## 2020-10-17 RX ADMIN — FAMOTIDINE 20 MG: 20 TABLET, FILM COATED ORAL at 08:24

## 2020-10-17 RX ADMIN — INSULIN LISPRO 1 UNITS: 100 INJECTION, SOLUTION INTRAVENOUS; SUBCUTANEOUS at 21:10

## 2020-10-17 RX ADMIN — PAROXETINE HYDROCHLORIDE 10 MG: 20 TABLET, FILM COATED ORAL at 08:25

## 2020-10-17 RX ADMIN — INSULIN LISPRO 2 UNITS: 100 INJECTION, SOLUTION INTRAVENOUS; SUBCUTANEOUS at 13:47

## 2020-10-17 RX ADMIN — Medication 50 MG: at 08:25

## 2020-10-17 RX ADMIN — MAGNESIUM GLUCONATE 500 MG ORAL TABLET 400 MG: 500 TABLET ORAL at 08:24

## 2020-10-17 RX ADMIN — LEVOTHYROXINE SODIUM 25 MCG: 25 TABLET ORAL at 06:03

## 2020-10-17 RX ADMIN — BACITRACIN, NEOMYCIN, POLYMYXIN B 1 G: 400; 3.5; 5 OINTMENT TOPICAL at 21:14

## 2020-10-17 RX ADMIN — Medication 1000 UNITS: at 08:25

## 2020-10-17 ASSESSMENT — PAIN SCALES - GENERAL
PAINLEVEL_OUTOF10: 0

## 2020-10-17 NOTE — PLAN OF CARE
Problem: Falls - Risk of:  Goal: Will remain free from falls  Description: Will remain free from falls  Outcome: Ongoing  Note: Patient ambulates stand by/1 assist to bedside commodeCall light within reach. Side rails up x2. Bed alarm on. Non skid slippers available. Problem: Skin Integrity:  Goal: Will show no infection signs and symptoms  Description: Will show no infection signs and symptoms  Outcome: Ongoing  Note: No new skin issues, will continue to monitor. Problem: Respiratory  Goal: No pulmonary complications  Outcome: Ongoing  Note: Patient lungs sounds diminished throughout, O2 Sat remains above 90% on high flow, will continue to monitor. Problem: Discharge Planning:  Goal: Discharged to appropriate level of care  Description: Discharged to appropriate level of care  Outcome: Ongoing  Note: Patient plans to be discharged home with family when medically stable. Problem: Pain:  Goal: Pain level will decrease  Description: Pain level will decrease  Outcome: Ongoing  Note: Patient free from pain this shift. Pain rated on 0-10 pain rating scale. Will continue to reassess. Care plan reviewed with patient. Patient verbalize understanding of the plan of care and contribute to goal setting.

## 2020-10-17 NOTE — CONSULTS
[facility]    Patient - Vero Ventura   MRN -  [id number]   [facility]   - 1950      Date of Admission -  10/9/2020 10:35 PM  Date of evaluation -  10/17/2020  Room - [address] [place] [address] Day - [address] [facility],, [employer] Primary Care Physician - Vanda Avendaño   Chief Complaint   CC: Covid 19 pneumonia related hypoxemia  Active Hospital Problem List      Active Hospital Problems    Diagnosis Date Noted    Severe malnutrition ([address] [E43] 10/13/2020     Class: Acute    Acute respiratory failure with hypoxia ([address] [J96.01]     COVID-19 [U07.1]     Pneumonia due to COVID-19 virus [U07.1, J12.89]     Hyponatremia [E87.1]     Leukocytosis [D72.829]     Hyperglycemia [R73.9]     High anion gap metabolic acidosis [E64.7]     History of left breast cancer [Z85.3]     Hyperlipidemia [E78.5]     History of hypothyroidism [Z86.39]     Chronic diarrhea [K52.9]     COVID-19 virus infection [U07.1] 10/10/2020     HPI   Vero Ventura is a 79 y.o. female admitted 10/10/20 for COVID-19 pneumonia started on Remdesivir, Decadron and convalescent plasma. Stable but still requires HFNC at 65% FiOs. H/O breast cancer recent tx with Taxotere, Carboplatin, Herceptin Pertuzumab             Vero Ventura is a 79 y.o. female with a past medical history of cancer of the left breast who presented to [facility] on 10/10/2020 with shortness of breath with known exposure to COVID positive patient. Patient states she was exposed to COVID 1 week prior to admission by a friend. Per chart review patient had tested positive for COVID-19 as an outpatient 2 days prior to admission. She states that 2 days prior to admission she noticed she was short of breath and experiencing fevers. He states that the symptoms got worse in the 2 days prior to admission. She also states that she had been having loss of taste and smell.   She also reports that she has been having a productive cough during this time as well. She reports the sputum is green in color. She states that her oxygen level was in the low 80s at home prior to calling EMS. She stated that her shortness of breath at that time was made worse by any activity and that there were no relieving factors. Of note patient received her last chemotherapy infusion on 9/29/2020.      Sapna Li was then admitted to the [place] [address] unit on 6A. She was started on remdesivir, dexamethasone and convalescent plasma. CTA chest on admission showed patchy peripheral groundglass opacities bilaterally suspicious for viral pneumonia. Overnight patient had desaturation episodes and had to be placed on high flow nasal cannula. She was then transferred from  to  for higher acuity of care. This morning Sapna Li states that she is doing well. She reports that she is not short of breath. She continues to report that she has a productive cough. She denies having any chest pain. Her SPO2 is 93% on 50% FiO2 at 50 L/min heated high flow nasal cannula.   She was febrile to 100.5 degrees this morning.     Past Medical History         Diagnosis Date    Breast cancer ([facility] 2020    left-invasive ductal    Hyperlipidemia     Numbness and tingling     left foot-chronic nerve damage      Past Surgical History           Procedure Laterality Date    ABDOMINAL EXPLORATION SURGERY  2014    Dr butterfield-lysis of adhesions    BREAST LUMPECTOMY Left 11/2015    Left breast lumpectomy, retroareolar with preoperative needle loc-Dr. Diego Silverman BREAST SURGERY Left 06/26/2020    biopsy of left breast    CHOLECYSTECTOMY  10/2014    Dr Long Arce  03/30/2016    Dr Moise Willson  10/2014    x4 abdominal wall    HYSTERECTOMY      INCISIONAL HERNIA REPAIR  2014    Dr Phipps Ort LIPECTOMY  2014    Dr Bruno Joseph LEFT  7/16/2020    [address] LEFT 7/16/2020 Donna Huffman MD [facility] [address]    PORT SURGERY Right 7/17/2020    SINGLE LUMEN SMART PORT INSERTION RIGHT SUBCLAVIAN performed by Adelso Yanez MD at [address]  10/2014    Dr Smith Oviedo      patient was 11years old    TUBAL LIGATION       Diet    Dietary Nutrition Supplements: Diabetic Oral Supplement  DIET CARB CONTROL; Low Sodium (2 GM); Daily Fluid Restriction: 2000 ml  Allergies    Aluminum-containing compounds  Social History     Social History     Socioeconomic History    Marital status:       Spouse name: Not on file    Number of children: 3    Years of education: Not on file    Highest education level: Not on file   Occupational History    Not on file   Social Needs    Financial resource strain: Not on file    Food insecurity     Worry: Not on file     Inability: Not on file    Transportation needs     Medical: Not on file     Non-medical: Not on file   Tobacco Use    Smoking status: Never Smoker    Smokeless tobacco: Never Used   Substance and Sexual Activity    Alcohol use: No     Alcohol/week: 0.0 standard drinks    Drug use: No    Sexual activity: Not on file   Lifestyle    Physical activity     Days per week: Not on file     Minutes per session: Not on file    Stress: Not on file   Relationships    Social connections     Talks on phone: Not on file     Gets together: Not on file     Attends Orthodox service: Not on file     Active member of club or organization: Not on file     Attends meetings of clubs or organizations: Not on file     Relationship status: Not on file    Intimate partner violence     Fear of current or ex partner: Not on file     Emotionally abused: Not on file     Physically abused: Not on file     Forced sexual activity: Not on file   Other Topics Concern    Not on file   Social History Narrative    Not on file     Family History          Problem Relation Age of Onset    Heart Disease Father         cath stent blood to thin and bled    Heart Attack Mother     Other Other         blood clot    Breast Cancer Paternal Aunt 62    High Blood Pressure Sister     Cancer Brother 68        skin    Prostate Cancer Brother         had chemotherapy to treat     Prostate Cancer Brother 64        has had for 10 years    Ovarian Cancer Neg Hx     Diabetes Neg Hx     Stroke Neg Hx        ROS    General/Constitutional: No recent loss of weight or appetite changes. No fever or chills. HENT: Negative. Eyes: Negative. Upper respiratory tract: No nasal stuffiness or post nasal drip. Lower respiratory tract/ lungs: No cough or sputum production. No hemoptysis. Cardiovascular: No palpitations, chest pain or edema. Gastrointestinal: No nausea or vomiting. Neurological: No focal neurological weakness. Extremities: No tenderness. Musculoskeletal: no complaints  Genitourinary: No complaints. Hematological: Negative. Denies easy buising  Skin: No itching.   Meds    Current Medications    potassium chloride  20 mEq Oral Daily with breakfast    magnesium oxide  400 mg Oral Daily    sodium chloride  20 mL Intravenous Once    neomycin-bacitracin-polymyxin   Topical BID    aspirin EC  81 mg Oral Daily    levothyroxine  25 mcg Oral Daily    PARoxetine  10 mg Oral QAM    vitamin C  500 mg Oral Daily    Vitamin D  1,000 Units Oral Daily    zinc sulfate  50 mg Oral Daily    sodium chloride flush  10 mL Intravenous 2 times per day    enoxaparin  40 mg Subcutaneous BID    sodium chloride  20 mL Intravenous Once    dexamethasone  6 mg Intravenous Daily    insulin lispro  0-6 Units Subcutaneous TID WC    insulin lispro  0-3 Units Subcutaneous Nightly    famotidine  20 mg Oral BID     loperamide, sodium chloride flush, acetaminophen **OR** acetaminophen, polyethylene glycol, promethazine **OR** ondansetron, potassium chloride **OR** potassium alternative oral replacement **OR** potassium chloride, magnesium sulfate, sodium chloride, glucose, dextrose, glucagon (rDNA), dextrose  IV Drips/Infusions   dextrose       Vitals    Vitals    height is 5' 8\" (1.727 m) and weight is 211 lb 6.4 oz (95.9 kg). Her oral temperature is 98.3 °F (36.8 °C). Her blood pressure is 107/55 (abnormal) and her pulse is 70. Her respiration is 18 and oxygen saturation is 93%. O2 Flow Rate (L/min): 50 L/min  I/O    Intake/Output Summary (Last 24 hours) at 10/17/2020 1947  Last data filed at 10/17/2020 1529  Gross per 24 hour   Intake 512.8 ml   Output --   Net 512.8 ml     Patient Vitals for the past 96 hrs (Last 3 readings):   Weight   10/17/20 0011 211 lb 6.4 oz (95.9 kg)   10/15/20 0530 211 lb (95.7 kg)   10/14/20 0400 211 lb (95.7 kg)     Exam   Constitutional: upset, frustrated, speaks with full sentences in no distress on HFNC  Head: Normocephalic and atraumatic. Mouth/Throat: Oropharynx is clear and moist.  No oral thrush. Eyes: Conjunctivae are normal. Pupils are equal, round, and reactive to light. No scleral icterus. Neck: Neck supple. No JVD or tracheal deviation present. Cardiovascular: Regular rate, regular rhythm, S1 and S2 with no murmur. No peripheral edema  Pulmonary/Chest: bilateral BS, bilateral coarse rales   Abdominal: Soft. Bowel sounds audible. No distension or tenderness to palp  Musculoskeletal: Moves all extremities  Lymphadenopathy:  No cervical adenopathy. Neurological: Patient is alert and oriented to person, place, and time. Skin: Skin is warm and dry.       Labs   ABG  Lab Results   Component Value Date    PH 7.49 10/13/2020    PO2 65 10/13/2020    PCO2 34 10/13/2020    HCO3 26 10/13/2020    O2SAT 94 10/13/2020     Lab Results   Component Value Date    IFIO2 60 10/13/2020     CBC  Recent Labs     10/15/20  0445 10/16/20  0615 10/17/20  0517   WBC 12.5* 10.1 14.2*   RBC 3.51* 3.68* 3.71*   HGB 11.2* 11.6* 11.8*   HCT 34.3* 36.2* 37.0   MCV 97.7 98.4 99.7*   MCH 31.9 31.5 31.8   MCHC 32.7 32.0* 31.9*    368 407*   MPV 11.0 11.0 10.7      BMP  Recent Labs     10/15/20  0445 10/16/20  0615 10/17/20  0517    139 138   K 3.5 3.4* 3.8    103 102   CO2 26 27 25   BUN 13 12 13   CREATININE 0.4 0.5 0.3*   GLUCOSE 120* 138* 126*   MG 1.7  --  1.7   CALCIUM 9.2 9.6 9.5     LFT  Recent Labs     10/15/20  0445   AST 19   ALT 12   BILITOT 0.5   ALKPHOS 67     TROP  Lab Results   Component Value Date    TROPONINT < 0.010 02/08/2017    TROPONINT < 0.010 01/23/2016     BNP  Lab Results   Component Value Date    PROBNP 78.5 10/17/2020    PROBNP 179.4 10/15/2020    PROBNP 43.6 02/08/2017     D-Dimer  Lab Results   Component Value Date    DDIMER 854.00 10/10/2020     Lactic Acid  No results for input(s): LACTA in the last 72 hours. INR  No results for input(s): INR, PROTIME in the last 72 hours. PTT  No results for input(s): APTT in the last 72 hours. Glucose  Recent Labs     10/16/20  1942 10/17/20  1304 10/17/20  1706   POCGLU 184* 235* 212*     UA No results for input(s): SPECGRAV, PHUR, COLORU, CLARITYU, MUCUS, PROTEINU, BLOODU, RBCUA, WBCUA, BACTERIA, NITRU, GLUCOSEU, BILIRUBINUR, UROBILINOGEN, KETUA, LABCAST, LABCASTTY, AMORPHOS in the last 72 hours. Invalid input(s): CRYSTALS. PFTs   N/A  Echo     Conclusions      Summary   Normal left ventricle size and systolic function. Ejection fraction was   estimated at 55 to 60 %. There were no regional left ventricular wall   motion abnormalities and wall thickness was within normal limits. Doppler parameters were consistent with abnormal left ventricular   relaxation (grade 1 diastolic dysfunction). Signature      ----------------------------------------------------------------   Electronically signed by Ammon Shrestha MD (Interpreting   physician) on 07/22/2020 at 06:45 PM  Cultures    Procalcitonin  Lab Results   Component Value Date    PROCAL 0.07 10/17/2020    PROCAL 0.11 10/13/2020    PROCAL 0.19 10/11/2020     Radiology    CXR    Impression    1.  Normal

## 2020-10-17 NOTE — PROGRESS NOTES
Hospitalist Progress Note      Patient:  Sulaiman Manual    Unit/Bed:4B-09/009-A  YOB: 1950  MRN: [id number]   Acct: [de-identified]   PCP: Darnell Dodd  Date of Admission: 10/9/2020    Assessment/Plan:    1. Acute Hypoxic Respiratory Failure 2/2 COVID-19 PNA:   1. Convalescent Plasma x2 doses 10/9 and 10/11/20. 10/10/20 placed on high flow O2. ABG 10/11/20 pH 7.42, PCO2 43, PO2 70, bicarb 27. CXR showed developing right perihilar and left basilar consolidation, procal not elevated. Repeat ABG showed respiratory alkalosis, low PaO2. BNP wnl.   2. Remains on high flow, 60% FiO2 55L/min. Minimal improvement in respiratory status despite treatment for COVID. Repeat CXR with mild-moderate residual right parahilar/left basilar atelectasis/pneumonia, no effusion, improved since prior CXR. Consult pulmonology for recommendations regarding difficulty weaning high flow. 2. Covid-19 PNA: CTA chest negative for PE, patchy peripheral groundglass opacities BL suspiciur for viral pneumonitis. Convalescent plasma x2 doses as above. Decadron/Remdesivir initiated 10/10/20. 1. Decadron day #8/10. Completed five day course of Remdesivir. . Continue Vit C, Vit D, Zinc and ASA. 2. Hx of breast cancer and Covid-19 increases risk for VTE. Currently on Lovenox 40mg BID. Eliquis 2.5mg PO BID x30 days recommended on discharge. Patient agreeable.   3. Hypokalemia 2/2 Diarrhea: Daily KCl supplementation increased to 20mEq qd.  Potassium replacement protocol. 1. K 3.8 today. 4. Hypomagnesemia: Continue Mag Oxide. Mg 1.7 today. Replace per protocol. Recheck in AM.   5. Hypotension: Likely 2/2 dehydration, resolved.  IVF hydration discontinued. Monitor BP closely. 6. Type 2 Diabetes: A1c 7.2% on 10/12/20. Low dose correctional SSI while inpatient and on steroids. Recommendations to repeat on 10/19/20 to confirm diabetes. May need to begin Metformin on discharge.  Carb pulmonology consult. She understands and is agreeable with no further questions or concerns. Past medical history, family history, social history and allergies reviewed again and is unchanged since admission. ROS (12 point review of systems completed. Pertinent positives noted. Otherwise ROS is negative)     Medications:  Reviewed    Infusion Medications    dextrose       Scheduled Medications    potassium chloride  20 mEq Oral Daily with breakfast    magnesium oxide  400 mg Oral Daily    sodium chloride  20 mL Intravenous Once    neomycin-bacitracin-polymyxin   Topical BID    aspirin EC  81 mg Oral Daily    levothyroxine  25 mcg Oral Daily    PARoxetine  10 mg Oral QAM    vitamin C  500 mg Oral Daily    Vitamin D  1,000 Units Oral Daily    zinc sulfate  50 mg Oral Daily    sodium chloride flush  10 mL Intravenous 2 times per day    enoxaparin  40 mg Subcutaneous BID    sodium chloride  20 mL Intravenous Once    dexamethasone  6 mg Intravenous Daily    insulin lispro  0-6 Units Subcutaneous TID WC    insulin lispro  0-3 Units Subcutaneous Nightly    famotidine  20 mg Oral BID     PRN Meds: loperamide, sodium chloride flush, acetaminophen **OR** acetaminophen, polyethylene glycol, promethazine **OR** ondansetron, potassium chloride **OR** potassium alternative oral replacement **OR** potassium chloride, magnesium sulfate, sodium chloride, glucose, dextrose, glucagon (rDNA), dextrose      Intake/Output Summary (Last 24 hours) at 10/17/2020 0735  Last data filed at 10/16/2020 1939  Gross per 24 hour   Intake 735.8 ml   Output 0 ml   Net 735.8 ml       Diet:  Dietary Nutrition Supplements: Diabetic Oral Supplement  DIET CARB CONTROL; Low Sodium (2 GM);  Daily Fluid Restriction: 2000 ml    Exam:  BP (!) 106/53   Pulse 67   Temp 97.9 °F (36.6 °C) (Oral)   Resp 18   Ht 5' 8\" (1.727 m)   Wt 211 lb 6.4 oz (95.9 kg)   SpO2 91%   BMI 32.14 kg/m²   General appearance: No apparent distress, appears stated age and cooperative. HEENT: Pupils equal, round, and reactive to light. Conjunctivae/corneas clear. Neck: Supple, with full range of motion. No jugular venous distention. Trachea midline. Respiratory:  On high flow O2. Diminished breath sounds bilaterally with coarse breath sounds R>L. No wheezing. .   Cardiovascular: Regular rate and rhythm with normal S1/S2 without murmurs, rubs or gallops. Abdomen: Soft, non-tender, non-distended with normal bowel sounds. Musculoskeletal: passive and active ROM x 4 extremities.  No lower extremity edema  Skin: Skin color, texture, turgor normal.  No rashes or lesions. Neurologic:  Neurovascularly intact without any focal sensory/motor deficits. Cranial nerves: II-XII intact, grossly non-focal.  Psychiatric: Alert and oriented, thought content appropriate, normal insight, flat affect  Capillary Refill: Brisk,< 3 seconds   Peripheral Pulses: +2 palpable, equal bilaterally    Labs:   Recent Labs     10/15/20  0445 10/16/20  0615 10/17/20  0517   WBC 12.5* 10.1 14.2*   HGB 11.2* 11.6* 11.8*   HCT 34.3* 36.2* 37.0    368 407*     Recent Labs     10/15/20  0445 10/16/20  0615 10/17/20  0517    139 138   K 3.5 3.4* 3.8    103 102   CO2 26 27 25   BUN 13 12 13   CREATININE 0.4 0.5 0.3*   CALCIUM 9.2 9.6 9.5     Recent Labs     10/14/20  0847 10/15/20  0445   AST 27 19   ALT 14 12   BILIDIR <0.2 <0.2   BILITOT 0.5 0.5   ALKPHOS 76 67     No results for input(s): INR in the last 72 hours. No results for input(s): [place] in the last 72 hours.     Microbiology:    Blood culture #1:   Lab Results   Component Value Date    BC No growth-preliminary  10/12/2020       Blood culture #2:No results found for: Siddharth Monson    Organism:No results found for: ORG    No results found for: LABGRAM    MRSA culture only:No results found for: [address] [place] [address]    Urine culture:   Lab Results   Component Value Date    LABURIN No growth-preliminary  No growth   02/08/2017 Respiratory culture: No results found for: CULTRESP    Aerobic and Anaerobic :  No results found for: LABAERO  No results found for: LABANAE    Urinalysis:      Lab Results   Component Value Date    NITRU NEGATIVE 02/08/2017    WBCUA 5-10 02/08/2017    BACTERIA NONE 02/08/2017    RBCUA 0-2 02/08/2017    BLOODU SMALL 02/08/2017    SPECGRAV 1.014 02/08/2017       Radiology:  XR CHEST PORTABLE   Final Result   Impression:      Developing right perihilar and left basilar consolidation. This document has been electronically signed by: Steve Callaway MD on    10/13/2020 02:49 AM         XR CHEST PORTABLE   Final Result   1. Minimal reticular linear opacities are seen laterally at the left lung base. This may represent mild atelectasis or pneumonia. Otherwise stable portable chest.            **This report has been created using voice recognition software. It may contain minor errors which are inherent in voice recognition technology. **      Final report electronically signed by Dr. Bethany Sandhu on 10/11/2020 8:58 AM      CTA CHEST [address]   Final Result   1. Negative for acute pulmonary artery embolism. Thoracic aorta of normal    caliber without dissection. 2.  Patchy peripheral groundglass opacities bilaterally suspicious for    viral pneumonitis. 3.  Fatty liver. 4.  Sequelae of prior granulomatous infection. This document has been electronically signed by: Kathy Pacheco MD on    10/10/2020 02:54 AM      All CT scans at this facility use dose modulation, iterative    reconstruction, and/or weight-based   dosing when appropriate to reduce radiation dose to as low as reasonably    achievable. XR CHEST PORTABLE   Final Result   Impression:   Patchy right upper lobe and left lower lobe infiltrates may reflect    pneumonitis and/or atelectasis. No CHF.       This document has been electronically signed by: Kathy Pacheco MD on    10/09/2020 11:46 PM         XR CHEST PORTABLE (Results Pending)     Cta Chest W Wo Contrast    Result Date: 10/10/2020  CT angiogram of chest with MIP postprocessing: Comparison:  CT  - CTA CHEST  - 02/08/2017 11:48 PM EST FINDINGS: Pulmonary arteries: Pulmonary arteries are well-opacified, free of intraluminal thrombus. Thoracic aorta: Normal caliber without dissection. Mediastinum/heart: Heart size is normal.  No pericardial effusion. Mild mediastinal lymphadenopathy. Calcified mediastinal and left hilar lymph nodes. Lungs/pleura: Patchy peripheral groundglass opacities bilaterally. Mild dependent atelectasis at the posterior sulci. Left upper lobe calcified granuloma. No pleural effusion or pneumothorax. Uppermost abdomen: Hepatic steatosis. No adrenal mass. There are 3 splenules in the left upper quadrant measuring up to 4 cm in diameter, similar to previous. Left kidney superior pole cortical cyst. Bone/MSK: Severe spondylosis of the thoracic spine. No acute vertebral body or rib fracture. No destructive osseous lesion. 1. Negative for acute pulmonary artery embolism. Thoracic aorta of normal caliber without dissection. 2.  Patchy peripheral groundglass opacities bilaterally suspicious for viral pneumonitis. 3.  Fatty liver. 4.  Sequelae of prior granulomatous infection. This document has been electronically signed by: Atilio Locke MD on 10/10/2020 02:54 AM All CT scans at this facility use dose modulation, iterative reconstruction, and/or weight-based dosing when appropriate to reduce radiation dose to as low as reasonably achievable. Xr Chest Portable    Result Date: 10/9/2020  1 view chest x-ray. Comparison:  CR,SR  - XR CHEST PORTABLE  - 07/17/2020 12:44 PM EDT Findings: Stable position of right chest port. Patchy interstitial opacities involve the right upper lobe and the left lower lobe, new compared to previous. Heart size is normal.  No CHF. No sizable pleural effusion. No pneumothorax. Tortuous thoracic aorta.  Moderate spondylosis of the thoracic spine. No acute fracture. Impression: Patchy right upper lobe and left lower lobe infiltrates may reflect pneumonitis and/or atelectasis. No CHF.  This document has been electronically signed by: Sneha Seaman MD on 10/09/2020 11:46 PM       Electronically signed by Emerson Nagel PA-C on 10/17/2020 at 7:35 AM

## 2020-10-17 NOTE — PLAN OF CARE
Problem: Falls - Risk of:  Goal: Will remain free from falls  Description: Will remain free from falls  Outcome: Ongoing  Note: Absence of falls this shift. Fall prevention in place. Fall band applied. Bed alarm activated as needed. Problem: Falls - Risk of:  Goal: Absence of physical injury  Description: Absence of physical injury  Outcome: Ongoing  Note: Absence of physical injury. Problem: Skin Integrity:  Goal: Will show no infection signs and symptoms  Description: Will show no infection signs and symptoms  Outcome: Ongoing  Note: Pt remains afebrile. Problem: Skin Integrity:  Goal: Absence of new skin breakdown  Description: Absence of new skin breakdown  Outcome: Ongoing  Note: No new skin breakdown noted this shift. Pt encouraged to turn and reposition self often to prevent skin breakdown. Problem: Respiratory  Goal: No pulmonary complications  Outcome: Ongoing  Note: Pt remains on highflo oxygen. Problem: Respiratory  Goal: O2 Sat > 90%  Outcome: Ongoing  Note: Pt oxygen saturation remains above 90%. Problem: Respiratory  Goal: Supplemental O2 requirements decreased  Outcome: Ongoing  Note: Pt remains on highflo oxygen. Problem: Nutrition  Goal: Optimal nutrition therapy  10/16/2020 2248 by Elham Angulo RN  Outcome: Ongoing  Note: Pt continues with a poor appetite. Problem: Cardiovascular  Goal: No DVT, peripheral vascular complications  Outcome: Ongoing  Note: No s/s of DVT. Pt denies redness, warmth, or calf pain. Problem: Discharge Planning:  Goal: Discharged to appropriate level of care  Description: Discharged to appropriate level of care  Outcome: Ongoing  Note: Pt plans to be discharged to [facility] when appropriate. Problem: Gas Exchange - Impaired  Goal: Absence of hypoxia  Outcome: Ongoing  Note: No s/s of hypoxia. Pt remains on high flow oxygen.       Problem: Isolation Precautions - Risk of Spread of Infection  Goal: Prevent transmission of

## 2020-10-18 LAB
ALBUMIN SERPL-MCNC: 3.1 G/DL (ref 3.5–5.1)
ALP BLD-CCNC: 64 U/L (ref 38–126)
ALT SERPL-CCNC: 8 U/L (ref 11–66)
ANION GAP SERPL CALCULATED.3IONS-SCNC: 8 MEQ/L (ref 8–16)
AST SERPL-CCNC: 15 U/L (ref 5–40)
BILIRUB SERPL-MCNC: 0.5 MG/DL (ref 0.3–1.2)
BUN BLDV-MCNC: 13 MG/DL (ref 7–22)
CALCIUM SERPL-MCNC: 9.2 MG/DL (ref 8.5–10.5)
CHLORIDE BLD-SCNC: 100 MEQ/L (ref 98–111)
CO2: 28 MEQ/L (ref 23–33)
CREAT SERPL-MCNC: 0.4 MG/DL (ref 0.4–1.2)
ERYTHROCYTE [DISTWIDTH] IN BLOOD BY AUTOMATED COUNT: 14.9 % (ref 11.5–14.5)
ERYTHROCYTE [DISTWIDTH] IN BLOOD BY AUTOMATED COUNT: 53.7 FL (ref 35–45)
GFR SERPL CREATININE-BSD FRML MDRD: > 90 ML/MIN/1.73M2
GLUCOSE BLD-MCNC: 112 MG/DL (ref 70–108)
GLUCOSE BLD-MCNC: 153 MG/DL (ref 70–108)
GLUCOSE BLD-MCNC: 236 MG/DL (ref 70–108)
GLUCOSE BLD-MCNC: 238 MG/DL (ref 70–108)
HCT VFR BLD CALC: 34.8 % (ref 37–47)
HEMOGLOBIN: 11.3 GM/DL (ref 12–16)
MCH RBC QN AUTO: 32 PG (ref 26–33)
MCHC RBC AUTO-ENTMCNC: 32.5 GM/DL (ref 32.2–35.5)
MCV RBC AUTO: 98.6 FL (ref 81–99)
PLATELET # BLD: 411 THOU/MM3 (ref 130–400)
PMV BLD AUTO: 10.9 FL (ref 9.4–12.4)
POTASSIUM SERPL-SCNC: 4.4 MEQ/L (ref 3.5–5.2)
RBC # BLD: 3.53 MILL/MM3 (ref 4.2–5.4)
SODIUM BLD-SCNC: 136 MEQ/L (ref 135–145)
TOTAL PROTEIN: 5.5 G/DL (ref 6.1–8)
WBC # BLD: 11.8 THOU/MM3 (ref 4.8–10.8)

## 2020-10-18 PROCEDURE — 2060000000 HC ICU INTERMEDIATE R&B

## 2020-10-18 PROCEDURE — 82948 REAGENT STRIP/BLOOD GLUCOSE: CPT

## 2020-10-18 PROCEDURE — 6370000000 HC RX 637 (ALT 250 FOR IP): Performed by: FAMILY MEDICINE

## 2020-10-18 PROCEDURE — 94761 N-INVAS EAR/PLS OXIMETRY MLT: CPT

## 2020-10-18 PROCEDURE — 99232 SBSQ HOSP IP/OBS MODERATE 35: CPT | Performed by: FAMILY MEDICINE

## 2020-10-18 PROCEDURE — 6360000002 HC RX W HCPCS: Performed by: PHYSICIAN ASSISTANT

## 2020-10-18 PROCEDURE — 36415 COLL VENOUS BLD VENIPUNCTURE: CPT

## 2020-10-18 PROCEDURE — 85027 COMPLETE CBC AUTOMATED: CPT

## 2020-10-18 PROCEDURE — 6370000000 HC RX 637 (ALT 250 FOR IP): Performed by: PHYSICIAN ASSISTANT

## 2020-10-18 PROCEDURE — 80053 COMPREHEN METABOLIC PANEL: CPT

## 2020-10-18 PROCEDURE — 2700000000 HC OXYGEN THERAPY PER DAY

## 2020-10-18 RX ADMIN — OXYCODONE HYDROCHLORIDE AND ACETAMINOPHEN 500 MG: 500 TABLET ORAL at 10:13

## 2020-10-18 RX ADMIN — DEXAMETHASONE SODIUM PHOSPHATE 6 MG: 4 INJECTION, SOLUTION INTRAMUSCULAR; INTRAVENOUS at 10:13

## 2020-10-18 RX ADMIN — POTASSIUM CHLORIDE 20 MEQ: 1500 TABLET, EXTENDED RELEASE ORAL at 10:13

## 2020-10-18 RX ADMIN — BACITRACIN, NEOMYCIN, POLYMYXIN B 1 G: 400; 3.5; 5 OINTMENT TOPICAL at 21:12

## 2020-10-18 RX ADMIN — Medication 50 MG: at 10:13

## 2020-10-18 RX ADMIN — INSULIN LISPRO 2 UNITS: 100 INJECTION, SOLUTION INTRAVENOUS; SUBCUTANEOUS at 18:42

## 2020-10-18 RX ADMIN — MAGNESIUM GLUCONATE 500 MG ORAL TABLET 400 MG: 500 TABLET ORAL at 10:13

## 2020-10-18 RX ADMIN — FAMOTIDINE 20 MG: 20 TABLET, FILM COATED ORAL at 21:11

## 2020-10-18 RX ADMIN — Medication 1000 UNITS: at 10:13

## 2020-10-18 RX ADMIN — ENOXAPARIN SODIUM 40 MG: 40 INJECTION SUBCUTANEOUS at 10:14

## 2020-10-18 RX ADMIN — INSULIN LISPRO 1 UNITS: 100 INJECTION, SOLUTION INTRAVENOUS; SUBCUTANEOUS at 21:06

## 2020-10-18 RX ADMIN — PAROXETINE HYDROCHLORIDE 10 MG: 20 TABLET, FILM COATED ORAL at 10:13

## 2020-10-18 RX ADMIN — ASPIRIN 81 MG: 81 TABLET ORAL at 10:13

## 2020-10-18 RX ADMIN — LEVOTHYROXINE SODIUM 25 MCG: 25 TABLET ORAL at 05:54

## 2020-10-18 RX ADMIN — INSULIN LISPRO 1 UNITS: 100 INJECTION, SOLUTION INTRAVENOUS; SUBCUTANEOUS at 11:39

## 2020-10-18 RX ADMIN — ENOXAPARIN SODIUM 40 MG: 40 INJECTION SUBCUTANEOUS at 21:11

## 2020-10-18 RX ADMIN — BACITRACIN, NEOMYCIN, POLYMYXIN B 1 G: 400; 3.5; 5 OINTMENT TOPICAL at 10:13

## 2020-10-18 RX ADMIN — FAMOTIDINE 20 MG: 20 TABLET, FILM COATED ORAL at 10:13

## 2020-10-18 ASSESSMENT — PAIN SCALES - GENERAL
PAINLEVEL_OUTOF10: 0
PAINLEVEL_OUTOF10: 0

## 2020-10-18 NOTE — PROGRESS NOTES
2.5mg PO BID x30 days on discharge. Will weigh risk vs benefit.    3. Hypokalemia 2/2 Diarrhea, resolved: Daily KCl supplementation increased to 20mEq qd.  Potassium replacement protocol. 1. K 4.4 today.   4. Hypomagnesemia: Continue Mag Oxide. Mg 1.7 today. Replace per protocol. Recheck in AM.   5. Hypotension: Likely 2/2 dehydration, resolved.  IVF hydration discontinued. Monitor BP closely. 6. Type 2 Diabetes: A1c 7.2% on 10/12/20. Low dose correctional SSI while inpatient and on steroids. Recommendations to repeat on 10/19/20 to confirm diabetes. May need to begin Metformin on discharge. Carb control diet. 7. Left Breast Cancer: currently on chemotherapy. 8. HLD: Not on statin OP. 9. Hypothyroidism: continue Synthroid. 10. Diarrhea 2/2 Chemotherapy: Appears stable. Replacing electrolytes as above. Immodium prn. 11. Chronic HFpEF: Echo 7/2020 with EF 35-85%, grade 1 diastolic dysfunction. BNP wnl.      Chief Complaint: SOB     Initial H and P:-    \"Briefly, this is a 70-year-old female, with past medical history of breast cancer, hyperlipidemia, who presented to [facility] on 10/19/2020 due to shortness of breath.  The patient was exposed to her ran out a week ago was diagnosed with COVID-19.  Per H&P note, \"The patient had concerns as she is a cancer patient and had her test in the outpatient setting two days prior to presentation.  Over the two days since her diagnosis she has had progressive shortness of breath and fevers.  The patient has been able to eat and drink. Mace  has had low grade fevers at home along with loss of taste and smell.  The patient is currently taking chemo for breast cancer. \"      The patient was tested positive for COVID-19 2 days prior to admission in outside facility. Aga Maguire was admitted under hospital medicine service for COVID-19 infection.    10/11: overnight, pt desaturated, now on high flow O2   10/12: still on high flow   10/13: still on high flow\"     Subjective (past 24 hours):   Patient seen and examined, still requiring high flow, attempting to wean, currently on 50%, 55 FiO2 and saturating 94%. Patient has been afebrile, denies any chest pain, palpitations, nausea/vomiting, abdominal pain or diarrhea. Medications:  Reviewed    Infusion Medications    dextrose       Scheduled Medications    potassium chloride  20 mEq Oral Daily with breakfast    magnesium oxide  400 mg Oral Daily    sodium chloride  20 mL Intravenous Once    neomycin-bacitracin-polymyxin   Topical BID    aspirin EC  81 mg Oral Daily    levothyroxine  25 mcg Oral Daily    PARoxetine  10 mg Oral QAM    vitamin C  500 mg Oral Daily    Vitamin D  1,000 Units Oral Daily    zinc sulfate  50 mg Oral Daily    sodium chloride flush  10 mL Intravenous 2 times per day    enoxaparin  40 mg Subcutaneous BID    sodium chloride  20 mL Intravenous Once    dexamethasone  6 mg Intravenous Daily    insulin lispro  0-6 Units Subcutaneous TID WC    insulin lispro  0-3 Units Subcutaneous Nightly    famotidine  20 mg Oral BID     PRN Meds: loperamide, sodium chloride flush, acetaminophen **OR** acetaminophen, polyethylene glycol, promethazine **OR** ondansetron, potassium chloride **OR** potassium alternative oral replacement **OR** potassium chloride, magnesium sulfate, sodium chloride, glucose, dextrose, glucagon (rDNA), dextrose      Intake/Output Summary (Last 24 hours) at 10/18/2020 0818  Last data filed at 10/18/2020 0447  Gross per 24 hour   Intake 997.51 ml   Output 0 ml   Net 997.51 ml       Diet:  Dietary Nutrition Supplements: Diabetic Oral Supplement  DIET CARB CONTROL; Low Sodium (2 GM);  Daily Fluid Restriction: 2000 ml    Exam:  /65   Pulse 56   Temp 98.4 °F (36.9 °C) (Oral)   Resp 18   Ht 5' 8\" (1.727 m)   Wt 212 lb 9.6 oz (96.4 kg)   SpO2 93%   BMI 32.33 kg/m²     General appearance:  Patient on high flow, able to speak full sentences, appears stated age and cooperative. HEENT: Pupils equal, round, and reactive to light. Conjunctivae/corneas clear. Neck: Supple, with full range of motion. No jugular venous distention. Trachea midline. Respiratory:  On high flow O2. Diminished breath sounds, bilateral with faint crackles  Cardiovascular: Regular rate and rhythm with normal S1/S2 without murmurs, rubs or gallops. Abdomen: Soft, non-tender, non-distended with normal bowel sounds. Musculoskeletal: passive and active ROM x 4 extremities.  No lower extremity edema  Skin: Skin color, texture, turgor normal.  No rashes or lesions. Neurologic:  Neurovascularly intact without any focal sensory/motor deficits. Cranial nerves: II-XII intact, grossly non-focal.  Psychiatric: Alert and oriented, thought content appropriate, normal insight, flat affect  Capillary Refill: Brisk,< 3 seconds   Peripheral Pulses: +2 palpable, equal bilaterally      Labs:   Recent Labs     10/16/20  0615 10/17/20  0517 10/18/20  0744   WBC 10.1 14.2* 11.8*   HGB 11.6* 11.8* 11.3*   HCT 36.2* 37.0 34.8*    407* 411*     Recent Labs     10/16/20  0615 10/17/20  0517    138   K 3.4* 3.8    102   CO2 27 25   BUN 12 13   CREATININE 0.5 0.3*   CALCIUM 9.6 9.5     No results for input(s): AST, ALT, BILIDIR, BILITOT, ALKPHOS in the last 72 hours. No results for input(s): INR in the last 72 hours. No results for input(s): Connee [place] in the last 72 hours. Urinalysis:      Lab Results   Component Value Date    NITRU NEGATIVE 02/08/2017    WBCUA 5-10 02/08/2017    BACTERIA NONE 02/08/2017    RBCUA 0-2 02/08/2017    BLOODU SMALL 02/08/2017    SPECGRAV 1.014 02/08/2017       Radiology:  XR CHEST PORTABLE   Final Result   1. Normal heart size. Mediport right side with catheter tip in superior vena cava. Moderately ectatic thoracic aorta. 2. Mild residual atelectasis/pneumonia right parahilar region and moderate residual left basilar atelectasis/pneumonia. No effusion.  Overall appearance of chest has improved since prior. **This report has been created using voice recognition software. It may contain minor errors which are inherent in voice recognition technology. **      Final report electronically signed by Dr. Paulie Mcconnell on 10/17/2020 7:46 AM      XR CHEST PORTABLE   Final Result   Impression:      Developing right perihilar and left basilar consolidation. This document has been electronically signed by: Kristina Hurst MD on    10/13/2020 02:49 AM         XR CHEST PORTABLE   Final Result   1. Minimal reticular linear opacities are seen laterally at the left lung base. This may represent mild atelectasis or pneumonia. Otherwise stable portable chest.            **This report has been created using voice recognition software. It may contain minor errors which are inherent in voice recognition technology. **      Final report electronically signed by Dr. Matt Johnson on 10/11/2020 8:58 AM      CTA CHEST [address]   Final Result   1. Negative for acute pulmonary artery embolism. Thoracic aorta of normal    caliber without dissection. 2.  Patchy peripheral groundglass opacities bilaterally suspicious for    viral pneumonitis. 3.  Fatty liver. 4.  Sequelae of prior granulomatous infection. This document has been electronically signed by: Yaquelin Bearden MD on    10/10/2020 02:54 AM      All CT scans at this facility use dose modulation, iterative    reconstruction, and/or weight-based   dosing when appropriate to reduce radiation dose to as low as reasonably    achievable. XR CHEST PORTABLE   Final Result   Impression:   Patchy right upper lobe and left lower lobe infiltrates may reflect    pneumonitis and/or atelectasis. No CHF. This document has been electronically signed by: Yaquelin Bearden MD on    10/09/2020 11:46 PM             Diet: Dietary Nutrition Supplements: Diabetic Oral Supplement  DIET CARB CONTROL; Low Sodium (2 GM);  Daily Fluid Restriction: 2000 ml    DVT prophylaxis: [] Lovenox                                 [] SCDs                                 [] SQ Heparin                                 [] Encourage ambulation           [] Already on Anticoagulation     Disposition:    [] Home       [] TCU       [] Rehab       [] Psych       [] SNF       [] [place]       [] Other-    Code Status: Full Code    PT/OT Eval Status:         Electronically signed by Trisha Collier MD on 10/18/2020 at 8:18 AM

## 2020-10-18 NOTE — PLAN OF CARE
Problem: Falls - Risk of:  Goal: Will remain free from falls  Description: Will remain free from falls  10/17/2020 2354 by Pat Menon RN  Outcome: Ongoing  Note: Absence of falls this shift. Fall prevention in place. Fall band applied. Bed alarm activated as needed. Problem: Falls - Risk of:  Goal: Absence of physical injury  Description: Absence of physical injury  Outcome: Ongoing  Note: Absence of physical injury. Problem: Skin Integrity:  Goal: Will show no infection signs and symptoms  Description: Will show no infection signs and symptoms  10/17/2020 2354 by Pat Menon RN  Outcome: Ongoing  Note: Pt remains afebrile. Problem: Skin Integrity:  Goal: Absence of new skin breakdown  Description: Absence of new skin breakdown  Outcome: Ongoing  Note: No new skin breakdown noted this shift. Pt encouraged to turn and reposition to prevent pressure injury. Pt buttocks red. EPC cream applied. Problem: Neurological  Goal: Maximum potential motor/sensory/cognitive function  Outcome: Ongoing  Note: Pt alert and oriented x4. Pt active with all extremities. Problem: Respiratory  Goal: No pulmonary complications  81/45/3174 2354 by Pat Menon RN  Outcome: Ongoing  Note: Pt remains on High flow oxygen. Problem: Respiratory  Goal: O2 Sat > 90%  Outcome: Ongoing  Note: Pt oxygen saturations remain above 90% on high flow oxygen. Problem: Respiratory  Goal: Supplemental O2 requirements decreased  Outcome: Ongoing  Note: Pt remains on high flow oxygen with oxygen saturation above 90%. Problem: Nutrition  Goal: Optimal nutrition therapy  Outcome: Ongoing  Note: Pt tolerating meals. Problem: Cardiovascular  Goal: No DVT, peripheral vascular complications  Outcome: Ongoing  Note: Lovenox given given per order. No s/s of DVT.      Problem: Discharge Planning:  Goal: Discharged to appropriate level of care  Description: Discharged to appropriate level of care  10/17/2020 2354 by Pat Menon RN  Outcome: Ongoing  Note: Pt plans to be discharged home when appropriate. Problem: Gas Exchange - Impaired  Goal: Absence of hypoxia  Outcome: Ongoing  Note: Pt oxygen saturation remains above 90% on high flow oxygen. Problem: Isolation Precautions - Risk of Spread of Infection  Goal: Prevent transmission of infection  Outcome: Ongoing  Note: Pt in droplet plus precautions. Problem: Loneliness or Risk for Loneliness  Goal: Demonstrate positive use of time alone when socialization is not possible  Outcome: Ongoing  Note: Pt able to demonstrate positive socialization time. Problem: Fatigue  Goal: Verbalize increase energy and improved vitality  Outcome: Ongoing  Note: Monitoring pt's activity level. Problem: Patient Education: Go to Patient Education Activity  Goal: Patient/Family Education  Outcome: Ongoing  Note: Update pt on plan of care. Education given on medications. Problem: Pain:  Goal: Pain level will decrease  Description: Pain level will decrease  10/17/2020 2354 by Ana Posey RN  Outcome: Ongoing  Note: Pt complains of pain at a 0/10. Non pharmacological interventions completed which include rest, and repositioning. Pt states pain goal at a 0/10. Pain assessed every 4 hours, and as needed. PRN pain medications given as ordered. Care plan reviewed with patient. Patient verbalize understanding of the plan of care and contribute to goal setting.

## 2020-10-18 NOTE — PROGRESS NOTES
Physician Progress Note      PATIENTIlah Hamman  CSN #:                  [id number]  :                       1950  ADMIT DATE:       10/9/2020 10:35 PM  DISCH DATE:  RESPONDING  PROVIDER #:        Fiordaliza Hernandez PA-C          QUERY TEXT:    Pt admitted with COVID and noted documentation of Severe Malnutrition on   dietician consult note. If possible, please document in progress notes and   discharge summary:    The medical record reflects the following:  Risk Factors: Acute illness  Clinical Indicators: ASPEN criteria met for severe malnutrition: Energy   Intake:  7 - 50% or less of estimated energy requirements for 5 or more days  Weight Loss:  1 - 7.5% over 3 months(-9.6% weight loss in 3 months per EMR)  Treatment: Dietician consult, Glucerna TID  Options provided:  -- Severe Protein Calorie Malnutrition confirmed POA  -- Severe Protein Calorie Malnutrition confirmed not POA  -- Severe Protein Calorie Malnutrition ruled out  -- Other - I will add my own diagnosis  -- Disagree - Not applicable / Not valid  -- Disagree - Clinically unable to determine / Unknown  -- Refer to Clinical Documentation Reviewer    PROVIDER RESPONSE TEXT:    The diagnosis of Severe Protein Calorie Malnutrition was confirmed as POA.     Query created by: Kristina Carney on 10/16/2020 8:36 AM      Electronically signed by:  Fiordaliza Hernandez PA-C 10/18/2020 4:26 PM

## 2020-10-18 NOTE — PROGRESS NOTES
Physician Progress Note      Kell Blackmon  [facility] #:                  [id number]  :                       1950  ADMIT DATE:       10/9/2020 10:35 PM  [address] DATE:  RESPONDING  PROVIDER #:        Lieutenant Naren BLOCK          QUERY TEXT:    Pt admitted with COVID and noted to have SIRS criteria. If possible, please   document in the progress notes and discharge summary if you are evaluating and   /or treating any of the following: The medical record reflects the following:  Risk Factors: COVID  Clinical Indicators: WBC 12.4, PCT 1.22, lactic 1.6, temp 101.5, HR 86, RR 18;  Treatment: Remdesivir, imaging, labs, dexamethasone, convalescent plasma  Options provided:  -- Sepsis, present on admission  -- Sepsis, not present on admission,  -- No Sepsis  -- Sepsis was ruled out  -- Other - I will add my own diagnosis  -- Disagree - Not applicable / Not valid  -- Disagree - Clinically unable to determine / Unknown  -- Refer to Clinical Documentation Reviewer    PROVIDER RESPONSE TEXT:    This patient has sepsis that was not present on admission. Query created by: Neto Campoverde on 10/10/2020 11:37 AM      QUERY TEXT:    Pt admitted with COVID and noted to have hypoxia. If possible, please document   in the progress notes and discharge summary if you are evaluating and/or   treating any of the following:     The medical record reflects the following:  Risk Factors: COVID  Clinical Indicators: SpO2 91% on 6 LPM now, was 92% on 2 LPM  Treatment: Supplemental oxygen, monitoring vitals  Options provided:  -- Acute respiratory failure with hypoxia  -- Acute respiratory failure with hypercapnia  -- Chronic respiratory failure with hypoxia  -- Chronic respiratory failure with hypercapnia  -- Acute on chronic respiratory failure with hypoxia  -- Acute on chronic respiratory failure with hypercapnia  -- Other - I will add my own diagnosis  -- Disagree - Not applicable / Not valid  -- Disagree - Clinically unable to determine / Unknown  -- Refer to Clinical Documentation Reviewer    PROVIDER RESPONSE TEXT:    This patient is in acute respiratory failure with hypoxia.     Query created by: Radha Dueñas on 10/10/2020 11:39 AM      Electronically signed by:  Nannette Dawson MD 10/18/2020 2:59 PM

## 2020-10-18 NOTE — PROGRESS NOTES
Physician Progress Note      Angel Duarte  CSN #:                  [id number]  :                       1950  ADMIT DATE:       10/9/2020 10:35 PM  [address] DATE:  RESPONDING  PROVIDER #:        Caitlyn Vasquez MD          QUERY TEXT:    Pt admitted with COVID and noted to have abnormal CT chest. If possible,   please document in the progress notes and discharge summary if you are   evaluating and/or treating any of the following: The medical record reflects the following:  Risk Factors: COVID  Clinical Indicators: CTA chest: Patchy peripheral groundglass opacities   bilaterally suspicious for viral pneumonitis. Treatment: Imaging, labs, Remdesivir  Options provided:  -- Viral pneumonia  -- Bacterial pneumonia  -- Aspiration pneumonia  -- Hypostatic pneumonia  -- Other - I will add my own diagnosis  -- Disagree - Not applicable / Not valid  -- Disagree - Clinically unable to determine / Unknown  -- Refer to Clinical Documentation Reviewer    PROVIDER RESPONSE TEXT:    This patient has viral pneumonia.     Query created by: Fco Penaloza on 10/10/2020 11:32 AM      Electronically signed by:  Caitlyn Vasquez MD 10/18/2020 2:11 PM

## 2020-10-19 ENCOUNTER — HOSPITAL ENCOUNTER (OUTPATIENT)
Dept: INFUSION THERAPY | Age: 70
Discharge: HOME | End: 2020-10-19

## 2020-10-19 LAB
ANION GAP SERPL CALCULATED.3IONS-SCNC: 12 MEQ/L (ref 8–16)
BUN BLDV-MCNC: 13 MG/DL (ref 7–22)
CALCIUM SERPL-MCNC: 9.1 MG/DL (ref 8.5–10.5)
CHLORIDE BLD-SCNC: 100 MEQ/L (ref 98–111)
CO2: 25 MEQ/L (ref 23–33)
CREAT SERPL-MCNC: 0.4 MG/DL (ref 0.4–1.2)
ERYTHROCYTE [DISTWIDTH] IN BLOOD BY AUTOMATED COUNT: 14.6 % (ref 11.5–14.5)
ERYTHROCYTE [DISTWIDTH] IN BLOOD BY AUTOMATED COUNT: 53 FL (ref 35–45)
GFR SERPL CREATININE-BSD FRML MDRD: > 90 ML/MIN/1.73M2
GLUCOSE BLD-MCNC: 113 MG/DL (ref 70–108)
GLUCOSE BLD-MCNC: 127 MG/DL (ref 70–108)
GLUCOSE BLD-MCNC: 204 MG/DL (ref 70–108)
GLUCOSE BLD-MCNC: 248 MG/DL (ref 70–108)
GLUCOSE BLD-MCNC: 255 MG/DL (ref 70–108)
HCT VFR BLD CALC: 34.9 % (ref 37–47)
HEMOGLOBIN: 11.4 GM/DL (ref 12–16)
MCH RBC QN AUTO: 32.2 PG (ref 26–33)
MCHC RBC AUTO-ENTMCNC: 32.7 GM/DL (ref 32.2–35.5)
MCV RBC AUTO: 98.6 FL (ref 81–99)
PLATELET # BLD: 402 THOU/MM3 (ref 130–400)
PMV BLD AUTO: 10.9 FL (ref 9.4–12.4)
POTASSIUM SERPL-SCNC: 4.3 MEQ/L (ref 3.5–5.2)
RBC # BLD: 3.54 MILL/MM3 (ref 4.2–5.4)
SODIUM BLD-SCNC: 137 MEQ/L (ref 135–145)
WBC # BLD: 10.6 THOU/MM3 (ref 4.8–10.8)

## 2020-10-19 PROCEDURE — 99232 SBSQ HOSP IP/OBS MODERATE 35: CPT | Performed by: INTERNAL MEDICINE

## 2020-10-19 PROCEDURE — 36415 COLL VENOUS BLD VENIPUNCTURE: CPT

## 2020-10-19 PROCEDURE — 94761 N-INVAS EAR/PLS OXIMETRY MLT: CPT

## 2020-10-19 PROCEDURE — 6360000002 HC RX W HCPCS: Performed by: INTERNAL MEDICINE

## 2020-10-19 PROCEDURE — 85027 COMPLETE CBC AUTOMATED: CPT

## 2020-10-19 PROCEDURE — 6370000000 HC RX 637 (ALT 250 FOR IP): Performed by: FAMILY MEDICINE

## 2020-10-19 PROCEDURE — 80048 BASIC METABOLIC PNL TOTAL CA: CPT

## 2020-10-19 PROCEDURE — 2580000003 HC RX 258: Performed by: PHYSICIAN ASSISTANT

## 2020-10-19 PROCEDURE — 93307 TTE W/O DOPPLER COMPLETE: CPT

## 2020-10-19 PROCEDURE — 82948 REAGENT STRIP/BLOOD GLUCOSE: CPT

## 2020-10-19 PROCEDURE — 2060000000 HC ICU INTERMEDIATE R&B

## 2020-10-19 PROCEDURE — 2700000000 HC OXYGEN THERAPY PER DAY

## 2020-10-19 PROCEDURE — 6360000002 HC RX W HCPCS: Performed by: PHYSICIAN ASSISTANT

## 2020-10-19 PROCEDURE — 6370000000 HC RX 637 (ALT 250 FOR IP): Performed by: PHYSICIAN ASSISTANT

## 2020-10-19 PROCEDURE — 99232 SBSQ HOSP IP/OBS MODERATE 35: CPT | Performed by: FAMILY MEDICINE

## 2020-10-19 RX ORDER — FUROSEMIDE 10 MG/ML
20 INJECTION INTRAMUSCULAR; INTRAVENOUS 2 TIMES DAILY
Status: COMPLETED | OUTPATIENT
Start: 2020-10-19 | End: 2020-10-20

## 2020-10-19 RX ADMIN — FAMOTIDINE 20 MG: 20 TABLET, FILM COATED ORAL at 10:04

## 2020-10-19 RX ADMIN — FUROSEMIDE 20 MG: 10 INJECTION, SOLUTION INTRAMUSCULAR; INTRAVENOUS at 10:07

## 2020-10-19 RX ADMIN — BACITRACIN, NEOMYCIN, POLYMYXIN B 1 G: 400; 3.5; 5 OINTMENT TOPICAL at 10:10

## 2020-10-19 RX ADMIN — INSULIN LISPRO 2 UNITS: 100 INJECTION, SOLUTION INTRAVENOUS; SUBCUTANEOUS at 13:19

## 2020-10-19 RX ADMIN — BACITRACIN, NEOMYCIN, POLYMYXIN B 1 G: 400; 3.5; 5 OINTMENT TOPICAL at 22:10

## 2020-10-19 RX ADMIN — Medication 50 MG: at 10:06

## 2020-10-19 RX ADMIN — SODIUM CHLORIDE, PRESERVATIVE FREE 10 ML: 5 INJECTION INTRAVENOUS at 10:07

## 2020-10-19 RX ADMIN — Medication 1000 UNITS: at 10:06

## 2020-10-19 RX ADMIN — ENOXAPARIN SODIUM 40 MG: 40 INJECTION SUBCUTANEOUS at 22:08

## 2020-10-19 RX ADMIN — DEXAMETHASONE SODIUM PHOSPHATE 6 MG: 4 INJECTION, SOLUTION INTRAMUSCULAR; INTRAVENOUS at 10:07

## 2020-10-19 RX ADMIN — INSULIN LISPRO 3 UNITS: 100 INJECTION, SOLUTION INTRAVENOUS; SUBCUTANEOUS at 17:03

## 2020-10-19 RX ADMIN — INSULIN LISPRO 2 UNITS: 100 INJECTION, SOLUTION INTRAVENOUS; SUBCUTANEOUS at 22:09

## 2020-10-19 RX ADMIN — OXYCODONE HYDROCHLORIDE AND ACETAMINOPHEN 500 MG: 500 TABLET ORAL at 10:06

## 2020-10-19 RX ADMIN — ENOXAPARIN SODIUM 40 MG: 40 INJECTION SUBCUTANEOUS at 10:06

## 2020-10-19 RX ADMIN — LEVOTHYROXINE SODIUM 25 MCG: 25 TABLET ORAL at 10:06

## 2020-10-19 RX ADMIN — PAROXETINE HYDROCHLORIDE 10 MG: 20 TABLET, FILM COATED ORAL at 10:06

## 2020-10-19 RX ADMIN — FAMOTIDINE 20 MG: 20 TABLET, FILM COATED ORAL at 22:08

## 2020-10-19 RX ADMIN — MAGNESIUM GLUCONATE 500 MG ORAL TABLET 400 MG: 500 TABLET ORAL at 10:05

## 2020-10-19 RX ADMIN — ASPIRIN 81 MG: 81 TABLET ORAL at 10:08

## 2020-10-19 RX ADMIN — POTASSIUM CHLORIDE 20 MEQ: 1500 TABLET, EXTENDED RELEASE ORAL at 10:32

## 2020-10-19 RX ADMIN — FUROSEMIDE 20 MG: 10 INJECTION, SOLUTION INTRAMUSCULAR; INTRAVENOUS at 17:27

## 2020-10-19 RX ADMIN — SODIUM CHLORIDE, PRESERVATIVE FREE 10 ML: 5 INJECTION INTRAVENOUS at 22:09

## 2020-10-19 ASSESSMENT — PAIN SCALES - GENERAL: PAINLEVEL_OUTOF10: 0

## 2020-10-19 NOTE — PLAN OF CARE
Problem: OXYGENATION/RESPIRATORY FUNCTION  Goal: Ability to maintain adequate oxygenation will improve  Description: Ability to maintain adequate oxygenation will improve  Outcome: Ongoing  Note: Patient currently on HFNC 50L and 50%

## 2020-10-19 NOTE — PLAN OF CARE
Problem: Nutrition  Goal: Optimal nutrition therapy  Outcome: Ongoing   Nutrition Problem #1: Severe malnutrition, In context of acute illness or injury  Intervention: Food and/or Nutrient Delivery: Continue Current Diet, Vitamin Supplement, Continue Oral Nutrition Supplement(send cut up meats)  Nutritional Goals: Pt will consume 75% or more of meals during LOS

## 2020-10-19 NOTE — PROGRESS NOTES
Pt crying and verbalizing dislike of diet stating foods are cold and that she doesn't get foods requested. Call to dietary to amend issues.

## 2020-10-19 NOTE — PROGRESS NOTES
Spoke with Char in nutrition about strawberry glucerna being sent. She will update manager so patient receives the glucerna she orders.

## 2020-10-19 NOTE — PROGRESS NOTES
[facility]    Patient - Vivica Cogan   MRN -  [id number]   [facility]   - 1950      Date of Admission -  10/9/2020 10:35 PM  Date of evaluation -  10/19/2020  Room - [address] Day - 9  Consulting - Óscar Beatty MD Primary Care Physician - Jimenez Zhu   Chief Complaint   To follow hypoxic respiratory failure. COVID19 infection. [address] Problems    Diagnosis Date Noted    Severe malnutrition ([address] [E43] 10/13/2020     Class: Acute    Acute respiratory failure with hypoxia (HCC) [J96.01]     COVID-19 [U07.1]     Pneumonia due to COVID-19 virus [U07.1, J12.89]     Hyponatremia [E87.1]     Leukocytosis [D72.829]     Hyperglycemia [R73.9]     High anion gap metabolic acidosis [G48.6]     History of left breast cancer [Z85.3]     Hyperlipidemia [E78.5]     History of hypothyroidism [Z86.39]     Chronic diarrhea [K52.9]     COVID-19 virus infection [U07.1] 10/10/2020     HPI   Vivica Cogan is a 79 y.o. female admitted 10/10/20 for COVID-19 pneumonia started on Remdesivir, Decadron and convalescent plasma. H/O breast cancer recent tx with Taxotere, Carboplatin, Herceptin Pertuzumab     Vivica Cogan is a 79 y.o. female with a past medical history of cancer of the left breast who presented to [facility] on 10/10/2020 with shortness of breath with known exposure to COVID positive patient. Patient states she was exposed to COVID 1 week prior to admission by a friend. Per chart review patient had tested positive for COVID-19 as an outpatient 2 days prior to admission. She states that 2 days prior to admission she noticed she was short of breath and experiencing fevers. He states that the symptoms got worse in the 2 days prior to admission. She also states that she had been having loss of taste and smell.   She also reports that she has been having a productive cough during this time as well. She reports the sputum is green in color. She states that her oxygen level was in the low 80s at home prior to calling EMS. She stated that her shortness of breath at that time was made worse by any activity and that there were no relieving factors. Of note patient received her last chemotherapy infusion on 9/29/2020.      Maegan Machuca was then admitted to the [place] [address] unit on 6A. She was started on remdesivir, dexamethasone and convalescent plasma. CTA chest on admission showed patchy peripheral groundglass opacities bilaterally suspicious for viral pneumonia. Overnight patient had desaturation episodes and had to be placed on high flow nasal cannula. She was then transferred from 6A to  for higher acuity of care. This morning Maegan Machuca states that she is doing well. She reports that she is not short of breath. She continues to report that she has a productive cough. She denies having any chest pain. Her SPO2 is 93% on 50% FiO2 at 50 L/min heated high flow nasal cannula. She was febrile to 100.5 degrees this morning. Subjective/Events Past 24 hours/ROS   -Still on Hi Flow at 50LPM   -Denies any chest pain. -Improving shortness of breath.  -Afebrile.  -Rest of the body systems were reviewed.      Past Medical History         Diagnosis Date    Breast cancer ([address] 2020    left-invasive ductal    Hyperlipidemia     Numbness and tingling     left foot-chronic nerve damage      Past Surgical History           Procedure Laterality Date    ABDOMINAL EXPLORATION SURGERY  2014    Dr butterfield-lysis of adhesions    BREAST LUMPECTOMY Left 11/2015    Left breast lumpectomy, retroareolar with preoperative needle loc-Dr. Cassandra Manriquez BREAST SURGERY Left 06/26/2020    biopsy of left breast    CHOLECYSTECTOMY  10/2014    Dr Prasad Swenson  03/30/2016    Dr Todd Goldmann  10/2014    x4 abdominal wall    HYSTERECTOMY      INCISIONAL HERNIA REPAIR  2014 Dr Lupe Coello LIPECTOMY  2014    Dr Cordero Pugh  7/16/2020    [address] LEFT 7/16/2020 Yosef Padilla MD [facility]    PORT SURGERY Right 7/17/2020    SINGLE LUMEN SMART PORT INSERTION RIGHT SUBCLAVIAN performed by Nakul Fuentes MD at [address]  10/2014    Dr Tate Half      patient was 11years old    TUBAL LIGATION       Diet    Dietary Nutrition Supplements: Diabetic Oral Supplement  DIET CARB CONTROL; Low Sodium (2 GM); Daily Fluid Restriction: 2000 ml  Allergies    Aluminum-containing compounds  Social History     Social History     Socioeconomic History    Marital status:       Spouse name: Not on file    Number of children: 3    Years of education: Not on file    Highest education level: Not on file   Occupational History    Not on file   Social Needs    Financial resource strain: Not on file    Food insecurity     Worry: Not on file     Inability: Not on file    Transportation needs     Medical: Not on file     Non-medical: Not on file   Tobacco Use    Smoking status: Never Smoker    Smokeless tobacco: Never Used   Substance and Sexual Activity    Alcohol use: No     Alcohol/week: 0.0 standard drinks    Drug use: No    Sexual activity: Not on file   Lifestyle    Physical activity     Days per week: Not on file     Minutes per session: Not on file    Stress: Not on file   Relationships    Social connections     Talks on phone: Not on file     Gets together: Not on file     Attends Mu-ism service: Not on file     Active member of club or organization: Not on file     Attends meetings of clubs or organizations: Not on file     Relationship status: Not on file    Intimate partner violence     Fear of current or ex partner: Not on file     Emotionally abused: Not on file     Physically abused: Not on file     Forced sexual activity: Not on file   Other Topics Concern    Not on file Social History Narrative    Not on file     Family History          Problem Relation Age of Onset    Heart Disease Father         cath stent blood to thin and bled    Heart Attack Mother     Other Other         blood clot    Breast Cancer Paternal Aunt 62    High Blood Pressure Sister     Cancer Brother 68        skin    Prostate Cancer Brother         had chemotherapy to treat     Prostate Cancer Brother 64        has had for 10 years    Ovarian Cancer Neg Hx     Diabetes Neg Hx     Stroke Neg Hx        Meds    Current Medications    potassium chloride  20 mEq Oral Daily with breakfast    magnesium oxide  400 mg Oral Daily    sodium chloride  20 mL Intravenous Once    neomycin-bacitracin-polymyxin   Topical BID    aspirin EC  81 mg Oral Daily    levothyroxine  25 mcg Oral Daily    PARoxetine  10 mg Oral QAM    vitamin C  500 mg Oral Daily    Vitamin D  1,000 Units Oral Daily    zinc sulfate  50 mg Oral Daily    sodium chloride flush  10 mL Intravenous 2 times per day    enoxaparin  40 mg Subcutaneous BID    sodium chloride  20 mL Intravenous Once    dexamethasone  6 mg Intravenous Daily    insulin lispro  0-6 Units Subcutaneous TID WC    insulin lispro  0-3 Units Subcutaneous Nightly    famotidine  20 mg Oral BID     loperamide, sodium chloride flush, acetaminophen **OR** acetaminophen, polyethylene glycol, promethazine **OR** ondansetron, potassium chloride **OR** potassium alternative oral replacement **OR** potassium chloride, magnesium sulfate, sodium chloride, glucose, dextrose, glucagon (rDNA), dextrose  IV Drips/Infusions   dextrose       Vitals    Vitals    height is 5' 8\" (1.727 m) and weight is 212 lb 9.6 oz (96.4 kg). Her oral temperature is 98.2 °F (36.8 °C). Her blood pressure is 102/58 (abnormal) and her pulse is 58. Her respiration is 18 and oxygen saturation is 95%.      O2 Flow Rate (L/min): 50 L/min  I/O    Intake/Output Summary (Last 24 hours) at 10/19/2020 0437  Last data filed at 10/19/2020 0337  Gross per 24 hour   Intake --   Output 775 ml   Net -775 ml     Patient Vitals for the past 96 hrs (Last 3 readings):   Weight   10/18/20 0446 212 lb 9.6 oz (96.4 kg)   10/17/20 0011 211 lb 6.4 oz (95.9 kg)     Exam   Constitutional: Patient appears in no distress on Hi Flow. HENT:    Head: Normocephalic and atraumatic. Right Ear: External ear normal.   Left Ear: External ear normal.   Mouth/Throat: Oropharynx is clear and moist.  No oral thrush. Eyes: Pupils are equal, round, and reactive to light. Neck: Neck supple. Cardiovascular: Normal rate, regular rhythm, normal heart sounds. No murmur heard. Pulmonary/Chest: Effort normal and good breath sounds. Diminished at bases. No stridor. No respiratory distress. No wheezes. No rales. Abdominal: Soft. Patient exhibits no distension. No tenderness. Musculoskeletal: Normal range of motion. Extremities: Patient exhibits no edema and no tenderness. Lymphadenopathy:  No cervical adenopathy. Neurological: Patient is alert and oriented to person, place, and time. Skin: Skin is warm and dry. Psychiatric: Patient  has a normal mood and affect.     Labs   ABG  Lab Results   Component Value Date    PH 7.49 10/13/2020    PO2 65 10/13/2020    PCO2 34 10/13/2020    HCO3 26 10/13/2020    O2SAT 94 10/13/2020     Lab Results   Component Value Date    IFIO2 60 10/13/2020     CBC  Recent Labs     10/17/20  0517 10/18/20  0744 10/19/20  0407   WBC 14.2* 11.8* 10.6   RBC 3.71* 3.53* 3.54*   HGB 11.8* 11.3* 11.4*   HCT 37.0 34.8* 34.9*   MCV 99.7* 98.6 98.6   MCH 31.8 32.0 32.2   MCHC 31.9* 32.5 32.7   * 411* 402*   MPV 10.7 10.9 10.9      BMP  Recent Labs     10/17/20  0517 10/18/20  0744 10/19/20  0407    136 137   K 3.8 4.4 4.3    100 100   CO2 25 28 25   BUN 13 13 13   CREATININE 0.3* 0.4 0.4   GLUCOSE 126* 112* 127*   MG 1.7  --   --    CALCIUM 9.5 9.2 9.1     LFT  Recent Labs     10/18/20  0744   AST 15 ALT 8*   BILITOT 0.5   ALKPHOS 64     TROP  Lab Results   Component Value Date    TROPONINT < 0.010 02/08/2017    TROPONINT < 0.010 01/23/2016     BNP  Lab Results   Component Value Date    PROBNP 78.5 10/17/2020    PROBNP 179.4 10/15/2020    PROBNP 43.6 02/08/2017     D-Dimer  Lab Results   Component Value Date    DDIMER 854.00 10/10/2020     Lactic Acid  No results for input(s): LACTA in the last 72 hours. INR  No results for input(s): INR, PROTIME in the last 72 hours. PTT  No results for input(s): APTT in the last 72 hours. Glucose  Recent Labs     10/18/20  1137 10/18/20  1710 10/18/20  2102   POCGLU 153* 236* 238*     UA No results for input(s): SPECGRAV, PHUR, COLORU, CLARITYU, MUCUS, PROTEINU, BLOODU, RBCUA, WBCUA, BACTERIA, NITRU, GLUCOSEU, BILIRUBINUR, UROBILINOGEN, KETUA, LABCAST, LABCASTTY, AMORPHOS in the last 72 hours. Invalid input(s): CRYSTALS. PFTs   N/A  Echo     Conclusions      Summary   Normal left ventricle size and systolic function. Ejection fraction was   estimated at 55 to 60 %. There were no regional left ventricular wall   motion abnormalities and wall thickness was within normal limits. Doppler parameters were consistent with abnormal left ventricular   relaxation (grade 1 diastolic dysfunction). Signature      ----------------------------------------------------------------   Electronically signed by Mark Florian MD (Interpreting   physician) on 07/22/2020 at 06:45 PM  Cultures    Procalcitonin  Lab Results   Component Value Date    PROCAL 0.07 10/17/2020    PROCAL 0.11 10/13/2020    PROCAL 0.19 10/11/2020     Radiology    CXR 19/17/2020. Impression    1. Normal heart size. Mediport right side with catheter tip in superior vena cava. Moderately ectatic thoracic aorta. 2. Mild residual atelectasis/pneumonia right parahilar region and moderate residual left basilar atelectasis/pneumonia. No effusion.  Overall appearance of chest has improved since prior.                **This report has been created using voice recognition software.  It may contain minor errors which are inherent in voice recognition technology. **         Final report electronically signed by Dr. Sotelo Kidney on 10/17/2020 7:46 AM               CTA 10/10/2020  Impression   1. Negative for acute pulmonary artery embolism.  Thoracic aorta of normal   caliber without dissection. 2.  Patchy peripheral groundglass opacities bilaterally suspicious for   viral pneumonitis. 3.  Fatty liver. 4.  Sequelae of prior granulomatous infection.           Assessment   Acute hypoxic respiratory failure secondary to COVID-19 pneumonia. Continues requiring HFNC 65%  COVID-19 pneumonia  Left breast cancer, most recent chemotherapy infusion 9/29/2020, [place] Herceptin, Pertuzumab, Taxotere  Has completed Remdesivir and received convalescent plasma, currently completing Decadron. Low PCT  She had a positive fluid balance of 4.6Litters. Recommendations   Consider completing 10 days of Remdesivir recommended for patients who have not obtained benefit at day 5. Consider Pneumonitis related to Herceptin or other Chemotherapy agent, would continue steroids beyond 10 days. Follow Limited ECHO to r/o cardiotoxicity Docetaxel    Will continue Remdesivir for 5more days. I spoke with Ms. Barakat,Pharmacist- inpatient. Slow oxygen taper  Continue incentive spirometry as ordered. Will start patient on Lasix 20mg IV bid for a total of 2days with daily monitoring of BMP. Stop IVF  Will follow. Case discussed with nurse and patient. Questions and concerns addressed. Meds and Orders reviewed.     Electronically signed by     Cathy Schroeder MD on 10/19/2020 at 8:05 AM

## 2020-10-19 NOTE — CARE COORDINATION
10/19/20, 1:49 PM EDT    DISCHARGE ON GOING [facility] day: 9  Location: -09/009-A Reason for admit: COVID-19 [U07.1]   Procedure: 10/17 CXR    Impression:         1. Normal heart size. Mediport right side with catheter tip in superior vena cava. Moderately ectatic thoracic aorta. 2. Mild residual atelectasis/pneumonia right parahilar region and moderate residual left basilar atelectasis/pneumonia. No effusion. Overall appearance of chest has improved since prior. Treatment Plan of Care: Tmax 98.6, O2 at 5 liters nasal cannula with saturations at 93%. IV Lasix, diabetes management, electrolyte replacement protocols, Vitamin C,D, and zinc. Pulmonology to continue IV Remdesivir? Will follow. PT/OT. Barriers to Discharge: medical stability  PCP: Marion Molina  Readmission Risk Score: 14%  Patient Goals/Plan/Treatment Preferences: Plan is to return home  Alone with family support. Will follow for potential needs.

## 2020-10-19 NOTE — PROGRESS NOTES
Hospitalist Progress Note    Patient:  Fidencio Mcpherson      Unit/Bed:4B-09/009-A    YOB: 1950    MRN: [id number]       Acct: [de-identified]     PCP: Brodie Martinez    Date of Admission: 10/9/2020    Assessment/Plan:    Anticipated Discharge in :     [facility]/Jacqueline Xavierjenifer [facility] [address] Problems    Diagnosis Date Noted    Severe malnutrition ([facility] [address] [E43] 10/13/2020     Class: Acute    Acute respiratory failure with hypoxia ([facility] [address] [J96.01]     COVID-19 [U07.1]     Pneumonia due to COVID-19 virus [U07.1, J12.89]     Hyponatremia [E87.1]     Leukocytosis [D72.829]     Hyperglycemia [R73.9]     High anion gap metabolic acidosis [T91.0]     History of left breast cancer [Z85.3]     Hyperlipidemia [E78.5]     History of hypothyroidism [Z86.39]     Chronic diarrhea [K52.9]     COVID-19 virus infection [U07.1] 10/10/2020     1. Acute Hypoxic Respiratory Failure 2/2 COVID-19 PNA:   Convalescent Plasma x2 doses 10/9 and 10/11/20. 10/10/20 placed on high flow O2. ABG 10/11/20 pH 7.42, PCO2 43, PO2 70, bicarb 27. CXR showed developing right perihilar and left basilar consolidation, procal not elevated. Repeat ABG showed respiratory alkalosis, low PaO2. BNP wnl.   10/19 patient transitioned from hi flow to 5lpm O2/cannula, saturating 91-94%  2. Covid-19 PNA: CTA chest negative for PE, patchy peripheral groundglass opacities BL suspicious for viral pneumonitis. Convalescent plasma x2 doses as above. Decadron/Remdesivir initiated 10/10/20. 1. Decadron day #9/10. Completed five day course of Remdesivir on 10/14. Continue Vit C, Vit D, Zinc and ASA. 2. Hx of breast cancer and Covid-19 increases risk for VTE. Currently on Lovenox 40mg BID. Consideration to start Eliquis 2.5mg PO BID x30 days on discharge. Will weigh risk vs benefit.    3. Hypokalemia 2/2 Diarrhea, resolved: Daily KCl supplementation increased to 20mEq qd.  Potassium replacement protocol. 1. K 4.4 today.   4. Hypomagnesemia: Continue Mag Oxide.  Mg 1.7 today. Replace per protocol. Recheck in AM.   5. Hypotension: Likely 2/2 dehydration, resolved.  IVF hydration discontinued. Monitor BP closely. 6. Type 2 Diabetes: A1c 7.2% on 10/12/20. Low dose correctional SSI while inpatient and on steroids. Recommendations to repeat on 10/19/20 to confirm diabetes. May need to begin Metformin on discharge. Carb control diet. 7. Left Breast Cancer: currently on chemotherapy. 8. HLD: Not on statin OP. 9. Hypothyroidism: continue Synthroid. 10. Diarrhea 2/2 Chemotherapy: Appears stable. Replacing electrolytes as above. Immodium prn. 11. Chronic HFpEF: Echo 7/2020 with EF 63-54%, grade 1 diastolic dysfunction. BNP wnl.     Disposition  Continue to wean O2  Consider transfer to a less acute COVID floor tomorrow     Chief Complaint: SOB     Initial H and P:-    \"Briefly, this is a 72-year-old female, with past medical history of breast cancer, hyperlipidemia, who presented to [facility] on 10/19/2020 due to shortness of breath.  The patient was exposed to her ran out a week ago was diagnosed with COVID-19.  Per H&P note, \"The patient had concerns as she is a cancer patient and had her test in the outpatient setting two days prior to presentation.  Over the two days since her diagnosis she has had progressive shortness of breath and fevers.  The patient has been able to eat and drink. Roger Ro has had low grade fevers at home along with loss of taste and smell.  The patient is currently taking chemo for breast cancer. \"      The patient was tested positive for COVID-19 2 days prior to admission in outside facility. Blair Hsu was admitted under hospital medicine service for COVID-19 infection.    10/11: overnight, pt desaturated, now on high flow O2   10/12: still on high flow   10/13: still on high flow\"   10/19 transitioned to 5 lpm O2/cannule     Subjective (past 24 hours):   Patient seen and examined, now on 5lpm O2/cannula, saturating 91-94%.  She feels better, VS stable and afebrile. She denies any chest pain, palpitations, nausea/vomiting, abdominal pain or diarrhea. Medications:  Reviewed    Infusion Medications    dextrose       Scheduled Medications    furosemide  20 mg Intravenous BID    potassium chloride  20 mEq Oral Daily with breakfast    magnesium oxide  400 mg Oral Daily    sodium chloride  20 mL Intravenous Once    neomycin-bacitracin-polymyxin   Topical BID    aspirin EC  81 mg Oral Daily    levothyroxine  25 mcg Oral Daily    PARoxetine  10 mg Oral QAM    vitamin C  500 mg Oral Daily    Vitamin D  1,000 Units Oral Daily    zinc sulfate  50 mg Oral Daily    sodium chloride flush  10 mL Intravenous 2 times per day    enoxaparin  40 mg Subcutaneous BID    sodium chloride  20 mL Intravenous Once    insulin lispro  0-6 Units Subcutaneous TID WC    insulin lispro  0-3 Units Subcutaneous Nightly    famotidine  20 mg Oral BID     PRN Meds: loperamide, sodium chloride flush, acetaminophen **OR** acetaminophen, polyethylene glycol, promethazine **OR** ondansetron, potassium chloride **OR** potassium alternative oral replacement **OR** potassium chloride, magnesium sulfate, glucose, dextrose, glucagon (rDNA), dextrose      Intake/Output Summary (Last 24 hours) at 10/19/2020 1259  Last data filed at 10/19/2020 1208  Gross per 24 hour   Intake 240 ml   Output 775 ml   Net -535 ml       Diet:  Dietary Nutrition Supplements: Diabetic Oral Supplement  DIET CARB CONTROL; Low Sodium (2 GM); Daily Fluid Restriction: 2000 ml    Exam:  BP (!) 102/58   Pulse 58   Temp 98.4 °F (36.9 °C) (Oral)   Resp 18   Ht 5' 8\" (1.727 m)   Wt 212 lb (96.2 kg)   SpO2 93%   BMI 32.23 kg/m²     General appearance:  Patient on high flow, able to speak full sentences, appears stated age and cooperative. HEENT: Pupils equal, round, and reactive to light. Conjunctivae/corneas clear. Neck: Supple, with full range of motion. No jugular venous distention.  Trachea midline. Respiratory:  On high flow O2. Diminished breath sounds, bilateral with faint crackles  Cardiovascular: Regular rate and rhythm with normal S1/S2 without murmurs, rubs or gallops. Abdomen: Soft, non-tender, non-distended with normal bowel sounds. Musculoskeletal: passive and active ROM x 4 extremities.  No lower extremity edema  Skin: Skin color, texture, turgor normal.  No rashes or lesions. Neurologic:  Neurovascularly intact without any focal sensory/motor deficits. Cranial nerves: II-XII intact, grossly non-focal.  Psychiatric: Alert and oriented, thought content appropriate, normal insight, flat affect  Capillary Refill: Brisk,< 3 seconds   Peripheral Pulses: +2 palpable, equal bilaterally      Labs:   Recent Labs     10/17/20  0517 10/18/20  0744 10/19/20  0407   WBC 14.2* 11.8* 10.6   HGB 11.8* 11.3* 11.4*   HCT 37.0 34.8* 34.9*   * 411* 402*     Recent Labs     10/17/20  0517 10/18/20  0744 10/19/20  0407    136 137   K 3.8 4.4 4.3    100 100   CO2 25 28 25   BUN 13 13 13   CREATININE 0.3* 0.4 0.4   CALCIUM 9.5 9.2 9.1     Recent Labs     10/18/20  0744   AST 15   ALT 8*   BILITOT 0.5   ALKPHOS 64     No results for input(s): INR in the last 72 hours. No results for input(s): Oc Altes in the last 72 hours. Urinalysis:      Lab Results   Component Value Date    NITRU NEGATIVE 02/08/2017    WBCUA 5-10 02/08/2017    BACTERIA NONE 02/08/2017    RBCUA 0-2 02/08/2017    BLOODU SMALL 02/08/2017    SPECGRAV 1.014 02/08/2017       Radiology:  XR CHEST PORTABLE   Final Result   1. Normal heart size. Mediport right side with catheter tip in superior vena cava. Moderately ectatic thoracic aorta. 2. Mild residual atelectasis/pneumonia right parahilar region and moderate residual left basilar atelectasis/pneumonia. No effusion. Overall appearance of chest has improved since prior. **This report has been created using voice recognition software.   It may contain minor errors which are inherent in voice recognition technology. **      Final report electronically signed by Dr. Lucila Sanchez on 10/17/2020 7:46 AM      XR CHEST PORTABLE   Final Result   Impression:      Developing right perihilar and left basilar consolidation. This document has been electronically signed by: Melanie Caruso MD on    10/13/2020 02:49 AM         XR CHEST PORTABLE   Final Result   1. Minimal reticular linear opacities are seen laterally at the left lung base. This may represent mild atelectasis or pneumonia. Otherwise stable portable chest.            **This report has been created using voice recognition software. It may contain minor errors which are inherent in voice recognition technology. **      Final report electronically signed by Dr. Maria Del Carmen Tadeo on 10/11/2020 8:58 AM      CTA CHEST [address]   Final Result   1. Negative for acute pulmonary artery embolism. Thoracic aorta of normal    caliber without dissection. 2.  Patchy peripheral groundglass opacities bilaterally suspicious for    viral pneumonitis. 3.  Fatty liver. 4.  Sequelae of prior granulomatous infection. This document has been electronically signed by: Kimberly Kaye MD on    10/10/2020 02:54 AM      All CT scans at this facility use dose modulation, iterative    reconstruction, and/or weight-based   dosing when appropriate to reduce radiation dose to as low as reasonably    achievable. XR CHEST PORTABLE   Final Result   Impression:   Patchy right upper lobe and left lower lobe infiltrates may reflect    pneumonitis and/or atelectasis. No CHF. This document has been electronically signed by: Kimberly Kaye MD on    10/09/2020 11:46 PM             Diet: Dietary Nutrition Supplements: Diabetic Oral Supplement  DIET CARB CONTROL; Low Sodium (2 GM);  Daily Fluid Restriction: 2000 ml    DVT prophylaxis: [] Lovenox                                 [] SCDs                                 [] SQ Heparin                                 [] Encourage ambulation           [] Already on Anticoagulation     Disposition:    [] Home       [] TCU       [] Rehab       [] Psych       [] SNF       [] [place]       [] Other-    Code Status: Full Code    PT/OT Eval Status:         Electronically signed by Myranda Wills MD on 10/19/2020 at 12:59 PM

## 2020-10-19 NOTE — PROGRESS NOTES
Comprehensive Nutrition Assessment    Type and Reason for Visit:  Reassess, Consult(verbal consult from charge nurse regarding food issues/concerns)    Nutrition Recommendations/Plan:   Continue current diet  Will send cut up meats due to difficulty cutting with plastic knife  Will send strawberry glucerna TID  Nursing to warm food prior to bringing it into patient's room    Nutrition Assessment:  Pt. with no improvement from a nutritional standpoint AEB patient reports some issues/concerns with food. Further nutritional compromise r/t +covid, unhappy with food temps, patient has received other ONS flavors that she does not care for, and underlying medical condition (previous diarrhea, ? New diagnosed DB, history of left breast cancer-on chemotherapy, HLD, CHF), and need for continued nutrition support. Nutrition recommendations/interventions as per above. Malnutrition Assessment:  Malnutrition Status:  Severe malnutrition    Context:  Acute Illness     Findings of the 6 clinical characteristics of malnutrition:  Energy Intake:  7 - 50% or less of estimated energy requirements for 5 or more days  Weight Loss:  1 - 7.5% over 3 months(-9.6% weight loss in 3 months per EMR)     Body Fat Loss:  Unable to assess(pt on COVID unit)     Muscle Mass Loss:  Unable to assess(pt on COVID unit)    Fluid Accumulation:  No significant fluid accumulation Extremities   Strength:  Not Performed    Estimated Daily Nutrient Needs:  Energy (kcal):  9235-4335 kcal/day (11-13)- pt in hypometabolic phase; Weight Used for Energy Requirements:  (93.9 kg on 10/13)     Protein (g):  127+ g/day (2 g/kg); Weight Used for Protein Requirements:  Ideal(63.6 kg IBW)        Fluid (ml/day):   ; Weight Used for Fluid Requirements:         Nutrition Related Findings:  +covid; Spoke to Patricia Cohn, charge nurse who reports that patient is not happy with food being cold and getting syrofoam/plasticware, patient also only wants strawberry glucerna. I spoke to patient and let her know ONS has been ordered correctly, our nutrition ambassador is aware and will also speak to manager. Patient reports a good appetite and would eat better if food was warm. Also informed patient that nurse has agreed to warm food up prior to bringing it into room. Patient also complained that she is not able to cut meat with the plastic knive, patient agrees to receive cut up meats (order placed). POC: 113. Meds: Mag-Ox, Humalog, Vitamins C, Vitamin D, Zinc.      Wounds:  (skin tears on abdomen, sacrum purple discoloration with blanchable redness)       Current Nutrition Therapies:    Dietary Nutrition Supplements: Diabetic Oral Supplement  DIET CARB CONTROL; Low Sodium (2 GM);  Daily Fluid Restriction: 2000 ml    Anthropometric Measures:  · Height: 5' 8\" (172.7 cm)  · Current Body Weight: 212 lb (96.2 kg)(10/19, bedscale, +1 non-pitting edema)   · Admission Body Weight: 207 lb 0.2 oz (93.9 kg)(10/9; stated weight; no edema noted)    · Usual Body Weight: (Per EMR: 211 lb 6.4 oz on 9/15/20; 229 lb 6.4 oz on 7/27/20;)     · Ideal Body Weight: 140 lbs;  · BMI: 32.2  · Adjusted Body Weight:  ; No Adjustment   · Adjusted BMI:      · BMI Categories: Obese Class 1 (BMI 30.0-34.9)(32.23)       Nutrition Diagnosis:   · Severe malnutrition, In context of acute illness or injury related to inadequate protein-energy intake, catabolic illness(COVID+) as evidenced by poor intake prior to admission, weight loss greater than or equal to 5% in 1 month    Nutrition Interventions:   Food and/or Nutrient Delivery:  Continue Current Diet, Vitamin Supplement, Continue Oral Nutrition Supplement(send cut up meats)  Nutrition Education/Counseling:  No recommendation at this time   Coordination of Nutrition Care:  Continued Inpatient Monitoring, Interdisciplinary Rounds    Goals:  Pt will consume 75% or more of meals during LOS       Nutrition Monitoring and Evaluation:   Behavioral-Environmental Outcomes: (n/a)   Food/Nutrient Intake Outcomes:  Food and Nutrient Intake, Supplement Intake  Physical Signs/Symptoms Outcomes:  Biochemical Data, GI Status, Fluid Status or Edema, Hemodynamic Status, Weight, Skin     Discharge Planning:     Too soon to determine     Electronically signed by Qasim Richter RD, LD on 10/19/20 at 11:38 AM EDT    Contact: [phone]

## 2020-10-20 LAB
ANION GAP SERPL CALCULATED.3IONS-SCNC: 15 MEQ/L (ref 8–16)
BUN BLDV-MCNC: 17 MG/DL (ref 7–22)
CALCIUM SERPL-MCNC: 9.7 MG/DL (ref 8.5–10.5)
CHLORIDE BLD-SCNC: 98 MEQ/L (ref 98–111)
CO2: 24 MEQ/L (ref 23–33)
CREAT SERPL-MCNC: 0.5 MG/DL (ref 0.4–1.2)
ERYTHROCYTE [DISTWIDTH] IN BLOOD BY AUTOMATED COUNT: 14.6 % (ref 11.5–14.5)
ERYTHROCYTE [DISTWIDTH] IN BLOOD BY AUTOMATED COUNT: 53.1 FL (ref 35–45)
GFR SERPL CREATININE-BSD FRML MDRD: > 90 ML/MIN/1.73M2
GLUCOSE BLD-MCNC: 134 MG/DL (ref 70–108)
GLUCOSE BLD-MCNC: 174 MG/DL (ref 70–108)
GLUCOSE BLD-MCNC: 186 MG/DL (ref 70–108)
GLUCOSE BLD-MCNC: 190 MG/DL (ref 70–108)
GLUCOSE BLD-MCNC: 233 MG/DL (ref 70–108)
HCT VFR BLD CALC: 37.7 % (ref 37–47)
HEMOGLOBIN: 11.9 GM/DL (ref 12–16)
MCH RBC QN AUTO: 31.5 PG (ref 26–33)
MCHC RBC AUTO-ENTMCNC: 31.6 GM/DL (ref 32.2–35.5)
MCV RBC AUTO: 99.7 FL (ref 81–99)
PLATELET # BLD: 415 THOU/MM3 (ref 130–400)
PMV BLD AUTO: 10.8 FL (ref 9.4–12.4)
POTASSIUM SERPL-SCNC: 4.3 MEQ/L (ref 3.5–5.2)
RBC # BLD: 3.78 MILL/MM3 (ref 4.2–5.4)
SODIUM BLD-SCNC: 137 MEQ/L (ref 135–145)
WBC # BLD: 11.1 THOU/MM3 (ref 4.8–10.8)

## 2020-10-20 PROCEDURE — 2580000003 HC RX 258: Performed by: PHYSICIAN ASSISTANT

## 2020-10-20 PROCEDURE — 99232 SBSQ HOSP IP/OBS MODERATE 35: CPT | Performed by: FAMILY MEDICINE

## 2020-10-20 PROCEDURE — 2700000000 HC OXYGEN THERAPY PER DAY

## 2020-10-20 PROCEDURE — 6370000000 HC RX 637 (ALT 250 FOR IP): Performed by: FAMILY MEDICINE

## 2020-10-20 PROCEDURE — 6370000000 HC RX 637 (ALT 250 FOR IP): Performed by: PHYSICIAN ASSISTANT

## 2020-10-20 PROCEDURE — 80048 BASIC METABOLIC PNL TOTAL CA: CPT

## 2020-10-20 PROCEDURE — 97110 THERAPEUTIC EXERCISES: CPT

## 2020-10-20 PROCEDURE — 36415 COLL VENOUS BLD VENIPUNCTURE: CPT

## 2020-10-20 PROCEDURE — 99233 SBSQ HOSP IP/OBS HIGH 50: CPT | Performed by: NURSE PRACTITIONER

## 2020-10-20 PROCEDURE — 94760 N-INVAS EAR/PLS OXIMETRY 1: CPT

## 2020-10-20 PROCEDURE — 85027 COMPLETE CBC AUTOMATED: CPT

## 2020-10-20 PROCEDURE — 97163 PT EVAL HIGH COMPLEX 45 MIN: CPT

## 2020-10-20 PROCEDURE — 6360000002 HC RX W HCPCS: Performed by: NURSE PRACTITIONER

## 2020-10-20 PROCEDURE — 6360000002 HC RX W HCPCS: Performed by: PHYSICIAN ASSISTANT

## 2020-10-20 PROCEDURE — 82948 REAGENT STRIP/BLOOD GLUCOSE: CPT

## 2020-10-20 PROCEDURE — 6360000002 HC RX W HCPCS: Performed by: INTERNAL MEDICINE

## 2020-10-20 PROCEDURE — 2060000000 HC ICU INTERMEDIATE R&B

## 2020-10-20 PROCEDURE — 94669 MECHANICAL CHEST WALL OSCILL: CPT

## 2020-10-20 PROCEDURE — 97530 THERAPEUTIC ACTIVITIES: CPT

## 2020-10-20 RX ORDER — DEXAMETHASONE SODIUM PHOSPHATE 4 MG/ML
6 INJECTION, SOLUTION INTRA-ARTICULAR; INTRALESIONAL; INTRAMUSCULAR; INTRAVENOUS; SOFT TISSUE DAILY
Status: DISCONTINUED | OUTPATIENT
Start: 2020-10-20 | End: 2020-10-21 | Stop reason: HOSPADM

## 2020-10-20 RX ADMIN — DEXAMETHASONE SODIUM PHOSPHATE 6 MG: 4 INJECTION, SOLUTION INTRAMUSCULAR; INTRAVENOUS at 11:32

## 2020-10-20 RX ADMIN — FUROSEMIDE 20 MG: 10 INJECTION, SOLUTION INTRAMUSCULAR; INTRAVENOUS at 09:57

## 2020-10-20 RX ADMIN — PAROXETINE HYDROCHLORIDE 10 MG: 20 TABLET, FILM COATED ORAL at 09:56

## 2020-10-20 RX ADMIN — FUROSEMIDE 20 MG: 10 INJECTION, SOLUTION INTRAMUSCULAR; INTRAVENOUS at 17:12

## 2020-10-20 RX ADMIN — SODIUM CHLORIDE, PRESERVATIVE FREE 10 ML: 5 INJECTION INTRAVENOUS at 20:16

## 2020-10-20 RX ADMIN — Medication 1000 UNITS: at 09:57

## 2020-10-20 RX ADMIN — Medication 50 MG: at 09:56

## 2020-10-20 RX ADMIN — SODIUM CHLORIDE, PRESERVATIVE FREE 10 ML: 5 INJECTION INTRAVENOUS at 09:57

## 2020-10-20 RX ADMIN — ENOXAPARIN SODIUM 40 MG: 40 INJECTION SUBCUTANEOUS at 20:16

## 2020-10-20 RX ADMIN — BACITRACIN, NEOMYCIN, POLYMYXIN B 1 G: 400; 3.5; 5 OINTMENT TOPICAL at 20:16

## 2020-10-20 RX ADMIN — OXYCODONE HYDROCHLORIDE AND ACETAMINOPHEN 500 MG: 500 TABLET ORAL at 09:56

## 2020-10-20 RX ADMIN — FAMOTIDINE 20 MG: 20 TABLET, FILM COATED ORAL at 09:57

## 2020-10-20 RX ADMIN — INSULIN LISPRO 1 UNITS: 100 INJECTION, SOLUTION INTRAVENOUS; SUBCUTANEOUS at 20:16

## 2020-10-20 RX ADMIN — INSULIN LISPRO 1 UNITS: 100 INJECTION, SOLUTION INTRAVENOUS; SUBCUTANEOUS at 13:22

## 2020-10-20 RX ADMIN — LEVOTHYROXINE SODIUM 25 MCG: 25 TABLET ORAL at 06:07

## 2020-10-20 RX ADMIN — ENOXAPARIN SODIUM 40 MG: 40 INJECTION SUBCUTANEOUS at 09:57

## 2020-10-20 RX ADMIN — POTASSIUM CHLORIDE 20 MEQ: 1500 TABLET, EXTENDED RELEASE ORAL at 09:57

## 2020-10-20 RX ADMIN — MAGNESIUM GLUCONATE 500 MG ORAL TABLET 400 MG: 500 TABLET ORAL at 09:56

## 2020-10-20 RX ADMIN — FAMOTIDINE 20 MG: 20 TABLET, FILM COATED ORAL at 20:16

## 2020-10-20 RX ADMIN — ASPIRIN 81 MG: 81 TABLET ORAL at 09:57

## 2020-10-20 RX ADMIN — BACITRACIN, NEOMYCIN, POLYMYXIN B 1 G: 400; 3.5; 5 OINTMENT TOPICAL at 09:58

## 2020-10-20 ASSESSMENT — PAIN SCALES - GENERAL
PAINLEVEL_OUTOF10: 0

## 2020-10-20 NOTE — CARE COORDINATION
10/20/20, 12:24 PM EDT    DISCHARGE ONGOING EVALUATION:     Lovely Solis day: 10  Location: [facility]09/009-A Reason for admit: COVID-19 [U07.1]   Treatment Plan of Care: Tmax 98.3, O2 at 6 liters nasal cannula with 93% saturation. PT/OT, Pulmonology following. IV Decadron, diabetes management, IV Lasix, electrolyte replacement protocols, Vitamin C,D and zinc.  Barriers to Discharge: medical stability  PCP: Nicole Caruso  Readmission Risk Score: 16%  Patient Goals/Plan/Treatment Preferences: Plan is to return home alone with family support. Will follow for additional needs.

## 2020-10-20 NOTE — PROGRESS NOTES
Pt has had periods of outbursts at nursing staff re: food selections, high flow O2 and transfer. Much support given to pt. Report has been called and awaiting transfer to [facility].

## 2020-10-20 NOTE — PROGRESS NOTES
[facility]    Patient - Nii Thomas   MRN -  [id number]   [facility]   - 1950      Date of Admission -  10/9/2020 10:35 PM  Date of evaluation -  10/20/2020  Room - 56 [facility] Maliha Salamanca MD Primary Care Physician - Margie Ramirez   Chief Complaint     Management of hypoxic respiratory failure w/Covid-19 infection  Active Hospital Problem List      Active Hospital Problems    Diagnosis Date Noted    Severe malnutrition [facility]) [E43] 10/13/2020     Class: Acute    Acute respiratory failure with hypoxia ([address] [J96.01]     COVID-19 [U07.1]     Pneumonia due to COVID-19 virus [U07.1, J12.89]     Hyponatremia [E87.1]     Leukocytosis [D72.829]     Hyperglycemia [R73.9]     High anion gap metabolic acidosis [K70.4]     History of left breast cancer [Z85.3]     Hyperlipidemia [E78.5]     History of hypothyroidism [Z86.39]     Chronic diarrhea [K52.9]     COVID-19 virus infection [U07.1] 10/10/2020     HPI   Nii Thomas is a 79 y.o. female admitted 10/10/20 for COVID-19 pneumonia started on Remdesivir, Decadron and convalescent plasma. H/O breast cancer recent tx with Taxotere, Carboplatin, Herceptin Pertuzumab     Nii Thomas is a 79 y.o. female with a past medical history of cancer of the left breast who presented to [facility] on 10/10/2020 with shortness of breath with known exposure to COVID positive patient. Patient states she was exposed to COVID 1 week prior to admission by a friend. Per chart review patient had tested positive for COVID-19 as an outpatient 2 days prior to admission. She states that 2 days prior to admission she noticed she was short of breath and experiencing fevers. He states that the symptoms got worse in the 2 days prior to admission. She also states that she had been having loss of taste and smell.   She also reports that she has been having a productive cough during this time as well. She reports the sputum is green in color. She states that her oxygen level was in the low 80s at home prior to calling EMS. She stated that her shortness of breath at that time was made worse by any activity and that there were no relieving factors. Of note patient received her last chemotherapy infusion on 9/29/2020.      Nan De Anda was then admitted to the [place] [address] unit on 6A. She was started on remdesivir, dexamethasone and convalescent plasma. CTA chest on admission showed patchy peripheral groundglass opacities bilaterally suspicious for viral pneumonia. Overnight patient had desaturation episodes and had to be placed on high flow nasal cannula. She was then transferred from  to  for higher acuity of care. This morning Nan De Anda states that she is doing well. She reports that she is not short of breath. She continues to report that she has a productive cough. She denies having any chest pain. Her SPO2 is 93% on 50% FiO2 at 50 L/min heated high flow nasal cannula. She was febrile to 100.5 degrees this morning. Subjective/Events Past 24 hours/ROS   -off HFNC currently on 4LPM via NC- 93%  -Denies any SOB  -Agitated today (wanting to go home)   -Afebrile. -Good diuresis overnight    -All body systems reviewed.      Past Medical History         Diagnosis Date    Breast cancer ([address] 2020    left-invasive ductal    Hyperlipidemia     Numbness and tingling     left foot-chronic nerve damage      Past Surgical History           Procedure Laterality Date    ABDOMINAL EXPLORATION SURGERY  2014    Dr butterfield-lysis of adhesions    BREAST LUMPECTOMY Left 11/2015    Left breast lumpectomy, retroareolar with preoperative needle loc-Dr. Pearl Hopper BREAST SURGERY Left 06/26/2020    biopsy of left breast    CHOLECYSTECTOMY  10/2014    Dr Favio Rich  03/30/2016    Dr Mati Carvajal  10/2014    x4 abdominal wall    HYSTERECTOMY      INCISIONAL HERNIA REPAIR  2014    Dr Arellano Ohcecilia LIPECTOMY  2014    Dr Mckee [facility]  7/16/2020    [address] LEFT 7/16/2020 Taylor Mccarthy MD [facility]    PORT SURGERY Right 7/17/2020    SINGLE LUMEN SMART PORT INSERTION RIGHT SUBCLAVIAN performed by Marsha Barnett MD at [address]  10/2014    Dr Helen Washington      patient was 11years old    TUBAL LIGATION       Diet    Dietary Nutrition Supplements: Diabetic Oral Supplement  DIET CARB CONTROL; Low Sodium (2 GM); Daily Fluid Restriction: 2000 ml  Allergies    Aluminum-containing compounds  Social History     Social History     Socioeconomic History    Marital status:       Spouse name: Not on file    Number of children: 3    Years of education: Not on file    Highest education level: Not on file   Occupational History    Not on file   Social Needs    Financial resource strain: Not on file    Food insecurity     Worry: Not on file     Inability: Not on file    Transportation needs     Medical: Not on file     Non-medical: Not on file   Tobacco Use    Smoking status: Never Smoker    Smokeless tobacco: Never Used   Substance and Sexual Activity    Alcohol use: No     Alcohol/week: 0.0 standard drinks    Drug use: No    Sexual activity: Not on file   Lifestyle    Physical activity     Days per week: Not on file     Minutes per session: Not on file    Stress: Not on file   Relationships    Social connections     Talks on phone: Not on file     Gets together: Not on file     Attends Yazidism service: Not on file     Active member of club or organization: Not on file     Attends meetings of clubs or organizations: Not on file     Relationship status: Not on file    Intimate partner violence     Fear of current or ex partner: Not on file     Emotionally abused: Not on file     Physically abused: Not on file     Forced sexual activity: Not on file Other Topics Concern    Not on file   Social History Narrative    Not on file     Family History          Problem Relation Age of Onset    Heart Disease Father         cath stent blood to thin and bled    Heart Attack Mother     Other Other         blood clot    Breast Cancer Paternal Aunt 62    High Blood Pressure Sister     Cancer Brother 68        skin    Prostate Cancer Brother         had chemotherapy to treat     Prostate Cancer Brother 64        has had for 10 years    Ovarian Cancer Neg Hx     Diabetes Neg Hx     Stroke Neg Hx        Meds    Current Medications    dexamethasone  6 mg Intravenous Daily    furosemide  20 mg Intravenous BID    potassium chloride  20 mEq Oral Daily with breakfast    magnesium oxide  400 mg Oral Daily    sodium chloride  20 mL Intravenous Once    neomycin-bacitracin-polymyxin   Topical BID    aspirin EC  81 mg Oral Daily    levothyroxine  25 mcg Oral Daily    PARoxetine  10 mg Oral QAM    vitamin C  500 mg Oral Daily    Vitamin D  1,000 Units Oral Daily    zinc sulfate  50 mg Oral Daily    sodium chloride flush  10 mL Intravenous 2 times per day    enoxaparin  40 mg Subcutaneous BID    sodium chloride  20 mL Intravenous Once    insulin lispro  0-6 Units Subcutaneous TID WC    insulin lispro  0-3 Units Subcutaneous Nightly    famotidine  20 mg Oral BID     loperamide, sodium chloride flush, acetaminophen **OR** acetaminophen, polyethylene glycol, promethazine **OR** ondansetron, potassium chloride **OR** potassium alternative oral replacement **OR** potassium chloride, magnesium sulfate, glucose, dextrose, glucagon (rDNA), dextrose  IV Drips/Infusions   dextrose       Vitals    Vitals    height is 5' 8\" (1.727 m) and weight is 209 lb 12.8 oz (95.2 kg). Her oral temperature is 98.1 °F (36.7 °C). Her blood pressure is 113/62 and her pulse is 63. Her respiration is 18 and oxygen saturation is 88% (abnormal).      O2 Flow Rate (L/min): 5 L/min  I/O    Intake/Output Summary (Last 24 hours) at 10/20/2020 1039  Last data filed at 10/20/2020 0345  Gross per 24 hour   Intake 1819.84 ml   Output 0 ml   Net 1819.84 ml     Patient Vitals for the past 96 hrs (Last 3 readings):   Weight   10/20/20 0345 209 lb 12.8 oz (95.2 kg)   10/19/20 0352 212 lb (96.2 kg)   10/18/20 0446 212 lb 9.6 oz (96.4 kg)     Exam   Physical Exam   Constitutional: No distress on O2 4LPM per NC. Agitated today. OOB to chair   Mouth/Throat: Oropharynx is clear and moist.  No oral thrush. Eyes: Conjunctivae are normal. Pupils are equal, round. No scleral icterus. Neck: Neck supple. No tracheal deviation present. No JVD  Cardiovascular: S1 and S2 with no murmur. No peripheral edema  Pulmonary/Chest: Normal effort with bilateral air entry, clear breath sounds with diminished bases bilaterally; slight crackles BLL . No stridor. No respiratory distress. Patient exhibits no tenderness. No wheezing. Mediport R chest.   Abdominal: Soft. Bowel sounds audible. No distension or tenderness to palp. Musculoskeletal: Moves all extremities; no clubbing or cyanosis  Neurological: Patient is alert and oriented to person, place, and time. Skin: Warm and dry.  Pale    Labs   ABG  Lab Results   Component Value Date    PH 7.49 10/13/2020    PO2 65 10/13/2020    PCO2 34 10/13/2020    HCO3 26 10/13/2020    O2SAT 94 10/13/2020     Lab Results   Component Value Date    IFIO2 60 10/13/2020     CBC  Recent Labs     10/18/20  0744 10/19/20  0407 10/20/20  0441   WBC 11.8* 10.6 11.1*   RBC 3.53* 3.54* 3.78*   HGB 11.3* 11.4* 11.9*   HCT 34.8* 34.9* 37.7   MCV 98.6 98.6 99.7*   MCH 32.0 32.2 31.5   MCHC 32.5 32.7 31.6*   * 402* 415*   MPV 10.9 10.9 10.8      BMP  Recent Labs     10/18/20  0744 10/19/20  0407 10/20/20  0441    137 137   K 4.4 4.3 4.3    100 98   CO2 28 25 24   BUN 13 13 17   CREATININE 0.4 0.4 0.5   GLUCOSE 112* 127* 186*   CALCIUM 9.2 9.1 9.7     LFT  Recent Labs 10/18/20  0744   AST 15   ALT 8*   BILITOT 0.5   ALKPHOS 64     TROP  Lab Results   Component Value Date    TROPONINT < 0.010 02/08/2017    TROPONINT < 0.010 01/23/2016     BNP  Lab Results   Component Value Date    PROBNP 78.5 10/17/2020    PROBNP 179.4 10/15/2020    PROBNP 43.6 02/08/2017     D-Dimer  Lab Results   Component Value Date    DDIMER 854.00 10/10/2020     Lactic Acid  No results for input(s): LACTA in the last 72 hours. INR  No results for input(s): INR, PROTIME in the last 72 hours. PTT  No results for input(s): APTT in the last 72 hours. Glucose  Recent Labs     10/19/20  1641 10/19/20  2049 10/20/20  0640   POCGLU 248* 255* 134*     UA No results for input(s): SPECGRAV, PHUR, COLORU, CLARITYU, MUCUS, PROTEINU, BLOODU, RBCUA, WBCUA, BACTERIA, NITRU, GLUCOSEU, BILIRUBINUR, UROBILINOGEN, KETUA, LABCAST, LABCASTTY, AMORPHOS in the last 72 hours. Invalid input(s): CRYSTALS. PFTs   N/A  Echo     10/19/2020   ECHOCARDIOGRAM LIMITED. Conclusions      Summary   Normal left ventricle size and systolic function. Ejection fraction was   estimated at 60 %. There were no regional left ventricular wall motion   abnormalities and wall thickness was within normal limits. Moderately enlarged right ventricle cavity. Right ventricle global systolic function is normal .      Signature      ----------------------------------------------------------------   Electronically signed by Mark Florian MD (Interpreting   physician) on 10/19/2020 at 03:26 PM   ----------------------------------------------------------------      Conclusions      Summary   Normal left ventricle size and systolic function. Ejection fraction was   estimated at 55 to 60 %. There were no regional left ventricular wall   motion abnormalities and wall thickness was within normal limits. Doppler parameters were consistent with abnormal left ventricular   relaxation (grade 1 diastolic dysfunction).       Signature ----------------------------------------------------------------   Electronically signed by Kait Lester MD (Interpreting   physician) on 07/22/2020 at 06:45 PM  Cultures    Procalcitonin  Lab Results   Component Value Date    PROCAL 0.07 10/17/2020    PROCAL 0.11 10/13/2020    PROCAL 0.19 10/11/2020     Blood Cultures x 2: no growth; prelim   Radiology    CXR   10/17/2020. Impression    1. Normal heart size. Mediport right side with catheter tip in superior vena cava. Moderately ectatic thoracic aorta. 2. Mild residual atelectasis/pneumonia right parahilar region and moderate residual left basilar atelectasis/pneumonia. No effusion. Overall appearance of chest has improved since prior.                   **This report has been created using voice recognition software.  It may contain minor errors which are inherent in voice recognition technology. **         Final report electronically signed by Dr. Corey Mcqueen on 10/17/2020 7:46 AM               CTA 10/10/2020  Impression   1. Negative for acute pulmonary artery embolism.  Thoracic aorta of normal   caliber without dissection. 2.  Patchy peripheral groundglass opacities bilaterally suspicious for   viral pneumonitis. 3.  Fatty liver. 4.  Sequelae of prior granulomatous infection.           Assessment   Acute hypoxic respiratory failure secondary to COVID-19 pneumonia. Continues requiring HFNC 65%  -10/20/20: patient refusing HFNC placed on 5LPM- 88%  COVID-19 pneumonia  Left breast cancer, most recent chemotherapy infusion 9/29/2020, Carbo Herceptin, Pertuzumab, Taxotere  Has completed Remdesivir and received convalescent plasma, currently completing Decadron. Low PCT  She had a positive fluid balance of 4.6Litters.   Full code  Plan   Remdesivir not recommended for longer than 5 days per pharmacist due to possible higher mortality rate   Consider Pneumonitis related to Herceptin or other Chemotherapy agent, would continue steroids beyond 10 days.  Restarted Decadron 6 mg IV daily   ECHO reviewed- no toxicity   Slow oxygen taper- refusing HF today   Continue Lasix 20mg IV bid for a total of 2days with daily monitoring of BMP. Acapella and IS use encouraged. VTE prophylaxis: Lovenox   GI prophylaxis: Pepcid   Will follow   CXR portable in AM     Case discussed with nurse and patient. Questions and concerns addressed. Meds and Orders reviewed.     Electronically signed by     ANNALISA Orellana CNP on 10/20/2020 at 10:39 AM

## 2020-10-20 NOTE — PLAN OF CARE
Problem: Respiratory  Goal: Supplemental O2 requirements decreased  10/20/2020 1751 by Atilio Locke RCP  Outcome: Ongoing     Problem: Gas Exchange - Impaired  Goal: Absence of hypoxia  10/20/2020 1751 by Atilio Locke RCP  Outcome: Ongoing     Problem: OXYGENATION/RESPIRATORY FUNCTION  Goal: Ability to maintain adequate oxygenation will improve  Description: Ability to maintain adequate oxygenation will improve  Outcome: Ongoing     Pt is on 4LPM and is doing well today. Will continue to wean FIO2 as able.

## 2020-10-20 NOTE — PLAN OF CARE
Problem: Falls - Risk of:  Goal: Will remain free from falls  Description: Will remain free from falls  Outcome: Ongoing  Note: Patient standby assist. Patient educated on and encouraged to use the call light for assistance from staff with needs. Patient verbalizes an understanding of this. Bed wheels locked and bed in lowest position; bed alarm on. Patient possessions are within reach; pathways are clear at this time. Problem: Falls - Risk of:  Goal: Absence of physical injury  Description: Absence of physical injury  Outcome: Ongoing  Note: Patient standby assist. Patient educated on and encouraged to use the call light for assistance from staff with needs. Patient verbalizes an understanding of this. Bed wheels locked and bed in lowest position; bed alarm on. Patient possessions are within reach; pathways are clear at this time. Problem: Skin Integrity:  Goal: Will show no infection signs and symptoms  Description: Will show no infection signs and symptoms  Outcome: Ongoing  Note: No new signs and/or symptoms of infection noted during this shift. Patient remains afebrile during this shift. Problem: Skin Integrity:  Goal: Absence of new skin breakdown  Description: Absence of new skin breakdown  Outcome: Ongoing  Note: No new skin integrity issues noted during this shift. Patient turns self in bed; staff assistance provided as needed with pillow support. Bilateral heels floated off bed. Problem: Neurological  Goal: Maximum potential motor/sensory/cognitive function  Outcome: Ongoing  Note: Patient A&O x4. Patient up with standby assist. Patient tolerates this well. Problem: Respiratory  Goal: No pulmonary complications  Outcome: Ongoing     Problem: Respiratory  Goal: O2 Sat > 90%  Outcome: Ongoing  Note: Patient respirations regular and unlabored. Patient remains on 5 L O2 NC with oxygen saturations >90% during this shift.      Problem: Respiratory  Goal: Supplemental O2 requirements decreased  Outcome: Ongoing     Problem: Nutrition  Goal: Optimal nutrition therapy  Outcome: Ongoing  Note: Patient is on a carb control diet and seemingly tolerating this well. Problem: Cardiovascular  Goal: No DVT, peripheral vascular complications  Outcome: Ongoing  Note: None noted during this shift. Patient receiving Lovenox BID. Problem: Discharge Planning:  Goal: Discharged to appropriate level of care  Description: Discharged to appropriate level of care  Outcome: Ongoing  Note: Patient actively involved in planning of care. Discharge planning in progress at this time. Problem: Gas Exchange - Impaired  Goal: Absence of hypoxia  Outcome: Ongoing  Note: Patient respirations regular and unlabored. Patient remains on 5 L O2 NC with oxygen saturations >90% during this shift. Problem: Isolation Precautions - Risk of Spread of Infection  Goal: Prevent transmission of infection  Outcome: Ongoing  Note: Patient in droplet plus isolation. All necessary precautions taken to minimize the potential for transmission of infection. Problem: Loneliness or Risk for Loneliness  Goal: Demonstrate positive use of time alone when socialization is not possible  Outcome: Ongoing     Problem: Fatigue  Goal: Verbalize increase energy and improved vitality  Outcome: Ongoing     Problem: Patient Education: Go to Patient Education Activity  Goal: Patient/Family Education  Outcome: Ongoing     Problem: Pain:  Goal: Pain level will decrease  Description: Pain level will decrease  Outcome: Ongoing  Note: Patient denies pain during this shift. Will continue to monitor. Problem: Pain:  Goal: Control of acute pain  Description: Control of acute pain  Outcome: Ongoing  Note: Patient denies pain during this shift. Will continue to monitor. Problem: Pain:  Goal: Control of chronic pain  Description: Control of chronic pain  Outcome: Ongoing  Note: Patient denies pain during this shift.  Will continue to monitor. Care plan reviewed with patient. Patient verbalizes understanding of the plan of care and contributes to goal setting.

## 2020-10-20 NOTE — PROGRESS NOTES
[address] [address]  INPATIENT PHYSICAL THERAPY  EVALUATION  [facility] 4B - 4B-09/009-A    Time In: 8370  Time Out: 5277  Timed Code Treatment Minutes: 30 Minutes  Minutes: 43          Date: 10/20/2020  Patient Name: Kathleen Kothari,  Gender:  female        MRN: [id number]  : 1950  (79 y.o.)      Referring Practitioner: Gibran Allred. Duane Yan MD  Diagnosis: COVID-19  Additional Pertinent Hx: Per H&P, \"Patient presents to the ED with increasing shortness of breath. The patient states she was exposed to Angy one week ago by a friend. The patient had concerns as she is a cancer patient and had her test in the outpatient setting two days prior to presentation. Over the two days since her diagnosis she has had progressive shortness of breath and fevers. The patient has been able to eat and drink. She has had low grade fevers at home along with loss of taste and smell. The patient is currently taking chemo for breast cancer. \"     Restrictions/Precautions:  Restrictions/Precautions: General Precautions, Fall Risk, Isolation  Position Activity Restriction  Other position/activity restrictions: Droplet Plus, COVID-19 +    Subjective:  Chart Reviewed: Yes  Patient assessed for rehabilitation services?: Yes  Family / Caregiver Present: No  Subjective: RN approved PT evaluation. Upon entry, pt supine in bed, agreeable to participate in therapy. Pt on 5L O2 via NC. SpO2 monitored throughout session, see assessment for details. During session, pt annoyed, stating that she is fine for all mobility. Educated pt on the importance of maintaing O2 sats >90% and the effects of muscle activity on O2 in the body.  Post session, pt reclined in bedside chair on 6L O2 oer RN request.     Vitals:  Resting: /62, HR 68 bpm, SpO2 90% sitting EOB on 5L    General:  Overall Orientation Status: Within Functional Limits  Follows Commands: Within Functional Limits    Vision: Impaired  Vision Exceptions: Wears glasses for reading    Hearing: Within functional limits         Pain: denies pain    Social/Functional History:    Lives With: Alone  Type of Home: House  Home Layout: One level  Home Access: Stairs to enter with rails  Entrance Stairs - Number of Steps: 3  Entrance Stairs - Rails: Both  Home Equipment: Rolling walker          Receives Help From: Family(ronan)  ADL Assistance: Independent  Homemaking Assistance: Independent  Ambulation Assistance: Independent  Transfer Assistance: Independent    Active : Yes     Additional Comments: pt reports indep PTA, amb community distances without AD. reports being diagnosed with breast cancer. awaiting chemo treatments post d/c    OBJECTIVE:  Range of Motion:  Bilateral Lower Extremity: WFL    Strength:  Bilateral Lower Extremity: Impaired - grossly deconditioned, 4-/5    Balance:  Static Standing Balance: Stand By Assistance  Dynamic Standing Balance: Stand By Assistance    Bed Mobility:  Rolling to Right: Stand By Assistance, with increased time for completion   Supine to Sit: Stand By Assistance, with increased time for completion    Transfers:  Sit to Stand: Stand By Assistance, to/from chair with arms  Stand to Sit:Stand By Assistance, to/from chair with arms    Ambulation:  Contact Guard Assistance, with increased time for completion  Distance: 5ft  Surface: Level Tile  Device:No Device  Gait Deviations:  Slow Martina, Decreased Step Length Bilaterally and Decreased Heel Strike Bilaterally, Pt steady throughout gait. Amb distance limited by respiratory status. SpO2 decreased to 86-88% with amb. Exercise:  Patient was guided in 1 set(s) 10 reps of exercise to both lower extremities. Ankle pumps, Glut sets, Heelslides, Hip abduction/adduction, Seated heel/toe raises and Long arc quads. Exercises were completed for increased independence with functional mobility. O2 sats dropped with seated there ex.     Functional Outcome Measures: Completed  AM-PAC Inpatient Mobility Raw Score : 17  AM-PAC Inpatient T-Scale Score : 42.13    ASSESSMENT:  Activity Tolerance:  Patient tolerance of  treatment: fair. Pt SpO2 decreased to 86-88% when ambulating to bedside chair on 5L O2 via nc. Pt returned to sitting and SpO2 monitored, remained <90%. RN notified and increased SpO2 to 6L. SpO2 increased to 90-92%, however would drop to 88% with seated there ex. Pt reclined in chair for supine there ex with O2 sats staying >90%. RN requested pt remain on 6L at conclusion of session. Pt motived to perform functional mobility activities, however lacks awareness of the effects of mobility of O2 sats. Patient education provided. Treatment Initiated: Treatment and education initiated within context of evaluation. Evaluation time included review of current medical information, gathering information related to past medical, social and functional history, completion of standardized testing, formal and informal observation of tasks, assessment of data and development of plan of care and goals. Treatment time included skilled education and facilitation of tasks to increase safety and independence with functional mobility for improved independence and quality of life. Pt education provided regarding the importance of mobility during admission. Pt encouraged to ambulate 3x/day with staff and sit in bedside chair for all meals. Patient education provided on the effects of mobility and muscle activity on O2 saturation. Assessment: Body structures, Functions, Activity limitations: Decreased functional mobility , Decreased strength, Decreased endurance  Assessment: Pt presents with COVID-19. Demonstrates a decrease in baseline functional mobility of bed mobility, transfers, gait and stair negotiation. She is currently limited by respiratory status, requiring an increase in supplemental O2 during functional mobility to keep SpO2 >90%.  She will benefit from skilled PT services to promote an increase infunctional mobility for return to PLOF prior to d/c. Prognosis: Good    REQUIRES PT FOLLOW UP: Yes    Discharge Recommendations:  Discharge Recommendations: Continue to assess pending progress, Subacute/Skilled Nursing Facility, Home with Home health PT(TCU?)    Patient Education:  PT Education: Goals, PT Role, Plan of Care, Home Exercise Program  Patient Education: patient educated provided on the importance of SpO2 being >90% and the effect of muscle activity on oxygen sats. Equipment Recommendations:  Equipment Needed: No    Plan:  Times per week: 3-5x GM  Current Treatment Recommendations: Strengthening, Balance Training, Functional Mobility Training, Transfer Training, Gait Training, Stair training, Endurance Training, Home Exercise Program, Safety Education & Training, Patient/Caregiver Education & Training    Goals:  Patient goals : to return home  Short term goals  Time Frame for Short term goals: by discharge  Short term goal 1: Pt to perform bed mobility with mod I to promote ease of getting out of bed. Short term goal 2: Pt to perform sit<>stand with mod I to improve ability to perform transfers in prep for OOB activity. Short term goal 3: Pt to ambulate >50 feet without an AD at supervision with SpO2 remaining >90% for improve ability to navigate within pt room. Short term goal 4: Pt to negotiate 3 steps with B HR at SBA to improve ability to enter home. Long term goals  Time Frame for Long term goals : n/a secondary to short ELOS    Following session, patient left in safe position with all fall risk precautions in place.

## 2020-10-20 NOTE — PROGRESS NOTES
Hospitalist Progress Note    Patient:  Jacquelyn Aranda      Unit/Bed:4B-09/009-A    YOB: 1950    MRN: [id number]       Acct: [de-identified]     PCP: Gala Cockayne    Date of Admission: 10/9/2020    Assessment/Plan:    Anticipated Discharge in :     [facility]/Jacqueline Xavierjenifer [facility] [address] Problems    Diagnosis Date Noted    Severe malnutrition ([facility] [address] [E43] 10/13/2020     Class: Acute    Acute respiratory failure with hypoxia ([facility] [address] [J96.01]     COVID-19 [U07.1]     Pneumonia due to COVID-19 virus [U07.1, J12.89]     Hyponatremia [E87.1]     Leukocytosis [D72.829]     Hyperglycemia [R73.9]     High anion gap metabolic acidosis [U31.0]     History of left breast cancer [Z85.3]     Hyperlipidemia [E78.5]     History of hypothyroidism [Z86.39]     Chronic diarrhea [K52.9]     COVID-19 virus infection [U07.1] 10/10/2020     1. Acute Hypoxic Respiratory Failure 2/2 COVID-19 PNA:   Convalescent Plasma x2 doses 10/9 and 10/11/20. 10/10/20 placed on high flow O2. ABG 10/11/20 pH 7.42, PCO2 43, PO2 70, bicarb 27. CXR showed developing right perihilar and left basilar consolidation, procal not elevated. Repeat ABG showed respiratory alkalosis, low PaO2. BNP wnl.   10/19 patient transitioned from hi flow to 5lpm O2/cannula, saturating 91-94%  10/20 Patient has been stable on 5 lpm for 24 hours, may move to a med-surg covid floor. Switch Lasix IV to PO.  2. Covid-19 PNA: CTA chest negative for PE, patchy peripheral groundglass opacities BL suspicious for viral pneumonitis. Convalescent plasma x2 doses as above. Decadron/Remdesivir initiated 10/10/20. 1. Completed five day course of Remdesivir on 10/14 and 10 day course of decadron. Continue Vit C, Vit D, Zinc and ASA. 2. Hx of breast cancer and Covid-19 increases risk for VTE. Currently on Lovenox 40mg BID. Consideration to start Eliquis 2.5mg PO BID x30 days on discharge. Will weigh risk vs benefit. 3. Chest physiotherapy     3.  Hypokalemia 2/2 Diarrhea, resolved: Daily KCl supplementation increased to 20mEq qd.  Potassium replacement protocol. 1. K 4.3 today.   4. Hypomagnesemia, resolved: Continue Mag Oxide. Replace per protocol. Recheck in AM.   5. Hypotension, resolved: Likely 2/2 dehydration, resolved.  IVF hydration discontinued. Monitor BP closely. 6. Type 2 Diabetes: A1c 7.2% on 10/12/20. Low dose correctional SSI while inpatient and on steroids. Recommendations to repeat on 10/19/20 to confirm diabetes. May need to begin Metformin on discharge. Carb control diet. 7. Left Breast Cancer: currently on chemotherapy. 8. HLD: Not on statin OP. 9. Hypothyroidism: continue Synthroid. 10. Diarrhea 2/2 Chemotherapy: Appears stable. Replacing electrolytes as above. Immodium prn. 11. Chronic HFpEF: Echo 7/2020 with EF 34-64%, grade 1 diastolic dysfunction. BNP wnl.     Disposition  Continue to wean O2  Transfer to a med surg [facility] floor     Chief Complaint: SOB     Initial H and P:-    \"Briefly, this is a 77-year-old female, with past medical history of breast cancer, hyperlipidemia, who presented to [facility] on 10/19/2020 due to shortness of breath.  The patient was exposed to her ran out a week ago was diagnosed with COVID-19.  Per H&P note, \"The patient had concerns as she is a cancer patient and had her test in the outpatient setting two days prior to presentation.  Over the two days since her diagnosis she has had progressive shortness of breath and fevers.  The patient has been able to eat and drink. Ofelia Painting has had low grade fevers at home along with loss of taste and smell.  The patient is currently taking chemo for breast cancer. \"      The patient was tested positive for COVID-19 2 days prior to admission in outside facility. Leyda Humphrey was admitted under hospital medicine service for COVID-19 infection.    10/11: overnight, pt desaturated, now on high flow O2   10/12: still on high flow   10/13: still on high flow\"   10/19 transitioned to 5 lpm O2/cannula     Subjective (past 24 hours):   Patient seen and examined, now on 5lpm O2/cannula, saturating 91-94%. She feels better, VS stable and afebrile. She wants to completely remove O2 today, and wants to go home as soon as possible. Continue weaning. She denies any chest pain, palpitations, nausea/vomiting, abdominal pain or diarrhea. Medications:  Reviewed    Infusion Medications    dextrose       Scheduled Medications    dexamethasone  6 mg Intravenous Daily    furosemide  20 mg Intravenous BID    potassium chloride  20 mEq Oral Daily with breakfast    magnesium oxide  400 mg Oral Daily    sodium chloride  20 mL Intravenous Once    neomycin-bacitracin-polymyxin   Topical BID    aspirin EC  81 mg Oral Daily    levothyroxine  25 mcg Oral Daily    PARoxetine  10 mg Oral QAM    vitamin C  500 mg Oral Daily    Vitamin D  1,000 Units Oral Daily    zinc sulfate  50 mg Oral Daily    sodium chloride flush  10 mL Intravenous 2 times per day    enoxaparin  40 mg Subcutaneous BID    sodium chloride  20 mL Intravenous Once    insulin lispro  0-6 Units Subcutaneous TID WC    insulin lispro  0-3 Units Subcutaneous Nightly    famotidine  20 mg Oral BID     PRN Meds: loperamide, sodium chloride flush, acetaminophen **OR** acetaminophen, polyethylene glycol, promethazine **OR** ondansetron, potassium chloride **OR** potassium alternative oral replacement **OR** potassium chloride, magnesium sulfate, glucose, dextrose, glucagon (rDNA), dextrose      Intake/Output Summary (Last 24 hours) at 10/20/2020 1549  Last data filed at 10/20/2020 0345  Gross per 24 hour   Intake 657.64 ml   Output 0 ml   Net 657.64 ml       Diet:  Dietary Nutrition Supplements: Diabetic Oral Supplement  DIET CARB CONTROL; Low Sodium (2 GM);  Daily Fluid Restriction: 2000 ml    Exam:  /62   Pulse 63   Temp 98.1 °F (36.7 °C) (Oral)   Resp 16   Ht 5' 8\" (1.727 m)   Wt 209 lb 12.8 oz (95.2 kg)   SpO2 93%   BMI 31.90 kg/m² residual atelectasis/pneumonia right parahilar region and moderate residual left basilar atelectasis/pneumonia. No effusion. Overall appearance of chest has improved since prior. **This report has been created using voice recognition software. It may contain minor errors which are inherent in voice recognition technology. **      Final report electronically signed by Dr. Rosita Victoria on 10/17/2020 7:46 AM      XR CHEST PORTABLE   Final Result   Impression:      Developing right perihilar and left basilar consolidation. This document has been electronically signed by: Kory Grier MD on    10/13/2020 02:49 AM         XR CHEST PORTABLE   Final Result   1. Minimal reticular linear opacities are seen laterally at the left lung base. This may represent mild atelectasis or pneumonia. Otherwise stable portable chest.            **This report has been created using voice recognition software. It may contain minor errors which are inherent in voice recognition technology. **      Final report electronically signed by Dr. Jayden Galvez on 10/11/2020 8:58 AM      CTA CHEST [address]   Final Result   1. Negative for acute pulmonary artery embolism. Thoracic aorta of normal    caliber without dissection. 2.  Patchy peripheral groundglass opacities bilaterally suspicious for    viral pneumonitis. 3.  Fatty liver. 4.  Sequelae of prior granulomatous infection. This document has been electronically signed by: Javier Edgar MD on    10/10/2020 02:54 AM      All CT scans at this facility use dose modulation, iterative    reconstruction, and/or weight-based   dosing when appropriate to reduce radiation dose to as low as reasonably    achievable. XR CHEST PORTABLE   Final Result   Impression:   Patchy right upper lobe and left lower lobe infiltrates may reflect    pneumonitis and/or atelectasis. No CHF.       This document has been electronically signed by: Javier Edgar MD on    10/09/2020 11:46 PM         XR CHEST PORTABLE    (Results Pending)       Diet: Dietary Nutrition Supplements: Diabetic Oral Supplement  DIET CARB CONTROL; Low Sodium (2 GM);  Daily Fluid Restriction: 2000 ml    DVT prophylaxis: [] Lovenox                                 [] SCDs                                 [] SQ Heparin                                 [] Encourage ambulation           [] Already on Anticoagulation     Disposition:    [] Home       [] TCU       [] Rehab       [] Psych       [] SNF       [] [place]       [] Other-    Code Status: Full Code    PT/OT Eval Status:         Electronically signed by Cindy Canales MD on 10/20/2020 at 3:44 PM

## 2020-10-21 ENCOUNTER — APPOINTMENT (OUTPATIENT)
Dept: GENERAL RADIOLOGY | Age: 70
DRG: 177 | End: 2020-10-21
Payer: MEDICARE

## 2020-10-21 VITALS
TEMPERATURE: 98.7 F | WEIGHT: 209.8 LBS | HEART RATE: 71 BPM | DIASTOLIC BLOOD PRESSURE: 80 MMHG | OXYGEN SATURATION: 95 % | HEIGHT: 68 IN | SYSTOLIC BLOOD PRESSURE: 133 MMHG | BODY MASS INDEX: 31.8 KG/M2 | RESPIRATION RATE: 16 BRPM

## 2020-10-21 LAB
ANION GAP SERPL CALCULATED.3IONS-SCNC: 13 MEQ/L (ref 8–16)
BUN BLDV-MCNC: 25 MG/DL (ref 7–22)
CALCIUM SERPL-MCNC: 9.9 MG/DL (ref 8.5–10.5)
CHLORIDE BLD-SCNC: 99 MEQ/L (ref 98–111)
CO2: 27 MEQ/L (ref 23–33)
CREAT SERPL-MCNC: 0.5 MG/DL (ref 0.4–1.2)
ERYTHROCYTE [DISTWIDTH] IN BLOOD BY AUTOMATED COUNT: 14.7 % (ref 11.5–14.5)
ERYTHROCYTE [DISTWIDTH] IN BLOOD BY AUTOMATED COUNT: 52.6 FL (ref 35–45)
GFR SERPL CREATININE-BSD FRML MDRD: > 90 ML/MIN/1.73M2
GLUCOSE BLD-MCNC: 120 MG/DL (ref 70–108)
GLUCOSE BLD-MCNC: 136 MG/DL (ref 70–108)
GLUCOSE BLD-MCNC: 196 MG/DL (ref 70–108)
HCT VFR BLD CALC: 38.2 % (ref 37–47)
HEMOGLOBIN: 12.4 GM/DL (ref 12–16)
MAGNESIUM: 2 MG/DL (ref 1.6–2.4)
MCH RBC QN AUTO: 32 PG (ref 26–33)
MCHC RBC AUTO-ENTMCNC: 32.5 GM/DL (ref 32.2–35.5)
MCV RBC AUTO: 98.5 FL (ref 81–99)
PLATELET # BLD: 428 THOU/MM3 (ref 130–400)
PMV BLD AUTO: 10.9 FL (ref 9.4–12.4)
POTASSIUM SERPL-SCNC: 4.2 MEQ/L (ref 3.5–5.2)
RBC # BLD: 3.88 MILL/MM3 (ref 4.2–5.4)
SODIUM BLD-SCNC: 139 MEQ/L (ref 135–145)
WBC # BLD: 11.5 THOU/MM3 (ref 4.8–10.8)

## 2020-10-21 PROCEDURE — 36415 COLL VENOUS BLD VENIPUNCTURE: CPT

## 2020-10-21 PROCEDURE — 99239 HOSP IP/OBS DSCHRG MGMT >30: CPT | Performed by: FAMILY MEDICINE

## 2020-10-21 PROCEDURE — 6360000002 HC RX W HCPCS: Performed by: FAMILY MEDICINE

## 2020-10-21 PROCEDURE — 6370000000 HC RX 637 (ALT 250 FOR IP): Performed by: PHYSICIAN ASSISTANT

## 2020-10-21 PROCEDURE — 82948 REAGENT STRIP/BLOOD GLUCOSE: CPT

## 2020-10-21 PROCEDURE — APPSS30 APP SPLIT SHARED TIME 16-30 MINUTES: Performed by: NURSE PRACTITIONER

## 2020-10-21 PROCEDURE — 71045 X-RAY EXAM CHEST 1 VIEW: CPT

## 2020-10-21 PROCEDURE — 80048 BASIC METABOLIC PNL TOTAL CA: CPT

## 2020-10-21 PROCEDURE — 6370000000 HC RX 637 (ALT 250 FOR IP): Performed by: FAMILY MEDICINE

## 2020-10-21 PROCEDURE — 99232 SBSQ HOSP IP/OBS MODERATE 35: CPT | Performed by: INTERNAL MEDICINE

## 2020-10-21 PROCEDURE — 85027 COMPLETE CBC AUTOMATED: CPT

## 2020-10-21 PROCEDURE — 2580000003 HC RX 258: Performed by: PHYSICIAN ASSISTANT

## 2020-10-21 PROCEDURE — 6360000002 HC RX W HCPCS: Performed by: PHYSICIAN ASSISTANT

## 2020-10-21 PROCEDURE — 97116 GAIT TRAINING THERAPY: CPT

## 2020-10-21 PROCEDURE — 97530 THERAPEUTIC ACTIVITIES: CPT

## 2020-10-21 PROCEDURE — 6360000002 HC RX W HCPCS: Performed by: NURSE PRACTITIONER

## 2020-10-21 PROCEDURE — 83735 ASSAY OF MAGNESIUM: CPT

## 2020-10-21 RX ORDER — FUROSEMIDE 20 MG/1
20 TABLET ORAL DAILY
Qty: 20 TABLET | Refills: 0 | Status: SHIPPED | OUTPATIENT
Start: 2020-10-22 | End: 2021-01-11

## 2020-10-21 RX ORDER — HEPARIN SODIUM (PORCINE) LOCK FLUSH IV SOLN 100 UNIT/ML 100 UNIT/ML
500 SOLUTION INTRAVENOUS PRN
Status: DISCONTINUED | OUTPATIENT
Start: 2020-10-21 | End: 2020-10-21 | Stop reason: HOSPADM

## 2020-10-21 RX ORDER — ASCORBIC ACID 500 MG
500 TABLET ORAL DAILY
Qty: 30 TABLET | Refills: 0 | Status: SHIPPED | OUTPATIENT
Start: 2020-10-22

## 2020-10-21 RX ORDER — FAMOTIDINE 20 MG/1
20 TABLET, FILM COATED ORAL 2 TIMES DAILY
Qty: 60 TABLET | Refills: 0 | Status: SHIPPED | OUTPATIENT
Start: 2020-10-21 | End: 2021-01-11

## 2020-10-21 RX ORDER — FUROSEMIDE 10 MG/ML
20 INJECTION INTRAMUSCULAR; INTRAVENOUS 2 TIMES DAILY
Status: DISCONTINUED | OUTPATIENT
Start: 2020-10-21 | End: 2020-10-21 | Stop reason: HOSPADM

## 2020-10-21 RX ORDER — ZINC SULFATE 50(220)MG
50 CAPSULE ORAL DAILY
Qty: 30 CAPSULE | Refills: 0 | Status: ON HOLD | COMMUNITY
Start: 2020-10-22 | End: 2021-09-08

## 2020-10-21 RX ORDER — DEXAMETHASONE 6 MG/1
6 TABLET ORAL DAILY
Qty: 2 TABLET | Refills: 0 | Status: SHIPPED | OUTPATIENT
Start: 2020-10-22 | End: 2020-10-24

## 2020-10-21 RX ADMIN — Medication 10 ML: at 11:39

## 2020-10-21 RX ADMIN — SODIUM CHLORIDE, PRESERVATIVE FREE 10 ML: 5 INJECTION INTRAVENOUS at 09:39

## 2020-10-21 RX ADMIN — DEXAMETHASONE SODIUM PHOSPHATE 6 MG: 4 INJECTION, SOLUTION INTRAMUSCULAR; INTRAVENOUS at 09:36

## 2020-10-21 RX ADMIN — HEPARIN 500 UNITS: 100 SYRINGE at 14:46

## 2020-10-21 RX ADMIN — Medication 1000 UNITS: at 09:36

## 2020-10-21 RX ADMIN — ASPIRIN 81 MG: 81 TABLET ORAL at 09:44

## 2020-10-21 RX ADMIN — ENOXAPARIN SODIUM 40 MG: 40 INJECTION SUBCUTANEOUS at 09:44

## 2020-10-21 RX ADMIN — MAGNESIUM GLUCONATE 500 MG ORAL TABLET 400 MG: 500 TABLET ORAL at 09:36

## 2020-10-21 RX ADMIN — Medication 50 MG: at 09:36

## 2020-10-21 RX ADMIN — FUROSEMIDE 20 MG: 10 INJECTION, SOLUTION INTRAMUSCULAR; INTRAVENOUS at 11:38

## 2020-10-21 RX ADMIN — BACITRACIN, NEOMYCIN, POLYMYXIN B 1 G: 400; 3.5; 5 OINTMENT TOPICAL at 09:38

## 2020-10-21 RX ADMIN — POTASSIUM CHLORIDE 20 MEQ: 1500 TABLET, EXTENDED RELEASE ORAL at 09:44

## 2020-10-21 RX ADMIN — OXYCODONE HYDROCHLORIDE AND ACETAMINOPHEN 500 MG: 500 TABLET ORAL at 09:38

## 2020-10-21 RX ADMIN — LEVOTHYROXINE SODIUM 25 MCG: 25 TABLET ORAL at 06:50

## 2020-10-21 RX ADMIN — PAROXETINE HYDROCHLORIDE 10 MG: 20 TABLET, FILM COATED ORAL at 09:36

## 2020-10-21 RX ADMIN — FAMOTIDINE 20 MG: 20 TABLET, FILM COATED ORAL at 09:44

## 2020-10-21 ASSESSMENT — PAIN SCALES - GENERAL
PAINLEVEL_OUTOF10: 0

## 2020-10-21 NOTE — DISCHARGE SUMMARY
Hospitalist Discharge Summary      I've been asked to take care of the patient at the day of discharge. Patient: Nuria Orellana  YOB: 1950  MRN: [id number]   Acct: [de-identified]    Primary Care Physician: Joy Delia date  10/9/2020    Discharge date:  10/21/2020 12:49 PM  Discharge Diagnoses: Active Hospital Problems    Diagnosis Date Noted    Severe malnutrition ([address] [E43] 10/13/2020     Class: Acute    Acute respiratory failure with hypoxia (HCC) [J96.01]     COVID-19 [U07.1]     Pneumonia due to COVID-19 virus [U07.1, J12.89]     Hyponatremia [E87.1]     Leukocytosis [D72.829]     Hyperglycemia [R73.9]     High anion gap metabolic acidosis [Z91.4]     History of left breast cancer [Z85.3]     Hyperlipidemia [E78.5]     History of hypothyroidism [Z86.39]     Chronic diarrhea [K52.9]     COVID-19 virus infection [U07.1] 10/10/2020       Code Status:  Full Code     Chief Complaint on presentation :-Shortness of breath      Initial admission HPI as below:-  Briefly, this is a 51-year-old female, with past medical history of breast cancer, hyperlipidemia, who presented to [facility] on 10/19/2020 due to shortness of breath.  The patient was exposed to her ran out a week ago was diagnosed with COVID-19.  Per H&P note, \"The patient had concerns as she is a cancer patient and had her test in the outpatient setting two days prior to presentation.  Over the two days since her diagnosis she has had progressive shortness of breath and fevers.  The patient has been able to eat and drink. Alton Clinton has had low grade fevers at home along with loss of taste and smell.  The patient is currently taking chemo for breast cancer. \"      The patient was tested positive for COVID-19 2 days prior to admission in outside facility. Jazmine Bartholomew was admitted under hospital medicine service for COVID-19 infection.    10/11: overnight, pt desaturated, now on high flow O2   10/12: still on high flow   10/13: still on high flow\"   10/19 transitioned to 5 lpm [facility] problem list with assessment and plan updates:-  1. Acute Hypoxic Respiratory Failure 2/2 COVID-19 PNA:   Convalescent Plasma x2 doses 10/9 and 10/11/20. 10/10/20 placed on high flow O2. ABG 10/11/20 pH 7.42, PCO2 43, PO2 70, bicarb 27. CXR showed developing right perihilar and left basilar consolidation, procal not elevated. Repeat ABG showed respiratory alkalosis, low PaO2. BNP wnl.   10/19 patient transitioned from hi flow to 5lpm O2/cannula, saturating 91-94%  10/20 Patient has been stable on 5 lpm for 24 hours, may move to a [facility] floor. Switch Lasix IV to PO.  2. Covid-19 PNA: CTA chest negative for PE, patchy peripheral groundglass opacities BL suspicious for viral pneumonitis. Convalescent plasma x2 doses as above. Decadron/Remdesivir initiated 10/10/20. 1. Completed five day course of Remdesivir on 10/14 and 10 day course of decadron. Continue Vit C, Vit D, Zinc and ASA. 2. Hx of breast cancer and Covid-19 increases risk for VTE. Currently on Lovenox 40mg BID. Consideration to start Eliquis 2.5mg PO BID x30 days on discharge. Will weigh risk vs benefit. 3. Chest physiotherapy     3. Hypokalemia 2/2 Diarrhea, resolved: Daily KCl supplementation increased to 20mEq qd.  Potassium replacement protocol. 1. K 4.3 today.   4. Hypomagnesemia, resolved: Continue Mag Oxide. Replace per protocol. Recheck in AM.   5. Hypotension, resolved: Likely 2/2 dehydration, resolved.  IVF hydration discontinued. Monitor BP closely. 6. Type 2 Diabetes: A1c 7.2% on 10/12/20. Low dose correctional SSI while inpatient and on steroids. Recommendations to repeat on 10/19/20 to confirm diabetes. May need to begin Metformin on discharge. Carb control diet. 7. Left Breast Cancer: currently on chemotherapy. 8. HLD: Not on statin OP. 9. Hypothyroidism: continue Synthroid.    10. Diarrhea 2/2 Chemotherapy: Appears stable. Replacing electrolytes as above. Immodium prn. 11. Chronic HFpEF: Echo 7/2020 with EF 21-76%, grade 1 diastolic dysfunction. BNP wnl.     Additional discharge final recommendations as below:-  After discussion with respiratory team they were okay to send the patient home with 2 more doses of Decadron orally in addition to lasix. The patient mentioned she can go home and she denies the need of any home health care orders or any oxygen. The patient already has her own oxygen delivered to her home and she can use it as needed as she mentioned. Mentions she will follow up with her oncologist as an outpatient. Here to mention that patient has increased risk of hospital readmission because of current comorbidities and because of her current cancer situation. Discharge Nurse Desiree Aragon RN ) note at the time of discharge as below:-  Discharge instructions explained to patient with verbalized understanding. All questions answered. Patient returning home alone with all personal belongings. All appointments obtained for the patient at the day of discharge with other specialities including PCP in one week. All questions and concerns addressed. Patient verbalized understandings and was agreeable with discharge planning.     Physical Exam:-  Vitals:   Patient Vitals for the past 24 hrs:   BP Temp Temp src Pulse Resp SpO2   10/21/20 1139 -- -- -- -- -- 93 %   10/21/20 0930 105/64 97.4 °F (36.3 °C) Oral 66 16 95 %   10/21/20 0330 123/62 96.3 °F (35.7 °C) Oral 51 20 91 %   10/21/20 0025 106/61 -- -- 61 18 95 %   10/20/20 2210 -- -- -- -- -- 94 %   10/20/20 2005 121/70 97.4 °F (36.3 °C) Oral 69 18 93 %   10/20/20 1855 -- -- -- -- 18 --   10/20/20 1710 -- -- -- -- 20 93 %   10/20/20 1600 122/74 97.8 °F (36.6 °C) Oral 65 18 --     Weight:   Weight: 209 lb 12.8 oz (95.2 kg)   24 hour intake/output:     Intake/Output Summary (Last 24 hours) at 10/21/2020 1249  Last data filed at 10/21/2020 1144  Gross per 24 hour   Intake 330 ml   Output 1250 ml   Net -920 ml       1. General appearance: Hair loss appreciated, no apparent distress, appears stated age and cooperative. 2. HEENT: Normal cephalic, atraumatic without obvious deformity. Pupils equal, round, and reactive to light. Extra ocular muscles intact. Conjunctivae/corneas clear. 3. Neck: Supple, with full range of motion. No jugular venous distention. Trachea midline. 4. Respiratory: Creased breath sounds bilaterally, no rhonchi or rales appreciated. 5. Cardiovascular: Regular rate and rhythm with normal S1/S2 without murmurs, rubs or gallops. 6. Abdomen: Soft, pleased abdominal girth, non-tender, non-distended with normal bowel sounds. 7. Musculoskeletal:  No clubbing, cyanosis or edema bilaterally. 8. Skin: Skin color, texture, turgor normal.  No rashes or lesions. 9. Neurologic:  Neurovascularly intact without any focal sensory/motor deficits. Cranial nerves: II-XII intact, grossly non-focal.  10. Psychiatric: Alert and oriented, thought content appropriate, normal insight  11. Capillary Refill: Brisk,< 3 seconds   12.  Peripheral Pulses: +2 palpable, equal bilaterally       Discharge Medications:-      Medication List      START taking these medications    ascorbic acid 500 MG tablet  Commonly known as:  VITAMIN C  Take 1 tablet by mouth daily  Start taking on:  October 22, 2020     dexamethasone 6 MG tablet  Commonly known as:  Decadron  Take 1 tablet by mouth daily for 2 days  Start taking on:  October 22, 2020     famotidine 20 MG tablet  Commonly known as:  PEPCID  Take 1 tablet by mouth 2 times daily     furosemide 20 MG tablet  Commonly known as:  Lasix  Take 1 tablet by mouth daily  Start taking on:  October 22, 2020     zinc sulfate 220 (50 Zn) MG capsule  Commonly known as:  ZINCATE  Take 1 capsule by mouth daily  Start taking on:  October 22, 2020        CONTINUE taking these medications    alendronate 35 MG tablet  Commonly known as:  FOSAMAX aspirin EC 81 MG EC tablet  Take 1 tablet by mouth daily     Cinnamon 500 MG Caps     CO-Q 10 OMEGA-3 FISH OIL PO     levothyroxine 25 MCG tablet  Commonly known as:  SYNTHROID     lidocaine-prilocaine 2.5-2.5 % cream  Commonly known as:  EMLA  Apply topically as needed.      loperamide 2 MG capsule  Commonly known as:  IMODIUM     ondansetron 4 MG disintegrating tablet  Commonly known as:  Zofran ODT  Take 1 tablet by mouth every 8 hours as needed for Nausea     PARoxetine 10 MG tablet  Commonly known as:  PAXIL     VITAMIN B12 PO     Vitamin D 1000 units Caps capsule  Commonly known as:  CHOLECALCIFEROL        STOP taking these medications    ibuprofen 800 MG tablet  Commonly known as:  ADVIL;MOTRIN           Where to Get Your Medications      These medications were sent to [address], [place] [address]  [address], [address] [facility]    Phone:  [phone]   · ascorbic acid 500 MG tablet  · dexamethasone 6 MG tablet  · famotidine 20 MG tablet  · furosemide 20 MG tablet     You can get these medications from any pharmacy    You don't need a prescription for these medications  · zinc sulfate 220 (50 Zn) MG capsule          Labs :-  Recent Results (from the past 72 hour(s))   POCT Glucose    Collection Time: 10/18/20  5:10 PM   Result Value Ref Range    POC Glucose 236 (H) 70 - 108 mg/dl   POCT Glucose    Collection Time: 10/18/20  9:02 PM   Result Value Ref Range    POC Glucose 238 (H) 70 - 108 mg/dl   CBC    Collection Time: 10/19/20  4:07 AM   Result Value Ref Range    WBC 10.6 4.8 - 10.8 thou/mm3    RBC 3.54 (L) 4.20 - 5.40 mill/mm3    Hemoglobin 11.4 (L) 12.0 - 16.0 gm/dl    Hematocrit 34.9 (L) 37.0 - 47.0 %    MCV 98.6 81.0 - 99.0 fL    MCH 32.2 26.0 - 33.0 pg    MCHC 32.7 32.2 - 35.5 gm/dl    RDW-CV 14.6 (H) 11.5 - 14.5 %    RDW-SD 53.0 (H) 35.0 - 45.0 fL    Platelets 021 (H) 664 - 400 thou/mm3    MPV 10.9 9.4 - 12.4 fL Rate, Estimated    Collection Time: 10/20/20  4:41 AM   Result Value Ref Range    Est, Glom Filt Rate >90 ml/min/1.73m2   POCT Glucose    Collection Time: 10/20/20  6:40 AM   Result Value Ref Range    POC Glucose 134 (H) 70 - 108 mg/dl   POCT Glucose    Collection Time: 10/20/20  1:20 PM   Result Value Ref Range    POC Glucose 190 (H) 70 - 108 mg/dl   POCT Glucose    Collection Time: 10/20/20  5:14 PM   Result Value Ref Range    POC Glucose 233 (H) 70 - 108 mg/dl   POCT Glucose    Collection Time: 10/20/20  8:14 PM   Result Value Ref Range    POC Glucose 174 (H) 70 - 108 mg/dl   POCT Glucose    Collection Time: 10/21/20  8:01 AM   Result Value Ref Range    POC Glucose 136 (H) 70 - 108 mg/dl   Basic Metabolic Panel    Collection Time: 10/21/20  8:36 AM   Result Value Ref Range    Sodium 139 135 - 145 meq/L    Potassium 4.2 3.5 - 5.2 meq/L    Chloride 99 98 - 111 meq/L    CO2 27 23 - 33 meq/L    Glucose 120 (H) 70 - 108 mg/dL    BUN 25 (H) 7 - 22 mg/dL    CREATININE 0.5 0.4 - 1.2 mg/dL    Calcium 9.9 8.5 - 10.5 mg/dL   CBC    Collection Time: 10/21/20  8:36 AM   Result Value Ref Range    WBC 11.5 (H) 4.8 - 10.8 thou/mm3    RBC 3.88 (L) 4.20 - 5.40 mill/mm3    Hemoglobin 12.4 12.0 - 16.0 gm/dl    Hematocrit 38.2 37.0 - 47.0 %    MCV 98.5 81.0 - 99.0 fL    MCH 32.0 26.0 - 33.0 pg    MCHC 32.5 32.2 - 35.5 gm/dl    RDW-CV 14.7 (H) 11.5 - 14.5 %    RDW-SD 52.6 (H) 35.0 - 45.0 fL    Platelets 002 (H) 126 - 400 thou/mm3    MPV 10.9 9.4 - 12.4 fL   Magnesium    Collection Time: 10/21/20  8:36 AM   Result Value Ref Range    Magnesium 2.0 1.6 - 2.4 mg/dL   Anion Gap    Collection Time: 10/21/20  8:36 AM   Result Value Ref Range    Anion Gap 13.0 8.0 - 16.0 meq/L   Glomerular Filtration Rate, Estimated    Collection Time: 10/21/20  8:36 AM   Result Value Ref Range    Est, Glom Filt Rate >90 ml/min/1.73m2   POCT Glucose    Collection Time: 10/21/20 12:11 PM   Result Value Ref Range    POC Glucose 196 (H) 70 - 108 mg/dl Microbiology:    Blood culture #1:   Lab Results   Component Value Date    BC No growth-preliminary No growth  10/12/2020       Blood culture #2:No results found for: Hortencia Cava    Organism:  No results found for: LABGRAM    MRSA culture only:No results found for: [address]    Urine culture:   Lab Results   Component Value Date    LABURIN No growth-preliminary  No growth   02/08/2017     No results found for: ORG     Respiratory culture: No results found for: CULTRESP    Aerobic and Anaerobic :  No results found for: LABAERO  No results found for: LABANAE    Urinalysis:      Lab Results   Component Value Date    NITRU NEGATIVE 02/08/2017    WBCUA 5-10 02/08/2017    BACTERIA NONE 02/08/2017    RBCUA 0-2 02/08/2017    BLOODU SMALL 02/08/2017    [address] 1.014 02/08/2017       Radiology:-  Cta Chest W Wo Contrast    Result Date: 10/10/2020  CT angiogram of chest with MIP postprocessing: Comparison:  CT  - CTA CHEST  - 02/08/2017 11:48 PM EST FINDINGS: Pulmonary arteries: Pulmonary arteries are well-opacified, free of intraluminal thrombus. Thoracic aorta: Normal caliber without dissection. Mediastinum/heart: Heart size is normal.  No pericardial effusion. Mild mediastinal lymphadenopathy. Calcified mediastinal and left hilar lymph nodes. Lungs/pleura: Patchy peripheral groundglass opacities bilaterally. Mild dependent atelectasis at the posterior sulci. Left upper lobe calcified granuloma. No pleural effusion or pneumothorax. Uppermost abdomen: Hepatic steatosis. No adrenal mass. There are 3 splenules in the left upper quadrant measuring up to 4 cm in diameter, similar to previous. Left kidney superior pole cortical cyst. Bone/MSK: Severe spondylosis of the thoracic spine. No acute vertebral body or rib fracture. No destructive osseous lesion. 1. Negative for acute pulmonary artery embolism. Thoracic aorta of normal caliber without dissection.  2.  Patchy peripheral groundglass opacities bilaterally suspicious for viral pneumonitis. 3.  Fatty liver. 4.  Sequelae of prior granulomatous infection. This document has been electronically signed by: Gabriel Pinzon MD on 10/10/2020 02:54 AM All CT scans at this facility use dose modulation, iterative reconstruction, and/or weight-based dosing when appropriate to reduce radiation dose to as low as reasonably achievable. Xr Chest Portable    Result Date: 10/9/2020  1 view chest x-ray. Comparison:  CR,SR  - XR CHEST PORTABLE  - 07/17/2020 12:44 PM EDT Findings: Stable position of right chest port. Patchy interstitial opacities involve the right upper lobe and the left lower lobe, new compared to previous. Heart size is normal.  No CHF. No sizable pleural effusion. No pneumothorax. Tortuous thoracic aorta. Moderate spondylosis of the thoracic spine. No acute fracture. Impression: Patchy right upper lobe and left lower lobe infiltrates may reflect pneumonitis and/or atelectasis. No CHF.  This document has been electronically signed by: Gabriel Pinzon MD on 10/09/2020 11:46 PM        Follow-up scheduled after discharge :-    today with Domingo Jimenez  in the next few days     Consultations during this hospital stay:-  [] NONE [] Cardiology  [] Nephrology  [] Hemo onco   [] GI   [] ID  [] Endocrine  [x] Pulm    [] Neuro    [] Psych   [] Urology  [] ENT   [] G SURGERY   []Ortho    []CV surg    [] Palliative  [] Hospice [] Pain management   []    []TCU   [x] PT/OT  OTHERS:-, dietician, spiritual care    Disposition: home  Condition at Discharge: guarded    Discharge Medications:      Lyla Abrams   Home Medication Instructions DSC:[id number]    Printed on:10/21/20 1249   Medication Information                      alendronate (FOSAMAX) 35 MG tablet  Take 35 mg by mouth every 7 days             aspirin EC 81 MG EC tablet  Take 1 tablet by mouth daily             Cinnamon 500 MG CAPS  Take by mouth daily             Coenzyme Q10-Fish Oil-Vit E (CO-Q 10 OMEGA-3 FISH OIL PO)  Take by mouth daily             Cyanocobalamin (VITAMIN B12 PO)  Take 1,000 mcg by mouth daily              dexamethasone (DECADRON) 6 MG tablet  Take 1 tablet by mouth daily for 2 days             famotidine (PEPCID) 20 MG tablet  Take 1 tablet by mouth 2 times daily             furosemide (LASIX) 20 MG tablet  Take 1 tablet by mouth daily             levothyroxine (SYNTHROID) 25 MCG tablet  Take 25 mcg by mouth Daily             lidocaine-prilocaine (EMLA) 2.5-2.5 % cream  Apply topically as needed.              loperamide (IMODIUM) 2 MG capsule  Take 2 mg by mouth 4 times daily as needed for Diarrhea             ondansetron (ZOFRAN ODT) 4 MG disintegrating tablet  Take 1 tablet by mouth every 8 hours as needed for Nausea             PARoxetine (PAXIL) 10 MG tablet  Take 10 mg by mouth every morning             vitamin C (VITAMIN C) 500 MG tablet  Take 1 tablet by mouth daily             Vitamin D (CHOLECALCIFEROL) 1000 UNITS CAPS capsule  Take by mouth daily 2 tab             zinc sulfate (ZINCATE) 220 (50 Zn) MG capsule  Take 1 capsule by mouth daily                     Time Spent:- 45 minutes    Electronically signed by Ambrose Rodriguez MD on 10/21/2020 at 12:49 PM  Discharging Hospitalist

## 2020-10-21 NOTE — PROGRESS NOTES
Patient was evaluated today for the diagnosis of PNA causes by Covid. I entered a DME order for home oxygen because the diagnosis and testing requires the patient to have supplemental oxygen. Condition will improve or be benefited by oxygen use. The patient is  able to perform good mobility in a home setting and therefore does require the use of a portable oxygen system. The need for this equipment was discussed with the patient and she understands and is in agreement. Patient was placed on room air for 60minutes. SpO2 was 93 % on room air at rest. Patients SpO2 was 89% or above and did not qualify for home oxygen. Patient was walked for 6 minutes. SpO2 was 90 % during walking. Patients SpO2 was not below 89% and did not qualify for home oxygen. Actual SpO2 was 89-90 %.     Electronically signed by ANNALISA Barker CNP on 10/21/2020 at 2:21 PM

## 2020-10-21 NOTE — PROGRESS NOTES
A home oxygen evaluation has been completed. [x]Patient is an inpatient. It is expected that the patient will be discharged within the next 48 hours. Qualified provider to write order for home prescription if patient qualifies. Social service/care managers will arrange for home oxygen. If patient is active, arrange for Home Medical supplier to assess for Oxygen Conserving Device per pulse oximetry. []Patient is an outpatient. Results will be faxed to the ordering provider. Qualified provider to write order for home prescription if patient qualifies and arranges for home oxygen. Patient was placed on room air for 60minutes. SpO2 was 93 % on room air at rest. Patients SpO2 was 89% or above and did not qualify for home oxygen. Patient was walked for 6 minutes. SpO2 was 90 % during walking. Patients SpO2 was not below 89% and did not qualify for home oxygen. Actual SpO2 was 89-90 %. Note: For any SpO2 at 84% see policy and procedure for possible qualifications.

## 2020-10-21 NOTE — PROGRESS NOTES
[facility]    Patient - Mary Lao   MRN -  [id number]   [facility]   - 1950      Date of Admission -  10/9/2020 10:35 PM  Date of evaluation -  10/21/2020  Room - -14/014-A   Hospital Day - MD Layla Primary Care Physician - Maxim Trent   Chief Complaint     Management of hypoxic respiratory failure w/Covid-19 infection  Active Hospital Problem List      Active Hospital Problems    Diagnosis Date Noted    Severe malnutrition ([address] [E43] 10/13/2020     Class: Acute    Acute respiratory failure with hypoxia ([facility] [J96.01]     COVID-19 [U07.1]     Pneumonia due to COVID-19 virus [U07.1, J12.89]     Hyponatremia [E87.1]     Leukocytosis [D72.829]     Hyperglycemia [R73.9]     High anion gap metabolic acidosis [A49.0]     History of left breast cancer [Z85.3]     Hyperlipidemia [E78.5]     History of hypothyroidism [Z86.39]     Chronic diarrhea [K52.9]     COVID-19 virus infection [U07.1] 10/10/2020     HPI   Mary Lao is a 79 y.o. female admitted 10/10/20 for COVID-19 pneumonia started on Remdesivir, Decadron and convalescent plasma. H/O breast cancer recent tx with Taxotere, Carboplatin, Herceptin Pertuzumab     Mary Lao is a 79 y.o. female with a past medical history of cancer of the left breast who presented to [facility] on 10/10/2020 with shortness of breath with known exposure to COVID positive patient. Patient states she was exposed to COVID 1 week prior to admission by a friend. Per chart review patient had tested positive for COVID-19 as an outpatient 2 days prior to admission. She states that 2 days prior to admission she noticed she was short of breath and experiencing fevers. He states that the symptoms got worse in the 2 days prior to admission. She also states that she had been having loss of taste and smell.   She also reports that she has been having a productive cough during this time as well. She reports the sputum is green in color. She states that her oxygen level was in the low 80s at home prior to calling EMS. She stated that her shortness of breath at that time was made worse by any activity and that there were no relieving factors. Of note patient received her last chemotherapy infusion on 9/29/2020.      Harley Chavarria was then admitted to the [place] [address] unit on 6A. She was started on remdesivir, dexamethasone and convalescent plasma. CTA chest on admission showed patchy peripheral groundglass opacities bilaterally suspicious for viral pneumonia. Overnight patient had desaturation episodes and had to be placed on high flow nasal cannula. She was then transferred from  to  for higher acuity of care. This morning Harley Chavarria states that she is doing well. She reports that she is not short of breath. She continues to report that she has a productive cough. She denies having any chest pain. Her SPO2 is 93% on 50% FiO2 at 50 L/min heated high flow nasal cannula. She was febrile to 100.5 degrees this morning. Subjective/Events Past 24 hours/ROS   -Stable on 2LPM; 95%  -Transferred to  yesterday off   -Denies any SOB  -No events overnight  -Still with slight crackles BLL- but improving.  -All body systems reviewed.      Past Medical History         Diagnosis Date    Breast cancer ([address] 2020    left-invasive ductal    Hyperlipidemia     Numbness and tingling     left foot-chronic nerve damage      Past Surgical History           Procedure Laterality Date    ABDOMINAL EXPLORATION SURGERY  2014    Dr butterfield-lysis of adhesions    BREAST LUMPECTOMY Left 11/2015    Left breast lumpectomy, retroareolar with preoperative needle loc-Dr. Acevedo Comp BREAST SURGERY Left 06/26/2020    biopsy of left breast    CHOLECYSTECTOMY  10/2014    Dr Criss Lancaster  03/30/2016    Dr Nicole Dooley  10/2014    x4 abdominal wall    HYSTERECTOMY      INCISIONAL HERNIA REPAIR  2014    Dr Moreira Letters LIPECTOMY  2014    Dr Titi Herndon  7/16/2020    GARC[address]A [address] LEFT 7/16/2020 García Alexander MD [facility]    PORT SURGERY Right 7/17/2020    SINGLE LUMEN SMART PORT INSERTION RIGHT SUBCLAVIAN performed by Gume Schmidt MD at [address]  10/2014    Dr Keith Roth      patient was 11years old    TUBAL LIGATION       Diet    Dietary Nutrition Supplements: Diabetic Oral Supplement  DIET CARB CONTROL; Low Sodium (2 GM); Daily Fluid Restriction: 2000 ml  Allergies    Aluminum-containing compounds  Social History     Social History     Socioeconomic History    Marital status:       Spouse name: Not on file    Number of children: 3    Years of education: Not on file    Highest education level: Not on file   Occupational History    Not on file   Social Needs    Financial resource strain: Not on file    Food insecurity     Worry: Not on file     Inability: Not on file    Transportation needs     Medical: Not on file     Non-medical: Not on file   Tobacco Use    Smoking status: Never Smoker    Smokeless tobacco: Never Used   Substance and Sexual Activity    Alcohol use: No     Alcohol/week: 0.0 standard drinks    Drug use: No    Sexual activity: Not on file   Lifestyle    Physical activity     Days per week: Not on file     Minutes per session: Not on file    Stress: Not on file   Relationships    Social connections     Talks on phone: Not on file     Gets together: Not on file     Attends Denominational service: Not on file     Active member of club or organization: Not on file     Attends meetings of clubs or organizations: Not on file     Relationship status: Not on file    Intimate partner violence     Fear of current or ex partner: Not on file     Emotionally abused: Not on file     Physically abused: Not on file     Forced sexual activity: Not on file   Other Topics Concern    Not on file   Social History Narrative    Not on file     Family History          Problem Relation Age of Onset    Heart Disease Father         cath stent blood to thin and bled    Heart Attack Mother     Other Other         blood clot    Breast Cancer Paternal Aunt 62    High Blood Pressure Sister     Cancer Brother 68        skin    Prostate Cancer Brother         had chemotherapy to treat     Prostate Cancer Brother 64        has had for 10 years    Ovarian Cancer Neg Hx     Diabetes Neg Hx     Stroke Neg Hx        Meds    Current Medications    dexamethasone  6 mg Intravenous Daily    potassium chloride  20 mEq Oral Daily with breakfast    magnesium oxide  400 mg Oral Daily    sodium chloride  20 mL Intravenous Once    neomycin-bacitracin-polymyxin   Topical BID    aspirin EC  81 mg Oral Daily    levothyroxine  25 mcg Oral Daily    PARoxetine  10 mg Oral QAM    vitamin C  500 mg Oral Daily    Vitamin D  1,000 Units Oral Daily    zinc sulfate  50 mg Oral Daily    sodium chloride flush  10 mL Intravenous 2 times per day    enoxaparin  40 mg Subcutaneous BID    sodium chloride  20 mL Intravenous Once    insulin lispro  0-6 Units Subcutaneous TID WC    insulin lispro  0-3 Units Subcutaneous Nightly    famotidine  20 mg Oral BID     loperamide, sodium chloride flush, acetaminophen **OR** acetaminophen, polyethylene glycol, promethazine **OR** ondansetron, potassium chloride **OR** potassium alternative oral replacement **OR** potassium chloride, magnesium sulfate, glucose, dextrose, glucagon (rDNA), dextrose  IV Drips/Infusions   dextrose       Vitals    Vitals    height is 5' 8\" (1.727 m) and weight is 209 lb 12.8 oz (95.2 kg). Her oral temperature is 96.3 °F (35.7 °C). Her blood pressure is 123/62 and her pulse is 51. Her respiration is 20 and oxygen saturation is 91%.      O2 Flow Rate (L/min): 2 L/min  I/O    Intake/Output Summary (Last 24 hours) at 10/21/2020 0839  Last data filed at 10/21/2020 0333  Gross per 24 hour   Intake 300 ml   Output 1150 ml   Net -850 ml     Patient Vitals for the past 96 hrs (Last 3 readings):   Weight   10/20/20 0345 209 lb 12.8 oz (95.2 kg)   10/19/20 0352 212 lb (96.2 kg)   10/18/20 0446 212 lb 9.6 oz (96.4 kg)     Exam   Physical Exam   Constitutional: No distress on O2 2LPM per NC. Agitated today. OOB to chair   Mouth/Throat: Oropharynx is clear and moist.  No oral thrush. Eyes: Conjunctivae are normal. Pupils are equal, round. No scleral icterus. Neck: Neck supple. No tracheal deviation present. No JVD  Cardiovascular: S1 and S2 with no murmur. No peripheral edema  Pulmonary/Chest: Normal effort with bilateral air entry, clear breath sounds with diminished bases bilaterally; slight crackles BLL . No stridor. No respiratory distress. Patient exhibits no tenderness. No wheezing. Mediport R chest.   Abdominal: Soft. Bowel sounds audible. No distension or tenderness to palp. Musculoskeletal: Moves all extremities; no clubbing or cyanosis  Neurological: Patient is alert and oriented to person, place, and time. Skin: Warm and dry. Pale    Labs   ABG  Lab Results   Component Value Date    PH 7.49 10/13/2020    PO2 65 10/13/2020    PCO2 34 10/13/2020    HCO3 26 10/13/2020    O2SAT 94 10/13/2020     Lab Results   Component Value Date    IFIO2 60 10/13/2020     CBC  Recent Labs     10/19/20  0407 10/20/20  0441   WBC 10.6 11.1*   RBC 3.54* 3.78*   HGB 11.4* 11.9*   HCT 34.9* 37.7   MCV 98.6 99.7*   MCH 32.2 31.5   MCHC 32.7 31.6*   * 415*   MPV 10.9 10.8      BMP  Recent Labs     10/19/20  0407 10/20/20  0441    137   K 4.3 4.3    98   CO2 25 24   BUN 13 17   CREATININE 0.4 0.5   GLUCOSE 127* 186*   CALCIUM 9.1 9.7     LFT  No results for input(s): AST, ALT, ALB, BILITOT, ALKPHOS, LIPASE in the last 72 hours. Invalid input(s):   AMYLASE  TROP  Lab Results   Component Value Date    TROPONINT < 0.010 02/08/2017    TROPONINT < 0.010 01/23/2016     BNP  Lab Results   Component Value Date    PROBNP 78.5 10/17/2020    PROBNP 179.4 10/15/2020    PROBNP 43.6 02/08/2017     D-Dimer  Lab Results   Component Value Date    DDIMER 854.00 10/10/2020     Lactic Acid  No results for input(s): LACTA in the last 72 hours. INR  No results for input(s): INR, PROTIME in the last 72 hours. PTT  No results for input(s): APTT in the last 72 hours. Glucose  Recent Labs     10/20/20  1714 10/20/20  2014 10/21/20  0801   POCGLU 233* 174* 136*     UA No results for input(s): SPECGRAV, PHUR, COLORU, CLARITYU, MUCUS, PROTEINU, BLOODU, RBCUA, WBCUA, BACTERIA, NITRU, GLUCOSEU, BILIRUBINUR, UROBILINOGEN, KETUA, LABCAST, LABCASTTY, AMORPHOS in the last 72 hours. Invalid input(s): CRYSTALS. PFTs   N/A  Echo     10/19/2020   ECHOCARDIOGRAM LIMITED. Conclusions      Summary   Normal left ventricle size and systolic function. Ejection fraction was   estimated at 60 %. There were no regional left ventricular wall motion   abnormalities and wall thickness was within normal limits. Moderately enlarged right ventricle cavity. Right ventricle global systolic function is normal .      Signature      ----------------------------------------------------------------   Electronically signed by Sophie Nguyễn MD (Interpreting   physician) on 10/19/2020 at 03:26 PM   ----------------------------------------------------------------      Conclusions      Summary   Normal left ventricle size and systolic function. Ejection fraction was   estimated at 55 to 60 %. There were no regional left ventricular wall   motion abnormalities and wall thickness was within normal limits. Doppler parameters were consistent with abnormal left ventricular   relaxation (grade 1 diastolic dysfunction).       Signature      ----------------------------------------------------------------   Electronically signed by Sophie Nguyễn MD (Interpreting   physician) on 07/22/2020 at 06:45 PM  Cultures    Procalcitonin  Lab Results   Component Value Date    PROCAL 0.07 10/17/2020    PROCAL 0.11 10/13/2020    PROCAL 0.19 10/11/2020     Blood Cultures x 2: no growth; prelim   Radiology    CXR   10/21/2020   Chest X-ray, 1 View   FINDINGS: Similar right perihilar and left lower lung infiltrates. No pleural effusion. No pneumothorax. No cardiomegaly. No acute fracture. Degenerative changes in the spine. Central venous access port implanted in the right chest wall with catheter tip projecting over the cavoatrial junction. Similar bilateral infiltrates. 10/17/2020. Impression    1. Normal heart size. Mediport right side with catheter tip in superior vena cava. Moderately ectatic thoracic aorta. 2. Mild residual atelectasis/pneumonia right parahilar region and moderate residual left basilar atelectasis/pneumonia. No effusion. Overall appearance of chest has improved since prior.                   **This report has been created using voice recognition software.  It may contain minor errors which are inherent in voice recognition technology. **         Final report electronically signed by Dr. Blue Frias on 10/17/2020 7:46 AM               CTA 10/10/2020  Impression   1. Negative for acute pulmonary artery embolism.  Thoracic aorta of normal   caliber without dissection. 2.  Patchy peripheral groundglass opacities bilaterally suspicious for   viral pneumonitis. 3.  Fatty liver. 4.  Sequelae of prior granulomatous infection.           Assessment   Acute hypoxic respiratory failure secondary to COVID-19 pneumonia. She is on 2LPM via nasal cannula- improving  COVID-19 pneumonia  Left breast cancer, most recent chemotherapy infusion 9/29/2020, Carbo Herceptin, Pertuzumab, Taxotere  Has completed Remdesivir and received convalescent plasma, currently completing Decadron. Low PCT  Full code  Plan   Remdesivir not recommended for longer than 5 days per pharmacist Ms. Peralta due to possible higher mortality rate   Restarted Decadron 6 mg IV daily - on DC will do Decadron 4 mg po daily x 4 days then STOP  ECHO reviewed- Normal EF  Acapella and IS use encouraged. VTE prophylaxis: Lovenox   GI prophylaxis: Pepcid   CXR reviewed- stable  Will give Lasix 20 mg IV BID x 2 doses today (labs reviewed)  BMP in AM   Patient qualified for 2LPM with activity  DME note done. Patient was placed on room air for 60minutes. SpO2 was 93 % on room air at rest. Patients SpO2 was 89% or above and did not qualify for home oxygen. Patient was walked for 6 minutes. SpO2 was 90 % during walking. Patients SpO2 was not below 89% and did not qualify for home oxygen. Actual SpO2 was 89-90 %. Case discussed with nurse and patient. Questions and concerns addressed. Meds and Orders reviewed. Electronically signed by     ANNALISA Mcmanus CNP on 10/21/2020 at 8:39 AM     Addendum by Dr. Rhiannon Schumacher MD:  I have seen and examined the patient independently. Face to face evaluation and examination was performed. The above evaluation and note has been reviewed. Labs and radiographs were reviewed. I Have discussed with Ms. ELMIRA Mcmanus CNP about this patient in detail. The above assessment and plan has been reviewed. Please see my modifications mentioned below. My modifications:  She is on 2LPM via nasal cannula. Feels better. Improving oxygenation. Home O2 eval done today by Silvano Castro RCP:  ?Patient is an inpatient. It is expected that the patient will be discharged within the next 48 hours. Qualified provider to write order for home prescription if patient qualifies. Social service/care managers will arrange for home oxygen. If patient is active, arrange for Home Medical supplier to assess for Oxygen Conserving Device per pulse oximetry. []? Patient is an outpatient. Results will be faxed to the ordering provider.  Qualified provider to write order for home prescription if patient qualifies and arranges for home oxygen.     Patient was placed on room air for >60 minutes. SpO2 was 90 % on room air at rest. Patients SpO2 was 89% or above and did not qualify for home oxygen. Patient was walked in hallway for 6 minutes. SpO2 was  88% during walking. Patients SpO2 was below 89% and qualified for home oxygen. Oxygen was applied at 2 lpm via nasal cannula to maintain a SpO2 between 90-92% while walking. Actual SpO2 was 92 %.     Pt very upset at me as soon as she saw me, talking loudly, complaining about needing to walk the patel for test.  I informed her that we are trying to qualify her for her to have o2 at home. She said she already has it at home. Pt very argumentive about everything. She also insisted on carrying her purse and bag to go home with, with her t/o walk. rns at desk are witness to pt refusing to set them down.       Note: For any SpO2 at 24% see policy and procedure for possible qualifications. Plan:  -Follow with ANNALISA Dan-ALEJANDRA/( Dr. Vivek Alas) clinic at [place] for pulmonary medicine in 2 to 3months with chest Xray PA and lateral views 2days before clinic visit to follow pneumonia. Viviane Mccann educated about my impression and plan. She verbalizes understanding.     Donya Thomas MD 10/21/2020 7:12 PM

## 2020-10-21 NOTE — PROGRESS NOTES
[facility]  INPATIENT PHYSICAL THERAPY  DAILY NOTE  STRZ 6A CAPAC[facility] EBOLA - 6A-14/014-A      Time In: 4926  Time Out: 1020  Timed Code Treatment Minutes: 23 Minutes  Minutes: 23          Date: 10/21/2020  Patient Name: Fabián Gibson,  Gender:  female        MRN: [id number]  : 1950  (79 y.o.)     Referring Practitioner: Skip Shah MD  Diagnosis: COVID-19  Additional Pertinent Hx: Per H&P, \"Patient presents to the ED with increasing shortness of breath. The patient states she was exposed to Angy one week ago by a friend. The patient had concerns as she is a cancer patient and had her test in the outpatient setting two days prior to presentation. Over the two days since her diagnosis she has had progressive shortness of breath and fevers. The patient has been able to eat and drink. She has had low grade fevers at home along with loss of taste and smell. The patient is currently taking chemo for breast cancer. \"     Prior Level of Function:  Lives With: Alone  Type of Home: House  Home Layout: One level  Home Access: Stairs to enter with rails  Entrance Stairs - Number of Steps: 3  Entrance Stairs - Rails: Both  Home Equipment: Rolling walker        Receives Help From: Family(ronan)  ADL Assistance: Independent  Homemaking Assistance: Independent  Ambulation Assistance: Independent  Transfer Assistance: Independent  Active : Yes  Additional Comments: pt reports indep PTA, amb community distances without AD. reports being diagnosed with breast cancer.  awaiting chemo treatments post d/c    Restrictions/Precautions:  Restrictions/Precautions: General Precautions, Fall Risk, Isolation  Position Activity Restriction  Other position/activity restrictions: Droplet Plus, COVID-19 +    SUBJECTIVE: nursing ok'd therapy reported pt wanting to go home she has decreased her O2 to 1L her sats did drop for a few sec but then would recover to low 90's, discussed concerns with home going and to perform sit<>stand with mod I to improve ability to perform transfers in prep for OOB activity. Short term goal 3: Pt to ambulate >50 feet without an AD at supervision with SpO2 remaining >90% for improve ability to navigate within pt room. Short term goal 4: Pt to negotiate 3 steps with B HR at SBA to improve ability to enter home. Long term goals  Time Frame for Long term goals : n/a secondary to short ELOS    Following session, patient left in safe position with all fall risk precautions in place.

## 2020-10-21 NOTE — PROGRESS NOTES
A home oxygen evaluation has been completed. [x]Patient is an inpatient. It is expected that the patient will be discharged within the next 48 hours. Qualified provider to write order for home prescription if patient qualifies. Social service/care managers will arrange for home oxygen. If patient is active, arrange for Home Medical supplier to assess for Oxygen Conserving Device per pulse oximetry. []Patient is an outpatient. Results will be faxed to the ordering provider. Qualified provider to write order for home prescription if patient qualifies and arranges for home oxygen. Patient was placed on room air for >60 minutes. SpO2 was 90 % on room air at rest. Patients SpO2 was 89% or above and did not qualify for home oxygen. Patient was walked in hallway for 6 minutes. SpO2 was  88% during walking. Patients SpO2 was below 89% and qualified for home oxygen. Oxygen was applied at 2 lpm via nasal cannula to maintain a SpO2 between 90-92% while walking. Actual SpO2 was 92 %. Pt very upset at me as soon as she saw me, talking loudly, complaining about needing to walk the patel for test.  I informed her that we are trying to qualify her for her to have o2 at home. She said she already has it at home. Pt very argumentive about everything. She also insisted on carrying her purse and bag to go home with, with her t/o walk. rns at desk are witness to pt refusing to set them down. Note: For any SpO2 at 36% see policy and procedure for possible qualifications.

## 2020-10-22 ENCOUNTER — CARE COORDINATION (OUTPATIENT)
Dept: CASE MANAGEMENT | Age: 70
End: 2020-10-22

## 2020-10-22 NOTE — CARE COORDINATION
[facility] Transitions Initial Follow Up Call    Call within 2 business days of discharge: Yes    Patient: Vero Ventura Patient : 1950   MRN: [id number]  Reason for Admission: covid 19  Discharge Date: 10/21/20 RARS: Readmission Risk Score: 23      Last Discharge [facility]       Complaint Diagnosis Description Type Department Provider    10/9/20 Shortness of Breath COVID-19 . .. ED to Hosp-Admission (Discharged) (ADMITTED) Catie Jones MD; Candace Santana DO. .. Challenges to be reviewed by the provider   Additional needs identified to be addressed with provider No  none    Discussed COVID-19 related testing which was available at this time. Test results were positive. Patient informed of results, if available? Yes         Method of communication with provider : none    Advance Care Planning:   Does patient have an Advance Directive:  reviewed and current. Was this a readmission? No  Patient stated reason for admission: covid  Patients top risk factors for readmission: medical condition    Care Transition Nurse (CTN) contacted the patient by telephone to perform post hospital discharge assessment. Verified name and  with patient as identifiers. Provided introduction to self, and explanation of the CTN role. CTN reviewed discharge instructions, medical action plan and red flags with patient who verbalized understanding. Patient given an opportunity to ask questions and does not have any further questions or concerns at this time. Were discharge instructions available to patient? Yes. Reviewed appropriate site of care based on symptoms and resources available to patient including: PCP. The patient agrees to contact the PCP office for questions related to their healthcare. Medication reconciliation was performed with patient, who verbalizes understanding of administration of home medications.    Covid Risk Education    Patient has following risk factors of:

## 2020-10-23 ENCOUNTER — TELEPHONE (OUTPATIENT)
Dept: PULMONOLOGY | Age: 70
End: 2020-10-23

## 2020-10-23 NOTE — TELEPHONE ENCOUNTER
Discussed patient condition with her niece with her consent. Patient was concerned her O2 was dropping with activity down to 82%. SOB with activity but only issues are with activity. Niece states highest O2 can go is 3LPM. Patients niece to call [facility] to discuss having stationary O2 delivered to their home. I placed order yesterday prior to DC home for 2LPM with activity for portable O2 and stationary. Patient has no fever or chills. Currently finishing PO Decadron. Discussed that patient may need more O2 with activity until she recovers from this virus. Stressed to her to report to the ED for sustained hypoxia and SOB that doesn't relieve with rest. Encouraged cough and deep breathing. Niece reported VS WNL.

## 2020-10-23 NOTE — TELEPHONE ENCOUNTER
Jemal Lauri phoned in explaining that she is concerned because because she has still felt SOB with O2. Her niece was over and said while Jemal Carreon is sitting her O2 was @ 90 and dropped a little below 88 and so they increased O2 from 2L to 2.5 L took an hour for her to reach 89. She has questions regarding this and is also asking about a POC? She is scheduled in the office in January for a hospital follow up. I offered her an appointment next week but she would like to speak with you today. Please advise.

## 2020-10-23 NOTE — TELEPHONE ENCOUNTER
Discussed patient condition with her niece with her consent. Patient was concerned her O2 was dropping with activity down to 82%. SOB with activity but only issues are with activity. Niece states highest O2 can go is 3LPM. Patients niece to call [facility] to discuss having stationary O2 delivered to their home. I placed order yesterday prior to DC home for 2LPM with activity for portable O2 and stationary. Patient has no fever or chills. Currently finishing PO Decadron. Discussed that patient may need more O2 with activity until she recovers from this virus.  Stressed to her on t

## 2020-10-26 RX ORDER — IPRATROPIUM BROMIDE AND ALBUTEROL SULFATE 2.5; .5 MG/3ML; MG/3ML
1 SOLUTION RESPIRATORY (INHALATION) EVERY 6 HOURS PRN
Qty: 360 ML | Refills: 3 | Status: SHIPPED | OUTPATIENT
Start: 2020-10-26 | End: 2020-10-27

## 2020-10-26 NOTE — PROGRESS NOTES
Discussed case with patient and niece. Patient not wanting to come in for visit today recently DC last week from inpatient Covid floor. Will discuss [facility] checking out patients O2 needs. Currently just 2LPM with activity only patient currently using 3LPM continuous per patient and niece. DME in for home nebulizer and RX for Albuterol neb every 6 hours PRN for SOB/wheezing   No fever or chills. Increased SOB with exertion only. Will call [facility] to evaluate home oxygen needs. Luanne Sweet was evaluated today and a DME order was entered for a nebulizer compressor in order to administer Duonebs due to the diagnosis of PNA/SOB r/t Covid-19 virus. The need for this equipment and treatment was discussed with the patient and she understands and is in agreement.       Electronically signed by ANNALISA Morales CNP on 10/26/2020 at 12:43 PM

## 2020-10-27 RX ORDER — ALBUTEROL SULFATE 2.5 MG/3ML
2.5 SOLUTION RESPIRATORY (INHALATION) EVERY 6 HOURS PRN
Qty: 1 PACKAGE | Refills: 5 | Status: SHIPPED | OUTPATIENT
Start: 2020-10-27 | End: 2021-01-11

## 2020-10-29 ENCOUNTER — CARE COORDINATION (OUTPATIENT)
Dept: CARE COORDINATION | Age: 70
End: 2020-10-29

## 2020-10-29 ENCOUNTER — HOSPITAL ENCOUNTER (OUTPATIENT)
Dept: INFUSION THERAPY | Age: 70
End: 2020-10-29

## 2020-10-29 NOTE — CARE COORDINATION
Date/Time:  10/29/2020 2:12 PM  Attempted to reach patient by telephone. Left HIPPA compliant message requesting a return call. Will attempt to reach patient again.     Jesika Montez, KYRIE     [facility] / [facility]

## 2020-11-06 ENCOUNTER — VIRTUAL VISIT (OUTPATIENT)
Dept: ONCOLOGY | Age: 70
End: 2020-11-06
Payer: MEDICARE

## 2020-11-06 PROCEDURE — 99214 OFFICE O/P EST MOD 30 MIN: CPT | Performed by: PHYSICIAN ASSISTANT

## 2020-11-06 NOTE — PROGRESS NOTES
2020    TELEHEALTH EVALUATION -- Audio/Visual (During WTYUZ-63 public health emergency)    HPI:    Heide Oliveros (:  1950) has requested an audio/video evaluation for the following concern(s):    Follow up post-hospitalization. The patient is seen via video visit today for follow up evaluation post-hospitalization. She has a history of breast cancer and had been receiving neoadjuvant chemotherapy with Taxotere, carbo, Herceptin and Pertuzumab. She completed 4 out of 6 planned cycles with last treatment on 20. She was admitted to [facility] from 10/9/20 to 10/21/20 with COVID-19 infection. During admission the patient required high flow oxygen and was discharged on nasal cannula. She is currently on 3L continuous with rest and activity. The patient states she has a pulse oximeter at home and her oxygen saturation has maintained above 90% and on average 92 - 93%. The patient states since she has been home she has been feeling better. She denies any shortness of breath at rest or with activity. Denies chest pain, lightheadedness, dizziness, palpitations or leg edema. She denies any changes in her breast since discharge from hospital. She feels the known breast tumor is smaller. She denies any skin changes or nipple changes or discharge. Review of Systems    Prior to Visit Medications    Medication Sig Taking?  Authorizing Provider   albuterol (PROVENTIL) (2.5 MG/3ML) 0.083% nebulizer solution Take 3 mLs by nebulization every 6 hours as needed for Wheezing or Shortness of Breath Yes Nabil Bain, APRN - CNP   zinc sulfate (ZINCATE) 220 (50 Zn) MG capsule Take 1 capsule by mouth daily Yes Corazon Chandler MD   famotidine (PEPCID) 20 MG tablet Take 1 tablet by mouth 2 times daily Yes Corazon Chandler MD   vitamin C (VITAMIN C) 500 MG tablet Take 1 tablet by mouth daily Yes Corazon Chandler MD   furosemide (LASIX) 20 MG tablet Take 1 tablet by mouth daily Yes Corazon Chandler MD loperamide (IMODIUM) 2 MG capsule Take 2 mg by mouth 4 times daily as needed for Diarrhea Yes Historical Provider, MD   lidocaine-prilocaine (EMLA) 2.5-2.5 % cream Apply topically as needed. Yes Marleen Alva MD   levothyroxine (SYNTHROID) 25 MCG tablet Take 25 mcg by mouth Daily Yes Historical Provider, MD   alendronate (FOSAMAX) 35 MG tablet Take 35 mg by mouth every 7 days Yes Historical Provider, MD   aspirin EC 81 MG EC tablet Take 1 tablet by mouth daily Yes Adrián Abreu MD   ondansetron (ZOFRAN ODT) 4 MG disintegrating tablet Take 1 tablet by mouth every 8 hours as needed for Nausea Yes Annalise Lara MD   PARoxetine (PAXIL) 10 MG tablet Take 10 mg by mouth every morning Yes Historical Provider, MD   Coenzyme Q10-Fish Oil-Vit E (CO-Q 10 OMEGA-3 FISH OIL PO) Take by mouth daily Yes Historical Provider, MD   Vitamin D (CHOLECALCIFEROL) 1000 UNITS CAPS capsule Take by mouth daily 2 tab Yes Historical Provider, MD   Cinnamon 500 MG CAPS Take by mouth daily Yes Historical Provider, MD   Cyanocobalamin (VITAMIN B12 PO) Take 1,000 mcg by mouth daily  Yes Historical Provider, MD       Social History     Tobacco Use    Smoking status: Never Smoker    Smokeless tobacco: Never Used   Substance Use Topics    Alcohol use: No     Alcohol/week: 0.0 standard drinks    Drug use: No          PHYSICAL EXAMINATION:  [ INSTRUCTIONS:  \"[x]\" Indicates a positive item  \"[]\" Indicates a negative item  -- DELETE ALL ITEMS NOT EXAMINED]  Vital Signs: (As obtained by patient/caregiver or practitioner observation)      Temperature-  Per patient 98.6F Pulse oximetry- per patient 93%.      Constitutional: [] Appears well-developed and well-nourished [x] No apparent distress      [] Abnormal-   Mental status  [x] Alert and awake  [x] Oriented to person/place/time [x]Able to follow commands      Eyes:  EOM    [x]  Normal  [] Abnormal-  Sclera  [x]  Normal  [] Abnormal -         Discharge []  None visible  [x] Abnormal -    HENT:   [x] Normocephalic, atraumatic. [] Abnormal   [] Mouth/Throat: Mucous membranes are moist.     External Ears [x] Normal  [] Abnormal-     Neck: [] No visualized mass     Pulmonary/Chest: [x] Respiratory effort normal. Nasal cannula in place. [] No visualized signs of difficulty breathing or respiratory distress        [] Abnormal-      Musculoskeletal:   [] Normal gait with no signs of ataxia         [x] Normal range of motion of neck        [] Abnormal-       Neurological:        [x] No Facial Asymmetry (Cranial nerve 7 motor function) (limited exam to video visit)          [] No gaze palsy        [] Abnormal-         Skin:        [] No significant exanthematous lesions or discoloration noted on facial skin         [] Abnormal-            Psychiatric:       [x] Normal Affect [] No Hallucinations        [] Abnormal-        ASSESSMENT/PLAN:  Triple Positive Left Breast Cancer with separate focus of triple negative breast cancer in the lower outer quadrant     1. Discussed with Dr. Yoselin Malone, no further neoadjuvant chemotherapy recommended with recent covid infection. Will refer back to General Surgery, Dr. Catalino Estrada. 2. Obtain echocardiogram following covid infection and recent chemotherapy   3. The patient was educated on VTE prevention measures with recent covid infection. 4. [facility] [address] [place] [address] [facility]  5. Return to clinic with Dr. Yoselin Malone 2 weeks after surgery. Fidencio Mcpherson is a 79 y.o. female being evaluated by a Virtual Visit (video visit) encounter to address concerns as mentioned above. A caregiver was present when appropriate. Due to this being a TeleHealth encounter (During REXJZ-79 public health emergency), evaluation of the following organ systems was limited: Vitals/Constitutional/EENT/Resp/CV/GI//MS/Neuro/Skin/Heme-Lymph-Imm.   Pursuant to the emergency declaration under the [facility]5 waiver authority and the Coronavirus Preparedness and Response Supplemental Appropriations Act, this Virtual Visit was conducted with patient's (and/or legal guardian's) consent, to reduce the patient's risk of exposure to COVID-19 and provide necessary medical care. The patient (and/or legal guardian) has also been advised to contact this office for worsening conditions or problems, and seek emergency medical treatment and/or call 911 if deemed necessary. Patient identification was verified at the start of the visit: Yes    Total time spent on this encounter: Not billed by time    Services were provided through a video synchronous discussion virtually to substitute for in-person clinic visit. Patient and provider were located at their individual homes. --Shawn Royal PA-C on 11/6/2020 at 9:41 AM    An electronic signature was used to authenticate this note.

## 2020-11-06 NOTE — PATIENT INSTRUCTIONS
1. Refer to Dr. Charlee Verde - appointment to discuss surgery. Patient is finished with chemotherapy. 2. Obtain Echocardiogram - if able try to do in [address] [place] [address]. 3. Referral to home health for PT/OT, respiratory therapy  4. Return to clinic with Dr. Gt Powers 2 weeks after surgery. 5. Please call for questions or concerns.

## 2020-11-10 ENCOUNTER — CARE COORDINATION (OUTPATIENT)
Dept: CASE MANAGEMENT | Age: 70
End: 2020-11-10

## 2020-11-10 ENCOUNTER — OFFICE VISIT (OUTPATIENT)
Dept: SURGERY | Age: 70
End: 2020-11-10
Payer: MEDICARE

## 2020-11-10 VITALS
WEIGHT: 205.8 LBS | DIASTOLIC BLOOD PRESSURE: 68 MMHG | OXYGEN SATURATION: 90 % | BODY MASS INDEX: 31.19 KG/M2 | HEART RATE: 112 BPM | HEIGHT: 68 IN | SYSTOLIC BLOOD PRESSURE: 118 MMHG | RESPIRATION RATE: 20 BRPM | TEMPERATURE: 97.8 F

## 2020-11-10 PROCEDURE — 99213 OFFICE O/P EST LOW 20 MIN: CPT | Performed by: SURGERY

## 2020-11-10 NOTE — CARE COORDINATION
[facility] Transitions Follow Up Call    11/10/2020    Patient: Nayely Vaughn  Patient : 1950   MRN: [id number]  Reason for Admission:   Discharge Date: 10/21/20 RARS: Readmission Risk Score: 23         Second attempt for covid risk call. Left message to return call    Care Transitions Subsequent and Final Call    Subsequent and Final Calls  Care Transitions Interventions  Other Interventions:             Follow Up  Future Appointments   Date Time Provider Rey Guzman   11/10/2020  3:45 PM Lucio Charles MD Adv Surg P - [facility]   2020 10:45 AM STR ECHO RM3 STRZ ECHO [place] HOD   2021  1:30 PM Bedelia Dubin, APRN - CNP Pulm Med DOR[facility] - Maida Montes RN

## 2020-11-10 NOTE — PROGRESS NOTES
[facility] [address] [facility] [address]  Dept: [phone]  Dept Fax: [phone]  Loc: [phone]    Visit Date: 11/10/2020    Shaheed Worthington is a 79 y.o. female who presentstoday for:    HPI:     HPI 80-year-old white female being seen again for her left breast cancer to reiterate patient in June had mammogram findings of breast masses and underwent biopsies showing poorly differentiated infiltrating ductal carcinoma both ER/GA positive but HER-2/jordan positive. Patient was seen in the office and was going to be set up for a mastectomy sentinel lymph node biopsy but was presented at breast cancer conference and it was felt she should have some neoadjuvant treatment prior to surgery. Also she underwent MRIs which showed a third lesion in the breast and underwent a biopsy ultrasound-guided showing poorly differentiated breast cancer ER/GA negative and had been undergoing neoadjuvant treatment. Patient was scheduled for 6 cycles of neoadjuvant treatment.   However then she recently contracted Covid and is been recommended that the neoadjuvant treatment stop she is here to discuss surgery for same it should be noted the patient had had a preoperative needle localization and lumpectomy performed in 2014 on the left side    Past Medical History:   Diagnosis Date    Breast cancer ([address] 2020    left-invasive ductal    Hyperlipidemia     Numbness and tingling     left foot-chronic nerve damage      Past Surgical History:   Procedure Laterality Date    ABDOMINAL EXPLORATION SURGERY  2014    Dr butterfield-lysis of adhesions    BREAST LUMPECTOMY Left 11/2015    Left breast lumpectomy, retroareolar with preoperative needle loc-Dr. Parth Eller BREAST SURGERY Left 06/26/2020    biopsy of left breast    CHOLECYSTECTOMY  10/2014    Dr James Schaefer  03/30/2016    Dr Jus Cormier  10/2014    x4 abdominal wall    HYSTERECTOMY      INCISIONAL HERNIA REPAIR  2014    Dr Caim Germain LIPECTOMY  2014    Dr Caldera Alert BIOPSY LEFT  7/16/2020    [facility] [address] BIOPSY LEFT 7/16/2020 Shelley Vazquez MD [facility]    PORT SURGERY Right 7/17/2020    SINGLE LUMEN SMART PORT INSERTION RIGHT SUBCLAVIAN performed by Loan Marin MD at [address] [facility]  10/2014    Dr Francois-[facility]      patient was 11years old    TUBAL LIGATION          Family History   Problem Relation Age of Onset    Heart Disease Father         cath stent blood to thin and bled    Heart Attack Mother     Other Other         blood clot    Breast Cancer Paternal Aunt 62    High Blood Pressure Sister     Cancer Brother 68        skin    Prostate Cancer Brother         had chemotherapy to treat     Prostate Cancer Brother 64        has had for 10 years    Ovarian Cancer Neg Hx     Diabetes Neg Hx     Stroke Neg Hx         Social History     Tobacco Use    Smoking status: Never Smoker    Smokeless tobacco: Never Used   Substance Use Topics    Alcohol use: No     Alcohol/week: 0.0 standard drinks          Current Outpatient Medications   Medication Sig Dispense Refill    albuterol (PROVENTIL) (2.5 MG/3ML) 0.083% nebulizer solution Take 3 mLs by nebulization every 6 hours as needed for Wheezing or Shortness of Breath 1 Package 5    zinc sulfate (ZINCATE) 220 (50 Zn) MG capsule Take 1 capsule by mouth daily 30 capsule 0    famotidine (PEPCID) 20 MG tablet Take 1 tablet by mouth 2 times daily 60 tablet 0    vitamin C (VITAMIN C) 500 MG tablet Take 1 tablet by mouth daily 30 tablet 0    furosemide (LASIX) 20 MG tablet Take 1 tablet by mouth daily 20 tablet 0    loperamide (IMODIUM) 2 MG capsule Take 2 mg by mouth 4 times daily as needed for Diarrhea      lidocaine-prilocaine (EMLA) 2.5-2.5 % cream Apply topically as needed.  30 g 1    levothyroxine (SYNTHROID) 25 MCG tablet Take 25 mcg by mouth Daily      alendronate (FOSAMAX) 35 MG tablet Take 35 mg by mouth every 7 days      aspirin EC 81 MG EC tablet Take 1 tablet by mouth daily 30 tablet 3    ondansetron (ZOFRAN ODT) 4 MG disintegrating tablet Take 1 tablet by mouth every 8 hours as needed for Nausea 20 tablet 0    PARoxetine (PAXIL) 10 MG tablet Take 10 mg by mouth every morning      Coenzyme Q10-Fish Oil-Vit E (CO-Q 10 OMEGA-3 FISH OIL PO) Take by mouth daily      Vitamin D (CHOLECALCIFEROL) 1000 UNITS CAPS capsule Take by mouth daily 2 tab      Cinnamon 500 MG CAPS Take by mouth daily      Cyanocobalamin (VITAMIN B12 PO) Take 1,000 mcg by mouth daily        No current facility-administered medications for this visit. Allergies   Allergen Reactions    Aluminum-Containing Compounds Anaphylaxis       Subjective:      Review of Systems   Constitutional: Negative. Negative for activity change, appetite change, chills, diaphoresis, fatigue, fever and unexpected weight change. HENT: Negative. Negative for congestion, dental problem, drooling, ear discharge, ear pain, facial swelling, hearing loss, mouth sores, nosebleeds, postnasal drip, rhinorrhea, sinus pressure, sinus pain, sneezing, sore throat, tinnitus, trouble swallowing and voice change. Eyes: Negative. Negative for photophobia, pain, discharge, redness, itching and visual disturbance. Respiratory: Negative. Negative for apnea, cough, choking, chest tightness, shortness of breath, wheezing and stridor. Cardiovascular: Negative. Negative for chest pain, palpitations and leg swelling. Gastrointestinal: Negative. Endocrine: Negative. Negative for cold intolerance, heat intolerance, polydipsia, polyphagia and polyuria. Genitourinary: Negative.   Negative for decreased urine volume, difficulty urinating, dyspareunia, dysuria, enuresis, flank pain, frequency, genital sores, hematuria, menstrual problem, pelvic pain, urgency, vaginal bleeding, vaginal discharge and vaginal pain. Musculoskeletal: Negative. Negative for arthralgias, back pain, gait problem, joint swelling, myalgias, neck pain and neck stiffness. Skin: Negative for color change, pallor, rash and wound. Allergic/Immunologic: Negative. Negative for environmental allergies, food allergies and immunocompromised state. Neurological: Negative. Negative for dizziness, tremors, seizures, syncope, facial asymmetry, speech difficulty, weakness, light-headedness, numbness and headaches. Hematological: Negative. Negative for adenopathy. Does not bruise/bleed easily. Psychiatric/Behavioral: Negative. Negative for agitation, behavioral problems, confusion, decreased concentration, dysphoric mood, hallucinations, self-injury, sleep disturbance and suicidal ideas. The patient is not nervous/anxious and is not hyperactive. Objective: There were no vitals taken for this visit. Physical Exam  Constitutional:       Appearance: She is well-developed. HENT:      Head: Normocephalic and atraumatic. Eyes:      General: No scleral icterus. Right eye: No discharge. Left eye: No discharge. Conjunctiva/sclera: Conjunctivae normal.      Pupils: Pupils are equal, round, and reactive to light. Neck:      Musculoskeletal: Normal range of motion and neck supple. Thyroid: No thyromegaly. Vascular: No JVD. Cardiovascular:      Rate and Rhythm: Normal rate and regular rhythm. Heart sounds: No murmur. No friction rub. No gallop. Pulmonary:      Effort: Pulmonary effort is normal. No respiratory distress. Breath sounds: Normal breath sounds. No stridor. No wheezing. Chest:      Chest wall: No tenderness. Abdominal:      General: There is no distension. Palpations: There is no mass. Tenderness: There is no abdominal tenderness. There is no right CVA tenderness, left CVA tenderness, guarding or rebound. Hernia: No hernia is present. Musculoskeletal: Normal range of motion. Skin:     General: Skin is warm and dry. Coloration: Skin is not pale. Findings: No erythema or rash. Neurological:      Mental Status: She is alert and oriented to person, place, and time. Psychiatric:         Behavior: Behavior normal.         Thought Content:  Thought content normal.         Judgment: Judgment normal.            Results for orders placed or performed during the hospital encounter of 10/09/20   Culture, Blood 2    Specimen: Blood   Result Value Ref Range    Blood Culture, Routine No growth-preliminary No growth     Culture, Blood 1    Specimen: Blood   Result Value Ref Range    Blood Culture, Routine No growth-preliminary No growth     CBC auto differential   Result Value Ref Range    WBC 12.4 (H) 4.8 - 10.8 thou/mm3    RBC 3.92 (L) 4.20 - 5.40 mill/mm3    Hemoglobin 12.4 12.0 - 16.0 gm/dl    Hematocrit 40.2 37.0 - 47.0 %    .6 (H) 81.0 - 99.0 fL    MCH 31.6 26.0 - 33.0 pg    MCHC 30.8 (L) 32.2 - 35.5 gm/dl    RDW-CV 15.5 (H) 11.5 - 14.5 %    RDW-SD 58.4 (H) 35.0 - 45.0 fL    Platelets 206 364 - 756 thou/mm3    MPV 11.4 9.4 - 12.4 fL    Seg Neutrophils 60.8 %    Lymphocytes 25.3 %    Monocytes 6.8 %    Eosinophils 0.0 %    Basophils 0.7 %    Immature Granulocytes 6.4 %    Platelet Estimate ADEQUATE Adequate    Segs Absolute 7.5 1.8 - 7.7 thou/mm3    Lymphocytes Absolute 3.1 1.0 - 4.8 thou/mm3    Monocytes Absolute 0.8 0.4 - 1.3 thou/mm3    Eosinophils Absolute 0.0 0.0 - 0.4 thou/mm3    Basophils Absolute 0.1 0.0 - 0.1 thou/mm3    Immature Grans (Abs) 0.79 (H) 0.00 - 0.07 thou/mm3    nRBC 0 /100 wbc   Comprehensive Metabolic Panel w/ Reflex to MG   Result Value Ref Range    Glucose 136 (H) 70 - 108 mg/dL    CREATININE 0.8 0.4 - 1.2 mg/dL    BUN 15 7 - 22 mg/dL    Sodium 131 (L) 135 - 145 meq/L    Potassium reflex Magnesium 3.6 3.5 - 5.2 meq/L    Chloride 94 (L) 98 - 111 meq/L    CO2 22 (L) 23 - 33 meq/L    Calcium 9.3 8.5 - 10.5 mg/dL AST 45 (H) 5 - 40 U/L    Alkaline Phosphatase 98 38 - 126 U/L    Total Protein 7.2 6.1 - 8.0 g/dL    Alb 3.5 3.5 - 5.1 g/dL    Total Bilirubin 0.2 (L) 0.3 - 1.2 mg/dL    ALT 24 11 - 66 U/L   Lactic acid, plasma   Result Value Ref Range    Lactic Acid 1.6 0.5 - 2.2 mmol/L   Procalcitonin   Result Value Ref Range    Procalcitonin 0.22 (H) 0.01 - 0.09 ng/mL   Anion Gap   Result Value Ref Range    Anion Gap 15.0 8.0 - 16.0 meq/L   Glomerular Filtration Rate, Estimated   Result Value Ref Range    Est, Glom Filt Rate 71 (A) ml/min/1.73m2   Osmolality   Result Value Ref Range    Osmolality Calc 265.6 (L) 275.0 - 300.0 mOsmol/kg   Scan of Blood Smear   Result Value Ref Range    SCAN OF BLOOD SMEAR see below    D-Dimer, Quantitative   Result Value Ref Range    D-Dimer, Quant 854.00 (H) 0.00 - 500.00 ng/ml FEU   Basic Metabolic Panel w/ Reflex to MG   Result Value Ref Range    Sodium 139 135 - 145 meq/L    Potassium reflex Magnesium 3.0 (L) 3.5 - 5.2 meq/L    Chloride 100 98 - 111 meq/L    CO2 26 23 - 33 meq/L    Glucose 144 (H) 70 - 108 mg/dL    BUN 16 7 - 22 mg/dL    CREATININE 0.7 0.4 - 1.2 mg/dL    Calcium 8.8 8.5 - 10.5 mg/dL   CBC auto differential   Result Value Ref Range    WBC 9.9 4.8 - 10.8 thou/mm3    RBC 3.78 (L) 4.20 - 5.40 mill/mm3    Hemoglobin 12.1 12.0 - 16.0 gm/dl    Hematocrit 37.4 37.0 - 47.0 %    MCV 98.9 81.0 - 99.0 fL    MCH 32.0 26.0 - 33.0 pg    MCHC 32.4 32.2 - 35.5 gm/dl    RDW-CV 15.2 (H) 11.5 - 14.5 %    RDW-SD 54.9 (H) 35.0 - 45.0 fL    Platelets 102 318 - 497 thou/mm3    MPV 11.0 9.4 - 12.4 fL    Seg Neutrophils 59.3 %    Lymphocytes 29.0 %    Monocytes 7.2 %    Eosinophils 0.0 %    Basophils 0.4 %    Immature Granulocytes 4.1 %    Atypical Lymphocytes RARE %    Platelet Estimate ADEQUATE Adequate    Segs Absolute 5.9 1.8 - 7.7 thou/mm3    Lymphocytes Absolute 2.9 1.0 - 4.8 thou/mm3    Monocytes Absolute 0.7 0.4 - 1.3 thou/mm3    Eosinophils Absolute 0.0 0.0 - 0.4 thou/mm3    Basophils Absolute 0.0 0.0 - 0.1 thou/mm3    Immature Grans (Abs) 0.41 (H) 0.00 - 0.07 thou/mm3    nRBC 0 /100 wbc    Anisocytosis Present Absent    Toxic Granulation Present Absent   Hepatic Function Panel   Result Value Ref Range    Alb 3.2 (L) 3.5 - 5.1 g/dL    Total Bilirubin 0.2 (L) 0.3 - 1.2 mg/dL    Bilirubin, Direct <0.2 0.0 - 0.3 mg/dL    Alkaline Phosphatase 77 38 - 126 U/L    AST 30 5 - 40 U/L    ALT 17 11 - 66 U/L    Total Protein 6.4 6.1 - 8.0 g/dL   Anion Gap   Result Value Ref Range    Anion Gap 13.0 8.0 - 16.0 meq/L   Glomerular Filtration Rate, Estimated   Result Value Ref Range    Est, Glom Filt Rate 83 (A) ml/min/1.73m2   Magnesium   Result Value Ref Range    Magnesium 1.6 1.6 - 2.4 mg/dL   Scan of Blood Smear   Result Value Ref Range    SCAN OF BLOOD SMEAR see below    Blood Gas, Arterial   Result Value Ref Range    pH, Blood Gas 7.42 7.35 - 7.45    PCO2 43 35 - 45 mmhg    PO2 70 (L) 71 - 104 mmhg    HCO3 27 23 - 28 mmol/l    Base Excess (Calculated) 2.3 -2.5 - 2.5 mmol/l    O2 Sat 94 %    IFIO2 50     DEVICE HFNC     Nelson Test Positive     Source: SHIRLENE Hutton     COLLECTED BY: [id number]     [facility] 50lpm    Procalcitonin   Result Value Ref Range    Procalcitonin 0.19 (H) 0.01 - 0.09 ng/mL   Hepatic Function Panel   Result Value Ref Range    Alb 3.2 (L) 3.5 - 5.1 g/dL    Total Bilirubin 0.2 (L) 0.3 - 1.2 mg/dL    Bilirubin, Direct <0.2 0.0 - 0.3 mg/dL    Alkaline Phosphatase 71 38 - 126 U/L    AST 28 5 - 40 U/L    ALT 17 11 - 66 U/L    Total Protein 6.0 (L) 6.1 - 8.0 g/dL   Hemoglobin A1c   Result Value Ref Range    Hemoglobin A1C 7.2 (H) 4.4 - 6.4 %    AVERAGE GLUCOSE 156 (H) 70 - 126 mg/dL   CBC auto differential   Result Value Ref Range    WBC 10.3 4.8 - 10.8 thou/mm3    RBC 3.67 (L) 4.20 - 5.40 mill/mm3    Hemoglobin 11.7 (L) 12.0 - 16.0 gm/dl    Hematocrit 36.1 (L) 37.0 - 47.0 %    MCV 98.4 81.0 - 99.0 fL    MCH 31.9 26.0 - 33.0 pg    MCHC 32.4 32.2 - 35.5 gm/dl    RDW-CV 15.3 (H) 11.5 - 14.5 %    RDW-SD 55.1 (H) 35.0 - 45.0 fL    Platelets 843 792 - 688 thou/mm3    MPV 11.3 9.4 - 12.4 fL    Seg Neutrophils 55.8 %    Lymphocytes 34.7 %    Monocytes 6.5 %    Eosinophils 0.0 %    Basophils 0.4 %    Immature Granulocytes 2.6 %    Segs Absolute 5.7 1.8 - 7.7 thou/mm3    Lymphocytes Absolute 3.6 1.0 - 4.8 thou/mm3    Monocytes Absolute 0.7 0.4 - 1.3 thou/mm3    Eosinophils Absolute 0.0 0.0 - 0.4 thou/mm3    Basophils Absolute 0.0 0.0 - 0.1 thou/mm3    Immature Grans (Abs) 0.27 (H) 0.00 - 0.07 thou/mm3    nRBC 0 /100 wbc   Basic Metabolic Panel   Result Value Ref Range    Sodium 140 135 - 145 meq/L    Potassium 3.3 (L) 3.5 - 5.2 meq/L    Chloride 104 98 - 111 meq/L    CO2 26 23 - 33 meq/L    Glucose 118 (H) 70 - 108 mg/dL    BUN 16 7 - 22 mg/dL    CREATININE 0.5 0.4 - 1.2 mg/dL    Calcium 8.7 8.5 - 10.5 mg/dL   Anion Gap   Result Value Ref Range    Anion Gap 10.0 8.0 - 16.0 meq/L   Glomerular Filtration Rate, Estimated   Result Value Ref Range    Est, Glom Filt Rate >90 ml/min/1.73m2   Hepatic Function Panel   Result Value Ref Range    Alb 3.1 (L) 3.5 - 5.1 g/dL    Total Bilirubin 0.2 (L) 0.3 - 1.2 mg/dL    Bilirubin, Direct <0.2 0.0 - 0.3 mg/dL    Alkaline Phosphatase 68 38 - 126 U/L    AST 23 5 - 40 U/L    ALT 13 11 - 66 U/L    Total Protein 5.9 (L) 6.1 - 8.0 g/dL   Magnesium   Result Value Ref Range    Magnesium 1.6 1.6 - 2.4 mg/dL   Comprehensive metabolic panel   Result Value Ref Range    Glucose 126 (H) 70 - 108 mg/dL    CREATININE 0.4 0.4 - 1.2 mg/dL    BUN 12 7 - 22 mg/dL    Sodium 140 135 - 145 meq/L    Potassium 3.2 (L) 3.5 - 5.2 meq/L    Chloride 103 98 - 111 meq/L    CO2 26 23 - 33 meq/L    Calcium 8.9 8.5 - 10.5 mg/dL   CBC auto differential   Result Value Ref Range    WBC 9.7 4.8 - 10.8 thou/mm3    RBC 3.68 (L) 4.20 - 5.40 mill/mm3    Hemoglobin 11.7 (L) 12.0 - 16.0 gm/dl    Hematocrit 36.2 (L) 37.0 - 47.0 %    MCV 98.4 81.0 - 99.0 fL    MCH 31.8 26.0 - 33.0 pg    MCHC 32.3 32.2 - 35.5 gm/dl    RDW-CV 15.2 (H) 11.5 - 14.5 %    RDW-SD 54.8 (H) 35.0 - 45.0 fL    Platelets 478 914 - 493 thou/mm3    MPV 11.4 9.4 - 12.4 fL    Seg Neutrophils 55.7 %    Lymphocytes 36.5 %    Monocytes 6.1 %    Eosinophils 0.0 %    Basophils 0.3 %    Immature Granulocytes 1.4 %    Segs Absolute 5.4 1.8 - 7.7 thou/mm3    Lymphocytes Absolute 3.5 1.0 - 4.8 thou/mm3    Monocytes Absolute 0.6 0.4 - 1.3 thou/mm3    Eosinophils Absolute 0.0 0.0 - 0.4 thou/mm3    Basophils Absolute 0.0 0.0 - 0.1 thou/mm3    Immature Grans (Abs) 0.14 (H) 0.00 - 0.07 thou/mm3    nRBC 0 /100 wbc   Anion Gap   Result Value Ref Range    Anion Gap 11.0 8.0 - 16.0 meq/L   Glomerular Filtration Rate, Estimated   Result Value Ref Range    Est, Glom Filt Rate >90 ml/min/1.73m2   Procalcitonin   Result Value Ref Range    Procalcitonin 0.11 (H) 0.01 - 0.09 ng/mL   Blood Gas, Arterial   Result Value Ref Range    pH, Blood Gas 7.49 (H) 7.35 - 7.45    PCO2 34 (L) 35 - 45 mmhg    PO2 65 (L) 71 - 104 mmhg    HCO3 26 23 - 28 mmol/l    Base Excess (Calculated) 2.5 -2.5 - 2.5 mmol/l    O2 Sat 94 %    IFIO2 60     DEVICE HFNC     Nelson Test Positive     Source: L Radial     COLLECTED BY: [id number]    Hepatic Function Panel   Result Value Ref Range    Alb 3.3 (L) 3.5 - 5.1 g/dL    Total Bilirubin 0.5 0.3 - 1.2 mg/dL    Bilirubin, Direct <0.2 0.0 - 0.3 mg/dL    Alkaline Phosphatase 76 38 - 126 U/L    AST 27 5 - 40 U/L    ALT 14 11 - 66 U/L    Total Protein 6.4 6.1 - 8.0 g/dL   CBC   Result Value Ref Range    WBC 14.5 (H) 4.8 - 10.8 thou/mm3    RBC 4.04 (L) 4.20 - 5.40 mill/mm3    Hemoglobin 12.9 12.0 - 16.0 gm/dl    Hematocrit 39.3 37.0 - 47.0 %    MCV 97.3 81.0 - 99.0 fL    MCH 31.9 26.0 - 33.0 pg    MCHC 32.8 32.2 - 35.5 gm/dl    RDW-CV 15.1 (H) 11.5 - 14.5 %    RDW-SD 54.1 (H) 35.0 - 45.0 fL    Platelets 491 773 - 823 thou/mm3    MPV 11.2 9.4 - 12.4 fL   Basic Metabolic Panel   Result Value Ref Range    Sodium 142 135 - 145 meq/L    Potassium 3.2 (L) 3.5 - 5.2 meq/L    Chloride 105 98 - 111 meq/L    CO2 25 23 - 33 meq/L    Glucose 126 (H) 70 - 108 mg/dL    BUN 10 7 - 22 mg/dL    CREATININE 0.4 0.4 - 1.2 mg/dL    Calcium 9.2 8.5 - 10.5 mg/dL   Magnesium   Result Value Ref Range    Magnesium 1.5 (L) 1.6 - 2.4 mg/dL   Anion Gap   Result Value Ref Range    Anion Gap 12.0 8.0 - 16.0 meq/L   Glomerular Filtration Rate, Estimated   Result Value Ref Range    Est, Glom Filt Rate >90 ml/min/1.73m2   Hepatic Function Panel   Result Value Ref Range    Alb 2.8 (L) 3.5 - 5.1 g/dL    Total Bilirubin 0.5 0.3 - 1.2 mg/dL    Bilirubin, Direct <0.2 0.0 - 0.3 mg/dL    Alkaline Phosphatase 67 38 - 126 U/L    AST 19 5 - 40 U/L    ALT 12 11 - 66 U/L    Total Protein 5.9 (L) 6.1 - 8.0 g/dL   Magnesium   Result Value Ref Range    Magnesium 1.7 1.6 - 2.4 mg/dL   Potassium   Result Value Ref Range    Potassium 3.7 3.5 - 5.2 meq/L   Magnesium   Result Value Ref Range    Magnesium 2.1 1.6 - 2.4 mg/dL   Brain Natriuretic Peptide   Result Value Ref Range    Pro-.4 0.0 - 900.0 pg/mL   CBC   Result Value Ref Range    WBC 12.5 (H) 4.8 - 10.8 thou/mm3    RBC 3.51 (L) 4.20 - 5.40 mill/mm3    Hemoglobin 11.2 (L) 12.0 - 16.0 gm/dl    Hematocrit 34.3 (L) 37.0 - 47.0 %    MCV 97.7 81.0 - 99.0 fL    MCH 31.9 26.0 - 33.0 pg    MCHC 32.7 32.2 - 35.5 gm/dl    RDW-CV 15.1 (H) 11.5 - 14.5 %    RDW-SD 53.7 (H) 35.0 - 45.0 fL    Platelets 298 702 - 091 thou/mm3    MPV 11.0 9.4 - 12.4 fL   Basic Metabolic Panel   Result Value Ref Range    Sodium 139 135 - 145 meq/L    Potassium 3.5 3.5 - 5.2 meq/L    Chloride 105 98 - 111 meq/L    CO2 26 23 - 33 meq/L    Glucose 120 (H) 70 - 108 mg/dL    BUN 13 7 - 22 mg/dL    CREATININE 0.4 0.4 - 1.2 mg/dL    Calcium 9.2 8.5 - 10.5 mg/dL   Anion Gap   Result Value Ref Range    Anion Gap 8.0 8.0 - 16.0 meq/L   Glomerular Filtration Rate, Estimated   Result Value Ref Range    Est, Glom Filt Rate >90 ml/min/1.73m2   SPECIMEN REJECTION   Result Value Ref Range    Rejected Test cresp     Reason for Rejection see below    CBC   Result Value Ref Range    WBC 10.1 4.8 - 10.8 thou/mm3    RBC 3.68 (L) 4.20 - 5.40 mill/mm3    Hemoglobin 11.6 (L) 12.0 - 16.0 gm/dl    Hematocrit 36.2 (L) 37.0 - 47.0 %    MCV 98.4 81.0 - 99.0 fL    MCH 31.5 26.0 - 33.0 pg    MCHC 32.0 (L) 32.2 - 35.5 gm/dl    RDW-CV 14.9 (H) 11.5 - 14.5 %    RDW-SD 54.2 (H) 35.0 - 45.0 fL    Platelets 515 170 - 160 thou/mm3    MPV 11.0 9.4 - 12.4 fL   Basic Metabolic Panel   Result Value Ref Range    Sodium 139 135 - 145 meq/L    Potassium 3.4 (L) 3.5 - 5.2 meq/L    Chloride 103 98 - 111 meq/L    CO2 27 23 - 33 meq/L    Glucose 138 (H) 70 - 108 mg/dL    BUN 12 7 - 22 mg/dL    CREATININE 0.5 0.4 - 1.2 mg/dL    Calcium 9.6 8.5 - 10.5 mg/dL   Anion Gap   Result Value Ref Range    Anion Gap 9.0 8.0 - 16.0 meq/L   Glomerular Filtration Rate, Estimated   Result Value Ref Range    Est, Glom Filt Rate >90 ml/min/1.73m2   CBC   Result Value Ref Range    WBC 14.2 (H) 4.8 - 10.8 thou/mm3    RBC 3.71 (L) 4.20 - 5.40 mill/mm3    Hemoglobin 11.8 (L) 12.0 - 16.0 gm/dl    Hematocrit 37.0 37.0 - 47.0 %    MCV 99.7 (H) 81.0 - 99.0 fL    MCH 31.8 26.0 - 33.0 pg    MCHC 31.9 (L) 32.2 - 35.5 gm/dl    RDW-CV 15.0 (H) 11.5 - 14.5 %    RDW-SD 54.9 (H) 35.0 - 45.0 fL    Platelets 829 (H) 440 - 400 thou/mm3    MPV 10.7 9.4 - 12.4 fL   Basic Metabolic Panel   Result Value Ref Range    Sodium 138 135 - 145 meq/L    Potassium 3.8 3.5 - 5.2 meq/L    Chloride 102 98 - 111 meq/L    CO2 25 23 - 33 meq/L    Glucose 126 (H) 70 - 108 mg/dL    BUN 13 7 - 22 mg/dL    CREATININE 0.3 (L) 0.4 - 1.2 mg/dL    Calcium 9.5 8.5 - 10.5 mg/dL   Magnesium   Result Value Ref Range    Magnesium 1.7 1.6 - 2.4 mg/dL   Anion Gap   Result Value Ref Range    Anion Gap 11.0 8.0 - 16.0 meq/L   Glomerular Filtration Rate, Estimated   Result Value Ref Range    Est, Glom Filt Rate >90 ml/min/1.73m2   Procalcitonin   Result Value Ref Range    Procalcitonin 0.07 0.01 - 0.09 ng/mL   Brain Natriuretic Peptide   Result Value Ref Range    Pro-BNP 78.5 0.0 - 900.0 pg/mL   CBC   Result Value Ref Range    WBC 11.8 (H) 4.8 - 10.8 thou/mm3    RBC 3.53 (L) 4.20 - 5.40 mill/mm3    Hemoglobin 11.3 (L) 12.0 - 16.0 gm/dl    Hematocrit 34.8 (L) 37.0 - 47.0 %    MCV 98.6 81.0 - 99.0 fL    MCH 32.0 26.0 - 33.0 pg    MCHC 32.5 32.2 - 35.5 gm/dl    RDW-CV 14.9 (H) 11.5 - 14.5 %    RDW-SD 53.7 (H) 35.0 - 45.0 fL    Platelets 940 (H) 536 - 400 thou/mm3    MPV 10.9 9.4 - 12.4 fL   Comprehensive metabolic panel   Result Value Ref Range    Glucose 112 (H) 70 - 108 mg/dL    CREATININE 0.4 0.4 - 1.2 mg/dL    BUN 13 7 - 22 mg/dL    Sodium 136 135 - 145 meq/L    Potassium 4.4 3.5 - 5.2 meq/L    Chloride 100 98 - 111 meq/L    CO2 28 23 - 33 meq/L    Calcium 9.2 8.5 - 10.5 mg/dL    AST 15 5 - 40 U/L    Alkaline Phosphatase 64 38 - 126 U/L    Total Protein 5.5 (L) 6.1 - 8.0 g/dL    Alb 3.1 (L) 3.5 - 5.1 g/dL    Total Bilirubin 0.5 0.3 - 1.2 mg/dL    ALT 8 (L) 11 - 66 U/L   Anion Gap   Result Value Ref Range    Anion Gap 8.0 8.0 - 16.0 meq/L   Glomerular Filtration Rate, Estimated   Result Value Ref Range    Est, Glom Filt Rate >90 ml/min/1.73m2   CBC   Result Value Ref Range    WBC 10.6 4.8 - 10.8 thou/mm3    RBC 3.54 (L) 4.20 - 5.40 mill/mm3    Hemoglobin 11.4 (L) 12.0 - 16.0 gm/dl    Hematocrit 34.9 (L) 37.0 - 47.0 %    MCV 98.6 81.0 - 99.0 fL    MCH 32.2 26.0 - 33.0 pg    MCHC 32.7 32.2 - 35.5 gm/dl    RDW-CV 14.6 (H) 11.5 - 14.5 %    RDW-SD 53.0 (H) 35.0 - 45.0 fL    Platelets 315 (H) 837 - 400 thou/mm3    MPV 10.9 9.4 - 12.4 fL   Basic Metabolic Panel   Result Value Ref Range    Sodium 137 135 - 145 meq/L    Potassium 4.3 3.5 - 5.2 meq/L    Chloride 100 98 - 111 meq/L    CO2 25 23 - 33 meq/L    Glucose 127 (H) 70 - 108 mg/dL    BUN 13 7 - 22 mg/dL    CREATININE 0.4 0.4 - 1.2 mg/dL    Calcium 9.1 8.5 - 10.5 mg/dL   Anion Gap   Result Value Ref Range    Anion Gap 12.0 8.0 - 16.0 meq/L   Glomerular Filtration Rate, Estimated   Result Value Ref Range    Est, Glom Filt Rate >90 NAVA/NSL/[id number]   Basic Metabolic Panel   Result Value Ref Range    Sodium 137 135 - 145 meq/L    Potassium 4.3 3.5 - 5.2 meq/L    Chloride 98 98 - 111 meq/L    CO2 24 23 - 33 meq/L    Glucose 186 (H) 70 - 108 mg/dL    BUN 17 7 - 22 mg/dL    CREATININE 0.5 0.4 - 1.2 mg/dL    Calcium 9.7 8.5 - 10.5 mg/dL   CBC   Result Value Ref Range    WBC 11.1 (H) 4.8 - 10.8 thou/mm3    RBC 3.78 (L) 4.20 - 5.40 mill/mm3    Hemoglobin 11.9 (L) 12.0 - 16.0 gm/dl    Hematocrit 37.7 37.0 - 47.0 %    MCV 99.7 (H) 81.0 - 99.0 fL    MCH 31.5 26.0 - 33.0 pg    MCHC 31.6 (L) 32.2 - 35.5 gm/dl    RDW-CV 14.6 (H) 11.5 - 14.5 %    RDW-SD 53.1 (H) 35.0 - 45.0 fL    Platelets 978 (H) 929 - 400 thou/mm3    MPV 10.8 9.4 - 12.4 fL   Anion Gap   Result Value Ref Range    Anion Gap 15.0 8.0 - 16.0 meq/L   Glomerular Filtration Rate, Estimated   Result Value Ref Range    Est, Glom Filt Rate >90 WW/TYD/0.39X9   Basic Metabolic Panel   Result Value Ref Range    Sodium 139 135 - 145 meq/L    Potassium 4.2 3.5 - 5.2 meq/L    Chloride 99 98 - 111 meq/L    CO2 27 23 - 33 meq/L    Glucose 120 (H) 70 - 108 mg/dL    BUN 25 (H) 7 - 22 mg/dL    CREATININE 0.5 0.4 - 1.2 mg/dL    Calcium 9.9 8.5 - 10.5 mg/dL   CBC   Result Value Ref Range    WBC 11.5 (H) 4.8 - 10.8 thou/mm3    RBC 3.88 (L) 4.20 - 5.40 mill/mm3    Hemoglobin 12.4 12.0 - 16.0 gm/dl    Hematocrit 38.2 37.0 - 47.0 %    MCV 98.5 81.0 - 99.0 fL    MCH 32.0 26.0 - 33.0 pg    MCHC 32.5 32.2 - 35.5 gm/dl    RDW-CV 14.7 (H) 11.5 - 14.5 %    RDW-SD 52.6 (H) 35.0 - 45.0 fL    Platelets 949 (H) 386 - 400 thou/mm3    MPV 10.9 9.4 - 12.4 fL   Magnesium   Result Value Ref Range    Magnesium 2.0 1.6 - 2.4 mg/dL   Anion Gap   Result Value Ref Range    Anion Gap 13.0 8.0 - 16.0 meq/L   Glomerular Filtration Rate, Estimated   Result Value Ref Range    Est, Glom Filt Rate >90 ml/min/1.73m2   POCT Glucose   Result Value Ref Range    POC Glucose 209 (H) 70 - 108 mg/dl   POCT Glucose   Result Value Ref Range POC Glucose 124 (H) 70 - 108 mg/dl   POCT Glucose   Result Value Ref Range    POC Glucose 195 (H) 70 - 108 mg/dl   POCT Glucose   Result Value Ref Range    POC Glucose 152 (H) 70 - 108 mg/dl   POCT Glucose   Result Value Ref Range    POC Glucose 126 (H) 70 - 108 mg/dl   POCT Glucose   Result Value Ref Range    POC Glucose 122 (H) 70 - 108 mg/dl   POCT Glucose   Result Value Ref Range    POC Glucose 212 (H) 70 - 108 mg/dl   POCT Glucose   Result Value Ref Range    POC Glucose 130 (H) 70 - 108 mg/dl   POCT Glucose   Result Value Ref Range    POC Glucose 182 (H) 70 - 108 mg/dl   POCT Glucose   Result Value Ref Range    POC Glucose 189 (H) 70 - 108 mg/dl   POCT Glucose   Result Value Ref Range    POC Glucose 189 (H) 70 - 108 mg/dl   POCT Glucose   Result Value Ref Range    POC Glucose 128 (H) 70 - 108 mg/dl   POCT Glucose   Result Value Ref Range    POC Glucose 173 (H) 70 - 108 mg/dl   POCT Glucose   Result Value Ref Range    POC Glucose 222 (H) 70 - 108 mg/dl   POCT Glucose   Result Value Ref Range    POC Glucose 181 (H) 70 - 108 mg/dl   POCT Glucose   Result Value Ref Range    POC Glucose 172 (H) 70 - 108 mg/dl   POCT Glucose   Result Value Ref Range    POC Glucose 207 (H) 70 - 108 mg/dl   POCT Glucose   Result Value Ref Range    POC Glucose 171 (H) 70 - 108 mg/dl   POCT Glucose   Result Value Ref Range    POC Glucose 127 (H) 70 - 108 mg/dl   POCT Glucose   Result Value Ref Range    POC Glucose 264 (H) 70 - 108 mg/dl   POCT Glucose   Result Value Ref Range    POC Glucose 218 (H) 70 - 108 mg/dl   POCT Glucose   Result Value Ref Range    POC Glucose 184 (H) 70 - 108 mg/dl   POCT Glucose   Result Value Ref Range    POC Glucose 235 (H) 70 - 108 mg/dl   POCT Glucose   Result Value Ref Range    POC Glucose 212 (H) 70 - 108 mg/dl   POCT Glucose   Result Value Ref Range    POC Glucose 168 (H) 70 - 108 mg/dl   POCT Glucose   Result Value Ref Range    POC Glucose 153 (H) 70 - 108 mg/dl   POCT Glucose   Result Value Ref Range POC Glucose 236 (H) 70 - 108 mg/dl   POCT Glucose   Result Value Ref Range    POC Glucose 238 (H) 70 - 108 mg/dl   POCT Glucose   Result Value Ref Range    POC Glucose 113 (H) 70 - 108 mg/dl   POCT Glucose   Result Value Ref Range    POC Glucose 204 (H) 70 - 108 mg/dl   POCT Glucose   Result Value Ref Range    POC Glucose 248 (H) 70 - 108 mg/dl   POCT Glucose   Result Value Ref Range    POC Glucose 255 (H) 70 - 108 mg/dl   POCT Glucose   Result Value Ref Range    POC Glucose 134 (H) 70 - 108 mg/dl   POCT Glucose   Result Value Ref Range    POC Glucose 190 (H) 70 - 108 mg/dl   POCT Glucose   Result Value Ref Range    POC Glucose 233 (H) 70 - 108 mg/dl   POCT Glucose   Result Value Ref Range    POC Glucose 174 (H) 70 - 108 mg/dl   POCT Glucose   Result Value Ref Range    POC Glucose 136 (H) 70 - 108 mg/dl   POCT Glucose   Result Value Ref Range    POC Glucose 196 (H) 70 - 108 mg/dl   EKG 12 Lead   Result Value Ref Range    Ventricular Rate 106 BPM    Atrial Rate 106 BPM    P-R Interval 238 ms    QRS Duration 92 ms    Q-T Interval 312 ms    QTc Calculation (Bazett) 414 ms    P Axis 21 degrees    R Axis -9 degrees    T Axis -16 degrees   ABO/RH   Result Value Ref Range    ABO O     Rh Factor POS        Assessment:     Carcinoma of the left breast with 3 separate areas showing poorly differentiated adenocarcinoma from a surgical perspective we have discussed with the patient in the past I feel there is no other option than mastectomy and sentinel lymph node biopsy patient understands the process she understands her neoadjuvant treatment was cut short because of the Covid we discussed the problems with wound healing mastectomy flap necrosis she voices understanding would like to proceed    Plan:     1. Schedule Bhargavi for left mastectomy sentinel lymph node biopsy  2. The risks, benefits and alternatives were discussed with Maegan Machuca. She understands and wishes to proceed with surgical intervention.   3. Restrictions discussed with Mynor Gilbert and she expresses understanding. 4. She is advised to call back directly if there are further questions/concerns, or if her symptoms worsen prior to surgery.             Electronicallysigned by Laurel Lynch MD on 11/10/2020 at 3:46 PM

## 2020-11-16 ENCOUNTER — TELEPHONE (OUTPATIENT)
Dept: SURGERY | Age: 70
End: 2020-11-16

## 2020-11-16 ASSESSMENT — ENCOUNTER SYMPTOMS
RESPIRATORY NEGATIVE: 1
SINUS PRESSURE: 0
EYE ITCHING: 0
CHOKING: 0
BACK PAIN: 0
COUGH: 0
SINUS PAIN: 0
PHOTOPHOBIA: 0
CHEST TIGHTNESS: 0
GASTROINTESTINAL NEGATIVE: 1
TROUBLE SWALLOWING: 0
ALLERGIC/IMMUNOLOGIC NEGATIVE: 1
EYE DISCHARGE: 0
EYE PAIN: 0
VOICE CHANGE: 0
FACIAL SWELLING: 0
EYE REDNESS: 0
EYES NEGATIVE: 1
SORE THROAT: 0
COLOR CHANGE: 0
SHORTNESS OF BREATH: 0
APNEA: 0
WHEEZING: 0
STRIDOR: 0
RHINORRHEA: 0

## 2020-11-17 ENCOUNTER — PREP FOR PROCEDURE (OUTPATIENT)
Dept: SURGERY | Age: 70
End: 2020-11-17

## 2020-11-17 ENCOUNTER — HOSPITAL ENCOUNTER (OUTPATIENT)
Dept: NON INVASIVE DIAGNOSTICS | Age: 70
Discharge: HOME | End: 2020-11-17
Payer: MEDICARE

## 2020-11-17 PROCEDURE — 93307 TTE W/O DOPPLER COMPLETE: CPT

## 2020-11-17 RX ORDER — SODIUM CHLORIDE 9 MG/ML
INJECTION, SOLUTION INTRAVENOUS CONTINUOUS
Status: CANCELLED | OUTPATIENT
Start: 2020-11-17

## 2020-11-18 ENCOUNTER — TELEPHONE (OUTPATIENT)
Dept: SURGERY | Age: 70
End: 2020-11-18

## 2020-11-18 NOTE — TELEPHONE ENCOUNTER
Spoke to Theron at [address] [place] [address] as OP does not require any authorization. Also a prophylactic mastectomy is a covered benefit. The ref# for the call is [id number].

## 2020-11-27 NOTE — PROGRESS NOTES
Patient contacted regarding COVID-19 screen. Test was done on 11/23/20* at [facility]**  Following questions asked: In the last month, have you been in contact with someone who was confirmed or suspected to have Coronavirus/COVID-19:  Patient stated NO      Pt was informed can be a visitor allowed. Please bring masks. Do you or family members have any of the following symptoms:  Cough-no   Muscle pain-no   Shortness of breath-no   Fever-no   Weakness-no  Severe headache-no   Sore throat-no   Respiratory symptoms-no    Have you traveled internationally in the last month? No     Have you been to the emergency room recently-no     Inform pt will need to have urine test done if she hasn't had a tubal ligation or hysterectomy.

## 2020-11-29 NOTE — H&P
[address] [address]  History and Physical Update      Pt Name: Nuria Orellana  MRN: [id number]  YOB: 1950  Date of evaluation: 11/29/2020    [x] I have examined the patient and reviewed the H&P/Consult and there are no changes to the patient or plans. [] I have examined the patient and reviewed the H&P/Consult and have noted the following changes:         Zen Story  Electronically signed 11/29/2020 at 12:39 PM

## 2020-11-29 NOTE — H&P
mouth Daily      alendronate (FOSAMAX) 35 MG tablet Take 35 mg by mouth every 7 days      aspirin EC 81 MG EC tablet Take 1 tablet by mouth daily 30 tablet 3    ondansetron (ZOFRAN ODT) 4 MG disintegrating tablet Take 1 tablet by mouth every 8 hours as needed for Nausea 20 tablet 0    PARoxetine (PAXIL) 10 MG tablet Take 10 mg by mouth every morning      Coenzyme Q10-Fish Oil-Vit E (CO-Q 10 OMEGA-3 FISH OIL PO) Take by mouth daily      Vitamin D (CHOLECALCIFEROL) 1000 UNITS CAPS capsule Take by mouth daily 2 tab      Cinnamon 500 MG CAPS Take by mouth daily      Cyanocobalamin (VITAMIN B12 PO) Take 1,000 mcg by mouth daily        No current facility-administered medications for this visit. Allergies   Allergen Reactions    Aluminum-Containing Compounds Anaphylaxis       Subjective:      Review of Systems   Constitutional: Negative. Negative for activity change, appetite change, chills, diaphoresis, fatigue, fever and unexpected weight change. HENT: Negative. Negative for congestion, dental problem, drooling, ear discharge, ear pain, facial swelling, hearing loss, mouth sores, nosebleeds, postnasal drip, rhinorrhea, sinus pressure, sinus pain, sneezing, sore throat, tinnitus, trouble swallowing and voice change. Eyes: Negative. Negative for photophobia, pain, discharge, redness, itching and visual disturbance. Respiratory: Negative. Negative for apnea, cough, choking, chest tightness, shortness of breath, wheezing and stridor. Cardiovascular: Negative. Negative for chest pain, palpitations and leg swelling. Gastrointestinal: Negative. Endocrine: Negative. Negative for cold intolerance, heat intolerance, polydipsia, polyphagia and polyuria. Genitourinary: Negative.   Negative for decreased urine volume, difficulty urinating, dyspareunia, dysuria, enuresis, flank pain, frequency, genital sores, hematuria, menstrual problem, pelvic pain, urgency, vaginal bleeding, vaginal discharge and vaginal pain. Musculoskeletal: Negative. Negative for arthralgias, back pain, gait problem, joint swelling, myalgias, neck pain and neck stiffness. Skin: Negative for color change, pallor, rash and wound. Allergic/Immunologic: Negative. Negative for environmental allergies, food allergies and immunocompromised state. Neurological: Negative. Negative for dizziness, tremors, seizures, syncope, facial asymmetry, speech difficulty, weakness, light-headedness, numbness and headaches. Hematological: Negative. Negative for adenopathy. Does not bruise/bleed easily. Psychiatric/Behavioral: Negative. Negative for agitation, behavioral problems, confusion, decreased concentration, dysphoric mood, hallucinations, self-injury, sleep disturbance and suicidal ideas. The patient is not nervous/anxious and is not hyperactive. Objective: There were no vitals taken for this visit. Physical Exam  Constitutional:       Appearance: She is well-developed. HENT:      Head: Normocephalic and atraumatic. Eyes:      General: No scleral icterus. Right eye: No discharge. Left eye: No discharge. Conjunctiva/sclera: Conjunctivae normal.      Pupils: Pupils are equal, round, and reactive to light. Neck:      Musculoskeletal: Normal range of motion and neck supple. Thyroid: No thyromegaly. Vascular: No JVD. Cardiovascular:      Rate and Rhythm: Normal rate and regular rhythm. Heart sounds: No murmur. No friction rub. No gallop. Pulmonary:      Effort: Pulmonary effort is normal. No respiratory distress. Breath sounds: Normal breath sounds. No stridor. No wheezing. Chest:      Chest wall: No tenderness. Abdominal:      General: There is no distension. Palpations: There is no mass. Tenderness: There is no abdominal tenderness. There is no right CVA tenderness, left CVA tenderness, guarding or rebound. Hernia: No hernia is present. AST 45 (H) 5 - 40 U/L    Alkaline Phosphatase 98 38 - 126 U/L    Total Protein 7.2 6.1 - 8.0 g/dL    Alb 3.5 3.5 - 5.1 g/dL    Total Bilirubin 0.2 (L) 0.3 - 1.2 mg/dL    ALT 24 11 - 66 U/L   Lactic acid, plasma   Result Value Ref Range    Lactic Acid 1.6 0.5 - 2.2 mmol/L   Procalcitonin   Result Value Ref Range    Procalcitonin 0.22 (H) 0.01 - 0.09 ng/mL   Anion Gap   Result Value Ref Range    Anion Gap 15.0 8.0 - 16.0 meq/L   Glomerular Filtration Rate, Estimated   Result Value Ref Range    Est, Glom Filt Rate 71 (A) ml/min/1.73m2   Osmolality   Result Value Ref Range    Osmolality Calc 265.6 (L) 275.0 - 300.0 mOsmol/kg   Scan of Blood Smear   Result Value Ref Range    SCAN OF BLOOD SMEAR see below    D-Dimer, Quantitative   Result Value Ref Range    D-Dimer, Quant 854.00 (H) 0.00 - 500.00 ng/ml FEU   Basic Metabolic Panel w/ Reflex to MG   Result Value Ref Range    Sodium 139 135 - 145 meq/L    Potassium reflex Magnesium 3.0 (L) 3.5 - 5.2 meq/L    Chloride 100 98 - 111 meq/L    CO2 26 23 - 33 meq/L    Glucose 144 (H) 70 - 108 mg/dL    BUN 16 7 - 22 mg/dL    CREATININE 0.7 0.4 - 1.2 mg/dL    Calcium 8.8 8.5 - 10.5 mg/dL   CBC auto differential   Result Value Ref Range    WBC 9.9 4.8 - 10.8 thou/mm3    RBC 3.78 (L) 4.20 - 5.40 mill/mm3    Hemoglobin 12.1 12.0 - 16.0 gm/dl    Hematocrit 37.4 37.0 - 47.0 %    MCV 98.9 81.0 - 99.0 fL    MCH 32.0 26.0 - 33.0 pg    MCHC 32.4 32.2 - 35.5 gm/dl    RDW-CV 15.2 (H) 11.5 - 14.5 %    RDW-SD 54.9 (H) 35.0 - 45.0 fL    Platelets 889 580 - 414 thou/mm3    MPV 11.0 9.4 - 12.4 fL    Seg Neutrophils 59.3 %    Lymphocytes 29.0 %    Monocytes 7.2 %    Eosinophils 0.0 %    Basophils 0.4 %    Immature Granulocytes 4.1 %    Atypical Lymphocytes RARE %    Platelet Estimate ADEQUATE Adequate    Segs Absolute 5.9 1.8 - 7.7 thou/mm3    Lymphocytes Absolute 2.9 1.0 - 4.8 thou/mm3    Monocytes Absolute 0.7 0.4 - 1.3 thou/mm3    Eosinophils Absolute 0.0 0.0 - 0.4 thou/mm3    Basophils Absolute 0.0 0.0 - 0.1 thou/mm3    Immature Grans (Abs) 0.41 (H) 0.00 - 0.07 thou/mm3    nRBC 0 /100 wbc    Anisocytosis Present Absent    Toxic Granulation Present Absent   Hepatic Function Panel   Result Value Ref Range    Alb 3.2 (L) 3.5 - 5.1 g/dL    Total Bilirubin 0.2 (L) 0.3 - 1.2 mg/dL    Bilirubin, Direct <0.2 0.0 - 0.3 mg/dL    Alkaline Phosphatase 77 38 - 126 U/L    AST 30 5 - 40 U/L    ALT 17 11 - 66 U/L    Total Protein 6.4 6.1 - 8.0 g/dL   Anion Gap   Result Value Ref Range    Anion Gap 13.0 8.0 - 16.0 meq/L   Glomerular Filtration Rate, Estimated   Result Value Ref Range    Est, Glom Filt Rate 83 (A) ml/min/1.73m2   Magnesium   Result Value Ref Range    Magnesium 1.6 1.6 - 2.4 mg/dL   Scan of Blood Smear   Result Value Ref Range    SCAN OF BLOOD SMEAR see below    Blood Gas, Arterial   Result Value Ref Range    pH, Blood Gas 7.42 7.35 - 7.45    PCO2 43 35 - 45 mmhg    PO2 70 (L) 71 - 104 mmhg    HCO3 27 23 - 28 mmol/l    Base Excess (Calculated) 2.3 -2.5 - 2.5 mmol/l    O2 Sat 94 %    IFIO2 50     DEVICE HFNC     Nelson Test Positive     Source: SHIRLENE Hutton     COLLECTED BY: [id number]     [facility] 50lpm    Procalcitonin   Result Value Ref Range    Procalcitonin 0.19 (H) 0.01 - 0.09 ng/mL   Hepatic Function Panel   Result Value Ref Range    Alb 3.2 (L) 3.5 - 5.1 g/dL    Total Bilirubin 0.2 (L) 0.3 - 1.2 mg/dL    Bilirubin, Direct <0.2 0.0 - 0.3 mg/dL    Alkaline Phosphatase 71 38 - 126 U/L    AST 28 5 - 40 U/L    ALT 17 11 - 66 U/L    Total Protein 6.0 (L) 6.1 - 8.0 g/dL   Hemoglobin A1c   Result Value Ref Range    Hemoglobin A1C 7.2 (H) 4.4 - 6.4 %    AVERAGE GLUCOSE 156 (H) 70 - 126 mg/dL   CBC auto differential   Result Value Ref Range    WBC 10.3 4.8 - 10.8 thou/mm3    RBC 3.67 (L) 4.20 - 5.40 mill/mm3    Hemoglobin 11.7 (L) 12.0 - 16.0 gm/dl    Hematocrit 36.1 (L) 37.0 - 47.0 %    MCV 98.4 81.0 - 99.0 fL    MCH 31.9 26.0 - 33.0 pg    MCHC 32.4 32.2 - 35.5 gm/dl    RDW-CV 15.3 (H) 11.5 - 14.5 %    RDW-SD 55.1 (H) 35.0 - 45.0 fL    Platelets 817 112 - 177 thou/mm3    MPV 11.3 9.4 - 12.4 fL    Seg Neutrophils 55.8 %    Lymphocytes 34.7 %    Monocytes 6.5 %    Eosinophils 0.0 %    Basophils 0.4 %    Immature Granulocytes 2.6 %    Segs Absolute 5.7 1.8 - 7.7 thou/mm3    Lymphocytes Absolute 3.6 1.0 - 4.8 thou/mm3    Monocytes Absolute 0.7 0.4 - 1.3 thou/mm3    Eosinophils Absolute 0.0 0.0 - 0.4 thou/mm3    Basophils Absolute 0.0 0.0 - 0.1 thou/mm3    Immature Grans (Abs) 0.27 (H) 0.00 - 0.07 thou/mm3    nRBC 0 /100 wbc   Basic Metabolic Panel   Result Value Ref Range    Sodium 140 135 - 145 meq/L    Potassium 3.3 (L) 3.5 - 5.2 meq/L    Chloride 104 98 - 111 meq/L    CO2 26 23 - 33 meq/L    Glucose 118 (H) 70 - 108 mg/dL    BUN 16 7 - 22 mg/dL    CREATININE 0.5 0.4 - 1.2 mg/dL    Calcium 8.7 8.5 - 10.5 mg/dL   Anion Gap   Result Value Ref Range    Anion Gap 10.0 8.0 - 16.0 meq/L   Glomerular Filtration Rate, Estimated   Result Value Ref Range    Est, Glom Filt Rate >90 ml/min/1.73m2   Hepatic Function Panel   Result Value Ref Range    Alb 3.1 (L) 3.5 - 5.1 g/dL    Total Bilirubin 0.2 (L) 0.3 - 1.2 mg/dL    Bilirubin, Direct <0.2 0.0 - 0.3 mg/dL    Alkaline Phosphatase 68 38 - 126 U/L    AST 23 5 - 40 U/L    ALT 13 11 - 66 U/L    Total Protein 5.9 (L) 6.1 - 8.0 g/dL   Magnesium   Result Value Ref Range    Magnesium 1.6 1.6 - 2.4 mg/dL   Comprehensive metabolic panel   Result Value Ref Range    Glucose 126 (H) 70 - 108 mg/dL    CREATININE 0.4 0.4 - 1.2 mg/dL    BUN 12 7 - 22 mg/dL    Sodium 140 135 - 145 meq/L    Potassium 3.2 (L) 3.5 - 5.2 meq/L    Chloride 103 98 - 111 meq/L    CO2 26 23 - 33 meq/L    Calcium 8.9 8.5 - 10.5 mg/dL   CBC auto differential   Result Value Ref Range    WBC 9.7 4.8 - 10.8 thou/mm3    RBC 3.68 (L) 4.20 - 5.40 mill/mm3    Hemoglobin 11.7 (L) 12.0 - 16.0 gm/dl    Hematocrit 36.2 (L) 37.0 - 47.0 %    MCV 98.4 81.0 - 99.0 fL    MCH 31.8 26.0 - 33.0 pg    MCHC 32.3 32.2 - 35.5 gm/dl    RDW-CV 15.2 (H) 11.5 - 14.5 %    RDW-SD 54.8 (H) 35.0 - 45.0 fL    Platelets 001 828 - 958 thou/mm3    MPV 11.4 9.4 - 12.4 fL    Seg Neutrophils 55.7 %    Lymphocytes 36.5 %    Monocytes 6.1 %    Eosinophils 0.0 %    Basophils 0.3 %    Immature Granulocytes 1.4 %    Segs Absolute 5.4 1.8 - 7.7 thou/mm3    Lymphocytes Absolute 3.5 1.0 - 4.8 thou/mm3    Monocytes Absolute 0.6 0.4 - 1.3 thou/mm3    Eosinophils Absolute 0.0 0.0 - 0.4 thou/mm3    Basophils Absolute 0.0 0.0 - 0.1 thou/mm3    Immature Grans (Abs) 0.14 (H) 0.00 - 0.07 thou/mm3    nRBC 0 /100 wbc   Anion Gap   Result Value Ref Range    Anion Gap 11.0 8.0 - 16.0 meq/L   Glomerular Filtration Rate, Estimated   Result Value Ref Range    Est, Glom Filt Rate >90 ml/min/1.73m2   Procalcitonin   Result Value Ref Range    Procalcitonin 0.11 (H) 0.01 - 0.09 ng/mL   Blood Gas, Arterial   Result Value Ref Range    pH, Blood Gas 7.49 (H) 7.35 - 7.45    PCO2 34 (L) 35 - 45 mmhg    PO2 65 (L) 71 - 104 mmhg    HCO3 26 23 - 28 mmol/l    Base Excess (Calculated) 2.5 -2.5 - 2.5 mmol/l    O2 Sat 94 %    IFIO2 60     DEVICE HFNC     Nelson Test Positive     Source: L Radial     COLLECTED BY: [id number]    Hepatic Function Panel   Result Value Ref Range    Alb 3.3 (L) 3.5 - 5.1 g/dL    Total Bilirubin 0.5 0.3 - 1.2 mg/dL    Bilirubin, Direct <0.2 0.0 - 0.3 mg/dL    Alkaline Phosphatase 76 38 - 126 U/L    AST 27 5 - 40 U/L    ALT 14 11 - 66 U/L    Total Protein 6.4 6.1 - 8.0 g/dL   CBC   Result Value Ref Range    WBC 14.5 (H) 4.8 - 10.8 thou/mm3    RBC 4.04 (L) 4.20 - 5.40 mill/mm3    Hemoglobin 12.9 12.0 - 16.0 gm/dl    Hematocrit 39.3 37.0 - 47.0 %    MCV 97.3 81.0 - 99.0 fL    MCH 31.9 26.0 - 33.0 pg    MCHC 32.8 32.2 - 35.5 gm/dl    RDW-CV 15.1 (H) 11.5 - 14.5 %    RDW-SD 54.1 (H) 35.0 - 45.0 fL    Platelets 715 831 - 408 thou/mm3    MPV 11.2 9.4 - 12.4 fL   Basic Metabolic Panel   Result Value Ref Range    Sodium 142 135 - 145 meq/L    Potassium 3.2 (L) 3.5 - 5.2 meq/L    Chloride 105 98 - 111 meq/L    CO2 25 23 - 33 meq/L    Glucose 126 (H) 70 - 108 mg/dL    BUN 10 7 - 22 mg/dL    CREATININE 0.4 0.4 - 1.2 mg/dL    Calcium 9.2 8.5 - 10.5 mg/dL   Magnesium   Result Value Ref Range    Magnesium 1.5 (L) 1.6 - 2.4 mg/dL   Anion Gap   Result Value Ref Range    Anion Gap 12.0 8.0 - 16.0 meq/L   Glomerular Filtration Rate, Estimated   Result Value Ref Range    Est, Glom Filt Rate >90 ml/min/1.73m2   Hepatic Function Panel   Result Value Ref Range    Alb 2.8 (L) 3.5 - 5.1 g/dL    Total Bilirubin 0.5 0.3 - 1.2 mg/dL    Bilirubin, Direct <0.2 0.0 - 0.3 mg/dL    Alkaline Phosphatase 67 38 - 126 U/L    AST 19 5 - 40 U/L    ALT 12 11 - 66 U/L    Total Protein 5.9 (L) 6.1 - 8.0 g/dL   Magnesium   Result Value Ref Range    Magnesium 1.7 1.6 - 2.4 mg/dL   Potassium   Result Value Ref Range    Potassium 3.7 3.5 - 5.2 meq/L   Magnesium   Result Value Ref Range    Magnesium 2.1 1.6 - 2.4 mg/dL   Brain Natriuretic Peptide   Result Value Ref Range    Pro-.4 0.0 - 900.0 pg/mL   CBC   Result Value Ref Range    WBC 12.5 (H) 4.8 - 10.8 thou/mm3    RBC 3.51 (L) 4.20 - 5.40 mill/mm3    Hemoglobin 11.2 (L) 12.0 - 16.0 gm/dl    Hematocrit 34.3 (L) 37.0 - 47.0 %    MCV 97.7 81.0 - 99.0 fL    MCH 31.9 26.0 - 33.0 pg    MCHC 32.7 32.2 - 35.5 gm/dl    RDW-CV 15.1 (H) 11.5 - 14.5 %    RDW-SD 53.7 (H) 35.0 - 45.0 fL    Platelets 645 778 - 406 thou/mm3    MPV 11.0 9.4 - 12.4 fL   Basic Metabolic Panel   Result Value Ref Range    Sodium 139 135 - 145 meq/L    Potassium 3.5 3.5 - 5.2 meq/L    Chloride 105 98 - 111 meq/L    CO2 26 23 - 33 meq/L    Glucose 120 (H) 70 - 108 mg/dL    BUN 13 7 - 22 mg/dL    CREATININE 0.4 0.4 - 1.2 mg/dL    Calcium 9.2 8.5 - 10.5 mg/dL   Anion Gap   Result Value Ref Range    Anion Gap 8.0 8.0 - 16.0 meq/L   Glomerular Filtration Rate, Estimated   Result Value Ref Range    Est, Glom Filt Rate >90 ml/min/1.73m2   SPECIMEN REJECTION   Result Value Ref Range    Rejected Test cresp     Reason for Rejection see below    CBC   Result Value Ref Range    WBC 10.1 4.8 - 10.8 thou/mm3    RBC 3.68 (L) 4.20 - 5.40 mill/mm3    Hemoglobin 11.6 (L) 12.0 - 16.0 gm/dl    Hematocrit 36.2 (L) 37.0 - 47.0 %    MCV 98.4 81.0 - 99.0 fL    MCH 31.5 26.0 - 33.0 pg    MCHC 32.0 (L) 32.2 - 35.5 gm/dl    RDW-CV 14.9 (H) 11.5 - 14.5 %    RDW-SD 54.2 (H) 35.0 - 45.0 fL    Platelets 387 636 - 285 thou/mm3    MPV 11.0 9.4 - 12.4 fL   Basic Metabolic Panel   Result Value Ref Range    Sodium 139 135 - 145 meq/L    Potassium 3.4 (L) 3.5 - 5.2 meq/L    Chloride 103 98 - 111 meq/L    CO2 27 23 - 33 meq/L    Glucose 138 (H) 70 - 108 mg/dL    BUN 12 7 - 22 mg/dL    CREATININE 0.5 0.4 - 1.2 mg/dL    Calcium 9.6 8.5 - 10.5 mg/dL   Anion Gap   Result Value Ref Range    Anion Gap 9.0 8.0 - 16.0 meq/L   Glomerular Filtration Rate, Estimated   Result Value Ref Range    Est, Glom Filt Rate >90 ml/min/1.73m2   CBC   Result Value Ref Range    WBC 14.2 (H) 4.8 - 10.8 thou/mm3    RBC 3.71 (L) 4.20 - 5.40 mill/mm3    Hemoglobin 11.8 (L) 12.0 - 16.0 gm/dl    Hematocrit 37.0 37.0 - 47.0 %    MCV 99.7 (H) 81.0 - 99.0 fL    MCH 31.8 26.0 - 33.0 pg    MCHC 31.9 (L) 32.2 - 35.5 gm/dl    RDW-CV 15.0 (H) 11.5 - 14.5 %    RDW-SD 54.9 (H) 35.0 - 45.0 fL    Platelets 514 (H) 840 - 400 thou/mm3    MPV 10.7 9.4 - 12.4 fL   Basic Metabolic Panel   Result Value Ref Range    Sodium 138 135 - 145 meq/L    Potassium 3.8 3.5 - 5.2 meq/L    Chloride 102 98 - 111 meq/L    CO2 25 23 - 33 meq/L    Glucose 126 (H) 70 - 108 mg/dL    BUN 13 7 - 22 mg/dL    CREATININE 0.3 (L) 0.4 - 1.2 mg/dL    Calcium 9.5 8.5 - 10.5 mg/dL   Magnesium   Result Value Ref Range    Magnesium 1.7 1.6 - 2.4 mg/dL   Anion Gap   Result Value Ref Range    Anion Gap 11.0 8.0 - 16.0 meq/L   Glomerular Filtration Rate, Estimated   Result Value Ref Range    Est, Glom Filt Rate >90 ml/min/1.73m2   Procalcitonin   Result Value Ref Range    Procalcitonin 0.07 0.01 - 0.09 ng/mL   Brain Natriuretic Peptide   Result Value Ref Range    Pro-BNP 78.5 0.0 - 900.0 pg/mL   CBC   Result Value Ref Range    WBC 11.8 (H) 4.8 - 10.8 thou/mm3    RBC 3.53 (L) 4.20 - 5.40 mill/mm3    Hemoglobin 11.3 (L) 12.0 - 16.0 gm/dl    Hematocrit 34.8 (L) 37.0 - 47.0 %    MCV 98.6 81.0 - 99.0 fL    MCH 32.0 26.0 - 33.0 pg    MCHC 32.5 32.2 - 35.5 gm/dl    RDW-CV 14.9 (H) 11.5 - 14.5 %    RDW-SD 53.7 (H) 35.0 - 45.0 fL    Platelets 483 (H) 018 - 400 thou/mm3    MPV 10.9 9.4 - 12.4 fL   Comprehensive metabolic panel   Result Value Ref Range    Glucose 112 (H) 70 - 108 mg/dL    CREATININE 0.4 0.4 - 1.2 mg/dL    BUN 13 7 - 22 mg/dL    Sodium 136 135 - 145 meq/L    Potassium 4.4 3.5 - 5.2 meq/L    Chloride 100 98 - 111 meq/L    CO2 28 23 - 33 meq/L    Calcium 9.2 8.5 - 10.5 mg/dL    AST 15 5 - 40 U/L    Alkaline Phosphatase 64 38 - 126 U/L    Total Protein 5.5 (L) 6.1 - 8.0 g/dL    Alb 3.1 (L) 3.5 - 5.1 g/dL    Total Bilirubin 0.5 0.3 - 1.2 mg/dL    ALT 8 (L) 11 - 66 U/L   Anion Gap   Result Value Ref Range    Anion Gap 8.0 8.0 - 16.0 meq/L   Glomerular Filtration Rate, Estimated   Result Value Ref Range    Est, Glom Filt Rate >90 ml/min/1.73m2   CBC   Result Value Ref Range    WBC 10.6 4.8 - 10.8 thou/mm3    RBC 3.54 (L) 4.20 - 5.40 mill/mm3    Hemoglobin 11.4 (L) 12.0 - 16.0 gm/dl    Hematocrit 34.9 (L) 37.0 - 47.0 %    MCV 98.6 81.0 - 99.0 fL    MCH 32.2 26.0 - 33.0 pg    MCHC 32.7 32.2 - 35.5 gm/dl    RDW-CV 14.6 (H) 11.5 - 14.5 %    RDW-SD 53.0 (H) 35.0 - 45.0 fL    Platelets 629 (H) 633 - 400 thou/mm3    MPV 10.9 9.4 - 12.4 fL   Basic Metabolic Panel   Result Value Ref Range    Sodium 137 135 - 145 meq/L    Potassium 4.3 3.5 - 5.2 meq/L    Chloride 100 98 - 111 meq/L    CO2 25 23 - 33 meq/L    Glucose 127 (H) 70 - 108 mg/dL    BUN 13 7 - 22 mg/dL    CREATININE 0.4 0.4 - 1.2 mg/dL    Calcium 9.1 8.5 - 10.5 mg/dL   Anion Gap   Result Value Ref Range    Anion Gap 12.0 8.0 - 16.0 meq/L   Glomerular Filtration Rate, Estimated   Result Value Ref Range    Est, Glom Filt Rate >90 LC/TCR/8.60W6   Basic Metabolic Panel   Result Value Ref Range    Sodium 137 135 - 145 meq/L    Potassium 4.3 3.5 - 5.2 meq/L    Chloride 98 98 - 111 meq/L    CO2 24 23 - 33 meq/L    Glucose 186 (H) 70 - 108 mg/dL    BUN 17 7 - 22 mg/dL    CREATININE 0.5 0.4 - 1.2 mg/dL    Calcium 9.7 8.5 - 10.5 mg/dL   CBC   Result Value Ref Range    WBC 11.1 (H) 4.8 - 10.8 thou/mm3    RBC 3.78 (L) 4.20 - 5.40 mill/mm3    Hemoglobin 11.9 (L) 12.0 - 16.0 gm/dl    Hematocrit 37.7 37.0 - 47.0 %    MCV 99.7 (H) 81.0 - 99.0 fL    MCH 31.5 26.0 - 33.0 pg    MCHC 31.6 (L) 32.2 - 35.5 gm/dl    RDW-CV 14.6 (H) 11.5 - 14.5 %    RDW-SD 53.1 (H) 35.0 - 45.0 fL    Platelets 338 (H) 769 - 400 thou/mm3    MPV 10.8 9.4 - 12.4 fL   Anion Gap   Result Value Ref Range    Anion Gap 15.0 8.0 - 16.0 meq/L   Glomerular Filtration Rate, Estimated   Result Value Ref Range    Est, Glom Filt Rate >90 IF/RVE/1.35L1   Basic Metabolic Panel   Result Value Ref Range    Sodium 139 135 - 145 meq/L    Potassium 4.2 3.5 - 5.2 meq/L    Chloride 99 98 - 111 meq/L    CO2 27 23 - 33 meq/L    Glucose 120 (H) 70 - 108 mg/dL    BUN 25 (H) 7 - 22 mg/dL    CREATININE 0.5 0.4 - 1.2 mg/dL    Calcium 9.9 8.5 - 10.5 mg/dL   CBC   Result Value Ref Range    WBC 11.5 (H) 4.8 - 10.8 thou/mm3    RBC 3.88 (L) 4.20 - 5.40 mill/mm3    Hemoglobin 12.4 12.0 - 16.0 gm/dl    Hematocrit 38.2 37.0 - 47.0 %    MCV 98.5 81.0 - 99.0 fL    MCH 32.0 26.0 - 33.0 pg    MCHC 32.5 32.2 - 35.5 gm/dl    RDW-CV 14.7 (H) 11.5 - 14.5 %    RDW-SD 52.6 (H) 35.0 - 45.0 fL    Platelets 933 (H) 022 - 400 thou/mm3    MPV 10.9 9.4 - 12.4 fL   Magnesium   Result Value Ref Range    Magnesium 2.0 1.6 - 2.4 mg/dL   Anion Gap   Result Value Ref Range    Anion Gap 13.0 8.0 - 16.0 meq/L   Glomerular Filtration Rate, Estimated   Result Value Ref Range    Est, Glom Filt Rate >90 ml/min/1.73m2   POCT Glucose   Result Value Ref Range    POC Glucose 209 (H) 70 - 108 mg/dl   POCT Glucose   Result Value Ref Range POC Glucose 124 (H) 70 - 108 mg/dl   POCT Glucose   Result Value Ref Range    POC Glucose 195 (H) 70 - 108 mg/dl   POCT Glucose   Result Value Ref Range    POC Glucose 152 (H) 70 - 108 mg/dl   POCT Glucose   Result Value Ref Range    POC Glucose 126 (H) 70 - 108 mg/dl   POCT Glucose   Result Value Ref Range    POC Glucose 122 (H) 70 - 108 mg/dl   POCT Glucose   Result Value Ref Range    POC Glucose 212 (H) 70 - 108 mg/dl   POCT Glucose   Result Value Ref Range    POC Glucose 130 (H) 70 - 108 mg/dl   POCT Glucose   Result Value Ref Range    POC Glucose 182 (H) 70 - 108 mg/dl   POCT Glucose   Result Value Ref Range    POC Glucose 189 (H) 70 - 108 mg/dl   POCT Glucose   Result Value Ref Range    POC Glucose 189 (H) 70 - 108 mg/dl   POCT Glucose   Result Value Ref Range    POC Glucose 128 (H) 70 - 108 mg/dl   POCT Glucose   Result Value Ref Range    POC Glucose 173 (H) 70 - 108 mg/dl   POCT Glucose   Result Value Ref Range    POC Glucose 222 (H) 70 - 108 mg/dl   POCT Glucose   Result Value Ref Range    POC Glucose 181 (H) 70 - 108 mg/dl   POCT Glucose   Result Value Ref Range    POC Glucose 172 (H) 70 - 108 mg/dl   POCT Glucose   Result Value Ref Range    POC Glucose 207 (H) 70 - 108 mg/dl   POCT Glucose   Result Value Ref Range    POC Glucose 171 (H) 70 - 108 mg/dl   POCT Glucose   Result Value Ref Range    POC Glucose 127 (H) 70 - 108 mg/dl   POCT Glucose   Result Value Ref Range    POC Glucose 264 (H) 70 - 108 mg/dl   POCT Glucose   Result Value Ref Range    POC Glucose 218 (H) 70 - 108 mg/dl   POCT Glucose   Result Value Ref Range    POC Glucose 184 (H) 70 - 108 mg/dl   POCT Glucose   Result Value Ref Range    POC Glucose 235 (H) 70 - 108 mg/dl   POCT Glucose   Result Value Ref Range    POC Glucose 212 (H) 70 - 108 mg/dl   POCT Glucose   Result Value Ref Range    POC Glucose 168 (H) 70 - 108 mg/dl   POCT Glucose   Result Value Ref Range    POC Glucose 153 (H) 70 - 108 mg/dl   POCT Glucose   Result Value Ref Range POC Glucose 236 (H) 70 - 108 mg/dl   POCT Glucose   Result Value Ref Range    POC Glucose 238 (H) 70 - 108 mg/dl   POCT Glucose   Result Value Ref Range    POC Glucose 113 (H) 70 - 108 mg/dl   POCT Glucose   Result Value Ref Range    POC Glucose 204 (H) 70 - 108 mg/dl   POCT Glucose   Result Value Ref Range    POC Glucose 248 (H) 70 - 108 mg/dl   POCT Glucose   Result Value Ref Range    POC Glucose 255 (H) 70 - 108 mg/dl   POCT Glucose   Result Value Ref Range    POC Glucose 134 (H) 70 - 108 mg/dl   POCT Glucose   Result Value Ref Range    POC Glucose 190 (H) 70 - 108 mg/dl   POCT Glucose   Result Value Ref Range    POC Glucose 233 (H) 70 - 108 mg/dl   POCT Glucose   Result Value Ref Range    POC Glucose 174 (H) 70 - 108 mg/dl   POCT Glucose   Result Value Ref Range    POC Glucose 136 (H) 70 - 108 mg/dl   POCT Glucose   Result Value Ref Range    POC Glucose 196 (H) 70 - 108 mg/dl   EKG 12 Lead   Result Value Ref Range    Ventricular Rate 106 BPM    Atrial Rate 106 BPM    P-R Interval 238 ms    QRS Duration 92 ms    Q-T Interval 312 ms    QTc Calculation (Bazett) 414 ms    P Axis 21 degrees    R Axis -9 degrees    T Axis -16 degrees   ABO/RH   Result Value Ref Range    ABO O     Rh Factor POS        Assessment:     Carcinoma of the left breast with 3 separate areas showing poorly differentiated adenocarcinoma from a surgical perspective we have discussed with the patient in the past I feel there is no other option than mastectomy and sentinel lymph node biopsy patient understands the process she understands her neoadjuvant treatment was cut short because of the Covid we discussed the problems with wound healing mastectomy flap necrosis she voices understanding would like to proceed    Plan:     1. Schedule Bhargavi for left mastectomy sentinel lymph node biopsy  2. The risks, benefits and alternatives were discussed with Faina. She understands and wishes to proceed with surgical intervention.   3. Restrictions discussed with Lynette Florence and she expresses understanding. 4. She is advised to call back directly if there are further questions/concerns, or if her symptoms worsen prior to surgery.             Electronicallysigned by Sloane Brown MD on 11/10/2020 at 3:46 PM

## 2020-11-30 ENCOUNTER — ANESTHESIA (OUTPATIENT)
Dept: OPERATING ROOM | Age: 70
End: 2020-11-30
Payer: MEDICARE

## 2020-11-30 ENCOUNTER — HOSPITAL ENCOUNTER (OUTPATIENT)
Dept: NUCLEAR MEDICINE | Age: 70
Discharge: HOME | End: 2020-11-30

## 2020-11-30 ENCOUNTER — HOSPITAL ENCOUNTER (OUTPATIENT)
Age: 70
Discharge: HOME HEALTH | End: 2020-12-01
Attending: SURGERY | Admitting: SURGERY
Payer: MEDICARE

## 2020-11-30 ENCOUNTER — ANESTHESIA EVENT (OUTPATIENT)
Dept: OPERATING ROOM | Age: 70
End: 2020-11-30
Payer: MEDICARE

## 2020-11-30 VITALS
SYSTOLIC BLOOD PRESSURE: 98 MMHG | RESPIRATION RATE: 1 BRPM | OXYGEN SATURATION: 94 % | DIASTOLIC BLOOD PRESSURE: 61 MMHG

## 2020-11-30 PROBLEM — Z90.13 S/P MASTECTOMY, BILATERAL: Status: ACTIVE | Noted: 2020-11-30

## 2020-11-30 PROCEDURE — 3700000000 HC ANESTHESIA ATTENDED CARE: Performed by: SURGERY

## 2020-11-30 PROCEDURE — 6360000002 HC RX W HCPCS: Performed by: SURGERY

## 2020-11-30 PROCEDURE — 88307 TISSUE EXAM BY PATHOLOGIST: CPT

## 2020-11-30 PROCEDURE — 2720000010 HC SURG SUPPLY STERILE: Performed by: SURGERY

## 2020-11-30 PROCEDURE — 19301 PARTIAL MASTECTOMY: CPT | Performed by: SURGERY

## 2020-11-30 PROCEDURE — 3600000006 HC SURGERY LEVEL 6 BASE: Performed by: SURGERY

## 2020-11-30 PROCEDURE — A9541 TC99M SULFUR COLLOID: HCPCS | Performed by: SURGERY

## 2020-11-30 PROCEDURE — 7100000000 HC PACU RECOVERY - FIRST 15 MIN: Performed by: SURGERY

## 2020-11-30 PROCEDURE — 6360000002 HC RX W HCPCS: Performed by: NURSE ANESTHETIST, CERTIFIED REGISTERED

## 2020-11-30 PROCEDURE — 2709999900 HC NON-CHARGEABLE SUPPLY: Performed by: SURGERY

## 2020-11-30 PROCEDURE — 7100000001 HC PACU RECOVERY - ADDTL 15 MIN: Performed by: SURGERY

## 2020-11-30 PROCEDURE — 6370000000 HC RX 637 (ALT 250 FOR IP): Performed by: SURGERY

## 2020-11-30 PROCEDURE — 3700000001 HC ADD 15 MINUTES (ANESTHESIA): Performed by: SURGERY

## 2020-11-30 PROCEDURE — 2580000003 HC RX 258

## 2020-11-30 PROCEDURE — 6360000002 HC RX W HCPCS

## 2020-11-30 PROCEDURE — 3430000000 HC RX DIAGNOSTIC RADIOPHARMACEUTICAL: Performed by: SURGERY

## 2020-11-30 PROCEDURE — 38792 RA TRACER ID OF SENTINL NODE: CPT

## 2020-11-30 PROCEDURE — 2500000003 HC RX 250 WO HCPCS: Performed by: NURSE ANESTHETIST, CERTIFIED REGISTERED

## 2020-11-30 PROCEDURE — 38525 BIOPSY/REMOVAL LYMPH NODES: CPT | Performed by: SURGERY

## 2020-11-30 PROCEDURE — 3600000016 HC SURGERY LEVEL 6 ADDTL 15MIN: Performed by: SURGERY

## 2020-11-30 PROCEDURE — 2580000003 HC RX 258: Performed by: SURGERY

## 2020-11-30 RX ORDER — ISOSULFAN BLUE 50 MG/5ML
INJECTION, SOLUTION SUBCUTANEOUS PRN
Status: DISCONTINUED | OUTPATIENT
Start: 2020-11-30 | End: 2020-11-30 | Stop reason: ALTCHOICE

## 2020-11-30 RX ORDER — GLYCOPYRROLATE 1 MG/5 ML
SYRINGE (ML) INTRAVENOUS PRN
Status: DISCONTINUED | OUTPATIENT
Start: 2020-11-30 | End: 2020-11-30 | Stop reason: SDUPTHER

## 2020-11-30 RX ORDER — ONDANSETRON 2 MG/ML
4 INJECTION INTRAMUSCULAR; INTRAVENOUS EVERY 6 HOURS PRN
Status: DISCONTINUED | OUTPATIENT
Start: 2020-11-30 | End: 2020-12-01 | Stop reason: HOSPADM

## 2020-11-30 RX ORDER — ZINC SULFATE 50(220)MG
50 CAPSULE ORAL DAILY
Status: DISCONTINUED | OUTPATIENT
Start: 2020-11-30 | End: 2020-12-01 | Stop reason: HOSPADM

## 2020-11-30 RX ORDER — SODIUM CHLORIDE 9 MG/ML
INJECTION, SOLUTION INTRAVENOUS CONTINUOUS
Status: DISCONTINUED | OUTPATIENT
Start: 2020-11-30 | End: 2020-12-01

## 2020-11-30 RX ORDER — HYDROMORPHONE HCL 110MG/55ML
PATIENT CONTROLLED ANALGESIA SYRINGE INTRAVENOUS PRN
Status: DISCONTINUED | OUTPATIENT
Start: 2020-11-30 | End: 2020-11-30 | Stop reason: SDUPTHER

## 2020-11-30 RX ORDER — FAMOTIDINE 20 MG/1
20 TABLET, FILM COATED ORAL 2 TIMES DAILY
Status: DISCONTINUED | OUTPATIENT
Start: 2020-11-30 | End: 2020-12-01 | Stop reason: HOSPADM

## 2020-11-30 RX ORDER — ONDANSETRON 2 MG/ML
4 INJECTION INTRAMUSCULAR; INTRAVENOUS
Status: DISCONTINUED | OUTPATIENT
Start: 2020-11-30 | End: 2020-11-30 | Stop reason: HOSPADM

## 2020-11-30 RX ORDER — PROPOFOL 10 MG/ML
INJECTION, EMULSION INTRAVENOUS PRN
Status: DISCONTINUED | OUTPATIENT
Start: 2020-11-30 | End: 2020-11-30 | Stop reason: SDUPTHER

## 2020-11-30 RX ORDER — SODIUM CHLORIDE 0.9 % (FLUSH) 0.9 %
10 SYRINGE (ML) INJECTION EVERY 12 HOURS SCHEDULED
Status: DISCONTINUED | OUTPATIENT
Start: 2020-11-30 | End: 2020-12-01 | Stop reason: HOSPADM

## 2020-11-30 RX ORDER — FENTANYL CITRATE 50 UG/ML
INJECTION, SOLUTION INTRAMUSCULAR; INTRAVENOUS PRN
Status: DISCONTINUED | OUTPATIENT
Start: 2020-11-30 | End: 2020-11-30 | Stop reason: SDUPTHER

## 2020-11-30 RX ORDER — LOPERAMIDE HYDROCHLORIDE 2 MG/1
2 CAPSULE ORAL 4 TIMES DAILY PRN
Status: DISCONTINUED | OUTPATIENT
Start: 2020-11-30 | End: 2020-12-01 | Stop reason: HOSPADM

## 2020-11-30 RX ORDER — SODIUM CHLORIDE 0.9 % (FLUSH) 0.9 %
10 SYRINGE (ML) INJECTION PRN
Status: DISCONTINUED | OUTPATIENT
Start: 2020-11-30 | End: 2020-12-01 | Stop reason: HOSPADM

## 2020-11-30 RX ORDER — CEFAZOLIN SODIUM 1 G/50ML
1 INJECTION, SOLUTION INTRAVENOUS
Status: COMPLETED | OUTPATIENT
Start: 2020-11-30 | End: 2020-11-30

## 2020-11-30 RX ORDER — ONDANSETRON 4 MG/1
4 TABLET, ORALLY DISINTEGRATING ORAL EVERY 8 HOURS PRN
Status: DISCONTINUED | OUTPATIENT
Start: 2020-11-30 | End: 2020-12-01 | Stop reason: HOSPADM

## 2020-11-30 RX ORDER — OXYCODONE HYDROCHLORIDE 5 MG/1
5 TABLET ORAL EVERY 4 HOURS PRN
Status: DISCONTINUED | OUTPATIENT
Start: 2020-11-30 | End: 2020-12-01 | Stop reason: HOSPADM

## 2020-11-30 RX ORDER — LABETALOL 20 MG/4 ML (5 MG/ML) INTRAVENOUS SYRINGE
5 EVERY 10 MIN PRN
Status: DISCONTINUED | OUTPATIENT
Start: 2020-11-30 | End: 2020-11-30 | Stop reason: HOSPADM

## 2020-11-30 RX ORDER — HYDRALAZINE HYDROCHLORIDE 20 MG/ML
5 INJECTION INTRAMUSCULAR; INTRAVENOUS EVERY 10 MIN PRN
Status: DISCONTINUED | OUTPATIENT
Start: 2020-11-30 | End: 2020-11-30 | Stop reason: HOSPADM

## 2020-11-30 RX ORDER — PAROXETINE HYDROCHLORIDE 20 MG/1
10 TABLET, FILM COATED ORAL EVERY MORNING
Status: DISCONTINUED | OUTPATIENT
Start: 2020-12-01 | End: 2020-12-01 | Stop reason: HOSPADM

## 2020-11-30 RX ORDER — PROMETHAZINE HYDROCHLORIDE 25 MG/1
12.5 TABLET ORAL EVERY 6 HOURS PRN
Status: DISCONTINUED | OUTPATIENT
Start: 2020-11-30 | End: 2020-12-01 | Stop reason: HOSPADM

## 2020-11-30 RX ORDER — SODIUM CHLORIDE 9 MG/ML
INJECTION, SOLUTION INTRAVENOUS CONTINUOUS
Status: DISCONTINUED | OUTPATIENT
Start: 2020-11-30 | End: 2020-11-30

## 2020-11-30 RX ORDER — METOCLOPRAMIDE HYDROCHLORIDE 5 MG/ML
10 INJECTION INTRAMUSCULAR; INTRAVENOUS
Status: DISCONTINUED | OUTPATIENT
Start: 2020-11-30 | End: 2020-11-30 | Stop reason: HOSPADM

## 2020-11-30 RX ORDER — 0.9 % SODIUM CHLORIDE 0.9 %
500 INTRAVENOUS SOLUTION INTRAVENOUS
Status: DISCONTINUED | OUTPATIENT
Start: 2020-11-30 | End: 2020-11-30 | Stop reason: HOSPADM

## 2020-11-30 RX ORDER — ONDANSETRON 2 MG/ML
INJECTION INTRAMUSCULAR; INTRAVENOUS PRN
Status: DISCONTINUED | OUTPATIENT
Start: 2020-11-30 | End: 2020-11-30 | Stop reason: SDUPTHER

## 2020-11-30 RX ORDER — OXYCODONE HYDROCHLORIDE 5 MG/1
10 TABLET ORAL EVERY 4 HOURS PRN
Status: DISCONTINUED | OUTPATIENT
Start: 2020-11-30 | End: 2020-12-01 | Stop reason: HOSPADM

## 2020-11-30 RX ORDER — LIDOCAINE HCL/PF 100 MG/5ML
SYRINGE (ML) INJECTION PRN
Status: DISCONTINUED | OUTPATIENT
Start: 2020-11-30 | End: 2020-11-30 | Stop reason: SDUPTHER

## 2020-11-30 RX ORDER — FUROSEMIDE 20 MG/1
20 TABLET ORAL DAILY
Status: DISCONTINUED | OUTPATIENT
Start: 2020-11-30 | End: 2020-12-01 | Stop reason: HOSPADM

## 2020-11-30 RX ORDER — MIDAZOLAM HYDROCHLORIDE 1 MG/ML
INJECTION INTRAMUSCULAR; INTRAVENOUS PRN
Status: DISCONTINUED | OUTPATIENT
Start: 2020-11-30 | End: 2020-11-30 | Stop reason: SDUPTHER

## 2020-11-30 RX ORDER — ALBUTEROL SULFATE 2.5 MG/3ML
2.5 SOLUTION RESPIRATORY (INHALATION) EVERY 6 HOURS PRN
Status: DISCONTINUED | OUTPATIENT
Start: 2020-11-30 | End: 2020-12-01 | Stop reason: HOSPADM

## 2020-11-30 RX ORDER — SUCCINYLCHOLINE/SOD CL,ISO/PF 200MG/10ML
SYRINGE (ML) INTRAVENOUS PRN
Status: DISCONTINUED | OUTPATIENT
Start: 2020-11-30 | End: 2020-11-30 | Stop reason: SDUPTHER

## 2020-11-30 RX ORDER — DEXAMETHASONE SODIUM PHOSPHATE 10 MG/ML
INJECTION, EMULSION INTRAMUSCULAR; INTRAVENOUS PRN
Status: DISCONTINUED | OUTPATIENT
Start: 2020-11-30 | End: 2020-11-30 | Stop reason: SDUPTHER

## 2020-11-30 RX ORDER — ALENDRONATE SODIUM 35 MG/1
35 TABLET ORAL
Status: DISCONTINUED | OUTPATIENT
Start: 2020-11-30 | End: 2020-11-30 | Stop reason: RX

## 2020-11-30 RX ORDER — LEVOTHYROXINE SODIUM 0.03 MG/1
25 TABLET ORAL DAILY
Status: DISCONTINUED | OUTPATIENT
Start: 2020-12-01 | End: 2020-12-01 | Stop reason: HOSPADM

## 2020-11-30 RX ADMIN — FAMOTIDINE 20 MG: 20 TABLET, FILM COATED ORAL at 21:55

## 2020-11-30 RX ADMIN — Medication 120 MG: at 11:13

## 2020-11-30 RX ADMIN — HYDROMORPHONE HYDROCHLORIDE 1 MG: 2 INJECTION INTRAMUSCULAR; INTRAVENOUS; SUBCUTANEOUS at 12:31

## 2020-11-30 RX ADMIN — OXYCODONE HYDROCHLORIDE 10 MG: 5 TABLET ORAL at 23:34

## 2020-11-30 RX ADMIN — Medication 100 MG: at 11:13

## 2020-11-30 RX ADMIN — FENTANYL CITRATE 25 MCG: 50 INJECTION, SOLUTION INTRAMUSCULAR; INTRAVENOUS at 12:13

## 2020-11-30 RX ADMIN — MIDAZOLAM HYDROCHLORIDE 2 MG: 1 INJECTION, SOLUTION INTRAMUSCULAR; INTRAVENOUS at 11:09

## 2020-11-30 RX ADMIN — Medication 0.2 MG: at 11:36

## 2020-11-30 RX ADMIN — CEFAZOLIN SODIUM 1 G: 1 INJECTION, SOLUTION INTRAVENOUS at 11:15

## 2020-11-30 RX ADMIN — HYDROMORPHONE HYDROCHLORIDE 0.5 MG: 1 INJECTION, SOLUTION INTRAMUSCULAR; INTRAVENOUS; SUBCUTANEOUS at 21:55

## 2020-11-30 RX ADMIN — SODIUM CHLORIDE: 9 INJECTION, SOLUTION INTRAVENOUS at 15:30

## 2020-11-30 RX ADMIN — SODIUM CHLORIDE: 9 INJECTION, SOLUTION INTRAVENOUS at 11:01

## 2020-11-30 RX ADMIN — Medication 50 MG: at 21:56

## 2020-11-30 RX ADMIN — FENTANYL CITRATE 100 MCG: 50 INJECTION, SOLUTION INTRAMUSCULAR; INTRAVENOUS at 11:09

## 2020-11-30 RX ADMIN — ONDANSETRON HYDROCHLORIDE 4 MG: 4 INJECTION, SOLUTION INTRAMUSCULAR; INTRAVENOUS at 11:24

## 2020-11-30 RX ADMIN — DEXAMETHASONE SODIUM PHOSPHATE 8 MG: 10 INJECTION, EMULSION INTRAMUSCULAR; INTRAVENOUS at 11:24

## 2020-11-30 RX ADMIN — PROPOFOL 150 MG: 10 INJECTION, EMULSION INTRAVENOUS at 11:13

## 2020-11-30 RX ADMIN — Medication 949 MICRO CURIE: at 08:20

## 2020-11-30 RX ADMIN — HYDROMORPHONE HYDROCHLORIDE 0.5 MG: 1 INJECTION, SOLUTION INTRAMUSCULAR; INTRAVENOUS; SUBCUTANEOUS at 15:53

## 2020-11-30 RX ADMIN — FENTANYL CITRATE 100 MCG: 50 INJECTION, SOLUTION INTRAMUSCULAR; INTRAVENOUS at 11:41

## 2020-11-30 RX ADMIN — FENTANYL CITRATE 25 MCG: 50 INJECTION, SOLUTION INTRAMUSCULAR; INTRAVENOUS at 12:10

## 2020-11-30 ASSESSMENT — PULMONARY FUNCTION TESTS
PIF_VALUE: 22
PIF_VALUE: 22
PIF_VALUE: 23
PIF_VALUE: 19
PIF_VALUE: 22
PIF_VALUE: 19
PIF_VALUE: 22
PIF_VALUE: 22
PIF_VALUE: 21
PIF_VALUE: 2
PIF_VALUE: 22
PIF_VALUE: 20
PIF_VALUE: 24
PIF_VALUE: 21
PIF_VALUE: 4
PIF_VALUE: 22
PIF_VALUE: 23
PIF_VALUE: 22
PIF_VALUE: 21
PIF_VALUE: 22
PIF_VALUE: 23
PIF_VALUE: 19
PIF_VALUE: 22
PIF_VALUE: 21
PIF_VALUE: 22
PIF_VALUE: 23
PIF_VALUE: 25
PIF_VALUE: 22
PIF_VALUE: 24
PIF_VALUE: 20
PIF_VALUE: 23
PIF_VALUE: 22
PIF_VALUE: 22
PIF_VALUE: 19
PIF_VALUE: 23
PIF_VALUE: 21
PIF_VALUE: 23
PIF_VALUE: 23
PIF_VALUE: 24
PIF_VALUE: 23
PIF_VALUE: 1
PIF_VALUE: 21
PIF_VALUE: 23
PIF_VALUE: 20
PIF_VALUE: 1
PIF_VALUE: 2
PIF_VALUE: 24
PIF_VALUE: 22
PIF_VALUE: 23
PIF_VALUE: 23
PIF_VALUE: 19
PIF_VALUE: 22
PIF_VALUE: 20
PIF_VALUE: 23
PIF_VALUE: 21
PIF_VALUE: 23
PIF_VALUE: 22
PIF_VALUE: 23
PIF_VALUE: 22
PIF_VALUE: 7
PIF_VALUE: 21
PIF_VALUE: 23
PIF_VALUE: 2
PIF_VALUE: 23
PIF_VALUE: 22
PIF_VALUE: 24
PIF_VALUE: 22
PIF_VALUE: 2
PIF_VALUE: 22
PIF_VALUE: 23
PIF_VALUE: 21
PIF_VALUE: 24
PIF_VALUE: 23
PIF_VALUE: 24
PIF_VALUE: 24
PIF_VALUE: 23
PIF_VALUE: 19
PIF_VALUE: 23
PIF_VALUE: 2
PIF_VALUE: 23
PIF_VALUE: 19
PIF_VALUE: 23
PIF_VALUE: 22
PIF_VALUE: 23
PIF_VALUE: 2
PIF_VALUE: 21
PIF_VALUE: 2
PIF_VALUE: 20
PIF_VALUE: 23
PIF_VALUE: 24
PIF_VALUE: 23
PIF_VALUE: 25
PIF_VALUE: 24
PIF_VALUE: 21
PIF_VALUE: 23
PIF_VALUE: 22
PIF_VALUE: 31
PIF_VALUE: 23
PIF_VALUE: 21
PIF_VALUE: 23
PIF_VALUE: 22
PIF_VALUE: 1
PIF_VALUE: 19
PIF_VALUE: 23
PIF_VALUE: 20
PIF_VALUE: 23
PIF_VALUE: 22
PIF_VALUE: 20
PIF_VALUE: 22
PIF_VALUE: 24
PIF_VALUE: 22
PIF_VALUE: 15
PIF_VALUE: 20
PIF_VALUE: 22
PIF_VALUE: 20
PIF_VALUE: 21
PIF_VALUE: 23
PIF_VALUE: 23
PIF_VALUE: 1
PIF_VALUE: 18
PIF_VALUE: 6
PIF_VALUE: 22
PIF_VALUE: 23
PIF_VALUE: 22
PIF_VALUE: 23
PIF_VALUE: 20
PIF_VALUE: 15
PIF_VALUE: 19
PIF_VALUE: 23
PIF_VALUE: 22
PIF_VALUE: 20
PIF_VALUE: 22
PIF_VALUE: 23
PIF_VALUE: 24
PIF_VALUE: 21
PIF_VALUE: 1
PIF_VALUE: 24
PIF_VALUE: 22
PIF_VALUE: 23
PIF_VALUE: 20
PIF_VALUE: 21
PIF_VALUE: 20
PIF_VALUE: 22
PIF_VALUE: 25
PIF_VALUE: 24
PIF_VALUE: 22
PIF_VALUE: 1
PIF_VALUE: 1
PIF_VALUE: 24
PIF_VALUE: 15
PIF_VALUE: 19

## 2020-11-30 ASSESSMENT — PAIN DESCRIPTION - LOCATION: LOCATION: CHEST

## 2020-11-30 ASSESSMENT — PAIN SCALES - GENERAL
PAINLEVEL_OUTOF10: 9
PAINLEVEL_OUTOF10: 8
PAINLEVEL_OUTOF10: 9
PAINLEVEL_OUTOF10: 8
PAINLEVEL_OUTOF10: 7
PAINLEVEL_OUTOF10: 8
PAINLEVEL_OUTOF10: 6

## 2020-11-30 ASSESSMENT — PAIN DESCRIPTION - DESCRIPTORS: DESCRIPTORS: SORE;TENDER

## 2020-11-30 ASSESSMENT — PAIN - FUNCTIONAL ASSESSMENT: PAIN_FUNCTIONAL_ASSESSMENT: 0-10

## 2020-11-30 ASSESSMENT — ENCOUNTER SYMPTOMS: SHORTNESS OF BREATH: 1

## 2020-11-30 ASSESSMENT — PAIN DESCRIPTION - PAIN TYPE: TYPE: SURGICAL PAIN

## 2020-11-30 ASSESSMENT — PAIN DESCRIPTION - ORIENTATION: ORIENTATION: RIGHT;LEFT

## 2020-11-30 NOTE — PROGRESS NOTES
Oriented to sds      20  Refuses flu and pneumonia vaccine. Family updated to stay in room. Informed family to take belonging with them if they leave. Keep nothing of value in room unattended. Waldo Sibley was asked and agreed to first name and last initial being put on white boards. Allergy and fall risks applied. SCD Applied to patient. Warming blanket applied to patient. Pt denies any abuse or thoughts of suicide.

## 2020-11-30 NOTE — BRIEF OP NOTE
Brief Postoperative Note      Patient: Mary Holcomb  YOB: 1950  MRN: [id number]    Date of Procedure: 11/30/2020    Pre-Op Diagnosis: LEFT BREAST CANCER, RIGHT PROPHYLACTIC    Post-Op Diagnosis: same       Procedure(s):  LEFT SIMPLE MASTECTOMY AND SENTINAL LYMPH NODE BIOPSY, RIGHT SIMPLE PROPHYLACTIC MASTECTOMY    Surgeon(s):  Gabby Alves MD    Assistant:      Anesthesia: General    Estimated Blood Loss (mL): 113 ml    Complications: none    Specimens:   ID Type Source Tests Collected by Time Destination   A : left sentinel lymph node Tissue Lymph Node SURGICAL PATHOLOGY Gabby Alves MD 11/30/2020 1127    B : right breast Tissue Breast SURGICAL PATHOLOGY Gabby Alves MD 11/30/2020 1238    D : left breast Tissue Breast SURGICAL PATHOLOGY Gabby Alves MD 11/30/2020 1237        Implants:      Drains:   Closed/Suction Drain Inferior; Left Chest Bulb 15 [address] (Active)       Closed/Suction Drain Lateral;Right Chest Bulb 15 [address] (Active)       Findings: see op note    Electronically signed by Gabby Alves MD on 11/30/2020 at 1:48 PM

## 2020-11-30 NOTE — BRIEF OP NOTE
Brief Postoperative Note      Patient: Annelise Medrano  YOB: 1950  MRN: [id number]    Date of Procedure: 11/30/2020    Pre-Op Diagnosis: Wound Hematoma    Post-Op Diagnosis:same       Operation  Evacuation Wound hematoma    Surgeon(s):  Warren Orta MD    Assistant:      Anesthesia: General    Estimated Blood Loss (mL): 116    Complications: none    Specimens:   ID Type Source Tests Collected by Time Destination   A : left sentinel lymph node Tissue Lymph Node SURGICAL PATHOLOGY Warren Orta MD 11/30/2020 1127    B : right breast Tissue Breast SURGICAL PATHOLOGY Warren Orta MD 11/30/2020 1238    D : left breast Tissue Breast SURGICAL PATHOLOGY Warren Orta MD 11/30/2020 1237        Implants:        Drains:   Closed/Suction Drain Inferior; Left Chest Bulb 15 [place] [address] (Active)       Closed/Suction Drain Lateral;Right Chest Bulb 15 [address] (Active)       Findings: see op note    Electronically signed by Warren Orta MD on 11/30/2020 at 3:08 PM

## 2020-11-30 NOTE — ANESTHESIA PRE PROCEDURE
Take by mouth daily   Yes Historical Provider, MD   Cyanocobalamin (VITAMIN B12 PO) Take 1,000 mcg by mouth daily    Yes Historical Provider, MD   ondansetron (ZOFRAN ODT) 4 MG disintegrating tablet Take 1 tablet by mouth every 8 hours as needed for Nausea 1/23/16   Feli Brown MD       Current medications:    Current Facility-Administered Medications   Medication Dose Route Frequency Provider Last Rate Last Dose    0.9 % sodium chloride infusion   Intravenous Continuous Linda Steiner LPN        ceFAZolin (ANCEF) 1 g in dextrose 5 % 50 mL IVPB (premix)  1 g Intravenous 30 Min Pre-Op Linda Steiner LPN           Allergies:     Allergies   Allergen Reactions    Aluminum-Containing Compounds Anaphylaxis       Problem List:    Patient Active Problem List   Diagnosis Code    Left leg numbness R20.0    Mental status alteration R41.82    Cognitive impairment  judgement  recent memory R41.89    Depression F32.9    Vitamin D deficiency E55.9    Altered mental status R41.82    Malignant neoplasm of upper-inner quadrant of left breast in female, estrogen receptor positive ([facility] C50.212, Z17.0    Malignant neoplasm of upper-outer quadrant of left breast in female, estrogen receptor positive ([facility] C50.412, Z17.0    Chemotherapy management, encounter for Z51.11    Hypokalemia E87.6    COVID-19 virus infection U07.1    Acute respiratory failure with hypoxia (HCC) J96.01    COVID-19 U07.1    Pneumonia due to COVID-19 virus U07.1, J12.89    Hyponatremia E87.1    Leukocytosis D72.829    Hyperglycemia R73.9    High anion gap metabolic acidosis I89.3    History of left breast cancer Z85.3    Hyperlipidemia E78.5    History of hypothyroidism Z86.39    Chronic diarrhea K52.9    Severe malnutrition (HCC) E43    S/P mastectomy, bilateral Z90.13       Past Medical History:        Diagnosis Date    Breast cancer ([facility] 2020    left-invasive ductal follows with Dr Carnella Rubinstein COVID-19 10/07/2020    wears o2 at night    Hyperlipidemia     Numbness and tingling     left foot-chronic nerve damage    Shortness of breath        Past Surgical History:        Procedure Laterality Date    ABDOMINAL EXPLORATION SURGERY  2014    Dr butterfield-lysis of adhesions    BREAST LUMPECTOMY Left 11/2015    Left breast lumpectomy, retroareolar with preoperative needle loc-Dr. Cristal Muñiz BREAST SURGERY Left 06/26/2020    biopsy of left breast    CHOLECYSTECTOMY  10/2014    Dr Dione Merrill  03/30/2016    Dr Yong Disla  10/2014    x4 abdominal wall    HYSTERECTOMY      INCISIONAL HERNIA REPAIR  2014    Dr Moreira Letters LIPECTOMY  2014    Dr Inna Escobar LEFT  7/16/2020    [address] LEFT 7/16/2020 García Alexander MD [facility]    PORT SURGERY Right 7/17/2020    SINGLE LUMEN SMART PORT INSERTION RIGHT SUBCLAVIAN performed by Gume Schmidt MD at [address]  10/2014    Dr Keith Roth      patient was 11years old    TUBAL LIGATION         Social History:    Social History     Tobacco Use    Smoking status: Never Smoker    Smokeless tobacco: Never Used   Substance Use Topics    Alcohol use: No     Alcohol/week: 0.0 standard drinks                                Counseling given: Not Answered      Vital Signs (Current):   Vitals:    11/30/20 0852   BP: (!) 141/70   Pulse: 77   Resp: 16   Temp: 97.1 °F (36.2 °C)   TempSrc: Temporal   SpO2: 94%   Weight: 209 lb 9.6 oz (95.1 kg)   Height: 5' 9\" (1.753 m)                                              BP Readings from Last 3 Encounters:   11/30/20 (!) 141/70   11/10/20 118/68   10/21/20 133/80       NPO Status: Time of last liquid consumption: 0630                        Time of last solid consumption: 2100                        Date of last liquid consumption: 11/30/20                        Date of last solid food consumption: 11/30/20    BMI:   Wt Readings from Last 3 Encounters:   11/30/20 209 lb 9.6 oz (95.1 kg)   11/10/20 205 lb 12.8 oz (93.4 kg)   10/20/20 209 lb 12.8 oz (95.2 kg)     Body mass index is 30.95 kg/m². CBC:   Lab Results   Component Value Date    WBC 11.5 10/21/2020    RBC 3.88 10/21/2020    HGB 12.4 10/21/2020    HCT 38.2 10/21/2020    MCV 98.5 10/21/2020    RDW 15.3 09/29/2020     10/21/2020       CMP:   Lab Results   Component Value Date     10/21/2020    K 4.2 10/21/2020    K 3.0 10/11/2020    CL 99 10/21/2020    CO2 27 10/21/2020    BUN 25 10/21/2020    CREATININE 0.5 10/21/2020    LABGLOM >90 10/21/2020    GLUCOSE 120 10/21/2020    PROT 5.5 10/18/2020    CALCIUM 9.9 10/21/2020    BILITOT 0.5 10/18/2020    ALKPHOS 64 10/18/2020    AST 15 10/18/2020    ALT 8 10/18/2020       POC Tests: No results for input(s): POCGLU, POCNA, POCK, POCCL, POCBUN, POCHEMO, POCHCT in the last 72 hours.     Coags:   Lab Results   Component Value Date    INR 0.86 02/08/2017       HCG (If Applicable): No results found for: PREGTESTUR, PREGSERUM, HCG, HCGQUANT     ABGs: No results found for: PHART, PO2ART, LZG5MKM, WAI3SLG, BEART, Y2DLYOYG     Type & Screen (If Applicable):  Lab Results   Component Value Date    LABRH POS 10/10/2020       Drug/Infectious Status (If Applicable):  No results found for: HIV, HEPCAB    COVID-19 Screening (If Applicable):   Lab Results   Component Value Date    COVID19 NOT DETECTED 11/23/2020         Anesthesia Evaluation  Patient summary reviewed and Nursing notes reviewed history of anesthetic complications:   Airway: Mallampati: II  TM distance: >3 FB   Neck ROM: full  Mouth opening: > = 3 FB Dental:          Pulmonary:normal exam  breath sounds clear to auscultation  (+) shortness of breath:                             Cardiovascular:Negative CV ROS    (+) hyperlipidemia        Rhythm: regular  Rate: normal                    Neuro/Psych:   (+) neuromuscular disease:, psychiatric history:            GI/Hepatic/Renal: Endo/Other:    (+) malignancy/cancer (Malignant neoplasm of upper-inner quadrant of left breast in female, estrogen receptor positive ). Abdominal:   (+) obese,     Abdomen: soft. Vascular:                                        Anesthesia Plan      general     ASA 3       Induction: intravenous. MIPS: Postoperative opioids intended. Anesthetic plan and risks discussed with patient. Use of blood products discussed with patient whom consented to blood products. Plan discussed with CRNA.                   Tessa Robbins,    11/30/2020

## 2020-11-30 NOTE — PROGRESS NOTES
Patient received in room 5K27 per bed from PACU. Patient is drowsy but awakens to name easily. Chest dressing is clean, dry and intact. Right and left drains are compressed and no drainage noted. Generalized edema noted. IV continued of .9 NS infusing at 125ml per hour. Physician orders and white board reviewed. Bed alarm on.  Daughter at bedside

## 2020-12-01 VITALS
SYSTOLIC BLOOD PRESSURE: 106 MMHG | WEIGHT: 209.6 LBS | BODY MASS INDEX: 31.04 KG/M2 | OXYGEN SATURATION: 94 % | HEIGHT: 69 IN | RESPIRATION RATE: 16 BRPM | DIASTOLIC BLOOD PRESSURE: 55 MMHG | TEMPERATURE: 98.2 F | HEART RATE: 69 BPM

## 2020-12-01 LAB
ANION GAP SERPL CALCULATED.3IONS-SCNC: 11 MEQ/L (ref 8–16)
BASOPHILS # BLD: 0.5 %
BASOPHILS ABSOLUTE: 0.1 THOU/MM3 (ref 0–0.1)
BUN BLDV-MCNC: 12 MG/DL (ref 7–22)
CALCIUM SERPL-MCNC: 9.3 MG/DL (ref 8.5–10.5)
CHLORIDE BLD-SCNC: 103 MEQ/L (ref 98–111)
CO2: 21 MEQ/L (ref 23–33)
CREAT SERPL-MCNC: 0.5 MG/DL (ref 0.4–1.2)
EOSINOPHIL # BLD: 0.1 %
EOSINOPHILS ABSOLUTE: 0 THOU/MM3 (ref 0–0.4)
ERYTHROCYTE [DISTWIDTH] IN BLOOD BY AUTOMATED COUNT: 13.7 % (ref 11.5–14.5)
ERYTHROCYTE [DISTWIDTH] IN BLOOD BY AUTOMATED COUNT: 51.3 FL (ref 35–45)
GFR SERPL CREATININE-BSD FRML MDRD: > 90 ML/MIN/1.73M2
GLUCOSE BLD-MCNC: 139 MG/DL (ref 70–108)
HCT VFR BLD CALC: 40 % (ref 37–47)
HEMOGLOBIN: 12.4 GM/DL (ref 12–16)
IMMATURE GRANS (ABS): 0.07 THOU/MM3 (ref 0–0.07)
IMMATURE GRANULOCYTES: 0.5 %
LYMPHOCYTES # BLD: 24 %
LYMPHOCYTES ABSOLUTE: 3.5 THOU/MM3 (ref 1–4.8)
MCH RBC QN AUTO: 31.7 PG (ref 26–33)
MCHC RBC AUTO-ENTMCNC: 31 GM/DL (ref 32.2–35.5)
MCV RBC AUTO: 102.3 FL (ref 81–99)
MONOCYTES # BLD: 12.5 %
MONOCYTES ABSOLUTE: 1.8 THOU/MM3 (ref 0.4–1.3)
NUCLEATED RED BLOOD CELLS: 0 /100 WBC
PLATELET # BLD: 375 THOU/MM3 (ref 130–400)
PMV BLD AUTO: 11 FL (ref 9.4–12.4)
POTASSIUM SERPL-SCNC: 4.6 MEQ/L (ref 3.5–5.2)
RBC # BLD: 3.91 MILL/MM3 (ref 4.2–5.4)
SEG NEUTROPHILS: 62.4 %
SEGMENTED NEUTROPHILS ABSOLUTE COUNT: 9.1 THOU/MM3 (ref 1.8–7.7)
SODIUM BLD-SCNC: 135 MEQ/L (ref 135–145)
WBC # BLD: 14.6 THOU/MM3 (ref 4.8–10.8)

## 2020-12-01 PROCEDURE — 80048 BASIC METABOLIC PNL TOTAL CA: CPT

## 2020-12-01 PROCEDURE — 6360000002 HC RX W HCPCS: Performed by: SURGERY

## 2020-12-01 PROCEDURE — 2700000000 HC OXYGEN THERAPY PER DAY

## 2020-12-01 PROCEDURE — 36415 COLL VENOUS BLD VENIPUNCTURE: CPT

## 2020-12-01 PROCEDURE — 99024 POSTOP FOLLOW-UP VISIT: CPT | Performed by: NURSE PRACTITIONER

## 2020-12-01 PROCEDURE — 85025 COMPLETE CBC W/AUTO DIFF WBC: CPT

## 2020-12-01 PROCEDURE — G0008 ADMIN INFLUENZA VIRUS VAC: HCPCS | Performed by: SURGERY

## 2020-12-01 PROCEDURE — 90686 IIV4 VACC NO PRSV 0.5 ML IM: CPT | Performed by: SURGERY

## 2020-12-01 PROCEDURE — 94760 N-INVAS EAR/PLS OXIMETRY 1: CPT

## 2020-12-01 PROCEDURE — 6370000000 HC RX 637 (ALT 250 FOR IP): Performed by: SURGERY

## 2020-12-01 RX ORDER — OXYCODONE HYDROCHLORIDE AND ACETAMINOPHEN 5; 325 MG/1; MG/1
1 TABLET ORAL EVERY 6 HOURS PRN
Qty: 16 TABLET | Refills: 0 | Status: SHIPPED | OUTPATIENT
Start: 2020-12-01 | End: 2020-12-08

## 2020-12-01 RX ADMIN — FUROSEMIDE 20 MG: 20 TABLET ORAL at 10:19

## 2020-12-01 RX ADMIN — LEVOTHYROXINE SODIUM 25 MCG: 25 TABLET ORAL at 08:43

## 2020-12-01 RX ADMIN — Medication 50 MG: at 08:44

## 2020-12-01 RX ADMIN — INFLUENZA A VIRUS A/VICTORIA/2454/2019 IVR-207 (H1N1) ANTIGEN (PROPIOLACTONE INACTIVATED), INFLUENZA A VIRUS A/HONG KONG/2671/2019 IVR-208 (H3N2) ANTIGEN (PROPIOLACTONE INACTIVATED), INFLUENZA B VIRUS B/VICTORIA/705/2018 BVR-11 ANTIGEN (PROPIOLACTONE INACTIVATED), INFLUENZA B VIRUS B/PHUKET/3073/2013 BVR-1B ANTIGEN (PROPIOLACTONE INACTIVATED) 0.5 ML: 15; 15; 15; 15 INJECTION, SUSPENSION INTRAMUSCULAR at 14:14

## 2020-12-01 RX ADMIN — PAROXETINE HYDROCHLORIDE 10 MG: 20 TABLET, FILM COATED ORAL at 08:45

## 2020-12-01 RX ADMIN — HYDROMORPHONE HYDROCHLORIDE 0.5 MG: 1 INJECTION, SOLUTION INTRAMUSCULAR; INTRAVENOUS; SUBCUTANEOUS at 03:58

## 2020-12-01 RX ADMIN — OXYCODONE HYDROCHLORIDE 10 MG: 5 TABLET ORAL at 07:57

## 2020-12-01 RX ADMIN — FAMOTIDINE 20 MG: 20 TABLET, FILM COATED ORAL at 08:43

## 2020-12-01 RX ADMIN — HYDROMORPHONE HYDROCHLORIDE 0.5 MG: 1 INJECTION, SOLUTION INTRAMUSCULAR; INTRAVENOUS; SUBCUTANEOUS at 10:24

## 2020-12-01 ASSESSMENT — PAIN - FUNCTIONAL ASSESSMENT: PAIN_FUNCTIONAL_ASSESSMENT: PREVENTS OR INTERFERES SOME ACTIVE ACTIVITIES AND ADLS

## 2020-12-01 ASSESSMENT — PAIN DESCRIPTION - FREQUENCY: FREQUENCY: CONTINUOUS

## 2020-12-01 ASSESSMENT — PAIN DESCRIPTION - PROGRESSION: CLINICAL_PROGRESSION: GRADUALLY WORSENING

## 2020-12-01 ASSESSMENT — PAIN SCALES - GENERAL
PAINLEVEL_OUTOF10: 0
PAINLEVEL_OUTOF10: 7
PAINLEVEL_OUTOF10: 7
PAINLEVEL_OUTOF10: 8
PAINLEVEL_OUTOF10: 8

## 2020-12-01 ASSESSMENT — PAIN DESCRIPTION - PAIN TYPE: TYPE: ACUTE PAIN;SURGICAL PAIN

## 2020-12-01 ASSESSMENT — PAIN DESCRIPTION - DESCRIPTORS: DESCRIPTORS: PRESSURE

## 2020-12-01 ASSESSMENT — PAIN DESCRIPTION - ORIENTATION: ORIENTATION: UPPER

## 2020-12-01 ASSESSMENT — PAIN DESCRIPTION - LOCATION: LOCATION: CHEST

## 2020-12-01 ASSESSMENT — PAIN DESCRIPTION - ONSET: ONSET: AWAKENED FROM SLEEP

## 2020-12-01 NOTE — PROGRESS NOTES
CHG soap given to patient and instructed to use daily. Patient instructed how to empty bulb suction. Patient verbalized understanding.

## 2020-12-01 NOTE — DISCHARGE SUMMARY
the operating room table. The patient had gone  previously to x-ray undergoing an injection of radioisotope per sentinel  node. Once the patient was asleep, we did inject Lymphazurin into the  left perinipple area and then the chest wall, left and right and left  axilla were prepped and draped in the usual sterile fashion. We made an  incision in the axillary first, taking down through the subcutaneous  tissue. We took the probe and the injection site showed values to  10,000 on the probe. Once we got into the axilla and dissected, we  could palpate a node. I did not see any Lymphazurin going into the  node. Also the probe did not seem to  too much in the way of  radioactive material.  However, this area was completely excised and  likely had 2 or 3 lymph nodes. It did register approximately 15 on the  probe. Once this was done, we used the probe to completely examine the  left axilla, and there was no further radioactivity. I saw no further  evidence of Lymphazurin. A packing was then placed in the left axilla  and a marking was taken and skin marks were made for the mastectomy. We  made an incision in the superior skin marking first and then dissected  creating a flap of the breast, going up to the clavicle, medially at the  sternum and went towards the latissimus dorsi laterally. We then made  an incision in the lower skin incision and created a lower flap down to  the rectus sheath. We then removed the breast tissue off the pectoralis  major muscle going from medial to lateral, ligating some vessels,  placing clips and using electrocautery. We went all the way over to the  latissimus dorsi muscle sweeping all of the tissue from the chest wall  and removing the specimen. Hemostasis was determined with  electrocautery. We then placed packings underneath the flaps and went  to the right side.   We made a skin incision along the markings that we  had made and again started with superior flap, dissecting the breast  tissue down to the clavicle. Made an incision in the lower skin,  dissected the lower flap and then removed the breast tissue again off  the pectoralis major muscle going from medial to lateral down to the  latissimus dorsi muscles sweeping all the tissue off and removed the  breast tissue. Again hemostasis was obtained with electrocautery. We  placed Surgiflo powder under each flap on each side, we placed a #15  Shubham drain and then closure was begun. Interrupted 3-0 Vicryl was used  to close the subcutaneous, staples were used to close the wound. Appropriate dressing was applied. The patient tolerated the procedure  well. Code Status: Full Code     Consults:   none    Examination:  Vitals:  Vitals:    11/30/20 2215 12/01/20 0344 12/01/20 0754 12/01/20 1401   BP: 127/60 (!) 109/52 (!) 106/55    Pulse: 87 80 69    Resp: 18 16 16    Temp: 98.7 °F (37.1 °C) 98.3 °F (36.8 °C) 98.2 °F (36.8 °C)    TempSrc: Oral Oral Oral    SpO2: 93% 95% 96% 94%   Weight:       Height:         Weight: Weight: 209 lb 9.6 oz (95.1 kg)     24 hour intake/output:    Intake/Output Summary (Last 24 hours) at 12/1/2020 1434  Last data filed at 12/1/2020 4181  Gross per 24 hour   Intake 1788.82 ml   Output 2420 ml   Net -631.18 ml           Significant Diagnostics:   Radiology: Nm Inj Lymphoscintigram    Result Date: 11/30/2020  PROCEDURE: NM INJ LYMPHOSCINTIGRAM CLINICAL INFORMATION: Ductal carcinoma of the left breast; radiopharmaceutical injection requested for sentinel node localization. TECHNIQUE: The procedure was explained to the patient. A total of 0.949 mCi technetium 99m filtered sulfur colloid was injected in perilesional and subareolar locations in the left breast. No images were obtained. Radiopharmaceutical injection for sentinel node localization.  Final report electronically signed by Dr. Patt Hinojosa on 11/30/2020 9:23 AM      Labs:   Recent Results (from the past 72 hour(s))   Basic Metabolic Panel    Collection Time: 12/01/20  5:44 AM   Result Value Ref Range    Sodium 135 135 - 145 meq/L    Potassium 4.6 3.5 - 5.2 meq/L    Chloride 103 98 - 111 meq/L    CO2 21 (L) 23 - 33 meq/L    Glucose 139 (H) 70 - 108 mg/dL    BUN 12 7 - 22 mg/dL    CREATININE 0.5 0.4 - 1.2 mg/dL    Calcium 9.3 8.5 - 10.5 mg/dL   CBC auto differential    Collection Time: 12/01/20  5:44 AM   Result Value Ref Range    WBC 14.6 (H) 4.8 - 10.8 thou/mm3    RBC 3.91 (L) 4.20 - 5.40 mill/mm3    Hemoglobin 12.4 12.0 - 16.0 gm/dl    Hematocrit 40.0 37.0 - 47.0 %    .3 (H) 81.0 - 99.0 fL    MCH 31.7 26.0 - 33.0 pg    MCHC 31.0 (L) 32.2 - 35.5 gm/dl    RDW-CV 13.7 11.5 - 14.5 %    RDW-SD 51.3 (H) 35.0 - 45.0 fL    Platelets 657 700 - 788 thou/mm3    MPV 11.0 9.4 - 12.4 fL    Seg Neutrophils 62.4 %    Lymphocytes 24.0 %    Monocytes 12.5 %    Eosinophils 0.1 %    Basophils 0.5 %    Immature Granulocytes 0.5 %    Segs Absolute 9.1 (H) 1.8 - 7.7 thou/mm3    Lymphocytes Absolute 3.5 1.0 - 4.8 thou/mm3    Monocytes Absolute 1.8 (H) 0.4 - 1.3 thou/mm3    Eosinophils Absolute 0.0 0.0 - 0.4 thou/mm3    Basophils Absolute 0.1 0.0 - 0.1 thou/mm3    Immature Grans (Abs) 0.07 0.00 - 0.07 thou/mm3    nRBC 0 /100 wbc   Anion Gap    Collection Time: 12/01/20  5:44 AM   Result Value Ref Range    Anion Gap 11.0 8.0 - 16.0 meq/L   Glomerular Filtration Rate, Estimated    Collection Time: 12/01/20  5:44 AM   Result Value Ref Range    Est, Glom Filt Rate >90 ml/min/1.73m2       Patient Instructions:    CHG bath daily.  [facility]    Pt Name: [address] Record Number: [id number]  Today's Date: 12/1/2020    GENERAL ANESTHESIA OR SEDATION  1. Do not drive or operate hazardous machinery for 24 hours. 2. Do not make important business or personal decisions for 24 hours. 3. Do not drink alcoholic beverages or use tobacco for 24 hours.     ACTIVITY INSTRUCTIONS:  Ambulate 4 times a day for approximately 10-15 minutes each time     You may resume normal activity tomorrow. Do not engage in strenuous activity that may place stress on your incision. You may drive when no longer taking pain medication and you are able to comfortably use the gas/brake pedal. Do not drive long distance, in town driving recommended    avoid heavy lifting, tugging, pullings greater than 10-15 lbs for 6 weeks postoperatively, or until released by Physician/CNP      DIET INSTRUCTIONS:  Normal at home diet    MEDICATIONS  You may resume your daily prescription medication schedule unless otherwise specified. Pain medication at discharge - use only as prescribed- refills may be available to you at your follow up appointments if needed and warranted. Narcotics should be used for only short term and we highly encouraged our patients to wean off appropriately and use other means for pain such as non pharmacologic measures and over the counter tylenol or ibuprofen if no restrictions apply. We do  know that surgical pain is real and will not hesitate to help eliminate some of your discomfort. However we will not be able to completely make you pain free and it is important to determine what pain level is tolerable for you     Narcotics cause constipation and we recommend taking a colace daily and Miralax if needed to help reduce the risk of constipation    Increasing water intake if no restrictions will also help eliminate constipation    Ambulation 4 times a day 15 minute each time will help reduce pain each day and help relieve constipation      WOUND/DRESSING INSTRUCTIONS:  Always ensure you and your care giver clean hands before and after caring for the  wound. Keep dressing clean and dry, Change when soiled or wet. Leave incision open to air if not draining    Ice operative site for 20 minutes 4 times a day as needed     May wash over incision in shower daily,  but do not soak in a bath.       BREAST PROCEDURES  [x]Following a breast procedure, it is important to continue to where supportive garments. [x]Following a sentinal lymph node biopsy, you should not be alarmed if your urine has a blue color to it. This is your body eliminating the dye used for the procedure. FOLLOW-UP CARE. SPECIFICALLY WATCH FOR:   Fever over 101 degrees by mouth   Increased redness, warmth, hardness at operative site. Blood soaked dressing (small amounts of oozing may be normal.)   Increased or progressive drainage from the surgical area   Inability to urinate or blood in the urine   Pain not relieved by the medications ordered   Persistent nausea and/or vomiting, unable to retain fluids. FOLLOW-UP APPOINTMENT:  As scheduled     Call my office if you have any problem that concerns you [phone]. After hours, you can reach the answering service via the office phone number. IF YOU NEED IMMEDIATE ATTENTION, GO TO THE EMERGENCY ROOM AND YOUR DOCTOR WILL BE CONTACTED.     Prepared By:  Beti Courtney CNP  For Myron Meredith MD    Electronically signed 12/1/2020 at 2:33 PM  Diet: DIET GENERAL;      Follow-up visits:   [place] [address] [address], [address] [place] [address] [phone]    On 12/10/2020  11:30       Discharge condition: fair  Disposition: Home  Time spent on discharge: 35 minutes     Discharge Medications:   Lyla Abrams   Home Medication Instructions EBM:[id number]    Printed on:12/01/20 6331   Medication Information                      albuterol (PROVENTIL) (2.5 MG/3ML) 0.083% nebulizer solution  Take 3 mLs by nebulization every 6 hours as needed for Wheezing or Shortness of Breath             alendronate (FOSAMAX) 35 MG tablet  Take 35 mg by mouth every 7 days             aspirin EC 81 MG EC tablet  Take 1 tablet by mouth daily             Cinnamon 500 MG CAPS  Take by mouth daily             Coenzyme Q10-Fish Oil-Vit E (CO-Q 10

## 2020-12-01 NOTE — CARE COORDINATION
12/1/20, 2:17 PM EST    Patient goals/plan/ treatment preferences discussed by  and . Patient goals/plan/ treatment preferences reviewed with patient/ family. Patient/ family verbalize understanding of discharge plan and are in agreement with goal/plan/treatment preferences. Understanding was demonstrated using the teach back method. AVS provided by RN at time of discharge, which includes all necessary medical information pertaining to the patients current course of illness, treatment, post-discharge goals of care, and treatment preferences. Discharge order in place. Bilat mastectomy yesterday. Home Health referral requested per [facility] to [facility]. [facility] states pt is current with them. They plan to see tomorrow.

## 2020-12-01 NOTE — PROGRESS NOTES
[facility]  Post Operative Progress Note  Dr Kurt Alan    Pt Name: Esther Leigh Record Number: [id number]  Date of Birth 1950   Today's Date: 12/1/2020    Hospital day # 0   POD # 1    Chief complaint: carcinoma of left breast     Patient was stable overnight. Chart reviewed. Updated by nursing staff. Denies chest discomfort or dyspnea. No N/V; (+) belching, flatus and BM. Tolerating DIET GENERAL; diet. Pain controlled with analgesia. Up with assistance. Bilateral NOHELIA drains intact and compressed. Minimal drainage noted. Incisions are well approximated     Past, Family, Social History unchanged from admission. Diet:  DIET GENERAL;    Medications:  Scheduled Meds:   famotidine  20 mg Oral BID    furosemide  20 mg Oral Daily    levothyroxine  25 mcg Oral Daily    PARoxetine  10 mg Oral QAM    zinc sulfate  50 mg Oral Daily    sodium chloride flush  10 mL Intravenous 2 times per day    influenza virus vaccine  0.5 mL Intramuscular Prior to discharge     Continuous Infusions:   sodium chloride 125 mL/hr at 11/30/20 1530     PRN Meds:albuterol, loperamide, ondansetron, sodium chloride flush, promethazine **OR** ondansetron, oxyCODONE **OR** oxyCODONE, HYDROmorphone **OR** HYDROmorphone    Objective:    CBC:   Recent Labs     12/01/20  0544   WBC 14.6*   HGB 12.4        BMP:    Recent Labs     12/01/20  0544      K 4.6      CO2 21*   BUN 12   CREATININE 0.5   GLUCOSE 139*     Calcium:  Recent Labs     12/01/20  0544   CALCIUM 9.3     Ionized Calcium:No results for input(s): IONCA in the last 72 hours. Magnesium:No results for input(s): MG in the last 72 hours. Phosphorus:No results for input(s): PHOS in the last 72 hours. BNP:No results for input(s): BNP in the last 72 hours. Glucose:No results for input(s): POCGLU in the last 72 hours. HgbA1C: No results for input(s): LABA1C in the last 72 hours.   INR: No results for input(s): INR in the last 72 hours. Hepatic: No results for input(s): ALKPHOS, ALT, AST, PROT, BILITOT, BILIDIR, LABALBU in the last 72 hours. Amylase and Lipase:No results for input(s): LACTA, AMYLASE in the last 72 hours. Lactic Acid: No results for input(s): LACTA in the last 72 hours. Troponin: No results for input(s): CKTOTAL, CKMB, TROPONINT in the last 72 hours. BNP: No results for input(s): BNP in the last 72 hours. Lipids: No results for input(s): CHOL, TRIG, HDL, LDLCALC in the last 72 hours. Invalid input(s): LDL  ABGs:   Lab Results   Component Value Date    PH 7.49 10/13/2020    PCO2 34 10/13/2020    PO2 65 10/13/2020    HCO3 26 10/13/2020    O2SAT 94 10/13/2020       Radiology reports as per the Radiologist  Radiology: Nm Inj Lymphoscintigram    Result Date: 11/30/2020  PROCEDURE: NM INJ LYMPHOSCINTIGRAM CLINICAL INFORMATION: Ductal carcinoma of the left breast; radiopharmaceutical injection requested for sentinel node localization. TECHNIQUE: The procedure was explained to the patient. A total of 0.949 mCi technetium 99m filtered sulfur colloid was injected in perilesional and subareolar locations in the left breast. No images were obtained. Radiopharmaceutical injection for sentinel node localization. Final report electronically signed by Dr. Vickie Sandoval on 11/30/2020 9:23 AM       Physical Exam:  Vitals: BP (!) 106/55   Pulse 69   Temp 98.2 °F (36.8 °C) (Oral)   Resp 16   Ht 5' 9\" (1.753 m)   Wt 209 lb 9.6 oz (95.1 kg)   SpO2 96%   BMI 30.95 kg/m²   24 hour intake/output:    Intake/Output Summary (Last 24 hours) at 12/1/2020 1139  Last data filed at 12/1/2020 0927  Gross per 24 hour   Intake 2388.82 ml   Output 2620 ml   Net -231.18 ml     Last 3 weights:   Wt Readings from Last 3 Encounters:   11/30/20 209 lb 9.6 oz (95.1 kg)   11/10/20 205 lb 12.8 oz (93.4 kg)   10/20/20 209 lb 12.8 oz (95.2 kg)       General appearance - oriented to person, place, and time and overweight  HEENT: Normocephalic and Atraumatic  Chest - clear to auscultation, no wheezes, rales or rhonchi, symmetric air entry  Incisions- well approximated, staples intact, drains x 2  Cardiovascular - normal rate and regular rhythm  Abdomen - soft, nontender, nondistended, no masses or organomegaly   Neurological - Alert and oriented and Normal speech  Integumentary - Skin color, texture, turgor normal. No Rashes or lesions  Musculoskeletal -Full ROM times 4 extremities      DVT prophylaxis: [] Lovenox                                 [x] SCDs                                 [] SQ Heparin                                 [] Encourage ambulation           [] Already on Anticoagulation                 ASSESSMENT:  S/p Left simple mastectomy with left axillary sentinel lymph node  Biopsy, Right simple mastectomy  POD# 1  1. Postoperative pain as expected  2. Leukocytosis reactive      has a past medical history of Breast cancer ([address] COVID-19, Hyperlipidemia, Numbness and tingling, and Shortness of breath. PLAN:  1. Routine incisional care daily  2. Monitor labs, replace lytes per protocol  3. Diet general   4. Iv hydration dc'd   5. DVT scd's  and GI Prophylaxis  6. Activity - ambulate, OOB to chair, C&DB,  IS  7. Analgesia and antiemetics as needed  8.  Okay to discharge later today if ambulating ok, home with home health       Electronically signed by ANNALISA Nam CNP on 12/1/2020 at 11:39 AM

## 2020-12-01 NOTE — OP NOTE
[address] [place] [address]                                OPERATIVE REPORT    PATIENT NAME: Christian Montano                     :        1950  MED REC NO:   [id number]                           ROOM:       9622  ACCOUNT NO:   [de-identified]                           ADMIT DATE: 2020  PROVIDER:     Milvia Lock. Era Escobar MD    DATE OF PROCEDURE:  2020    PREOPERATIVE DIAGNOSIS:  Carcinoma of the left breast.    POSTOPERATIVE DIAGNOSIS:  Carcinoma of the left breast.    OPERATIONS:  1. Left simple mastectomy with left axillary sentinel lymph node  biopsy. 2.  Right simple mastectomy. SURGEON:  Milvia Lock. MD Porsha     ANESTHESIA:  General.    COMPLICATIONS:  None. INDICATION FOR PROCEDURE:  The patient is a 80-year-old white female who  has 3 separate carcinomas of the left breast.  The patient had elected  to have a prophylactic mastectomy on the right side at her request.  She  is here for sentinel node biopsy and bilateral mastectomies. FINDINGS:  [place] node was removed. DESCRIPTION OF PROCEDURE:  The patient was brought to the operating  suite, placed supine on the operating room table. The patient had gone  previously to x-ray undergoing an injection of radioisotope per sentinel  node. Once the patient was asleep, we did inject Lymphazurin into the  left perinipple area and then the chest wall, left and right and left  axilla were prepped and draped in the usual sterile fashion. We made an  incision in the axillary first, taking down through the subcutaneous  tissue. We took the probe and the injection site showed values to  10,000 on the probe. Once we got into the axilla and dissected, we  could palpate a node. I did not see any Lymphazurin going into the  node.  Also the probe did not seem to  too much in the way of  radioactive material.  However, this area was completely excised and  likely had 2 or 3 lymph nodes. It did register approximately 15 on the  probe. Once this was done, we used the probe to completely examine the  left axilla, and there was no further radioactivity. I saw no further  evidence of Lymphazurin. A packing was then placed in the left axilla  and a marking was taken and skin marks were made for the mastectomy. We  made an incision in the superior skin marking first and then dissected  creating a flap of the breast, going up to the clavicle, medially at the  sternum and went towards the latissimus dorsi laterally. We then made  an incision in the lower skin incision and created a lower flap down to  the rectus sheath. We then removed the breast tissue off the pectoralis  major muscle going from medial to lateral, ligating some vessels,  placing clips and using electrocautery. We went all the way over to the  latissimus dorsi muscle sweeping all of the tissue from the chest wall  and removing the specimen. Hemostasis was determined with  electrocautery. We then placed packings underneath the flaps and went  to the right side. We made a skin incision along the markings that we  had made and again started with superior flap, dissecting the breast  tissue down to the clavicle. Made an incision in the lower skin,  dissected the lower flap and then removed the breast tissue again off  the pectoralis major muscle going from medial to lateral down to the  latissimus dorsi muscles sweeping all the tissue off and removed the  breast tissue. Again hemostasis was obtained with electrocautery. We  placed Surgiflo powder under each flap on each side, we placed a #15  Shubham drain and then closure was begun. Interrupted 3-0 Vicryl was used  to close the subcutaneous, staples were used to close the wound. Appropriate dressing was applied. The patient tolerated the procedure  well. CHELSEA KINSEY [facility], MD    D: 11/30/2020 14:08:03       T: 11/30/2020 14:13:03

## 2020-12-03 ENCOUNTER — OFFICE VISIT (OUTPATIENT)
Dept: SURGERY | Age: 70
End: 2020-12-03
Payer: MEDICARE

## 2020-12-03 VITALS
TEMPERATURE: 98.2 F | WEIGHT: 209 LBS | RESPIRATION RATE: 14 BRPM | SYSTOLIC BLOOD PRESSURE: 124 MMHG | HEIGHT: 69 IN | BODY MASS INDEX: 30.96 KG/M2 | OXYGEN SATURATION: 98 % | HEART RATE: 111 BPM | DIASTOLIC BLOOD PRESSURE: 78 MMHG

## 2020-12-04 NOTE — PROGRESS NOTES
[address] [facility] [address] [facility] [address]  Dept: [phone]  Dept Fax: [phone]  Loc: [phone]    Visit Date: 12/3/2020    Nayely Vaughn is a 79 y.o. female who presentstoday for:  Chief Complaint   Patient presents with    Post-Op Check     s/p- Left simple mastectomy with left axillary sentinel lymph node biopsy.  11/30-- stitch holding drain came out       HPI:     HPI patient 3 days post bilateral mastectomies patient called thought that stitch had come out of right-sided drain and she was draining around the drain is here for check    Past Medical History:   Diagnosis Date    Breast cancer ([facility] [address] 2020    left-invasive ductal follows with Dr Hortensia Ma COVID-19 10/07/2020    wears o2 at night    Hyperlipidemia     Numbness and tingling     left foot-chronic nerve damage    Shortness of breath       Past Surgical History:   Procedure Laterality Date    ABDOMINAL EXPLORATION SURGERY  2014    Dr butterfield-lysis of adhesions    BREAST LUMPECTOMY Left 11/2015    Left breast lumpectomy, retroareolar with preoperative needle loc-Dr. Soraya Billingsley BREAST SURGERY Left 06/26/2020    biopsy of left breast    CHOLECYSTECTOMY  10/2014    Dr Wallace Hall  03/30/2016    Dr Hoda Sauceda  10/2014    x4 abdominal wall    HYSTERECTOMY      INCISIONAL HERNIA REPAIR  2014    Dr Shana Burns LIPECTOMY  2014    Dr Eunice Peralta  7/16/2020    [address] LEFT 7/16/2020 Bryan Davis  [facility] Bilateral 11/30/2020    LEFT SIMPLE MASTECTOMY AND SENTINAL LYMPH NODE BIOPSY, RIGHT SIMPLE PROPHYLACTIC MASTECTOMY performed by Lucio Charles MD at [address] [facility] Right 7/17/2020    SINGLE LUMEN SMART PORT INSERTION RIGHT SUBCLAVIAN performed by Lucio Charles MD at [address] [facility]  10/2014    Dr Francois-[facility] disintegrating tablet Take 1 tablet by mouth every 8 hours as needed for Nausea 20 tablet 0    PARoxetine (PAXIL) 10 MG tablet Take 10 mg by mouth every morning      Coenzyme Q10-Fish Oil-Vit E (CO-Q 10 OMEGA-3 FISH OIL PO) Take by mouth daily      Vitamin D (CHOLECALCIFEROL) 1000 UNITS CAPS capsule Take by mouth daily 2 tab      Cinnamon 500 MG CAPS Take by mouth daily      Cyanocobalamin (VITAMIN B12 PO) Take 1,000 mcg by mouth daily        No current facility-administered medications for this visit.       Allergies   Allergen Reactions    Aluminum-Containing Compounds Anaphylaxis       Subjective:      Review of Systems    Objective:   /78 (Site: Left Calf, Position: Sitting, Cuff Size: Medium Adult)   Pulse 111   Temp 98.2 °F (36.8 °C) (Temporal)   Resp 14   Ht 5' 9\" (1.753 m)   Wt 209 lb (94.8 kg)   SpO2 98%   BMI 30.86 kg/m²     Physical Exam wounds look good stitches still in place holding drain in place but lateral aspect of drain incision is open and draining place a few sutures to close       Results for orders placed or performed during the hospital encounter of 98/05/13   Basic Metabolic Panel   Result Value Ref Range    Sodium 135 135 - 145 meq/L    Potassium 4.6 3.5 - 5.2 meq/L    Chloride 103 98 - 111 meq/L    CO2 21 (L) 23 - 33 meq/L    Glucose 139 (H) 70 - 108 mg/dL    BUN 12 7 - 22 mg/dL    CREATININE 0.5 0.4 - 1.2 mg/dL    Calcium 9.3 8.5 - 10.5 mg/dL   CBC auto differential   Result Value Ref Range    WBC 14.6 (H) 4.8 - 10.8 thou/mm3    RBC 3.91 (L) 4.20 - 5.40 mill/mm3    Hemoglobin 12.4 12.0 - 16.0 gm/dl    Hematocrit 40.0 37.0 - 47.0 %    .3 (H) 81.0 - 99.0 fL    MCH 31.7 26.0 - 33.0 pg    MCHC 31.0 (L) 32.2 - 35.5 gm/dl    RDW-CV 13.7 11.5 - 14.5 %    RDW-SD 51.3 (H) 35.0 - 45.0 fL    Platelets 151 099 - 249 thou/mm3    MPV 11.0 9.4 - 12.4 fL    Seg Neutrophils 62.4 %    Lymphocytes 24.0 %    Monocytes 12.5 %    Eosinophils 0.1 %    Basophils 0.5 %    Immature

## 2020-12-10 ENCOUNTER — OFFICE VISIT (OUTPATIENT)
Dept: SURGERY | Age: 70
End: 2020-12-10

## 2020-12-10 VITALS
HEIGHT: 69 IN | TEMPERATURE: 98.2 F | RESPIRATION RATE: 16 BRPM | WEIGHT: 202 LBS | HEART RATE: 97 BPM | DIASTOLIC BLOOD PRESSURE: 84 MMHG | OXYGEN SATURATION: 95 % | BODY MASS INDEX: 29.92 KG/M2 | SYSTOLIC BLOOD PRESSURE: 122 MMHG

## 2020-12-10 PROCEDURE — 99024 POSTOP FOLLOW-UP VISIT: CPT | Performed by: NURSE PRACTITIONER

## 2020-12-10 ASSESSMENT — ENCOUNTER SYMPTOMS
RECTAL PAIN: 0
TROUBLE SWALLOWING: 0
BACK PAIN: 0
ABDOMINAL PAIN: 0
RHINORRHEA: 0
ABDOMINAL DISTENTION: 0
SORE THROAT: 0
WHEEZING: 0
SINUS PRESSURE: 0
EYE DISCHARGE: 0
APNEA: 0
CHEST TIGHTNESS: 0
PHOTOPHOBIA: 0
CHOKING: 0
ANAL BLEEDING: 0
SHORTNESS OF BREATH: 0
COUGH: 0
STRIDOR: 0
EYE REDNESS: 0
COLOR CHANGE: 0
VOMITING: 0
FACIAL SWELLING: 0
DIARRHEA: 0
CONSTIPATION: 0
BLOOD IN STOOL: 0
EYE ITCHING: 0
NAUSEA: 0
EYE PAIN: 0
VOICE CHANGE: 0

## 2020-12-10 NOTE — PROGRESS NOTES
tingling     left foot-chronic nerve damage    Shortness of breath       Past Surgical History:   Procedure Laterality Date    ABDOMINAL EXPLORATION SURGERY  2014    Dr butterfield-lysis of adhesions    BREAST LUMPECTOMY Left 11/2015    Left breast lumpectomy, retroareolar with preoperative needle loc-Dr. Acevedo Comp BREAST SURGERY Left 06/26/2020    biopsy of left breast    CHOLECYSTECTOMY  10/2014    Dr Criss Lancaster  03/30/2016    Dr Nicole Dooley  10/2014    x4 abdominal wall    HYSTERECTOMY      INCISIONAL HERNIA REPAIR  2014    Dr Silvia Cooley LIPECTOMY  2014    Dr Pacheco  LEFT  7/16/2020    [address] LEFT 7/16/2020 [place]  [facility] Bilateral 11/30/2020    LEFT SIMPLE MASTECTOMY AND SENTINAL LYMPH NODE BIOPSY, RIGHT SIMPLE PROPHYLACTIC MASTECTOMY performed by Lovely Lozoya MD at [address] [facility] Right 7/17/2020    SINGLE LUMEN SMART PORT INSERTION RIGHT SUBCLAVIAN performed by Lovely Lozoya MD at [address]  10/2014    Dr Francois-[facility]      patient was 11years old    TUBAL LIGATION       Family History   Problem Relation Age of Onset    Heart Disease Father         cath stent blood to thin and bled    Heart Attack Mother     Other Other         blood clot    Breast Cancer Paternal Aunt 62    High Blood Pressure Sister     Cancer Brother 68        skin    Prostate Cancer Brother         had chemotherapy to treat     Prostate Cancer Brother 64        has had for 10 years    Ovarian Cancer Neg Hx     Diabetes Neg Hx     Stroke Neg Hx      Social History     Tobacco Use    Smoking status: Never Smoker    Smokeless tobacco: Never Used   Substance Use Topics    Alcohol use: No     Alcohol/week: 0.0 standard drinks        Current Outpatient Medications   Medication Sig Dispense Refill    albuterol (PROVENTIL) (2.5 MG/3ML) 0.083% nebulizer solution Take 3 mLs by nebulization every 6 hours as needed for Wheezing or Shortness of Breath 1 Package 5    zinc sulfate (ZINCATE) 220 (50 Zn) MG capsule Take 1 capsule by mouth daily 30 capsule 0    famotidine (PEPCID) 20 MG tablet Take 1 tablet by mouth 2 times daily 60 tablet 0    vitamin C (VITAMIN C) 500 MG tablet Take 1 tablet by mouth daily 30 tablet 0    furosemide (LASIX) 20 MG tablet Take 1 tablet by mouth daily 20 tablet 0    loperamide (IMODIUM) 2 MG capsule Take 2 mg by mouth 4 times daily as needed for Diarrhea      lidocaine-prilocaine (EMLA) 2.5-2.5 % cream Apply topically as needed. 30 g 1    levothyroxine (SYNTHROID) 25 MCG tablet Take 25 mcg by mouth Daily      alendronate (FOSAMAX) 35 MG tablet Take 35 mg by mouth every 7 days      aspirin EC 81 MG EC tablet Take 1 tablet by mouth daily 30 tablet 3    ondansetron (ZOFRAN ODT) 4 MG disintegrating tablet Take 1 tablet by mouth every 8 hours as needed for Nausea 20 tablet 0    PARoxetine (PAXIL) 10 MG tablet Take 10 mg by mouth every morning      Coenzyme Q10-Fish Oil-Vit E (CO-Q 10 OMEGA-3 FISH OIL PO) Take by mouth daily      Vitamin D (CHOLECALCIFEROL) 1000 UNITS CAPS capsule Take by mouth daily 2 tab      Cinnamon 500 MG CAPS Take by mouth daily      Cyanocobalamin (VITAMIN B12 PO) Take 1,000 mcg by mouth daily        No current facility-administered medications for this visit. Allergies   Allergen Reactions    Aluminum-Containing Compounds Anaphylaxis       Subjective:      Review of Systems   Constitutional: Negative for activity change, appetite change, chills, diaphoresis, fatigue, fever and unexpected weight change. HENT: Negative for congestion, dental problem, drooling, ear discharge, ear pain, facial swelling, hearing loss, mouth sores, nosebleeds, postnasal drip, rhinorrhea, sinus pressure, sneezing, sore throat, tinnitus, trouble swallowing and voice change.     Eyes: Negative for photophobia, pain, discharge, redness, itching and visual disturbance. Respiratory: Negative for apnea, cough, choking, chest tightness, shortness of breath, wheezing and stridor. Cardiovascular: Negative for chest pain, palpitations and leg swelling. Gastrointestinal: Negative for abdominal distention, abdominal pain, anal bleeding, blood in stool, constipation, diarrhea, nausea, rectal pain and vomiting. Genitourinary: Negative for decreased urine volume, difficulty urinating, dyspareunia, dysuria, enuresis, flank pain, frequency, genital sores, hematuria, menstrual problem, pelvic pain, urgency, vaginal bleeding, vaginal discharge and vaginal pain. Musculoskeletal: Negative for arthralgias, back pain, gait problem, joint swelling, myalgias, neck pain and neck stiffness. Skin: Positive for wound (drainage at drain sites, staples present ). Negative for color change, pallor and rash. Neurological: Negative for dizziness, tremors, seizures, syncope, facial asymmetry, speech difficulty, weakness, light-headedness, numbness and headaches. Hematological: Negative for adenopathy. Does not bruise/bleed easily. Psychiatric/Behavioral: Negative for agitation, behavioral problems, confusion, decreased concentration, dysphoric mood, hallucinations, self-injury, sleep disturbance and suicidal ideas. The patient is not nervous/anxious and is not hyperactive. Objective:     /84 (Site: Left Lower Arm, Position: Sitting, Cuff Size: Medium Adult)   Pulse 97   Temp 98.2 °F (36.8 °C) (Temporal)   Resp 16   Ht 5' 9\" (1.753 m)   Wt 202 lb (91.6 kg)   SpO2 95%   BMI 29.83 kg/m²     Wt Readings from Last 3 Encounters:   12/10/20 202 lb (91.6 kg)   12/03/20 209 lb (94.8 kg)   11/30/20 209 lb 9.6 oz (95.1 kg)       Physical Exam  Vitals signs reviewed. Constitutional:       Appearance: She is obese. HENT:      Head: Normocephalic.       Nose: Nose normal.      Mouth/Throat:      Mouth: Mucous membranes are moist. Neck:      Musculoskeletal: Normal range of motion. Pulmonary:      Effort: Pulmonary effort is normal.   Chest:       Abdominal:      Palpations: Abdomen is soft. Musculoskeletal: Normal range of motion. Skin:     General: Skin is warm. Neurological:      General: No focal deficit present. Mental Status: She is alert. Assessment/Plan:     Rey Monday was seen today for post-op check. Diagnoses and all orders for this visit:    S/P bilateral mastectomy        Return in about 1 week (around 12/17/2020). There are no Patient Instructions on file for this visit. Discusseduse, benefit, and side effects of prescribed medications. All patient questionsanswered. Pt voiced understanding.      Electronically signed by ANNALISA Pacheco CNP on 12/10/2020 at 2:58 PM

## 2020-12-17 ENCOUNTER — OFFICE VISIT (OUTPATIENT)
Dept: SURGERY | Age: 70
End: 2020-12-17

## 2020-12-17 VITALS
SYSTOLIC BLOOD PRESSURE: 110 MMHG | HEIGHT: 69 IN | RESPIRATION RATE: 18 BRPM | TEMPERATURE: 96.7 F | DIASTOLIC BLOOD PRESSURE: 70 MMHG | OXYGEN SATURATION: 96 % | BODY MASS INDEX: 29.84 KG/M2 | WEIGHT: 201.5 LBS | HEART RATE: 84 BPM

## 2020-12-17 PROCEDURE — 99024 POSTOP FOLLOW-UP VISIT: CPT | Performed by: NURSE PRACTITIONER

## 2020-12-17 ASSESSMENT — ENCOUNTER SYMPTOMS
CHOKING: 0
NAUSEA: 0
EYE DISCHARGE: 0
VOMITING: 0
VOICE CHANGE: 0
BLOOD IN STOOL: 0
EYE PAIN: 0
ANAL BLEEDING: 0
TROUBLE SWALLOWING: 0
COLOR CHANGE: 0
SHORTNESS OF BREATH: 0
WHEEZING: 0
RHINORRHEA: 0
COUGH: 0
DIARRHEA: 0
SINUS PRESSURE: 0
CHEST TIGHTNESS: 0
ABDOMINAL DISTENTION: 0
PHOTOPHOBIA: 0
ABDOMINAL PAIN: 0
RECTAL PAIN: 0
STRIDOR: 0
SORE THROAT: 0
BACK PAIN: 0
FACIAL SWELLING: 0
EYE REDNESS: 0
CONSTIPATION: 0
APNEA: 0
EYE ITCHING: 0

## 2020-12-17 NOTE — PROGRESS NOTES
[address] [address]  Dept: [phone]  Dept Fax: [phone]  Loc: [phone]    Visit Date: 12/17/2020       Mari Ramires is a 79 y.o. female who presents today for:  Chief Complaint   Patient presents with    Post-Op Check     s/p 11/30 1. Left simple mastectomy with left axillary sentinel lymph node biopsy. 2.  Right simple mastectomy. HPI:     Ashly Barnett presents today for a post op check. Today She has no complaints. The NOHELIA drain has appx 60ml out daily. Drain was removed, tolerated well. All staples removed from the left breast incision site and steri strips applied. The right breast incision looks good, seroma fluid noted. Skin was prepped with betadine, a 18g needle was inserted and 60ml syringe was used to withdraw 100ml of clear seroma fluid by Dr Elder Mcqueen, discussed with  patient to make  follow up with Oncology. Pathology again was reviewed and discussed.        Past Medical History:   Diagnosis Date    Breast cancer ([address] 2020    left-invasive ductal follows with Dr Alice West COVID-19 10/07/2020    wears o2 at night    Hyperlipidemia     Numbness and tingling     left foot-chronic nerve damage    Shortness of breath       Past Surgical History:   Procedure Laterality Date    ABDOMINAL EXPLORATION SURGERY  2014    Dr butterfield-lysis of adhesions    BREAST LUMPECTOMY Left 11/2015    Left breast lumpectomy, retroareolar with preoperative needle loc-Dr. Lubna Tate BREAST SURGERY Left 06/26/2020    biopsy of left breast    CHOLECYSTECTOMY  10/2014    Dr Leesa Babin  03/30/2016    Dr Rodriguez Cargo  10/2014    x4 abdominal wall    HYSTERECTOMY      INCISIONAL HERNIA REPAIR  2014    Dr Juvenal Egan LIPECTOMY  2014    Dr Nasario Spatz  7/16/2020    [address] LEFT 7/16/2020 Lázaro Curran MD [facility]  MASTECTOMY Bilateral 11/30/2020    LEFT SIMPLE MASTECTOMY AND SENTINAL LYMPH NODE BIOPSY, RIGHT SIMPLE PROPHYLACTIC MASTECTOMY performed by Milvia Renteria MD at [address] [place] Right 7/17/2020    SINGLE LUMEN SMART PORT INSERTION RIGHT SUBCLAVIAN performed by Milvia Renteria MD at [address]  10/2014    [facility]      patient was 11years old    TUBAL LIGATION       Family History   Problem Relation Age of Onset    Heart Disease Father         cath stent blood to thin and bled    Heart Attack Mother     Other Other         blood clot    Breast Cancer Paternal Aunt 62    High Blood Pressure Sister     Cancer Brother 68        skin    Prostate Cancer Brother         had chemotherapy to treat     Prostate Cancer Brother 64        has had for 10 years    Ovarian Cancer Neg Hx     Diabetes Neg Hx     Stroke Neg Hx      Social History     Tobacco Use    Smoking status: Never Smoker    Smokeless tobacco: Never Used   Substance Use Topics    Alcohol use: No     Alcohol/week: 0.0 standard drinks        Current Outpatient Medications   Medication Sig Dispense Refill    albuterol (PROVENTIL) (2.5 MG/3ML) 0.083% nebulizer solution Take 3 mLs by nebulization every 6 hours as needed for Wheezing or Shortness of Breath 1 Package 5    zinc sulfate (ZINCATE) 220 (50 Zn) MG capsule Take 1 capsule by mouth daily 30 capsule 0    famotidine (PEPCID) 20 MG tablet Take 1 tablet by mouth 2 times daily 60 tablet 0    vitamin C (VITAMIN C) 500 MG tablet Take 1 tablet by mouth daily 30 tablet 0    furosemide (LASIX) 20 MG tablet Take 1 tablet by mouth daily 20 tablet 0    loperamide (IMODIUM) 2 MG capsule Take 2 mg by mouth 4 times daily as needed for Diarrhea      lidocaine-prilocaine (EMLA) 2.5-2.5 % cream Apply topically as needed.  30 g 1    levothyroxine (SYNTHROID) 25 MCG tablet Take 25 mcg by mouth Daily  alendronate (FOSAMAX) 35 MG tablet Take 35 mg by mouth every 7 days      aspirin EC 81 MG EC tablet Take 1 tablet by mouth daily 30 tablet 3    ondansetron (ZOFRAN ODT) 4 MG disintegrating tablet Take 1 tablet by mouth every 8 hours as needed for Nausea 20 tablet 0    PARoxetine (PAXIL) 10 MG tablet Take 10 mg by mouth every morning      Coenzyme Q10-Fish Oil-Vit E (CO-Q 10 OMEGA-3 FISH OIL PO) Take by mouth daily      Vitamin D (CHOLECALCIFEROL) 1000 UNITS CAPS capsule Take by mouth daily 2 tab      Cinnamon 500 MG CAPS Take by mouth daily      Cyanocobalamin (VITAMIN B12 PO) Take 1,000 mcg by mouth daily        No current facility-administered medications for this visit. Allergies   Allergen Reactions    Aluminum-Containing Compounds Anaphylaxis       Subjective:      Review of Systems   Constitutional: Negative for activity change, appetite change, chills, diaphoresis, fatigue, fever and unexpected weight change. HENT: Negative for congestion, dental problem, drooling, ear discharge, ear pain, facial swelling, hearing loss, mouth sores, nosebleeds, postnasal drip, rhinorrhea, sinus pressure, sneezing, sore throat, tinnitus, trouble swallowing and voice change. Eyes: Negative for photophobia, pain, discharge, redness, itching and visual disturbance. Respiratory: Negative for apnea, cough, choking, chest tightness, shortness of breath, wheezing and stridor. Cardiovascular: Negative for chest pain, palpitations and leg swelling. Gastrointestinal: Negative for abdominal distention, abdominal pain, anal bleeding, blood in stool, constipation, diarrhea, nausea, rectal pain and vomiting. Endocrine: Negative for cold intolerance, heat intolerance, polydipsia, polyphagia and polyuria. Genitourinary: Negative for decreased urine volume, difficulty urinating, dysuria, enuresis, flank pain, frequency, genital sores, hematuria and urgency. Return in about 1 week (around 12/24/2020). There are no Patient Instructions on file for this visit. Discusseduse, benefit, and side effects of prescribed medications. All patient questionsanswered. Pt voiced understanding.      Electronically signed by Saintclair Sanger, APRN - CNP on 12/17/2020 at 10:45 AM

## 2020-12-24 ENCOUNTER — OFFICE VISIT (OUTPATIENT)
Dept: SURGERY | Age: 70
End: 2020-12-24

## 2020-12-24 VITALS
BODY MASS INDEX: 30.13 KG/M2 | OXYGEN SATURATION: 95 % | DIASTOLIC BLOOD PRESSURE: 72 MMHG | WEIGHT: 204 LBS | SYSTOLIC BLOOD PRESSURE: 104 MMHG | TEMPERATURE: 96.8 F | HEART RATE: 91 BPM | RESPIRATION RATE: 20 BRPM

## 2020-12-24 PROCEDURE — 99024 POSTOP FOLLOW-UP VISIT: CPT | Performed by: SURGERY

## 2020-12-24 NOTE — PROGRESS NOTES
patient was 11years old    TUBAL LIGATION          Family History   Problem Relation Age of Onset    Heart Disease Father         cath stent blood to thin and bled    Heart Attack Mother     Other Other         blood clot    Breast Cancer Paternal Aunt 62    High Blood Pressure Sister     Cancer Brother 68        skin    Prostate Cancer Brother         had chemotherapy to treat     Prostate Cancer Brother 64        has had for 10 years    Ovarian Cancer Neg Hx     Diabetes Neg Hx     Stroke Neg Hx         Social History     Tobacco Use    Smoking status: Never Smoker    Smokeless tobacco: Never Used   Substance Use Topics    Alcohol use: No     Alcohol/week: 0.0 standard drinks          Current Outpatient Medications   Medication Sig Dispense Refill    albuterol (PROVENTIL) (2.5 MG/3ML) 0.083% nebulizer solution Take 3 mLs by nebulization every 6 hours as needed for Wheezing or Shortness of Breath 1 Package 5    zinc sulfate (ZINCATE) 220 (50 Zn) MG capsule Take 1 capsule by mouth daily 30 capsule 0    famotidine (PEPCID) 20 MG tablet Take 1 tablet by mouth 2 times daily 60 tablet 0    vitamin C (VITAMIN C) 500 MG tablet Take 1 tablet by mouth daily 30 tablet 0    furosemide (LASIX) 20 MG tablet Take 1 tablet by mouth daily 20 tablet 0    lidocaine-prilocaine (EMLA) 2.5-2.5 % cream Apply topically as needed.  30 g 1    levothyroxine (SYNTHROID) 25 MCG tablet Take 25 mcg by mouth Daily      alendronate (FOSAMAX) 35 MG tablet Take 35 mg by mouth every 7 days      aspirin EC 81 MG EC tablet Take 1 tablet by mouth daily 30 tablet 3    PARoxetine (PAXIL) 10 MG tablet Take 10 mg by mouth every morning      Coenzyme Q10-Fish Oil-Vit E (CO-Q 10 OMEGA-3 FISH OIL PO) Take by mouth daily      Vitamin D (CHOLECALCIFEROL) 1000 UNITS CAPS capsule Take by mouth daily 2 tab      Cinnamon 500 MG CAPS Take by mouth daily      Cyanocobalamin (VITAMIN B12 PO) Take 1,000 mcg by mouth daily  loperamide (IMODIUM) 2 MG capsule Take 2 mg by mouth 4 times daily as needed for Diarrhea      ondansetron (ZOFRAN ODT) 4 MG disintegrating tablet Take 1 tablet by mouth every 8 hours as needed for Nausea (Patient not taking: Reported on 12/24/2020) 20 tablet 0     No current facility-administered medications for this visit.       Allergies   Allergen Reactions    Aluminum-Containing Compounds Anaphylaxis       Subjective:      Review of Systems    Objective:   /72 (Site: Right Lower Arm, Position: Sitting, Cuff Size: Medium Adult)   Pulse 91   Temp 96.8 °F (36 °C) (Temporal)   Resp 20   Wt 204 lb (92.5 kg)   SpO2 95%   BMI 30.13 kg/m²     Physical Exam all of staples removed from right mastectomy site 240 mL aspirated from the left mastectomy site and 200 mL aspirated from the right site       Results for orders placed or performed during the hospital encounter of 93/93/83   Basic Metabolic Panel   Result Value Ref Range    Sodium 135 135 - 145 meq/L    Potassium 4.6 3.5 - 5.2 meq/L    Chloride 103 98 - 111 meq/L    CO2 21 (L) 23 - 33 meq/L    Glucose 139 (H) 70 - 108 mg/dL    BUN 12 7 - 22 mg/dL    CREATININE 0.5 0.4 - 1.2 mg/dL    Calcium 9.3 8.5 - 10.5 mg/dL   CBC auto differential   Result Value Ref Range    WBC 14.6 (H) 4.8 - 10.8 thou/mm3    RBC 3.91 (L) 4.20 - 5.40 mill/mm3    Hemoglobin 12.4 12.0 - 16.0 gm/dl    Hematocrit 40.0 37.0 - 47.0 %    .3 (H) 81.0 - 99.0 fL    MCH 31.7 26.0 - 33.0 pg    MCHC 31.0 (L) 32.2 - 35.5 gm/dl    RDW-CV 13.7 11.5 - 14.5 %    RDW-SD 51.3 (H) 35.0 - 45.0 fL    Platelets 131 266 - 636 thou/mm3    MPV 11.0 9.4 - 12.4 fL    Seg Neutrophils 62.4 %    Lymphocytes 24.0 %    Monocytes 12.5 %    Eosinophils 0.1 %    Basophils 0.5 %    Immature Granulocytes 0.5 %    Segs Absolute 9.1 (H) 1.8 - 7.7 thou/mm3    Lymphocytes Absolute 3.5 1.0 - 4.8 thou/mm3    Monocytes Absolute 1.8 (H) 0.4 - 1.3 thou/mm3    Eosinophils Absolute 0.0 0.0 - 0.4 thou/mm3 Basophils Absolute 0.1 0.0 - 0.1 thou/mm3    Immature Grans (Abs) 0.07 0.00 - 0.07 thou/mm3    nRBC 0 /100 wbc   Anion Gap   Result Value Ref Range    Anion Gap 11.0 8.0 - 16.0 meq/L   Glomerular Filtration Rate, Estimated   Result Value Ref Range    Est, Glom Filt Rate >90 ml/min/1.73m2       Assessment:     Overall doing well is going to see oncology on December 30 patient has some persistent seromas that were aspirated    Plan:     Return to office in 1 week      Electronicallysigned by Jez Cruz MD on 12/24/2020 at 9:49 AM

## 2020-12-30 ENCOUNTER — HOSPITAL ENCOUNTER (OUTPATIENT)
Dept: INFUSION THERAPY | Age: 70
Discharge: HOME | End: 2020-12-30
Payer: MEDICARE

## 2020-12-30 ENCOUNTER — OFFICE VISIT (OUTPATIENT)
Dept: ONCOLOGY | Age: 70
End: 2020-12-30

## 2020-12-30 VITALS
TEMPERATURE: 97.5 F | SYSTOLIC BLOOD PRESSURE: 140 MMHG | BODY MASS INDEX: 30.42 KG/M2 | RESPIRATION RATE: 18 BRPM | DIASTOLIC BLOOD PRESSURE: 78 MMHG | OXYGEN SATURATION: 94 % | WEIGHT: 205.4 LBS | HEIGHT: 69 IN | HEART RATE: 94 BPM

## 2020-12-30 VITALS
BODY MASS INDEX: 30.42 KG/M2 | SYSTOLIC BLOOD PRESSURE: 145 MMHG | HEART RATE: 96 BPM | HEIGHT: 69 IN | WEIGHT: 205.4 LBS | DIASTOLIC BLOOD PRESSURE: 84 MMHG | RESPIRATION RATE: 18 BRPM | TEMPERATURE: 96.7 F | OXYGEN SATURATION: 94 %

## 2020-12-30 DIAGNOSIS — C50.919 TRIPLE NEGATIVE MALIGNANT NEOPLASM OF BREAST (HCC): ICD-10-CM

## 2020-12-30 DIAGNOSIS — C50.912 HER2-POSITIVE CARCINOMA OF LEFT BREAST (HCC): ICD-10-CM

## 2020-12-30 DIAGNOSIS — C50.212 MALIGNANT NEOPLASM OF UPPER-INNER QUADRANT OF LEFT BREAST IN FEMALE, ESTROGEN RECEPTOR POSITIVE (HCC): Primary | ICD-10-CM

## 2020-12-30 DIAGNOSIS — Z17.0 MALIGNANT NEOPLASM OF UPPER-INNER QUADRANT OF LEFT BREAST IN FEMALE, ESTROGEN RECEPTOR POSITIVE (HCC): Primary | ICD-10-CM

## 2020-12-30 DIAGNOSIS — Z90.13 STATUS POST BILATERAL MASTECTOMY: ICD-10-CM

## 2020-12-30 DIAGNOSIS — Z17.0 MALIGNANT NEOPLASM OF UPPER-OUTER QUADRANT OF LEFT BREAST IN FEMALE, ESTROGEN RECEPTOR POSITIVE (HCC): ICD-10-CM

## 2020-12-30 DIAGNOSIS — E55.9 VITAMIN D DEFICIENCY: ICD-10-CM

## 2020-12-30 DIAGNOSIS — D49.3 MULTIFOCAL BREAST TUMOR: Primary | ICD-10-CM

## 2020-12-30 DIAGNOSIS — C50.412 MALIGNANT NEOPLASM OF UPPER-OUTER QUADRANT OF LEFT BREAST IN FEMALE, ESTROGEN RECEPTOR POSITIVE (HCC): ICD-10-CM

## 2020-12-30 PROBLEM — Z17.31 HER2-POSITIVE CARCINOMA OF LEFT BREAST (HCC): Status: ACTIVE | Noted: 2020-12-30

## 2020-12-30 PROBLEM — Z17.31 HER2-POSITIVE CARCINOMA OF RIGHT BREAST (HCC): Status: ACTIVE | Noted: 2020-12-30

## 2020-12-30 PROBLEM — C50.911 HER2-POSITIVE CARCINOMA OF RIGHT BREAST (HCC): Status: ACTIVE | Noted: 2020-12-30

## 2020-12-30 PROCEDURE — 6360000002 HC RX W HCPCS: Performed by: INTERNAL MEDICINE

## 2020-12-30 PROCEDURE — 2580000003 HC RX 258: Performed by: INTERNAL MEDICINE

## 2020-12-30 PROCEDURE — 99213 OFFICE O/P EST LOW 20 MIN: CPT

## 2020-12-30 PROCEDURE — 99214 OFFICE O/P EST MOD 30 MIN: CPT | Performed by: INTERNAL MEDICINE

## 2020-12-30 RX ORDER — SODIUM CHLORIDE 0.9 % (FLUSH) 0.9 %
10 SYRINGE (ML) INJECTION PRN
Status: CANCELLED | OUTPATIENT
Start: 2020-12-30

## 2020-12-30 RX ORDER — HEPARIN SODIUM (PORCINE) LOCK FLUSH IV SOLN 100 UNIT/ML 100 UNIT/ML
500 SOLUTION INTRAVENOUS PRN
Status: CANCELLED | OUTPATIENT
Start: 2020-12-30

## 2020-12-30 RX ORDER — METHYLPREDNISOLONE SODIUM SUCCINATE 125 MG/2ML
125 INJECTION, POWDER, LYOPHILIZED, FOR SOLUTION INTRAMUSCULAR; INTRAVENOUS ONCE
Status: CANCELLED | OUTPATIENT
Start: 2021-01-11 | End: 2021-01-11

## 2020-12-30 RX ORDER — SODIUM CHLORIDE 0.9 % (FLUSH) 0.9 %
10 SYRINGE (ML) INJECTION PRN
Status: CANCELLED | OUTPATIENT
Start: 2021-01-11

## 2020-12-30 RX ORDER — SODIUM CHLORIDE 9 MG/ML
20 INJECTION, SOLUTION INTRAVENOUS ONCE
Status: CANCELLED | OUTPATIENT
Start: 2021-01-11 | End: 2021-01-11

## 2020-12-30 RX ORDER — HEPARIN SODIUM (PORCINE) LOCK FLUSH IV SOLN 100 UNIT/ML 100 UNIT/ML
500 SOLUTION INTRAVENOUS PRN
Status: DISCONTINUED | OUTPATIENT
Start: 2020-12-30 | End: 2020-12-31 | Stop reason: HOSPADM

## 2020-12-30 RX ORDER — SODIUM CHLORIDE 0.9 % (FLUSH) 0.9 %
5 SYRINGE (ML) INJECTION PRN
Status: CANCELLED | OUTPATIENT
Start: 2021-01-11

## 2020-12-30 RX ORDER — HEPARIN SODIUM (PORCINE) LOCK FLUSH IV SOLN 100 UNIT/ML 100 UNIT/ML
500 SOLUTION INTRAVENOUS PRN
Status: CANCELLED | OUTPATIENT
Start: 2021-01-11

## 2020-12-30 RX ORDER — EPINEPHRINE 1 MG/ML
0.3 INJECTION, SOLUTION, CONCENTRATE INTRAVENOUS PRN
Status: CANCELLED | OUTPATIENT
Start: 2021-01-11

## 2020-12-30 RX ORDER — SODIUM CHLORIDE 9 MG/ML
INJECTION, SOLUTION INTRAVENOUS CONTINUOUS
Status: CANCELLED | OUTPATIENT
Start: 2021-01-11

## 2020-12-30 RX ORDER — SODIUM CHLORIDE 0.9 % (FLUSH) 0.9 %
20 SYRINGE (ML) INJECTION PRN
Status: CANCELLED | OUTPATIENT
Start: 2020-12-30

## 2020-12-30 RX ORDER — MEPERIDINE HYDROCHLORIDE 50 MG/ML
12.5 INJECTION INTRAMUSCULAR; INTRAVENOUS; SUBCUTANEOUS ONCE
Status: CANCELLED | OUTPATIENT
Start: 2021-01-11 | End: 2021-01-11

## 2020-12-30 RX ORDER — SODIUM CHLORIDE 0.9 % (FLUSH) 0.9 %
10 SYRINGE (ML) INJECTION PRN
Status: DISCONTINUED | OUTPATIENT
Start: 2020-12-30 | End: 2020-12-31 | Stop reason: HOSPADM

## 2020-12-30 RX ORDER — DIPHENHYDRAMINE HYDROCHLORIDE 50 MG/ML
50 INJECTION INTRAMUSCULAR; INTRAVENOUS ONCE
Status: CANCELLED | OUTPATIENT
Start: 2021-01-11 | End: 2021-01-11

## 2020-12-30 RX ADMIN — Medication 20 ML: at 11:15

## 2020-12-30 RX ADMIN — Medication 500 UNITS: at 11:16

## 2020-12-30 NOTE — PROGRESS NOTES
Patient tolerated  Flushing mediport without any complications. Discharge instructions given to patient-verbalizes understanding. Ambulated off unit per self with belongings.

## 2020-12-30 NOTE — PATIENT INSTRUCTIONS
1.  Port flush today  2.   Patient will return to clinic to see me for labs: CBC, BMP, LFTs and to start [address] on January 11, 2021

## 2020-12-30 NOTE — PROGRESS NOTES
[facility]  [address] [address]  Dept: [phone]  Dept Fax: [phone]: [phone]      Visit Date:2020     Edgardo Phipps is a 79 y.o. female who presents today for:   Chief Complaint   Patient presents with    Follow-up     Malignant neoplasm of upper-inner quadrant of left breast in female, estrogen receptor positive         HPI:   Edgardo Phipps is a 79 y.o. female with  triple positive left breast cancer. She had annual screening mammogram on 2020 that showed 2 lesions in the left breast.  Subsequently she underwent breast ultrasound followed by ultrasound guided biopsy of those 2 lesions. The largest of these lesions is a lobulated mixed cystic and    solid appearing mass measuring approximately 3 x 2.1 x 2.7 cm. This is located at the anterior depth upper inner quadrant. Aspiration of the fluid component of this lesion and biopsy of    the solid component of this lesion was performed on 2020. The smaller adjacent lesion is located at the middle depth of the     upper inner quadrant left breast measuring approximately 1.6 x    1.1 x 1.6 cm. Biopsy of this lesion was performed on 2020.       Final pathology report showed:   A-B.  Left breast, upper inner quadrant, anterior and middle depth,   barbell and tribell clips, multiple core needle biopsies:   Nahed Kee, poorly differentiated ductal carcinoma, Pepe score 3. Estrogen Receptor: Positive 100 % of cells (>10% of cells)   Progesterone Receptor:  Positive 90 % of cells   Ki-67 (clone 30-9)     Percentage of positive nuclei: 42 %   HER2 NEW POSITIVE      On 2020 the patient had breast MRI showin. A known biopsy-proven mass demonstrated within the upper     inner left breast anterior to middle depth.  This measures 3 cm x     4.1 cm x 3.1 cm middle depth located 4  cm from the nipple, 0.9 cm from the skin, and 6.5 cm from the chest wall.  This shows    heterogeneous enhancement and delayed washout type vascular    enhancement.      2. There are scattered small foci of enhancement demonstrated    bilaterally. The dominant focus of enhancement within the right    breast is located within the lower inner right breast anterior    depth on axial image 105 series 9. Recommend further evaluation    with second look ultrasound. If there is no sonographic    correlate, recommend 6 month follow-up breast MRI to document    stability.         3. Among the small foci of enhancement within the left breast,    the most prominent is located within the lower outer quadrant    posterior depth as seen on axial image 91 series 9. Recommend    further evaluation with second look ultrasound. If there is no    sonographic correlate, recommend 6 month follow-up breast MRI to    document stability.          Patient past medical history significant for hypothyroidism and osteoporosis. Patient denies having any new complaints. Specifically she denies having any headaches, no difficulty with balance, no falls. She denies having any focal neurological deficits. No shortness of breath no cough no abdominal pain. She denies having any bone pain.   Patient started neoadjuvant chemotherapy was Taxotere/carbo/Herceptin/Pertuzumab on July 27, 2020       Interval History on 12/30/2020: The patient received cycle #3 of chemotherapy on September 8, 2020. On October 4, 2020 she was diagnosed with Covid infection. On October 9, 2020 she was admitted to the hospital for worsening shortness of breath and hypoxia. He was treated with convalescent plasma 2 doses and she required high with high flow oxygen. Chest x-ray showed a developing right perihilar and left basilar consolidation. She was also treated with the course of remdesivir and 10-day course of Decadron. She started improving, she was discharged home after 12-day hospitalization. On November 30, 2020 she underwent left breast mastectomy with sentinel lymph node excision and right breast prophylactic mastectomy. Final pathology report showed:  A.  Right breast, prophylactic mastectomy:    Fibrocystic changes including stromal fibrosis and cysts. B.  Left sentinel lymph nodes, excision:    Two lymph nodes, negative for malignancy (0/2). C.  Left breast, mastectomy:       Residual multifocal invasive ductal carcinoma (11 mm, upper outer       quadrant mass) with treatment effect (ympT1c, snpN0).  One lymph node, negative for malignancy (0/1).  Atypical lobular hyperplasia.    Margins are negative.    Closest margin: 10 mm (deep margin, lower outer quadrant mass). Patient tolerated her surgery well. She presents to the medical oncology clinic to discuss postsurgical treatment. She denies having any lingering symptoms from her Covid infection. She is fully independent and functioning without any limitations.   She denies having any fevers no shortness of breath no chest pain    Past Medical History:   Diagnosis Date    [facility] [address] 2020    left-invasive ductal follows with Dr Beverly Dias COVID-19 10/07/2020    wears o2 at night    Hyperlipidemia     Numbness and tingling     left foot-chronic nerve damage    Shortness of breath       Past Surgical History:   Procedure Laterality Date  ABDOMINAL EXPLORATION SURGERY  2014    Dr butterfield-lysis of adhesions    BREAST LUMPECTOMY Left 11/2015    Left breast lumpectomy, retroareolar with preoperative needle loc-Dr. Belinda Hurt BREAST SURGERY Left 06/26/2020    biopsy of left breast    CHOLECYSTECTOMY  10/2014    Dr Alayna Huddleston  03/30/2016    Dr Nasim Rosario  10/2014    x4 abdominal wall    HYSTERECTOMY      INCISIONAL HERNIA REPAIR  2014    Dr Carlos Manning LIPECTOMY  2014    Dr Onel Parker LEFT  7/16/2020    [address] LEFT 7/16/2020 Branden Tejada  [facility] Bilateral 11/30/2020    LEFT SIMPLE MASTECTOMY AND SENTINAL LYMPH NODE BIOPSY, RIGHT SIMPLE PROPHYLACTIC MASTECTOMY performed by Maru Lockhart MD at [place] [address]. Right 7/17/2020    SINGLE LUMEN SMART PORT INSERTION RIGHT SUBCLAVIAN performed by Maru Lockhart MD at [address] [facility]  10/2014    Dr Francois[facility]      patient was 11years old    TUBAL LIGATION        Family History   Problem Relation Age of Onset    Heart Disease Father         cath stent blood to thin and bled    Heart Attack Mother     Other Other         blood clot    Breast Cancer Paternal Aunt 62    High Blood Pressure Sister     Cancer Brother 68        skin    Prostate Cancer Brother         had chemotherapy to treat     Prostate Cancer Brother 64        has had for 10 years    Ovarian Cancer Neg Hx     Diabetes Neg Hx     Stroke Neg Hx       Social History     Tobacco Use    Smoking status: Never Smoker    Smokeless tobacco: Never Used   Substance Use Topics    Alcohol use: No     Alcohol/week: 0.0 standard drinks      Current Outpatient Medications   Medication Sig Dispense Refill    zinc sulfate (ZINCATE) 220 (50 Zn) MG capsule Take 1 capsule by mouth daily 30 capsule 0    vitamin C (VITAMIN C) 500 MG tablet Take 1 tablet by mouth daily 30 tablet 0 Abdomen: Soft, non-tender, non-distended with active bowel sounds. There is a small, dime sized circular sore to the right of the umbilicus at the waist band level. There is surrounding warmth and erythema present. No pus or drainage noted. A blue ink marker margin was drawn around the redness. Musculoskeletal: No clubbing, cyanosis or edema bilaterally. Full range of motion without deformity. Skin: Skin color, texture, turgor normal.  No rashes or lesions. Neurologic:  Neurovascularly intact without any focal sensory/motor deficits. Psychiatric: Alert and oriented      Imaging Studies and Labs:   CBC:   Lab Results   Component Value Date    WBC 8.2 01/11/2021    HGB 14.5 01/11/2021    HCT 45.4 01/11/2021    MCV 96 01/11/2021     (H) 01/11/2021     BMP:   Lab Results   Component Value Date     01/11/2021     12/01/2020    K 4.4 01/11/2021    K 4.6 12/01/2020    K 3.0 10/11/2020     12/01/2020    CO2 21 12/01/2020    BUN 12 12/01/2020    CREATININE 0.8 01/11/2021    CREATININE 0.5 12/01/2020    GLUCOSE 139 12/01/2020    CALCIUM 9.3 12/01/2020      LFT:   Lab Results   Component Value Date    ALT 15 01/11/2021    AST 25 01/11/2021    ALKPHOS 55 01/11/2021    BILITOT 0.4 01/11/2021         Assessment and Plan:   1.  Triple Positive Left Breast Cancer in upper outer quadrant with separate focus of triple negative bresat cancer in lower outer quadrant Patient has received 3 cycles of neoadjuvant chemotherapy with Taxotere/carbo/Herceptin and Pertuzumab. Last treatment given in September 2020. Then the patient was hospitalized for Covid infection. After she recovered from her Covid infection decision was made to proceed with breast surgery. She had surgery on November 30, 2020. Final pathology report showed residual cancer in both locations upper outer quadrant and lower outer quadrant. Residual triple negative breast cancer was smaller than 1 cm in size. I had a lengthy discussion with the patient about further treatment. The CREATE-X trial randomly assigned approximately 900 patients with HER2-negative breast cancer (approximately one-third of whom had TNBC) and residual disease larger than 1 cm in size after neoadjuvant anthracycline and/or taxane therapy to either eight cycles of adjuvant Xeloda or no further chemotherapy. Patients with triple negative breast cancer receiving capecitabine had higher rates of five-year disease-free survival (DFS; 70 versus 56 %). However patient's residual tumor was smaller than 1 cm in size. Residual HER-2 positive breast cancer on the other hand was bigger than 1 cm in size. In a randomized, open-label trial of 18 women with HER2-positive early breast cancer with residual invasive disease after neoadjuvant chemotherapy and Herceptin, adjuvant treatment with 14 cycles of T-DM1 versus trastuzumab improved three-year invasive DFS (88 versus 77%). The risk of distant recurrence was substantially lower with T-DM1 compared with trastuzumab. The patient's tumor was 11 mm in size my recommendation is to proceed with adjuvant Kadcyla. In preparation for Kadcyla the patient will need 2D echo  Her HER-2 positive for tumor was also estrogen and progesterone receptor positive. After few cycles of Kadcyla we will introduce aromatase inhibitor. All patient questions answered. Pt voiced understanding. Patient agreed with treatment plan. Follow up as directed. Patient instructed to call for questions or concerns.       Electronically signed by   Edward Kitchen MD

## 2020-12-30 NOTE — PLAN OF CARE
Problem: Discharge Planning  Goal: Knowledge of discharge instructions  Description: Knowledge of discharge instructions  Outcome: Met This Shift  Note: Verbalized understanding of discharge instructions, follow-up appointments, and when to call the physician. Intervention: Discharge to appropriate level of care  Note: Discuss understanding of discharge instructions,follow-up appointments, and when to call the physician. Problem: Infection - Central Venous Catheter-Associated Bloodstream Infection:  Goal: Will show no infection signs and symptoms  Description: Will show no infection signs and symptoms  Outcome: Met This Shift  Note: Mediport site with no redness or warmth. Skin over port site intact with no signs of breakdown noted. Patient verbalizes signs/symptoms of port infection and when to notify the physician. Intervention: Infection risk assessment  Description: Infection risk assessment  Note: Instructed to monitor for signs/symptoms of infection at [facility] and call MD if problems develop. Care plan reviewed with patient . Patient  verbalize understanding of the plan of care and contribute to goal setting.

## 2020-12-31 ENCOUNTER — OFFICE VISIT (OUTPATIENT)
Dept: SURGERY | Age: 70
End: 2020-12-31

## 2020-12-31 VITALS
SYSTOLIC BLOOD PRESSURE: 122 MMHG | RESPIRATION RATE: 16 BRPM | WEIGHT: 203 LBS | BODY MASS INDEX: 30.07 KG/M2 | TEMPERATURE: 98.2 F | DIASTOLIC BLOOD PRESSURE: 74 MMHG | HEART RATE: 94 BPM | OXYGEN SATURATION: 93 % | HEIGHT: 69 IN

## 2020-12-31 DIAGNOSIS — Z51.89 VISIT FOR WOUND CHECK: Primary | ICD-10-CM

## 2020-12-31 DIAGNOSIS — Z90.13 S/P BILATERAL MASTECTOMY: ICD-10-CM

## 2020-12-31 PROCEDURE — 99024 POSTOP FOLLOW-UP VISIT: CPT | Performed by: SURGERY

## 2020-12-31 NOTE — PROGRESS NOTES
[address] [address]  Dept: [phone]  Dept Fax: [phone]  Loc: [phone]    Visit Date: 12/31/2020    Kory Montilla is a 79 y.o. female who presentstoday for:  Chief Complaint   Patient presents with    Post-Op Check     s/p 11/30-1. Left simple mastectomy with left axillary sentinel lymph node bx. 2. Right simple mastectomy.         HPI:     HPI as above following patient with seromas from her mastectomies there for aspiration    Past Medical History:   Diagnosis Date    Breast cancer ([address] 2020    left-invasive ductal follows with Dr Kamla Sims COVID-19 10/07/2020    wears o2 at night    Hyperlipidemia     Numbness and tingling     left foot-chronic nerve damage    Shortness of breath       Past Surgical History:   Procedure Laterality Date    ABDOMINAL EXPLORATION SURGERY  2014    Dr butterfield-lysis of adhesions    BREAST LUMPECTOMY Left 11/2015    Left breast lumpectomy, retroareolar with preoperative needle loc-Dr. Day Cancer BREAST SURGERY Left 06/26/2020    biopsy of left breast    CHOLECYSTECTOMY  10/2014    Dr Matt Mcfarland  03/30/2016    Dr Katie Dunham  10/2014    x4 abdominal wall    HYSTERECTOMY      INCISIONAL HERNIA REPAIR  2014    Dr Jon Artist LIPECTOMY  2014    Dr Abdiaziz Finn  7/16/2020    [facility] [address] LEFT 7/16/2020 Camelia Garcia  [facility] Bilateral 11/30/2020    LEFT SIMPLE MASTECTOMY AND SENTINAL LYMPH NODE BIOPSY, RIGHT SIMPLE PROPHYLACTIC MASTECTOMY performed by Tim Kim MD at [address] [facility] Right 7/17/2020    SINGLE LUMEN SMART PORT INSERTION RIGHT SUBCLAVIAN performed by Tim Kim MD at [address] [facility] [address]  10/2014    Dr [facility]      patient was 11years old   [facility] TUBAL LIGATION          Family History Problem Relation Age of Onset    Heart Disease Father         cath stent blood to thin and bled    Heart Attack Mother     Other Other         blood clot    Breast Cancer Paternal Aunt 62    High Blood Pressure Sister     Cancer Brother 68        skin    Prostate Cancer Brother         had chemotherapy to treat     Prostate Cancer Brother 64        has had for 10 years    Ovarian Cancer Neg Hx     Diabetes Neg Hx     Stroke Neg Hx         Social History     Tobacco Use    Smoking status: Never Smoker    Smokeless tobacco: Never Used   Substance Use Topics    Alcohol use: No     Alcohol/week: 0.0 standard drinks          Current Outpatient Medications   Medication Sig Dispense Refill    albuterol (PROVENTIL) (2.5 MG/3ML) 0.083% nebulizer solution Take 3 mLs by nebulization every 6 hours as needed for Wheezing or Shortness of Breath 1 Package 5    zinc sulfate (ZINCATE) 220 (50 Zn) MG capsule Take 1 capsule by mouth daily 30 capsule 0    famotidine (PEPCID) 20 MG tablet Take 1 tablet by mouth 2 times daily 60 tablet 0    vitamin C (VITAMIN C) 500 MG tablet Take 1 tablet by mouth daily 30 tablet 0    furosemide (LASIX) 20 MG tablet Take 1 tablet by mouth daily 20 tablet 0    loperamide (IMODIUM) 2 MG capsule Take 2 mg by mouth 4 times daily as needed for Diarrhea      lidocaine-prilocaine (EMLA) 2.5-2.5 % cream Apply topically as needed.  30 g 1    levothyroxine (SYNTHROID) 25 MCG tablet Take 25 mcg by mouth Daily      alendronate (FOSAMAX) 35 MG tablet Take 35 mg by mouth every 7 days      aspirin EC 81 MG EC tablet Take 1 tablet by mouth daily 30 tablet 3    ondansetron (ZOFRAN ODT) 4 MG disintegrating tablet Take 1 tablet by mouth every 8 hours as needed for Nausea 20 tablet 0    PARoxetine (PAXIL) 10 MG tablet Take 10 mg by mouth every morning      Coenzyme Q10-Fish Oil-Vit E (CO-Q 10 OMEGA-3 FISH OIL PO) Take by mouth daily  Vitamin D (CHOLECALCIFEROL) 1000 UNITS CAPS capsule Take by mouth daily 2 tab      Cinnamon 500 MG CAPS Take by mouth daily      Cyanocobalamin (VITAMIN B12 PO) Take 1,000 mcg by mouth daily        No current facility-administered medications for this visit.       Allergies   Allergen Reactions    Aluminum-Containing Compounds Anaphylaxis       Subjective:      Review of Systems    Objective:   /74 (Site: Left Upper Arm, Position: Sitting, Cuff Size: Medium Adult)   Pulse 94   Temp 98.2 °F (36.8 °C) (Temporal)   Resp 16   Ht 5' 9\" (1.753 m)   Wt 203 lb (92.1 kg)   SpO2 93%   BMI 29.98 kg/m²     Physical Exam 110 cc aspirated from right mastectomy site 255 cc aspirated from left mastectomy site wounds otherwise look good       Results for orders placed or performed during the hospital encounter of 48/93/83   Basic Metabolic Panel   Result Value Ref Range    Sodium 135 135 - 145 meq/L    Potassium 4.6 3.5 - 5.2 meq/L    Chloride 103 98 - 111 meq/L    CO2 21 (L) 23 - 33 meq/L    Glucose 139 (H) 70 - 108 mg/dL    BUN 12 7 - 22 mg/dL    CREATININE 0.5 0.4 - 1.2 mg/dL    Calcium 9.3 8.5 - 10.5 mg/dL   CBC auto differential   Result Value Ref Range    WBC 14.6 (H) 4.8 - 10.8 thou/mm3    RBC 3.91 (L) 4.20 - 5.40 mill/mm3    Hemoglobin 12.4 12.0 - 16.0 gm/dl    Hematocrit 40.0 37.0 - 47.0 %    .3 (H) 81.0 - 99.0 fL    MCH 31.7 26.0 - 33.0 pg    MCHC 31.0 (L) 32.2 - 35.5 gm/dl    RDW-CV 13.7 11.5 - 14.5 %    RDW-SD 51.3 (H) 35.0 - 45.0 fL    Platelets 931 141 - 242 thou/mm3    MPV 11.0 9.4 - 12.4 fL    Seg Neutrophils 62.4 %    Lymphocytes 24.0 %    Monocytes 12.5 %    Eosinophils 0.1 %    Basophils 0.5 %    Immature Granulocytes 0.5 %    Segs Absolute 9.1 (H) 1.8 - 7.7 thou/mm3    Lymphocytes Absolute 3.5 1.0 - 4.8 thou/mm3    Monocytes Absolute 1.8 (H) 0.4 - 1.3 thou/mm3    Eosinophils Absolute 0.0 0.0 - 0.4 thou/mm3    Basophils Absolute 0.1 0.0 - 0.1 thou/mm3 Immature Grans (Abs) 0.07 0.00 - 0.07 thou/mm3    nRBC 0 /100 wbc   Anion Gap   Result Value Ref Range    Anion Gap 11.0 8.0 - 16.0 meq/L   Glomerular Filtration Rate, Estimated   Result Value Ref Range    Est, Glom Filt Rate >90 ml/min/1.73m2       Assessment:     Persistent seromas    Plan:     Return to office in 1 week      Electronicallysigned by Fernanda Torres MD on 12/31/2020 at 11:33 AM

## 2021-01-07 ENCOUNTER — OFFICE VISIT (OUTPATIENT)
Dept: SURGERY | Age: 71
End: 2021-01-07

## 2021-01-07 VITALS
DIASTOLIC BLOOD PRESSURE: 84 MMHG | TEMPERATURE: 98.2 F | WEIGHT: 204 LBS | BODY MASS INDEX: 30.21 KG/M2 | HEART RATE: 74 BPM | SYSTOLIC BLOOD PRESSURE: 122 MMHG | HEIGHT: 69 IN | RESPIRATION RATE: 16 BRPM | OXYGEN SATURATION: 98 %

## 2021-01-07 DIAGNOSIS — Z90.13 S/P BILATERAL MASTECTOMY: ICD-10-CM

## 2021-01-07 DIAGNOSIS — Z51.89 VISIT FOR WOUND CHECK: Primary | ICD-10-CM

## 2021-01-07 PROCEDURE — 99024 POSTOP FOLLOW-UP VISIT: CPT | Performed by: SURGERY

## 2021-01-11 ENCOUNTER — OFFICE VISIT (OUTPATIENT)
Dept: ONCOLOGY | Age: 71
End: 2021-01-11
Payer: MEDICARE

## 2021-01-11 ENCOUNTER — CLINICAL DOCUMENTATION (OUTPATIENT)
Dept: CASE MANAGEMENT | Age: 71
End: 2021-01-11

## 2021-01-11 ENCOUNTER — HOSPITAL ENCOUNTER (OUTPATIENT)
Dept: INFUSION THERAPY | Age: 71
Discharge: HOME | End: 2021-01-11
Payer: MEDICARE

## 2021-01-11 VITALS
HEIGHT: 69 IN | DIASTOLIC BLOOD PRESSURE: 71 MMHG | SYSTOLIC BLOOD PRESSURE: 126 MMHG | WEIGHT: 206.8 LBS | HEART RATE: 74 BPM | RESPIRATION RATE: 18 BRPM | BODY MASS INDEX: 30.63 KG/M2 | TEMPERATURE: 98.3 F | OXYGEN SATURATION: 95 %

## 2021-01-11 VITALS
BODY MASS INDEX: 30.18 KG/M2 | SYSTOLIC BLOOD PRESSURE: 116 MMHG | RESPIRATION RATE: 16 BRPM | HEIGHT: 69 IN | DIASTOLIC BLOOD PRESSURE: 72 MMHG | OXYGEN SATURATION: 95 % | TEMPERATURE: 97.9 F | HEART RATE: 75 BPM | WEIGHT: 203.8 LBS

## 2021-01-11 DIAGNOSIS — Z17.0 MALIGNANT NEOPLASM OF UPPER-OUTER QUADRANT OF LEFT BREAST IN FEMALE, ESTROGEN RECEPTOR POSITIVE (HCC): ICD-10-CM

## 2021-01-11 DIAGNOSIS — C50.919 TRIPLE NEGATIVE MALIGNANT NEOPLASM OF BREAST (HCC): ICD-10-CM

## 2021-01-11 DIAGNOSIS — Z51.11 CHEMOTHERAPY MANAGEMENT, ENCOUNTER FOR: ICD-10-CM

## 2021-01-11 DIAGNOSIS — C50.912 HER2-POSITIVE CARCINOMA OF LEFT BREAST (HCC): Primary | ICD-10-CM

## 2021-01-11 DIAGNOSIS — C50.912 HER2-POSITIVE CARCINOMA OF LEFT BREAST (HCC): ICD-10-CM

## 2021-01-11 DIAGNOSIS — C50.412 MALIGNANT NEOPLASM OF UPPER-OUTER QUADRANT OF LEFT BREAST IN FEMALE, ESTROGEN RECEPTOR POSITIVE (HCC): ICD-10-CM

## 2021-01-11 DIAGNOSIS — C50.212 MALIGNANT NEOPLASM OF UPPER-INNER QUADRANT OF LEFT BREAST IN FEMALE, ESTROGEN RECEPTOR POSITIVE (HCC): Primary | ICD-10-CM

## 2021-01-11 DIAGNOSIS — Z17.0 MALIGNANT NEOPLASM OF UPPER-INNER QUADRANT OF LEFT BREAST IN FEMALE, ESTROGEN RECEPTOR POSITIVE (HCC): Primary | ICD-10-CM

## 2021-01-11 DIAGNOSIS — Z90.13 STATUS POST BILATERAL MASTECTOMY: ICD-10-CM

## 2021-01-11 DIAGNOSIS — E55.9 VITAMIN D DEFICIENCY: ICD-10-CM

## 2021-01-11 DIAGNOSIS — D49.3 MULTIFOCAL BREAST TUMOR: ICD-10-CM

## 2021-01-11 LAB
ALBUMIN SERPL-MCNC: 3.8 G/DL (ref 3.5–5.1)
ALP BLD-CCNC: 55 U/L (ref 38–126)
ALT SERPL-CCNC: 15 U/L (ref 11–66)
AST SERPL-CCNC: 25 U/L (ref 5–40)
BILIRUB SERPL-MCNC: 0.4 MG/DL (ref 0.3–1.2)
BILIRUBIN DIRECT: < 0.2 MG/DL (ref 0–0.3)
BUN, WHOLE BLOOD: 14 MG/DL (ref 8–26)
CHLORIDE, WHOLE BLOOD: 104 MEQ/L (ref 98–109)
CREATININE, WHOLE BLOOD: 0.8 MG/DL (ref 0.5–1.2)
GFR, ESTIMATED: 75 ML/MIN/1.73M2
GLUCOSE, WHOLE BLOOD: 116 MG/DL (ref 70–108)
HCT VFR BLD CALC: 45.4 % (ref 37–47)
HEMOGLOBIN: 14.5 GM/DL (ref 12–16)
IONIZED CALCIUM, WHOLE BLOOD: 1.21 MMOL/L (ref 1.12–1.32)
MCH RBC QN AUTO: 30.7 PG (ref 26–33)
MCHC RBC AUTO-ENTMCNC: 31.9 GM/DL (ref 32.2–35.5)
MCV RBC AUTO: 96 FL (ref 81–99)
PDW BLD-RTO: 12.8 % (ref 11.5–14.5)
PLATELET # BLD: 407 THOU/MM3 (ref 130–400)
PMV BLD AUTO: 10.6 FL (ref 9.4–12.4)
POTASSIUM, WHOLE BLOOD: 4.4 MEQ/L (ref 3.5–4.9)
RBC # BLD: 4.73 MILL/MM3 (ref 4.2–5.4)
SEG NEUTROPHILS: 38 % (ref 43–75)
SEGMENTED NEUTROPHILS ABSOLUTE COUNT: 3.1 THOU/MM3 (ref 1.8–7.7)
SODIUM, WHOLE BLOOD: 139 MEQ/L (ref 138–146)
TOTAL CO2, WHOLE BLOOD: 27 MEQ/L (ref 23–33)
TOTAL PROTEIN: 7.2 G/DL (ref 6.1–8)
WBC # BLD: 8.2 THOU/MM3 (ref 4.8–10.8)

## 2021-01-11 PROCEDURE — 85027 COMPLETE CBC AUTOMATED: CPT

## 2021-01-11 PROCEDURE — 99214 OFFICE O/P EST MOD 30 MIN: CPT | Performed by: INTERNAL MEDICINE

## 2021-01-11 PROCEDURE — 80047 BASIC METABLC PNL IONIZED CA: CPT

## 2021-01-11 PROCEDURE — 80076 HEPATIC FUNCTION PANEL: CPT

## 2021-01-11 PROCEDURE — 99211 OFF/OP EST MAY X REQ PHY/QHP: CPT

## 2021-01-11 PROCEDURE — 96415 CHEMO IV INFUSION ADDL HR: CPT

## 2021-01-11 PROCEDURE — 2580000003 HC RX 258: Performed by: INTERNAL MEDICINE

## 2021-01-11 PROCEDURE — 96413 CHEMO IV INFUSION 1 HR: CPT

## 2021-01-11 PROCEDURE — 36591 DRAW BLOOD OFF VENOUS DEVICE: CPT

## 2021-01-11 PROCEDURE — 96375 TX/PRO/DX INJ NEW DRUG ADDON: CPT

## 2021-01-11 PROCEDURE — 6360000002 HC RX W HCPCS: Performed by: INTERNAL MEDICINE

## 2021-01-11 RX ORDER — HEPARIN SODIUM (PORCINE) LOCK FLUSH IV SOLN 100 UNIT/ML 100 UNIT/ML
500 SOLUTION INTRAVENOUS PRN
Status: CANCELLED | OUTPATIENT
Start: 2021-01-11

## 2021-01-11 RX ORDER — DEXAMETHASONE SODIUM PHOSPHATE 4 MG/ML
8 INJECTION, SOLUTION INTRA-ARTICULAR; INTRALESIONAL; INTRAMUSCULAR; INTRAVENOUS; SOFT TISSUE ONCE
Status: COMPLETED | OUTPATIENT
Start: 2021-01-11 | End: 2021-01-11

## 2021-01-11 RX ORDER — HEPARIN SODIUM (PORCINE) LOCK FLUSH IV SOLN 100 UNIT/ML 100 UNIT/ML
500 SOLUTION INTRAVENOUS PRN
Status: DISCONTINUED | OUTPATIENT
Start: 2021-01-11 | End: 2021-01-12 | Stop reason: HOSPADM

## 2021-01-11 RX ORDER — SODIUM CHLORIDE 0.9 % (FLUSH) 0.9 %
10 SYRINGE (ML) INJECTION PRN
Status: DISCONTINUED | OUTPATIENT
Start: 2021-01-11 | End: 2021-01-12 | Stop reason: HOSPADM

## 2021-01-11 RX ORDER — SODIUM CHLORIDE 9 MG/ML
20 INJECTION, SOLUTION INTRAVENOUS ONCE
Status: COMPLETED | OUTPATIENT
Start: 2021-01-11 | End: 2021-01-11

## 2021-01-11 RX ORDER — SODIUM CHLORIDE 0.9 % (FLUSH) 0.9 %
20 SYRINGE (ML) INJECTION PRN
Status: DISCONTINUED | OUTPATIENT
Start: 2021-01-11 | End: 2021-01-12 | Stop reason: HOSPADM

## 2021-01-11 RX ORDER — SODIUM CHLORIDE 0.9 % (FLUSH) 0.9 %
20 SYRINGE (ML) INJECTION PRN
Status: CANCELLED | OUTPATIENT
Start: 2021-01-11

## 2021-01-11 RX ORDER — SODIUM CHLORIDE 0.9 % (FLUSH) 0.9 %
10 SYRINGE (ML) INJECTION PRN
Status: CANCELLED | OUTPATIENT
Start: 2021-01-11

## 2021-01-11 RX ADMIN — Medication 10 ML: at 16:01

## 2021-01-11 RX ADMIN — Medication 20 ML: at 11:31

## 2021-01-11 RX ADMIN — Medication 10 ML: at 11:30

## 2021-01-11 RX ADMIN — ADO-TRASTUZUMAB EMTANSINE 320 MG: 20 INJECTION, POWDER, LYOPHILIZED, FOR SOLUTION INTRAVENOUS at 13:59

## 2021-01-11 RX ADMIN — SODIUM CHLORIDE 20 ML/HR: 9 INJECTION, SOLUTION INTRAVENOUS at 13:34

## 2021-01-11 RX ADMIN — DEXAMETHASONE SODIUM PHOSPHATE 8 MG: 4 INJECTION, SOLUTION INTRA-ARTICULAR; INTRALESIONAL; INTRAMUSCULAR; INTRAVENOUS; SOFT TISSUE at 13:37

## 2021-01-11 RX ADMIN — Medication 500 UNITS: at 16:01

## 2021-01-11 NOTE — ONCOLOGY
Chemotherapy Administration    Pre-assessment Data: Antineoplastic Agents  Other:   See toxicity flow sheet for assessment [x]     Physician Notification of Concerns Related to Chemotherapy Administration:   Physician Notified Gerilyn Shone / Time of Notification      Interventions:   Lab work assessed  [x]   Height / Weight verified for dose [x]   Current MAR reviewed [x]   Emergency drugs available as appropriate [x]   Anaphylaxis assessment completed [x]   Pre-medications administered as ordered [x]   Blood return noted upon initiation of chemotherapy [x]   Blood return noted each 1-2ml of a vesicant medication if given IV push []   Blood return noted each 2-3ml of a non-vesicant medication if given IV push []   Monitor for signs / symptoms of hypersensitivity reaction [x]   Chemotherapy orders (drug/dose/rate) verified by 2 Chemo certified RNs [x]   Monitor IV site and blood return throughout the infusion of the medication [x]   Document IV site checks on the IV assessment form [x]   Document chemotherapy teaching on the Patient Education tab [x]   Document patient verbalizes understanding of medications being administered- Kadcyla [x]   If IV infiltration, see ONS Guidelines []   Other:      []

## 2021-01-11 NOTE — PATIENT INSTRUCTIONS
1. Proceed with Kadcyla today. 2. RTC to see me for labs: CBC, BMP, LFTs and next dose of Kadcyla in 3 weeks.

## 2021-01-11 NOTE — PROGRESS NOTES
Name: Ambreen Olivas  : 1950  MRN: [id number]    Oncology Navigation Follow-Up Note    Contact Type:  Medical Oncology    Subjective: Seen prior to visit with Dr. Rebeca Rodriguez. States left breast is collecting fluid, more so than right. Objective: Has an appointment with Dr. Jose Herrmann 21 for aspiration. No discomfort voiced. Denies shortness of breath, resperations non-labored. Appetite good and drinking fluids well. Assistance Needed: Denies needs at this time    Notes: Will follow as needed through continuum of care.     Electronically signed by Shirley Ortega RN on 2021 at 12:26 PM

## 2021-01-11 NOTE — PROGRESS NOTES
Patient assessed for the following post chemotherapy:    Dizziness   No  Lightheadedness  No      Acute nausea/vomiting No  Headache   No  Chest pain/pressure  No  Rash/itching   No  Shortness of breath  No    Patient kept for 20 minutes observation post infusion chemotherapy. Patient tolerated chemotherapy treatment Kadcyla without any complications. Last vital signs:   /71   Pulse 74   Temp 98.3 °F (36.8 °C) (Oral)   Resp 18   Ht 5' 9\" (1.753 m)   Wt 206 lb 12.8 oz (93.8 kg)   SpO2 95%   BMI 30.54 kg/m²       Patient instructed if experience any of the above symptoms following today's infusion,he/she is to notify MD immediately or go to the emergency department. Discharge instructions given to patient. Verbalizes understanding. Ambulated off unit per self with belongings.

## 2021-01-11 NOTE — PLAN OF CARE
Problem: Discharge Planning  Goal: Knowledge of discharge instructions  Description: Knowledge of discharge instructions  Outcome: Met This Shift  Note: Verbalize understanding of discharge instructions, follow up appointments, and when to call Physician. Intervention: Discharge to appropriate level of care  Note: Discuss understanding of discharge instructions, follow up appointments and when to call Physician. Problem: Intellectual/Education/Knowledge Deficit  Goal: Teaching initiated upon admission  Outcome: Met This Shift  Note: Patient verbalizes understanding to verbal information given on Kadcyla,action and possible side effects. Aware to call MD if develop complications. Intervention: Verbal/written education provided  Note: Chemotherapy Teaching     What is Chemotherapy   Drug action [x]   Method of Administration [x]   Handouts given []     Side Effects  Nausea/vomiting [x]   Diarrhea [x]   Fatigue [x]   Signs / Symptoms of infection [x]   Neutropenia [x]   Thrombocytopenia [x]   Alopecia [x]   neuropathy [x]   Hart diet &  the importance of fluids [x]       Micellaneous  Importance of nutrition [x]   Importance of oral hygiene [x]   When to call the MD [x]   Monitoring labs [x]   Use of supportive services []     Explanation of Drug Regimen / Frequency  Kadcyla     Comments  Verbalized understanding to drug,action,side effects and when to call MD         Problem: Infection - Central Venous Catheter-Associated Bloodstream Infection:  Goal: Will show no infection signs and symptoms  Description: Will show no infection signs and symptoms  Outcome: Met This Shift  Note: Patient verbalizes understanding to verbal information given on medi-port,action and possible side effects. Aware to call MD if develop complications.     Intervention: Infection risk assessment

## 2021-01-11 NOTE — PROGRESS NOTES
[facility] [address] [facility] [address] [facility] [address]  [facility] [address]  Dept: [phone]  Dept Fax: [phone]: [phone]      Visit Date:1/11/2021     Rhiannon Quinn is a 79 y.o. female who presents today for:   Chief Complaint   Patient presents with    Follow-up     breast CA        HPI:   Rhiannon Quinn is a 79 y.o. female with  triple positive left breast cancer. She had annual screening mammogram on June 18, 2020 that showed 2 lesions in the left breast.  Subsequently she underwent breast ultrasound followed by ultrasound guided biopsy of those 2 lesions. The largest of these lesions is a lobulated mixed cystic and    solid appearing mass measuring approximately 3 x 2.1 x 2.7 cm. This is located at the anterior depth upper inner quadrant. Aspiration of the fluid component of this lesion and biopsy of    the solid component of this lesion was performed on 6/26/2020. The smaller adjacent lesion is located at the middle depth of the     upper inner quadrant left breast measuring approximately 1.6 x    1.1 x 1.6 cm. Biopsy of this lesion was performed on 6/26/2020.       Final pathology report showed:   A-B.  Left breast, upper inner quadrant, anterior and middle depth,   barbell and tribell clips, multiple core needle biopsies:   Duron Marcelino, poorly differentiated ductal carcinoma, [place] score 3.    Estrogen Receptor: (Clone SP1), [employer]    Positive 100 % of cells (>10% of cells)   Intensity of Staining:    Strong   Progesterone Receptor: (Clone 1E2), [employer]    Positive 90 % of cells   Intensity of Staining:    Strong   Ki-67 (clone 30-9)       Percentage of positive nuclei: 42 %               Favorable <10%               Borderline 10-20%               Unfavorable >20%          2018 ASCO/ CAP   Inform HER2 Dual NEW        HER2 dual NEW    RingTu        Group # ( X  Group 1:         HER2/CEP17 Ratio >=.0 and >=.0 HER2    )                    signals/cell                         HER2 NEW POSITIVE      On 2020 the patient had breast MRI showin. A known biopsy-proven mass demonstrated within the upper     inner left breast anterior to middle depth. This measures 3 cm x     4.1 cm x 3.1 cm middle depth located 4  cm from the nipple, 0.9    cm from the skin, and 6.5 cm from the chest wall.  This shows    heterogeneous enhancement and delayed washout type vascular    enhancement.      2. There are scattered small foci of enhancement demonstrated    bilaterally. The dominant focus of enhancement within the right    breast is located within the lower inner right breast anterior    depth on axial image 105 series 9. Recommend further evaluation    with second look ultrasound. If there is no sonographic    correlate, recommend 6 month follow-up breast MRI to document    stability.         3. Among the small foci of enhancement within the left breast,    the most prominent is located within the lower outer quadrant    posterior depth as seen on axial image 91 series 9. Recommend    further evaluation with second look ultrasound. If there is no    sonographic correlate, recommend 6 month follow-up breast MRI to    document stability.          Patient past medical history significant for hypothyroidism and osteoporosis. Patient denies having any new complaints. Specifically she denies having any headaches, no difficulty with balance, no falls. She denies having any focal neurological deficits. No shortness of breath no cough no abdominal pain. She denies having any bone pain.   Patient started neoadjuvant chemotherapy was Taxotere/carbo/Herceptin/Pertuzumab on 2020       Interval History on 2020:  TONSILLECTOMY AND ADENOIDECTOMY      patient was 11years old   [facility] TUBAL LIGATION        Family History   Problem Relation Age of Onset    Heart Disease Father         cath stent blood to thin and bled    Heart Attack Mother     Other Other         blood clot    Breast Cancer Paternal Aunt 62    High Blood Pressure Sister     Cancer Brother 68        skin    Prostate Cancer Brother         had chemotherapy to treat     Prostate Cancer Brother 64        has had for 10 years    Ovarian Cancer Neg Hx     Diabetes Neg Hx     Stroke Neg Hx       Social History     Tobacco Use    Smoking status: Never Smoker    Smokeless tobacco: Never Used   Substance Use Topics    Alcohol use: No     Alcohol/week: 0.0 standard drinks      Current Outpatient Medications   Medication Sig Dispense Refill    zinc sulfate (ZINCATE) 220 (50 Zn) MG capsule Take 1 capsule by mouth daily 30 capsule 0    vitamin C (VITAMIN C) 500 MG tablet Take 1 tablet by mouth daily 30 tablet 0    loperamide (IMODIUM) 2 MG capsule Take 2 mg by mouth 4 times daily as needed for Diarrhea      lidocaine-prilocaine (EMLA) 2.5-2.5 % cream Apply topically as needed. 30 g 1    levothyroxine (SYNTHROID) 25 MCG tablet Take 25 mcg by mouth Daily      alendronate (FOSAMAX) 35 MG tablet Take 35 mg by mouth every 7 days      aspirin EC 81 MG EC tablet Take 1 tablet by mouth daily 30 tablet 3    ondansetron (ZOFRAN ODT) 4 MG disintegrating tablet Take 1 tablet by mouth every 8 hours as needed for Nausea 20 tablet 0    PARoxetine (PAXIL) 10 MG tablet Take 10 mg by mouth every morning      Coenzyme Q10-Fish Oil-Vit E (CO-Q 10 OMEGA-3 FISH OIL PO) Take by mouth daily      Vitamin D (CHOLECALCIFEROL) 1000 UNITS CAPS capsule Take by mouth daily 2 tab      Cinnamon 500 MG CAPS Take by mouth daily      Cyanocobalamin (VITAMIN B12 PO) Take 1,000 mcg by mouth daily        No current facility-administered medications for this visit. Facility-Administered Medications Ordered in Other Visits   Medication Dose Route Frequency Provider Last Rate Last Admin    sodium chloride flush 0.9 % injection 10 mL  10 mL Intravenous PRN Colette Brown MD   10 mL at 01/11/21 1130    sodium chloride flush 0.9 % injection 20 mL  20 mL Intravenous PRN Colette Brown MD   20 mL at 01/11/21 1131    heparin flush 100 UNIT/ML injection 500 Units  500 Units Intracatheter PRN Colette Brown MD          Allergies   Allergen Reactions    Aluminum-Containing Compounds Anaphylaxis          Review of Systems:   Review of Systems   Pertinent review of systems noted in HPI, all other ROS negative. Objective:   Physical Exam   Vitals:    01/11/21 1210   BP: 116/72   Pulse: 75   Resp: 16   Temp: 97.9 °F (36.6 °C)   SpO2: 95%        General appearance: No apparent distress, well developed, chronically ill appearing, and cooperative. HEENT: Pupils equal, round, and reactive to light. Conjunctivae/corneas clear. Oral mucosa moist.   Neck: Supple, with full range of motion. Trachea midline. Respiratory:  Normal respiratory effort. Clear to auscultation, bilaterally without Rales/Wheezes/Rhonchi. Cardiovascular: Regular rate and rhythm with normal S1/S2  Abdomen: Soft, non-tender, non-distended with active bowel sounds. There is a small, dime sized circular sore to the right of the umbilicus at the waist band level. There is surrounding warmth and erythema present. No pus or drainage noted. A blue ink marker margin was drawn around the redness. Musculoskeletal: No clubbing, cyanosis or edema bilaterally. Full range of motion without deformity. Skin: Skin color, texture, turgor normal.  No rashes or lesions. Neurologic:  Neurovascularly intact without any focal sensory/motor deficits.    Psychiatric: Alert and oriented      Imaging Studies and Labs:   CBC:   Lab Results   Component Value Date    WBC 8.2 01/11/2021    HGB 14.5 01/11/2021    HCT 45.4 01/11/2021 MCV 96 01/11/2021     (H) 01/11/2021     BMP:   Lab Results   Component Value Date     01/11/2021     12/01/2020    K 4.4 01/11/2021    K 4.6 12/01/2020    K 3.0 10/11/2020     12/01/2020    CO2 21 12/01/2020    BUN 12 12/01/2020    CREATININE 0.8 01/11/2021    CREATININE 0.5 12/01/2020    GLUCOSE 139 12/01/2020    CALCIUM 9.3 12/01/2020      LFT:   Lab Results   Component Value Date    ALT 8 (L) 10/18/2020    AST 15 10/18/2020    ALKPHOS 64 10/18/2020    BILITOT 0.5 10/18/2020         Assessment and Plan:   1. Triple Positive Left Breast Cancer with separate focus of triple negative bresat cancer in lower outer quadrant  She is currently receiving neoadjuvant chemotherapy with Taxotere, carboplatin, herceptin and pertuzumab. Received cycle #2 on 8/17/20.   2. Abdominal Wall Cellulitis  Likely source of fever on 8/19/20. The patient was placed on Keflex. Marked area on her abdominal wall is much smaller and less raised. The patient will finish a planned course of Keflex in addition she is applying over-the-counter triple antibiotic ointment and non -adherent bandage if clothing. 3. Constipation   Likely due to combination of Imodium and Lomotil on scheduled basis. Resolved after she stopped I Imodium and Lomotil. Istructed to drink up to 48 oz of fluid daily. Based on data from randomized to right my recommendation is to proceed with T-DM1 therapy in adjuvant setting. T-DM1 is an antibody-drug conjugate of trastuzuzmab and the cytotoxic agent emtansine. In a randomized, open-label trial of 18 women with HER2-positive early breast cancer with residual invasive disease after neoadjuvant chemotherapy and Herceptin, adjuvant treatment with 14 cycles of T-DM1 versus trastuzumab improved three-year invasive DFS (88 versus 77%). The risk of distant recurrence was substantially lower with T-DM1 compared with trastuzumab. All patient questions answered. Pt voiced understanding. Patient agreed with treatment plan. Follow up as directed. Patient instructed to call for questions or concerns.       Electronically signed by   Mathilda Libman, MD

## 2021-01-14 ENCOUNTER — OFFICE VISIT (OUTPATIENT)
Dept: SURGERY | Age: 71
End: 2021-01-14

## 2021-01-14 VITALS
RESPIRATION RATE: 14 BRPM | HEIGHT: 69 IN | TEMPERATURE: 97.4 F | WEIGHT: 203 LBS | HEART RATE: 83 BPM | BODY MASS INDEX: 30.07 KG/M2 | SYSTOLIC BLOOD PRESSURE: 124 MMHG | DIASTOLIC BLOOD PRESSURE: 78 MMHG | OXYGEN SATURATION: 94 %

## 2021-01-14 DIAGNOSIS — Z48.89 ENCOUNTER FOR POSTOPERATIVE CARE: Primary | ICD-10-CM

## 2021-01-14 PROCEDURE — 99024 POSTOP FOLLOW-UP VISIT: CPT | Performed by: SURGERY

## 2021-01-14 NOTE — PROGRESS NOTES
[address] [address]  Dept: [phone]  Dept Fax: [phone]  Loc: [phone]    Visit Date: 1/14/2021    Sania Llanes is a 79 y.o. female who presentstoday for:  Chief Complaint   Patient presents with    Post-Op Check     s/p 11/30-1. Left simple mastectomy with left axillary sentinel lymph node bx. 2. Right simple mastectomy.         HPI:     HPI as above status post bilateral mastectomy patient has had seromas we have been draining in the office and she is here for recheck    Past Medical History:   Diagnosis Date    Breast cancer ([facility] [address] 2020    left-invasive ductal follows with Dr Ya Carrera COVID-19 10/07/2020    wears o2 at night    Hyperlipidemia     Numbness and tingling     left foot-chronic nerve damage    Shortness of breath       Past Surgical History:   Procedure Laterality Date    ABDOMINAL EXPLORATION SURGERY  2014    Dr butterfield-lysis of adhesions    BREAST LUMPECTOMY Left 11/2015    Left breast lumpectomy, retroareolar with preoperative needle loc-Dr. Juan C Billingsley BREAST SURGERY Left 06/26/2020    biopsy of left breast    CHOLECYSTECTOMY  10/2014    Dr Christian Reynolds  03/30/2016    Dr Kiya Santiago  10/2014    x4 abdominal wall    HYSTERECTOMY      INCISIONAL HERNIA REPAIR  2014    Dr Prince Carrera LIPECTOMY  2014    Dr Casimiro Capps  7/16/2020    [address] LEFT 7/16/2020 Minda Tristan  [facility] Bilateral 11/30/2020    LEFT SIMPLE MASTECTOMY AND SENTINAL LYMPH NODE BIOPSY, RIGHT SIMPLE PROPHYLACTIC MASTECTOMY performed by Vazquez Ro MD at [address] [place] [address] Right 7/17/2020    SINGLE LUMEN SMART PORT INSERTION RIGHT SUBCLAVIAN performed by Vazquez Ro MD at [address] [facility]  10/2014    Dr Francois- Darylene Lenis patient was 11years old    TUBAL LIGATION          Family History   Problem Relation Age of Onset    Heart Disease Father         cath stent blood to thin and bled    Heart Attack Mother     Other Other         blood clot    Breast Cancer Paternal Aunt 62    High Blood Pressure Sister     Cancer Brother 68        skin    Prostate Cancer Brother         had chemotherapy to treat     Prostate Cancer Brother 64        has had for 10 years    Ovarian Cancer Neg Hx     Diabetes Neg Hx     Stroke Neg Hx         Social History     Tobacco Use    Smoking status: Never Smoker    Smokeless tobacco: Never Used   Substance Use Topics    Alcohol use: No     Alcohol/week: 0.0 standard drinks          Current Outpatient Medications   Medication Sig Dispense Refill    zinc sulfate (ZINCATE) 220 (50 Zn) MG capsule Take 1 capsule by mouth daily 30 capsule 0    vitamin C (VITAMIN C) 500 MG tablet Take 1 tablet by mouth daily 30 tablet 0    loperamide (IMODIUM) 2 MG capsule Take 2 mg by mouth 4 times daily as needed for Diarrhea      lidocaine-prilocaine (EMLA) 2.5-2.5 % cream Apply topically as needed. 30 g 1    levothyroxine (SYNTHROID) 25 MCG tablet Take 25 mcg by mouth Daily      alendronate (FOSAMAX) 35 MG tablet Take 35 mg by mouth every 7 days      aspirin EC 81 MG EC tablet Take 1 tablet by mouth daily 30 tablet 3    ondansetron (ZOFRAN ODT) 4 MG disintegrating tablet Take 1 tablet by mouth every 8 hours as needed for Nausea 20 tablet 0    PARoxetine (PAXIL) 10 MG tablet Take 10 mg by mouth every morning      Coenzyme Q10-Fish Oil-Vit E (CO-Q 10 OMEGA-3 FISH OIL PO) Take by mouth daily      Vitamin D (CHOLECALCIFEROL) 1000 UNITS CAPS capsule Take by mouth daily 2 tab      Cinnamon 500 MG CAPS Take by mouth daily      Cyanocobalamin (VITAMIN B12 PO) Take 1,000 mcg by mouth daily        No current facility-administered medications for this visit.       Allergies   Allergen Reactions  Aluminum-Containing Compounds Anaphylaxis       Subjective:      Review of Systems    Objective:   /78 (Site: Right Lower Arm, Position: Sitting, Cuff Size: Medium Adult)   Pulse 83   Temp 97.4 °F (36.3 °C) (Temporal)   Resp 14   Ht 5' 9\" (1.753 m)   Wt 203 lb (92.1 kg)   SpO2 94%   BMI 29.98 kg/m²     Physical Exam no signs of infection 55 mm drained off the right side 200 mL drained off the left       Results for orders placed or performed during the hospital encounter of 01/11/21   Absolute Neutrophil   Result Value Ref Range    Segs Absolute 3.1 1.8 - 7.7 thou/mm3   CBC   Result Value Ref Range    WBC 8.2 4.8 - 10.8 thou/mm3    RBC 4.73 4.20 - 5.40 mill/mm3    Hemoglobin 14.5 12.0 - 16.0 gm/dl    Hematocrit 45.4 37.0 - 47.0 %    MCV 96 81 - 99 fL    MCH 30.7 26.0 - 33.0 pg    MCHC 31.9 (L) 32.2 - 35.5 gm/dl    RDW 12.8 11.5 - 14.5 %    Platelets 689 (H) 666 - 400 thou/mm3    MPV 10.6 9.4 - 12.4 fL   Hepatic Function Panel   Result Value Ref Range    Alb 3.8 3.5 - 5.1 g/dL    Total Bilirubin 0.4 0.3 - 1.2 mg/dL    Bilirubin, Direct <0.2 0.0 - 0.3 mg/dL    Alkaline Phosphatase 55 38 - 126 U/L    AST 25 5 - 40 U/L    ALT 15 11 - 66 U/L    Total Protein 7.2 6.1 - 8.0 g/dL   POC PANEL BMP W/IOCA   Result Value Ref Range    Sodium, Whole Blood 139 138 - 146 meq/l    Potassium, Whole Blood 4.4 3.5 - 4.9 meq/l    Chloride, Whole Blood 104 98 - 109 meq/l    TOTAL CO2, WHOLE BLOOD 27 23 - 33 meq/l    Glucose, Whole Blood 116 (H) 70 - 108 mg/dl    BUN, WHOLE BLOOD 14 8 - 26 mg/dl    CREATININE, WHOLE BLOOD 0.8 0.5 - 1.2 mg/dl    Ionized Calcium, WB 1.21 1.12 - 1.32 mmol/L   Neutrophils, Automated   Result Value Ref Range    Seg Neutrophils 38 (L) 43 - 75 %   Glomerular Filtration Rate, Estimated   Result Value Ref Range    GFR, Estimated 75 ml/min/1.73m2       Assessment: Postmastectomy seromas left greater than right will return to office in 1 week if seromas persist will consider placing drain on the left side    Plan:     Return to office in 1 week      Electronicallysigned by Brigitte Acosta MD on 1/14/2021 at 11:13 AM

## 2021-01-19 NOTE — PROGRESS NOTES
[address] [place] [address] [address] [id number]  Dept: [phone]  Dept Fax: [phone]  Loc: [phone]    Visit Date: 1/7/2021    Rhiannon Quinn is a 79 y.o. female who presentstoday for:  Chief Complaint   Patient presents with    Post-Op Check     s/p 11/30 1. Left simple mastectomy with left axillary sentinel lymph node biopsy. 2. Right simple mastectomy.        HPI:     HPI as above patient has been doing well but has developed seromas under each chest wall flap with the left greater than the right he is here for evaluation and probable repeat aspiration she is having no fever no pain she has been started on chemotherapy per recommendations of oncology    Past Medical History:   Diagnosis Date    Breast cancer ([address] 2020    left-invasive ductal follows with Dr Lainez Ethan COVID-19 10/07/2020    wears o2 at night    Hyperlipidemia     Numbness and tingling     left foot-chronic nerve damage    Shortness of breath       Past Surgical History:   Procedure Laterality Date    ABDOMINAL EXPLORATION SURGERY  2014    Dr butterfield-lysis of adhesions    BREAST LUMPECTOMY Left 11/2015    Left breast lumpectomy, retroareolar with preoperative needle loc-Dr. Adeline Todd BREAST SURGERY Left 06/26/2020    biopsy of left breast    CHOLECYSTECTOMY  10/2014    Dr Natalie Coughlin  03/30/2016    Dr Colt Padilla  10/2014    x4 abdominal wall    HYSTERECTOMY      INCISIONAL HERNIA REPAIR  2014    Dr Joshua Saunders LIPECTOMY  2014    Dr Mcpherson Pennington  7/16/2020    [address] LEFT 7/16/2020 Annalise Cantu  [facility] Bilateral 11/30/2020    LEFT SIMPLE MASTECTOMY AND SENTINAL LYMPH NODE BIOPSY, RIGHT SIMPLE PROPHYLACTIC MASTECTOMY performed by Nick Richardson MD at [address] [place] [address] Right 7/17/2020 SINGLE LUMEN SMART PORT INSERTION RIGHT SUBCLAVIAN performed by Michelle Vicente MD at [address] [facility]  10/2014    [facility]      patient was 11years old    TUBAL LIGATION          Family History   Problem Relation Age of Onset    Heart Disease Father         cath stent blood to thin and bled    Heart Attack Mother     Other Other         blood clot    Breast Cancer Paternal Aunt 62    High Blood Pressure Sister     Cancer Brother 68        skin    Prostate Cancer Brother         had chemotherapy to treat     Prostate Cancer Brother 64        has had for 10 years    Ovarian Cancer Neg Hx     Diabetes Neg Hx     Stroke Neg Hx         Social History     Tobacco Use    Smoking status: Never Smoker    Smokeless tobacco: Never Used   Substance Use Topics    Alcohol use: No     Alcohol/week: 0.0 standard drinks          Current Outpatient Medications   Medication Sig Dispense Refill    zinc sulfate (ZINCATE) 220 (50 Zn) MG capsule Take 1 capsule by mouth daily 30 capsule 0    vitamin C (VITAMIN C) 500 MG tablet Take 1 tablet by mouth daily 30 tablet 0    loperamide (IMODIUM) 2 MG capsule Take 2 mg by mouth 4 times daily as needed for Diarrhea      lidocaine-prilocaine (EMLA) 2.5-2.5 % cream Apply topically as needed.  30 g 1    levothyroxine (SYNTHROID) 25 MCG tablet Take 25 mcg by mouth Daily      alendronate (FOSAMAX) 35 MG tablet Take 35 mg by mouth every 7 days      aspirin EC 81 MG EC tablet Take 1 tablet by mouth daily 30 tablet 3    ondansetron (ZOFRAN ODT) 4 MG disintegrating tablet Take 1 tablet by mouth every 8 hours as needed for Nausea 20 tablet 0    PARoxetine (PAXIL) 10 MG tablet Take 10 mg by mouth every morning      Coenzyme Q10-Fish Oil-Vit E (CO-Q 10 OMEGA-3 FISH OIL PO) Take by mouth daily      Vitamin D (CHOLECALCIFEROL) 1000 UNITS CAPS capsule Take by mouth daily 2 tab      Cinnamon 500 MG CAPS Take by mouth daily  Cyanocobalamin (VITAMIN B12 PO) Take 1,000 mcg by mouth daily        No current facility-administered medications for this visit.       Allergies   Allergen Reactions    Aluminum-Containing Compounds Anaphylaxis       Subjective:      Review of Systems    Objective:   /84 (Site: Left Upper Arm, Position: Sitting, Cuff Size: Medium Adult)   Pulse 74   Temp 98.2 °F (36.8 °C) (Temporal)   Resp 16   Ht 5' 9\" (1.753 m)   Wt 204 lb (92.5 kg)   SpO2 98%   BMI 30.13 kg/m²     Physical Exam breast flaps are good patient had 55 cc aspirated from the right flap 240 cc from the left flap       Results for orders placed or performed during the hospital encounter of 04/70/56   Basic Metabolic Panel   Result Value Ref Range    Sodium 135 135 - 145 meq/L    Potassium 4.6 3.5 - 5.2 meq/L    Chloride 103 98 - 111 meq/L    CO2 21 (L) 23 - 33 meq/L    Glucose 139 (H) 70 - 108 mg/dL    BUN 12 7 - 22 mg/dL    CREATININE 0.5 0.4 - 1.2 mg/dL    Calcium 9.3 8.5 - 10.5 mg/dL   CBC auto differential   Result Value Ref Range    WBC 14.6 (H) 4.8 - 10.8 thou/mm3    RBC 3.91 (L) 4.20 - 5.40 mill/mm3    Hemoglobin 12.4 12.0 - 16.0 gm/dl    Hematocrit 40.0 37.0 - 47.0 %    .3 (H) 81.0 - 99.0 fL    MCH 31.7 26.0 - 33.0 pg    MCHC 31.0 (L) 32.2 - 35.5 gm/dl    RDW-CV 13.7 11.5 - 14.5 %    RDW-SD 51.3 (H) 35.0 - 45.0 fL    Platelets 744 122 - 978 thou/mm3    MPV 11.0 9.4 - 12.4 fL    Seg Neutrophils 62.4 %    Lymphocytes 24.0 %    Monocytes 12.5 %    Eosinophils 0.1 %    Basophils 0.5 %    Immature Granulocytes 0.5 %    Segs Absolute 9.1 (H) 1.8 - 7.7 thou/mm3    Lymphocytes Absolute 3.5 1.0 - 4.8 thou/mm3    Monocytes Absolute 1.8 (H) 0.4 - 1.3 thou/mm3    Eosinophils Absolute 0.0 0.0 - 0.4 thou/mm3    Basophils Absolute 0.1 0.0 - 0.1 thou/mm3    Immature Grans (Abs) 0.07 0.00 - 0.07 thou/mm3    nRBC 0 /100 wbc   Anion Gap   Result Value Ref Range    Anion Gap 11.0 8.0 - 16.0 meq/L   Glomerular Filtration Rate, Estimated Result Value Ref Range    Est, Glom Filt Rate >90 ml/min/1.73m2       Assessment:     Persistent seromas will return to office in 1 week and probable aspirated again but if she continues to form on the left side may need a repeat drain    Plan:     As above      Electronicallysigned by Areli Fofana MD on 1/19/2021 at 6:39 PM

## 2021-01-21 ENCOUNTER — OFFICE VISIT (OUTPATIENT)
Dept: SURGERY | Age: 71
End: 2021-01-21

## 2021-01-21 ENCOUNTER — HOSPITAL ENCOUNTER (OUTPATIENT)
Age: 71
Discharge: HOME | End: 2021-01-21
Payer: MEDICARE

## 2021-01-21 ENCOUNTER — HOSPITAL ENCOUNTER (OUTPATIENT)
Dept: GENERAL RADIOLOGY | Age: 71
Discharge: HOME | End: 2021-01-21
Payer: MEDICARE

## 2021-01-21 ENCOUNTER — OFFICE VISIT (OUTPATIENT)
Dept: PULMONOLOGY | Age: 71
End: 2021-01-21
Payer: MEDICARE

## 2021-01-21 VITALS
HEART RATE: 86 BPM | TEMPERATURE: 98.5 F | HEIGHT: 69 IN | BODY MASS INDEX: 30.96 KG/M2 | WEIGHT: 209 LBS | OXYGEN SATURATION: 93 % | SYSTOLIC BLOOD PRESSURE: 118 MMHG | DIASTOLIC BLOOD PRESSURE: 74 MMHG

## 2021-01-21 VITALS
TEMPERATURE: 97 F | HEIGHT: 69 IN | SYSTOLIC BLOOD PRESSURE: 124 MMHG | RESPIRATION RATE: 16 BRPM | WEIGHT: 203 LBS | OXYGEN SATURATION: 91 % | BODY MASS INDEX: 30.07 KG/M2 | DIASTOLIC BLOOD PRESSURE: 80 MMHG | HEART RATE: 98 BPM

## 2021-01-21 DIAGNOSIS — Z90.13 S/P BILATERAL MASTECTOMY: Primary | ICD-10-CM

## 2021-01-21 DIAGNOSIS — Z48.89 ENCOUNTER FOR POSTOPERATIVE CARE: ICD-10-CM

## 2021-01-21 DIAGNOSIS — U07.1 PNEUMONIA DUE TO COVID-19 VIRUS: Primary | ICD-10-CM

## 2021-01-21 DIAGNOSIS — Z51.89 VISIT FOR WOUND CHECK: ICD-10-CM

## 2021-01-21 DIAGNOSIS — J12.82 PNEUMONIA DUE TO COVID-19 VIRUS: ICD-10-CM

## 2021-01-21 DIAGNOSIS — J12.82 PNEUMONIA DUE TO COVID-19 VIRUS: Primary | ICD-10-CM

## 2021-01-21 DIAGNOSIS — Z09 HOSPITAL DISCHARGE FOLLOW-UP: ICD-10-CM

## 2021-01-21 DIAGNOSIS — U07.1 PNEUMONIA DUE TO COVID-19 VIRUS: ICD-10-CM

## 2021-01-21 PROCEDURE — 99213 OFFICE O/P EST LOW 20 MIN: CPT | Performed by: NURSE PRACTITIONER

## 2021-01-21 PROCEDURE — 71046 X-RAY EXAM CHEST 2 VIEWS: CPT

## 2021-01-21 PROCEDURE — 99024 POSTOP FOLLOW-UP VISIT: CPT | Performed by: SURGERY

## 2021-01-21 ASSESSMENT — ENCOUNTER SYMPTOMS
WHEEZING: 0
EYES NEGATIVE: 1
RESPIRATORY NEGATIVE: 1
SHORTNESS OF BREATH: 0
GASTROINTESTINAL NEGATIVE: 1
ALLERGIC/IMMUNOLOGIC NEGATIVE: 1
COUGH: 0

## 2021-01-21 NOTE — PROGRESS NOTES
[facility]    Patient: Mellissa Haynes, 79 y.o.   : 1950      Subjective     Chief Complaint   Patient presents with   [address] 3 month follow up with a chest xray 21        NICKY  Brice Herrera is here for follow up for hypoxic respiratory failure 2/2 Covid-19 infection she received Remdesivir, Decadron and convalescent plasma. H/O breast cancer recent tx with Taxotere, Carboplatin, Herceptin Pertuzumab . Patient never required intubation. Patient DC on 2LPM with activity only - patient already returned home oxygen  CXR repeated on 2021 showing much improvement  Patient has no complaints today pulmonary wise. She continues treatment for her breast cancer. 2020 double mastectomy- no issues- no cancer lymph nodes. Progress History:   Since last visit any new medical issues? No  New ER or hospital visits? No  Any new or changes in medicines? No  Using inhalers? No  Are they helpful?  No  Flu vaccine yes  Pneumonia vaccine yes  Past Medical hx   PMH:  Past Medical History:   Diagnosis Date    Breast cancer ([facility] [address]     left-invasive ductal follows with Dr Yu Deng COVID-19 10/07/2020    wears o2 at night    Hyperlipidemia     Numbness and tingling     left foot-chronic nerve damage    Shortness of breath      SURGICAL HISTORY:  Past Surgical History:   Procedure Laterality Date    ABDOMINAL EXPLORATION SURGERY      Dr butterfield-lysis of adhesions    BREAST LUMPECTOMY Left 2015    Left breast lumpectomy, retroareolar with preoperative needle loc-Dr. Yomaira Lopez BREAST SURGERY Left 2020    biopsy of left breast    CHOLECYSTECTOMY  10/2014    Dr Yolette Mi  2016    Dr Carson Sweet  10/2014    x4 abdominal wall    HYSTERECTOMY      INCISIONAL HERNIA REPAIR      Dr Bonilla Hollow LIPECTOMY      Dr Constanza Sanders  [address] LEFT 7/16/2020 Deb Dorsey MD [facility]    MASTECTOMY Bilateral 11/30/2020    LEFT SIMPLE MASTECTOMY AND SENTINAL LYMPH NODE BIOPSY, RIGHT SIMPLE PROPHYLACTIC MASTECTOMY [facility] Right 7/17/2020    SINGLE LUMEN SMART PORT INSERTION RIGHT SUBCLAVIAN performed by Arias Christopher MD at [address]  10/2014    [facility]      patient was 11years old    TUBAL LIGATION       SOCIAL HISTORY:  Social History     Tobacco Use    Smoking status: Never Smoker    Smokeless tobacco: Never Used   Substance Use Topics    Alcohol use: No     Alcohol/week: 0.0 standard drinks    Drug use: No     ALLERGIES:  Allergies   Allergen Reactions    Aluminum-Containing Compounds Anaphylaxis    Adhesive Tape Other (See Comments)     Takes top layer of skin off     FAMILY HISTORY:  Family History   Problem Relation Age of Onset    Heart Disease Father         cath stent blood to thin and bled    Heart Attack Mother     Other Other         blood clot    Breast Cancer Paternal Aunt 62    High Blood Pressure Sister     Cancer Brother 68        skin    Prostate Cancer Brother         had chemotherapy to treat     Prostate Cancer Brother 64        has had for 10 years    Ovarian Cancer Neg Hx     Diabetes Neg Hx     Stroke Neg Hx      CURRENT MEDICATIONS:  Current Outpatient Medications   Medication Sig Dispense Refill    zinc sulfate (ZINCATE) 220 (50 Zn) MG capsule Take 1 capsule by mouth daily 30 capsule 0    vitamin C (VITAMIN C) 500 MG tablet Take 1 tablet by mouth daily 30 tablet 0    loperamide (IMODIUM) 2 MG capsule Take 2 mg by mouth 4 times daily as needed for Diarrhea      lidocaine-prilocaine (EMLA) 2.5-2.5 % cream Apply topically as needed.  30 g 1    levothyroxine (SYNTHROID) 25 MCG tablet Take 25 mcg by mouth Daily  alendronate (FOSAMAX) 35 MG tablet Take 35 mg by mouth every 7 days      aspirin EC 81 MG EC tablet Take 1 tablet by mouth daily 30 tablet 3    ondansetron (ZOFRAN ODT) 4 MG disintegrating tablet Take 1 tablet by mouth every 8 hours as needed for Nausea 20 tablet 0    PARoxetine (PAXIL) 10 MG tablet Take 10 mg by mouth every morning      Coenzyme Q10-Fish Oil-Vit E (CO-Q 10 OMEGA-3 FISH OIL PO) Take by mouth daily      Vitamin D (CHOLECALCIFEROL) 1000 UNITS CAPS capsule Take by mouth daily 2 tab      Cinnamon 500 MG CAPS Take by mouth daily      Cyanocobalamin (VITAMIN B12 PO) Take 1,000 mcg by mouth daily        No current facility-administered medications for this visit. ROS   Review of Systems   Constitutional: Negative. Negative for chills and fever. HENT: Negative. Negative for congestion. Eyes: Negative. Respiratory: Negative. Negative for cough, shortness of breath and wheezing. Cardiovascular: Negative. Negative for chest pain and leg swelling. Gastrointestinal: Negative. Endocrine: Negative. Genitourinary: Negative. Musculoskeletal: Negative. Allergic/Immunologic: Negative. Neurological: Negative. Hematological: Negative. Psychiatric/Behavioral: Negative. Negative for sleep disturbance. Physical exam   /74 (Site: Right Lower Arm, Position: Sitting, Cuff Size: Medium Adult)   Pulse 86   Temp 98.5 °F (36.9 °C) (Tympanic)   Ht 5' 9\" (1.753 m)   Wt 209 lb (94.8 kg)   SpO2 93% Comment: on room air at rest  BMI 30.86 kg/m²    Wt Readings from Last 3 Encounters:   01/21/21 209 lb (94.8 kg)   01/21/21 203 lb (92.1 kg)   01/14/21 203 lb (92.1 kg)       Physical Exam  Vitals signs and nursing note reviewed. Constitutional:       Appearance: She is well-developed. HENT:      Head: Normocephalic and atraumatic. Eyes:      Conjunctiva/sclera: Conjunctivae normal.      Pupils: Pupils are equal, round, and reactive to light.    Neck: Musculoskeletal: Normal range of motion and neck supple. Vascular: No JVD. Cardiovascular:      Rate and Rhythm: Normal rate and regular rhythm. Heart sounds: Normal heart sounds. No murmur. No friction rub. No gallop. Pulmonary:      Effort: Pulmonary effort is normal. No respiratory distress. Breath sounds: Normal breath sounds. No wheezing or rales. Chest:      Breasts:         Right: Absent. Left: Absent. Abdominal:      General: Bowel sounds are normal.      Palpations: Abdomen is soft. Musculoskeletal: Normal range of motion. Skin:     General: Skin is warm and dry. Capillary Refill: Capillary refill takes less than 2 seconds. Neurological:      Mental Status: She is alert and oriented to person, place, and time. Psychiatric:         Behavior: Behavior normal.         Thought Content: Thought content normal.         Judgment: Judgment normal.          results   Lung Nodule Screening     [] Qualifies    [x] Does not qualify   [] Declined    [] Completed  The USPSTF recommends annual screening for lung cancer with low-dose computed tomography (LDCT) in adults aged 54 to [de-identified] years who have a 30 pack-year smoking history and currently smoke or have quit within the past 15 years. Screening should be discontinued once a person has not smoked for 15 years or develops a health problem that substantially limits life expectancy or the ability or willingness to have curative lung surgery. 1/21/2021   XR CHEST (2 VW)   1. Normal heart size. Mediport right side, catheter tip in SVC. Evidence for old, healed granulomatous disease. Vascular clips on both sides of the chest from prior surgery, possibly related to the breasts. Clinical correlation recommended. 2. Mild residual atelectasis/pneumonia right perihilar region and left lung base. No effusion. Overall appearance significantly improved from prior.          Assessment      Diagnosis Orders 1. Pneumonia due to COVID-19 virus      resolving   2.  Hospital discharge follow-up           Plan   -CXR improving  -Patient no longer using home O2 and has since returned it  -No inhaler use  -Doing well pulmonary wise with no problems   -Advised to maintain pneumonia vaccine with PCP and to take flu vaccine this coming season.  -Advised patient to call office with any changes, questions, or concerns regarding respiratory status    Will see Karen Oviedo back in: CARLOS Bailey, ALEJANDRA  1/21/2021

## 2021-01-24 NOTE — PROGRESS NOTES
[address] [place] [address]  Dept: [phone]  Dept Fax: [phone]  Loc: [phone]    Visit Date: 1/21/2021    Aga Mayo is a 79 y.o. female who presentstoday for:  Chief Complaint   Patient presents with    Post-Op Check     s/p 11/30-1. Left simple mastectomy with left axillary sentinel lymph node bx. 2. Right simple mastectomy.  -- check seroma       HPI:     HPI as above patient status post bilateral mastectomies left being cancer right prophylactic she has had seromas on each side although right much less than left here for wound check  Past Medical History:   Diagnosis Date    Breast cancer ([facility] [address] 2020    left-invasive ductal follows with Dr Paco Larsen COVID-19 10/07/2020    wears o2 at night    Hyperlipidemia     Numbness and tingling     left foot-chronic nerve damage    Shortness of breath       Past Surgical History:   Procedure Laterality Date    ABDOMINAL EXPLORATION SURGERY  2014    Dr butterfield-lysis of adhesions    BREAST LUMPECTOMY Left 11/2015    Left breast lumpectomy, retroareolar with preoperative needle loc-Dr. Jamarcus Miller BREAST SURGERY Left 06/26/2020    biopsy of left breast    CHOLECYSTECTOMY  10/2014    Dr Isabella Hernández  03/30/2016    Dr Afia Martinez  10/2014    x4 abdominal wall    HYSTERECTOMY      INCISIONAL HERNIA REPAIR  2014    Dr Merline Rye LIPECTOMY  2014    Dr Augustina Pope LEFT  7/16/2020    [address] LEFT 7/16/2020 Philippe Lopez  [facility] Bilateral 11/30/2020    LEFT SIMPLE MASTECTOMY AND SENTINAL LYMPH NODE BIOPSY, RIGHT SIMPLE PROPHYLACTIC MASTECTOMY performed by Willard Lan MD at [facility] Right 7/17/2020    SINGLE LUMEN SMART PORT INSERTION RIGHT SUBCLAVIAN performed by Willard Lan MD at [address] [facility]  10/2014 [facility]      patient was 11years old    TUBAL LIGATION          Family History   Problem Relation Age of Onset    Heart Disease Father         cath stent blood to thin and bled    Heart Attack Mother     Other Other         blood clot    Breast Cancer Paternal Aunt 62    High Blood Pressure Sister     Cancer Brother 68        skin    Prostate Cancer Brother         had chemotherapy to treat     Prostate Cancer Brother 64        has had for 10 years    Ovarian Cancer Neg Hx     Diabetes Neg Hx     Stroke Neg Hx         Social History     Tobacco Use    Smoking status: Never Smoker    Smokeless tobacco: Never Used   Substance Use Topics    Alcohol use: No     Alcohol/week: 0.0 standard drinks          Current Outpatient Medications   Medication Sig Dispense Refill    zinc sulfate (ZINCATE) 220 (50 Zn) MG capsule Take 1 capsule by mouth daily 30 capsule 0    vitamin C (VITAMIN C) 500 MG tablet Take 1 tablet by mouth daily 30 tablet 0    loperamide (IMODIUM) 2 MG capsule Take 2 mg by mouth 4 times daily as needed for Diarrhea      lidocaine-prilocaine (EMLA) 2.5-2.5 % cream Apply topically as needed.  30 g 1    levothyroxine (SYNTHROID) 25 MCG tablet Take 25 mcg by mouth Daily      alendronate (FOSAMAX) 35 MG tablet Take 35 mg by mouth every 7 days      aspirin EC 81 MG EC tablet Take 1 tablet by mouth daily 30 tablet 3    ondansetron (ZOFRAN ODT) 4 MG disintegrating tablet Take 1 tablet by mouth every 8 hours as needed for Nausea 20 tablet 0    PARoxetine (PAXIL) 10 MG tablet Take 10 mg by mouth every morning      Coenzyme Q10-Fish Oil-Vit E (CO-Q 10 OMEGA-3 FISH OIL PO) Take by mouth daily      Vitamin D (CHOLECALCIFEROL) 1000 UNITS CAPS capsule Take by mouth daily 2 tab      Cinnamon 500 MG CAPS Take by mouth daily      Cyanocobalamin (VITAMIN B12 PO) Take 1,000 mcg by mouth daily No current facility-administered medications for this visit.       Allergies   Allergen Reactions    Aluminum-Containing Compounds Anaphylaxis    Adhesive Tape Other (See Comments)     Takes top layer of skin off       Subjective:      Review of Systems    Objective:   /80 (Site: Right Lower Arm, Position: Sitting, Cuff Size: Medium Adult)   Pulse 98   Temp 97 °F (36.1 °C) (Temporal)   Resp 16   Ht 5' 9\" (1.753 m)   Wt 203 lb (92.1 kg)   SpO2 91%   BMI 29.98 kg/m²     Physical Exam wounds are good no signs of infection minimal seroma right side no need for aspiration 180 mL aspirated from the left mastectomy flaps       Results for orders placed or performed during the hospital encounter of 01/11/21   Absolute Neutrophil   Result Value Ref Range    Segs Absolute 3.1 1.8 - 7.7 thou/mm3   CBC   Result Value Ref Range    WBC 8.2 4.8 - 10.8 thou/mm3    RBC 4.73 4.20 - 5.40 mill/mm3    Hemoglobin 14.5 12.0 - 16.0 gm/dl    Hematocrit 45.4 37.0 - 47.0 %    MCV 96 81 - 99 fL    MCH 30.7 26.0 - 33.0 pg    MCHC 31.9 (L) 32.2 - 35.5 gm/dl    RDW 12.8 11.5 - 14.5 %    Platelets 966 (H) 854 - 400 thou/mm3    MPV 10.6 9.4 - 12.4 fL   Hepatic Function Panel   Result Value Ref Range    Alb 3.8 3.5 - 5.1 g/dL    Total Bilirubin 0.4 0.3 - 1.2 mg/dL    Bilirubin, Direct <0.2 0.0 - 0.3 mg/dL    Alkaline Phosphatase 55 38 - 126 U/L    AST 25 5 - 40 U/L    ALT 15 11 - 66 U/L    Total Protein 7.2 6.1 - 8.0 g/dL   POC PANEL BMP W/IOCA   Result Value Ref Range    Sodium, Whole Blood 139 138 - 146 meq/l    Potassium, Whole Blood 4.4 3.5 - 4.9 meq/l    Chloride, Whole Blood 104 98 - 109 meq/l    TOTAL CO2, WHOLE BLOOD 27 23 - 33 meq/l    Glucose, Whole Blood 116 (H) 70 - 108 mg/dl    BUN, WHOLE BLOOD 14 8 - 26 mg/dl    CREATININE, WHOLE BLOOD 0.8 0.5 - 1.2 mg/dl    Ionized Calcium, WB 1.21 1.12 - 1.32 mmol/L   Neutrophils, Automated   Result Value Ref Range    Seg Neutrophils 38 (L) 43 - 75 % Glomerular Filtration Rate, Estimated   Result Value Ref Range    GFR, Estimated 75 ml/min/1.73m2       Assessment:     Status post bilateral mastectomies patient having persistent seroma of the left chest wall 180 cc removed will see in 1 week if persist will need NOHELIA drain    Plan:     Seromas postmastectomy      Electronicallysigned by Bakari Israel MD on 1/24/2021 at 5:00 PM

## 2021-01-25 RX ORDER — ONDANSETRON 4 MG/1
4 TABLET, ORALLY DISINTEGRATING ORAL EVERY 8 HOURS PRN
Qty: 20 TABLET | Refills: 3 | Status: SHIPPED | OUTPATIENT
Start: 2021-01-25 | End: 2022-05-12 | Stop reason: ALTCHOICE

## 2021-01-26 NOTE — PROGRESS NOTES
[facility], [address] [facility] [address]  [address] [address]  Dept: [phone]  Dept Fax: [phone]: [phone]      Visit Date:2021     Polly Garcia is a 79 y.o. female who presents today for:   Chief Complaint   Patient presents with    Follow-up     breast CA        HPI:   Polly Garcia is a 79 y.o. female with  triple positive left breast cancer. She had annual screening mammogram on 2020 that showed 2 lesions in the left breast.  Subsequently she underwent breast ultrasound followed by ultrasound guided biopsy of those 2 lesions. The largest of these lesions is a lobulated mixed cystic and    solid appearing mass measuring approximately 3 x 2.1 x 2.7 cm. This is located at the anterior depth upper inner quadrant. Aspiration of the fluid component of this lesion and biopsy of    the solid component of this lesion was performed on 2020. The smaller adjacent lesion is located at the middle depth of the     upper inner quadrant left breast measuring approximately 1.6 x    1.1 x 1.6 cm. Biopsy of this lesion was performed on 2020.       Final pathology report showed:   A-B.  Left breast, upper inner quadrant, anterior and middle depth,   barbell and tribell clips, multiple core needle biopsies:   Cornelia Bustos, poorly differentiated ductal carcinoma, Pepe score 3. Estrogen Receptor: Positive 100 % of cells (>10% of cells)   Progesterone Receptor:  Positive 90 % of cells   Ki-67 (clone 30-9)     Percentage of positive nuclei: 42 %   HER2 NEW POSITIVE      On 2020 the patient had breast MRI showin. A known biopsy-proven mass demonstrated within the upper     inner left breast anterior to middle depth.  This measures 3 cm x     4.1 cm x 3.1 cm middle depth located 4  cm from the nipple, 0.9    cm from the skin, and 6.5 cm from the chest wall.  This shows    heterogeneous enhancement and delayed washout type vascular enhancement.      2. There are scattered small foci of enhancement demonstrated    bilaterally. The dominant focus of enhancement within the right    breast is located within the lower inner right breast anterior    depth on axial image 105 series 9. Recommend further evaluation    with second look ultrasound. If there is no sonographic    correlate, recommend 6 month follow-up breast MRI to document    stability.         3. Among the small foci of enhancement within the left breast,    the most prominent is located within the lower outer quadrant    posterior depth as seen on axial image 91 series 9. Recommend    further evaluation with second look ultrasound. If there is no    sonographic correlate, recommend 6 month follow-up breast MRI to    document stability.          Patient started neoadjuvant chemotherapy was Taxotere/carbo/Herceptin/Pertuzumab on July 27, 2020  The patient received cycle #3 of chemotherapy on September 8, 2020. On October 4, 2020 she was diagnosed with Covid infection. On October 9, 2020 she was admitted to the hospital for worsening shortness of breath and hypoxia. She was treated with convalescent plasma 2 doses and she required high with high flow oxygen. Chest x-ray showed a developing right perihilar and left basilar consolidation. She was also treated with the course of remdesivir and 10-day course of Decadron. She started improving, she was discharged home after 12-day hospitalization. On November 30, 2020 she underwent left breast mastectomy with sentinel lymph node excision and right breast prophylactic mastectomy. Final pathology report showed:  A.  Right breast, prophylactic mastectomy:    Fibrocystic changes including stromal fibrosis and cysts. B.  Left sentinel lymph nodes, excision:    Two lymph nodes, negative for malignancy (0/2).    C.  Left breast, mastectomy:       Residual multifocal invasive ductal carcinoma (11 mm, upper outer     quadrant mass) with treatment effect (ympT1c, snpN0).  One lymph node, negative for malignancy (0/1).  Atypical lobular hyperplasia.    Margins are negative.    Closest margin: 10 mm (deep margin, lower outer quadrant mass). Patient tolerated her surgery well. Interval History on 01/11/2021:   The patient presents to the medical oncology clinic to start adjuvant chemotherapy with Kadcyla for her residual HER-2 positive breast cancer. Denies any new complaints since last visit with me  She denies having any lingering symptoms from her Covid infection. She is fully independent and functioning without any limitations.   She denies having any fevers no shortness of breath no chest pain    Past Medical History:   Diagnosis Date    Breast cancer ([facility] [address] 2020    left-invasive ductal follows with Dr Beverly HERNANDEZ-19 10/07/2020    wears o2 at night    Hyperlipidemia     Numbness and tingling     left foot-chronic nerve damage    Shortness of breath       Past Surgical History:   Procedure Laterality Date    ABDOMINAL EXPLORATION SURGERY  2014    Dr butterfield-lysis of adhesions    BREAST LUMPECTOMY Left 11/2015    Left breast lumpectomy, retroareolar with preoperative needle loc-Dr. Toribio Huffman BREAST SURGERY Left 06/26/2020    biopsy of left breast    CHOLECYSTECTOMY  10/2014    Dr Bhavana Brock  03/30/2016    Dr Man Greenwood  10/2014    x4 abdominal wall    HYSTERECTOMY      INCISIONAL HERNIA REPAIR  2014    Dr Noris Silva LIPECTOMY  2014    Dr Rhonda Srinivasan  7/16/2020    [facility] [address] LEFT 7/16/2020 Kale Solorio  [facility] Bilateral 11/30/2020    LEFT SIMPLE MASTECTOMY AND SENTINAL LYMPH NODE BIOPSY, RIGHT SIMPLE PROPHYLACTIC MASTECTOMY performed by Debbie Crawford MD at [address] [place] [address] Right 7/17/2020 SINGLE LUMEN SMART PORT INSERTION RIGHT SUBCLAVIAN performed by Elizabeth Gonzalez MD at [address] [facility]  [facility]      patient was 11years old    TUBAL LIGATION        Family History   Problem Relation Age of Onset    Heart Disease Father         cath stent blood to thin and bled    Heart Attack Mother     Other Other         blood clot    Breast Cancer Paternal Aunt 62    High Blood Pressure Sister     Cancer Brother 68        skin    Prostate Cancer Brother         had chemotherapy to treat     Prostate Cancer Brother 64        has had for 10 years    Ovarian Cancer Neg Hx     Diabetes Neg Hx     Stroke Neg Hx       Social History     Tobacco Use    Smoking status: Never Smoker    Smokeless tobacco: Never Used   Substance Use Topics    Alcohol use: No     Alcohol/week: 0.0 standard drinks      Current Outpatient Medications   Medication Sig Dispense Refill    zinc sulfate (ZINCATE) 220 (50 Zn) MG capsule Take 1 capsule by mouth daily 30 capsule 0    vitamin C (VITAMIN C) 500 MG tablet Take 1 tablet by mouth daily 30 tablet 0    loperamide (IMODIUM) 2 MG capsule Take 2 mg by mouth 4 times daily as needed for Diarrhea      lidocaine-prilocaine (EMLA) 2.5-2.5 % cream Apply topically as needed.  30 g 1    levothyroxine (SYNTHROID) 25 MCG tablet Take 25 mcg by mouth Daily      alendronate (FOSAMAX) 35 MG tablet Take 35 mg by mouth every 7 days      aspirin EC 81 MG EC tablet Take 1 tablet by mouth daily 30 tablet 3    PARoxetine (PAXIL) 10 MG tablet Take 10 mg by mouth every morning      Coenzyme Q10-Fish Oil-Vit E (CO-Q 10 OMEGA-3 FISH OIL PO) Take by mouth daily      Vitamin D (CHOLECALCIFEROL) 1000 UNITS CAPS capsule Take by mouth daily 2 tab      Cinnamon 500 MG CAPS Take by mouth daily      Cyanocobalamin (VITAMIN B12 PO) Take 1,000 mcg by mouth daily  ondansetron (ZOFRAN ODT) 4 MG disintegrating tablet Take 1 tablet by mouth every 8 hours as needed for Nausea 20 tablet 3     No current facility-administered medications for this visit. Allergies   Allergen Reactions    Aluminum-Containing Compounds Anaphylaxis    Adhesive Tape Other (See Comments)     Takes top layer of skin off          Review of Systems:   Review of Systems   Pertinent review of systems noted in HPI, all other ROS negative. Objective:   Physical Exam   Vitals:    01/11/21 1210   BP: 116/72   Pulse: 75   Resp: 16   Temp: 97.9 °F (36.6 °C)   SpO2: 95%        General appearance: No apparent distress, well developed, chronically ill appearing, and cooperative. HEENT: Pupils equal, round, and reactive to light. Conjunctivae/corneas clear. Oral mucosa moist.   Neck: Supple, with full range of motion. Trachea midline. Respiratory:  Normal respiratory effort. Clear to auscultation, bilaterally without Rales/Wheezes/Rhonchi. Cardiovascular: Regular rate and rhythm with normal S1/S2  Chest: Status post bilateral mastectomy both incisions healed nicely no any palpable masses or nodules  Abdomen: Soft, non-tender, non-distended with active bowel sounds. There is a small, dime sized circular sore to the right of the umbilicus at the waist band level. There is surrounding warmth and erythema present. No pus or drainage noted. A blue ink marker margin was drawn around the redness. Musculoskeletal: No clubbing, cyanosis or edema bilaterally. Full range of motion without deformity. Skin: Skin color, texture, turgor normal.  No rashes or lesions. Neurologic:  Neurovascularly intact without any focal sensory/motor deficits.    Psychiatric: Alert and oriented      Imaging Studies and Labs:   CBC:   Lab Results   Component Value Date    WBC 8.2 01/11/2021    HGB 14.5 01/11/2021    HCT 45.4 01/11/2021    MCV 96 01/11/2021     (H) 01/11/2021     BMP:   Lab Results   Component Value Date Residual HER-2 positive breast cancer on the other hand was bigger than 1 cm in size. In a randomized, open-label trial of 18 women with HER2-positive early breast cancer with residual invasive disease after neoadjuvant chemotherapy and Herceptin, adjuvant treatment with 14 cycles of T-DM1 versus trastuzumab improved three-year invasive DFS (88 versus 77%). The risk of distant recurrence was substantially lower with T-DM1 compared with trastuzumab. 2.  Adjuvant chemotherapy with Kadcyla. Presents to the medical oncology clinic to start [address]. Schedule and side effects were reviewed with the patient. Common side effects may include:  · easy bruising or bleeding (especially nosebleeds);  · nausea, constipation;  · joint or muscle pain;  · headache; or  · feeling tired. Her vital signs are stable, her labs are within normal range allowing for treatment. Chemotherapy orders were reviewed and signed. We will proceed with cycle #1. Her HER-2 positive for tumor was also estrogen and progesterone receptor positive. After few cycles of Kadcyla we will introduce aromatase inhibitor. All patient questions answered. Pt voiced understanding. Patient agreed with treatment plan. Follow up as directed. Patient instructed to call for questions or concerns.       Electronically signed by   Warren Pendleton MD

## 2021-01-28 ENCOUNTER — OFFICE VISIT (OUTPATIENT)
Dept: SURGERY | Age: 71
End: 2021-01-28

## 2021-01-28 VITALS
SYSTOLIC BLOOD PRESSURE: 122 MMHG | OXYGEN SATURATION: 94 % | DIASTOLIC BLOOD PRESSURE: 76 MMHG | RESPIRATION RATE: 16 BRPM | BODY MASS INDEX: 30.81 KG/M2 | TEMPERATURE: 97.4 F | WEIGHT: 208 LBS | HEART RATE: 103 BPM | HEIGHT: 69 IN

## 2021-01-28 DIAGNOSIS — N64.89 SEROMA OF BREAST: ICD-10-CM

## 2021-01-28 DIAGNOSIS — Z90.13 S/P BILATERAL MASTECTOMY: Primary | ICD-10-CM

## 2021-01-28 DIAGNOSIS — Z51.89 VISIT FOR WOUND CHECK: ICD-10-CM

## 2021-01-28 PROCEDURE — 99024 POSTOP FOLLOW-UP VISIT: CPT | Performed by: SURGERY

## 2021-01-28 NOTE — PATIENT INSTRUCTIONS
Keep drain site clean and dry. May shower. Empty drain once daily and record outputs. Bring with you to your next appt.

## 2021-01-28 NOTE — PROGRESS NOTES
[address] [place] [address] [address] [address]  Dept: [phone]  Dept Fax: [phone]  Loc: [phone]    Visit Date: 1/28/2021    Man Marrero is a 79 y.o. female who presentstoday for:  Chief Complaint   Patient presents with    Post-Op Check     seroma check--s/p 11/30 1. Left simple mastectomy with left axillary sentinel lymph node biopsy. 2. Right simple mastectomy.        HPI:     HPI as above patient has persisted with seromas particularly of the left side she has had several aspirations feel the NOHELIA drain should be placed will place NOHELIA drain today    Past Medical History:   Diagnosis Date    Breast cancer ([facility] [address] 2020    left-invasive ductal follows with Dr Andres Plata COVID-19 10/07/2020    wears o2 at night    Hyperlipidemia     Numbness and tingling     left foot-chronic nerve damage    Shortness of breath       Past Surgical History:   Procedure Laterality Date    ABDOMINAL EXPLORATION SURGERY  2014    Dr butterfield-lysis of adhesions    BREAST LUMPECTOMY Left 11/2015    Left breast lumpectomy, retroareolar with preoperative needle loc-Dr. Yu Benitez BREAST SURGERY Left 06/26/2020    biopsy of left breast    CHOLECYSTECTOMY  10/2014    Dr Kt Mcdaniels  03/30/2016    Dr Trudi Lala  10/2014    x4 abdominal wall    HYSTERECTOMY      INCISIONAL HERNIA REPAIR  2014    Dr Cheryle Cea LIPECTOMY  2014    Dr Jona Hauser  7/16/2020    [address] LEFT 7/16/2020 Torrie Pitt  [facility] Bilateral 11/30/2020    LEFT SIMPLE MASTECTOMY AND SENTINAL LYMPH NODE BIOPSY, RIGHT SIMPLE PROPHYLACTIC MASTECTOMY performed by Brigitte Acosta MD at [address] Right 7/17/2020    SINGLE LUMEN SMART PORT INSERTION RIGHT SUBCLAVIAN performed by Brigitte Acosta MD at [address] [facility]  10/2014    Dr Erin Johnson  TONSILLECTOMY AND ADENOIDECTOMY      patient was 11years old   [facility] TUBAL LIGATION          Family History   Problem Relation Age of Onset    Heart Disease Father         cath stent blood to thin and bled    Heart Attack Mother     Other Other         blood clot    Breast Cancer Paternal Aunt 62    High Blood Pressure Sister     Cancer Brother 68        skin    Prostate Cancer Brother         had chemotherapy to treat     Prostate Cancer Brother 64        has had for 10 years    Ovarian Cancer Neg Hx     Diabetes Neg Hx     Stroke Neg Hx         Social History     Tobacco Use    Smoking status: Never Smoker    Smokeless tobacco: Never Used   Substance Use Topics    Alcohol use: No     Alcohol/week: 0.0 standard drinks          Current Outpatient Medications   Medication Sig Dispense Refill    ondansetron (ZOFRAN ODT) 4 MG disintegrating tablet Take 1 tablet by mouth every 8 hours as needed for Nausea 20 tablet 3    zinc sulfate (ZINCATE) 220 (50 Zn) MG capsule Take 1 capsule by mouth daily 30 capsule 0    vitamin C (VITAMIN C) 500 MG tablet Take 1 tablet by mouth daily 30 tablet 0    loperamide (IMODIUM) 2 MG capsule Take 2 mg by mouth 4 times daily as needed for Diarrhea      lidocaine-prilocaine (EMLA) 2.5-2.5 % cream Apply topically as needed. 30 g 1    levothyroxine (SYNTHROID) 25 MCG tablet Take 25 mcg by mouth Daily      alendronate (FOSAMAX) 35 MG tablet Take 35 mg by mouth every 7 days      aspirin EC 81 MG EC tablet Take 1 tablet by mouth daily 30 tablet 3    PARoxetine (PAXIL) 10 MG tablet Take 10 mg by mouth every morning      Coenzyme Q10-Fish Oil-Vit E (CO-Q 10 OMEGA-3 FISH OIL PO) Take by mouth daily      Vitamin D (CHOLECALCIFEROL) 1000 UNITS CAPS capsule Take by mouth daily 2 tab      Cinnamon 500 MG CAPS Take by mouth daily      Cyanocobalamin (VITAMIN B12 PO) Take 1,000 mcg by mouth daily        No current facility-administered medications for this visit.       Allergies Allergen Reactions    Aluminum-Containing Compounds Anaphylaxis    Adhesive Tape Other (See Comments)     Takes top layer of skin off       Subjective:      Review of Systems    Objective:   /76 (Site: Left Upper Arm, Position: Sitting, Cuff Size: Medium Adult)   Pulse 103   Temp 97.4 °F (36.3 °C) (Temporal)   Resp 16   Ht 5' 9\" (1.753 m)   Wt 208 lb (94.3 kg)   SpO2 94%   BMI 30.72 kg/m²     Physical Exam seroma left chest wall       Results for orders placed or performed during the hospital encounter of 01/11/21   Absolute Neutrophil   Result Value Ref Range    Segs Absolute 3.1 1.8 - 7.7 thou/mm3   CBC   Result Value Ref Range    WBC 8.2 4.8 - 10.8 thou/mm3    RBC 4.73 4.20 - 5.40 mill/mm3    Hemoglobin 14.5 12.0 - 16.0 gm/dl    Hematocrit 45.4 37.0 - 47.0 %    MCV 96 81 - 99 fL    MCH 30.7 26.0 - 33.0 pg    MCHC 31.9 (L) 32.2 - 35.5 gm/dl    RDW 12.8 11.5 - 14.5 %    Platelets 082 (H) 029 - 400 thou/mm3    MPV 10.6 9.4 - 12.4 fL   Hepatic Function Panel   Result Value Ref Range    Albumin 3.8 3.5 - 5.1 g/dL    Total Bilirubin 0.4 0.3 - 1.2 mg/dL    Bilirubin, Direct <0.2 0.0 - 0.3 mg/dL    Alkaline Phosphatase 55 38 - 126 U/L    AST 25 5 - 40 U/L    ALT 15 11 - 66 U/L    Total Protein 7.2 6.1 - 8.0 g/dL   POC PANEL BMP W/IOCA   Result Value Ref Range    Sodium, Whole Blood 139 138 - 146 meq/l    Potassium, Whole Blood 4.4 3.5 - 4.9 meq/l    Chloride, Whole Blood 104 98 - 109 meq/l    TOTAL CO2, WHOLE BLOOD 27 23 - 33 meq/l    Glucose, Whole Blood 116 (H) 70 - 108 mg/dl    BUN, WHOLE BLOOD 14 8 - 26 mg/dl    CREATININE, WHOLE BLOOD 0.8 0.5 - 1.2 mg/dl    Ionized Calcium, WB 1.21 1.12 - 1.32 mmol/L   Neutrophils, Automated   Result Value Ref Range    Seg Neutrophils 38 (L) 43 - 75 %   Glomerular Filtration Rate, Estimated   Result Value Ref Range    GFR, Estimated 75 ml/min/1.73m2       Assessment:     Seroma left chest wall    Plan:     Procedure Note:

## 2021-01-31 RX ORDER — MEPERIDINE HYDROCHLORIDE 50 MG/ML
12.5 INJECTION INTRAMUSCULAR; INTRAVENOUS; SUBCUTANEOUS ONCE
Status: CANCELLED | OUTPATIENT
Start: 2021-02-12 | End: 2021-02-01

## 2021-01-31 RX ORDER — SODIUM CHLORIDE 0.9 % (FLUSH) 0.9 %
5 SYRINGE (ML) INJECTION PRN
Status: CANCELLED | OUTPATIENT
Start: 2021-02-12

## 2021-01-31 RX ORDER — SODIUM CHLORIDE 0.9 % (FLUSH) 0.9 %
10 SYRINGE (ML) INJECTION PRN
Status: CANCELLED | OUTPATIENT
Start: 2021-02-12

## 2021-01-31 RX ORDER — DIPHENHYDRAMINE HYDROCHLORIDE 50 MG/ML
50 INJECTION INTRAMUSCULAR; INTRAVENOUS ONCE
Status: CANCELLED | OUTPATIENT
Start: 2021-02-12 | End: 2021-02-01

## 2021-01-31 RX ORDER — SODIUM CHLORIDE 9 MG/ML
20 INJECTION, SOLUTION INTRAVENOUS ONCE
Status: CANCELLED | OUTPATIENT
Start: 2021-02-12 | End: 2021-02-01

## 2021-01-31 RX ORDER — EPINEPHRINE 1 MG/ML
0.3 INJECTION, SOLUTION, CONCENTRATE INTRAVENOUS PRN
Status: CANCELLED | OUTPATIENT
Start: 2021-02-12

## 2021-01-31 RX ORDER — HEPARIN SODIUM (PORCINE) LOCK FLUSH IV SOLN 100 UNIT/ML 100 UNIT/ML
500 SOLUTION INTRAVENOUS PRN
Status: CANCELLED | OUTPATIENT
Start: 2021-02-12

## 2021-01-31 RX ORDER — METHYLPREDNISOLONE SODIUM SUCCINATE 125 MG/2ML
125 INJECTION, POWDER, LYOPHILIZED, FOR SOLUTION INTRAMUSCULAR; INTRAVENOUS ONCE
Status: CANCELLED | OUTPATIENT
Start: 2021-02-12 | End: 2021-02-01

## 2021-01-31 RX ORDER — SODIUM CHLORIDE 9 MG/ML
INJECTION, SOLUTION INTRAVENOUS CONTINUOUS
Status: CANCELLED | OUTPATIENT
Start: 2021-02-12

## 2021-01-31 NOTE — PROGRESS NOTES
[facility], [address] [facility],[address]  [address] [address]  Dept: [phone]  Dept Fax: [phone]: [phone]      Visit Date:2021     Caro Pruitt is a 79 y.o. female who presents today for:   Chief Complaint   Patient presents with    Follow-up     breast CA        HPI:   Caro Pruitt is a 79 y.o. female with  triple positive left breast cancer. She had annual screening mammogram on 2020 that showed 2 lesions in the left breast.  Subsequently she underwent breast ultrasound followed by ultrasound guided biopsy of those 2 lesions. The largest of these lesions is a lobulated mixed cystic and    solid appearing mass measuring approximately 3 x 2.1 x 2.7 cm. This is located at the anterior depth upper inner quadrant. Aspiration of the fluid component of this lesion and biopsy of    the solid component of this lesion was performed on 2020. The smaller adjacent lesion is located at the middle depth of the     upper inner quadrant left breast measuring approximately 1.6 x    1.1 x 1.6 cm. Biopsy of this lesion was performed on 2020.       Final pathology report showed:   A-B.  Left breast, upper inner quadrant, anterior and middle depth,   barbell and tribell clips, multiple core needle biopsies:   Bedelia Galt, poorly differentiated ductal carcinoma, Pepe score 3. Estrogen Receptor: Positive 100 % of cells (>10% of cells)   Progesterone Receptor:  Positive 90 % of cells   Ki-67 (clone 30-9)     Percentage of positive nuclei: 42 %   HER2 NEW POSITIVE      On 2020 the patient had breast MRI showin. A known biopsy-proven mass demonstrated within the upper     inner left breast anterior to middle depth.  This measures 3 cm x     4.1 cm x 3.1 cm middle depth located 4  cm from the nipple, 0.9    cm from the skin, and 6.5 cm from the chest wall.  This shows    heterogeneous enhancement and delayed washout type vascular enhancement.      2. There are scattered small foci of enhancement demonstrated    bilaterally. The dominant focus of enhancement within the right    breast is located within the lower inner right breast anterior    depth on axial image 105 series 9. Recommend further evaluation    with second look ultrasound. If there is no sonographic    correlate, recommend 6 month follow-up breast MRI to document    stability.         3. Among the small foci of enhancement within the left breast,    the most prominent is located within the lower outer quadrant    posterior depth as seen on axial image 91 series 9. Recommend    further evaluation with second look ultrasound. If there is no    sonographic correlate, recommend 6 month follow-up breast MRI to    document stability.          Patient started neoadjuvant chemotherapy was Taxotere/carbo/Herceptin/Pertuzumab on July 27, 2020  The patient received cycle #3 of chemotherapy on September 8, 2020. On October 4, 2020 she was diagnosed with Covid infection. On October 9, 2020 she was admitted to the hospital for worsening shortness of breath and hypoxia. She was treated with convalescent plasma 2 doses and she required high with high flow oxygen. Chest x-ray showed a developing right perihilar and left basilar consolidation. She was also treated with the course of remdesivir and 10-day course of Decadron. She started improving, she was discharged home after 12-day hospitalization. On November 30, 2020 she underwent left breast mastectomy with sentinel lymph node excision and right breast prophylactic mastectomy. Final pathology report showed:  A.  Right breast, prophylactic mastectomy:    Fibrocystic changes including stromal fibrosis and cysts. B.  Left sentinel lymph nodes, excision:    Two lymph nodes, negative for malignancy (0/2).    C.  Left breast, mastectomy:       Residual multifocal invasive ductal carcinoma (11 mm, upper outer       quadrant mass) with treatment effect (ympT1c, snpN0).  One lymph node, negative for malignancy (0/1).  Atypical lobular hyperplasia.    Margins are negative.    Closest margin: 10 mm (deep margin, lower outer quadrant mass). Patient tolerated her surgery well. Interval History on 02/01/2021:   The patient presents to the medical oncology clinic to continue adjuvant chemotherapy with Kadcyla. Denies any side effects from first infusion of Kadcyla. Developed recurrent seroma in her left chest wall, the surgeon placed a NOHELIA drain. She denies having fevers.   She denies having shortness of breath no chest pain    Past Medical History:   Diagnosis Date    Breast cancer ([address] 2020    left-invasive ductal follows with Dr Beverly HERNANDEZ-19 10/07/2020    wears o2 at night    Hyperlipidemia     Numbness and tingling     left foot-chronic nerve damage    Shortness of breath       Past Surgical History:   Procedure Laterality Date    ABDOMINAL EXPLORATION SURGERY  2014    Dr butterfield-lysis of adhesions    BREAST LUMPECTOMY Left 11/2015    Left breast lumpectomy, retroareolar with preoperative needle loc-Dr. Toribio Huffman BREAST SURGERY Left 06/26/2020    biopsy of left breast    CHOLECYSTECTOMY  10/2014    Dr Bhavana Brock  03/30/2016    Dr Man Greenwood  10/2014    x4 abdominal wall    HYSTERECTOMY      INCISIONAL HERNIA REPAIR  2014    Dr Noris Silva LIPECTOMY  2014    Dr Rhonda Srinivasan  7/16/2020    [address] LEFT 7/16/2020 Kale Solorio  [facility] Bilateral 11/30/2020    LEFT SIMPLE MASTECTOMY AND SENTINAL LYMPH NODE BIOPSY, RIGHT SIMPLE PROPHYLACTIC MASTECTOMY performed by Debbie Crawford MD at [address] Right 7/17/2020    SINGLE LUMEN SMART PORT INSERTION RIGHT SUBCLAVIAN performed by Debbie Crawford MD at [address] [facility] [address]  10/2014    Dr Francois-[facility]      patient was 11years old    TUBAL LIGATION        Family History   Problem Relation Age of Onset    Heart Disease Father         cath stent blood to thin and bled    Heart Attack Mother     Other Other         blood clot    Breast Cancer Paternal Aunt 62    High Blood Pressure Sister     Cancer Brother 68        skin    Prostate Cancer Brother         had chemotherapy to treat     Prostate Cancer Brother 64        has had for 10 years    Ovarian Cancer Neg Hx     Diabetes Neg Hx     Stroke Neg Hx       Social History     Tobacco Use    Smoking status: Never Smoker    Smokeless tobacco: Never Used   Substance Use Topics    Alcohol use: No     Alcohol/week: 0.0 standard drinks      Current Outpatient Medications   Medication Sig Dispense Refill    ondansetron (ZOFRAN ODT) 4 MG disintegrating tablet Take 1 tablet by mouth every 8 hours as needed for Nausea 20 tablet 3    zinc sulfate (ZINCATE) 220 (50 Zn) MG capsule Take 1 capsule by mouth daily 30 capsule 0    vitamin C (VITAMIN C) 500 MG tablet Take 1 tablet by mouth daily 30 tablet 0    loperamide (IMODIUM) 2 MG capsule Take 2 mg by mouth 4 times daily as needed for Diarrhea      lidocaine-prilocaine (EMLA) 2.5-2.5 % cream Apply topically as needed. 30 g 1    levothyroxine (SYNTHROID) 25 MCG tablet Take 25 mcg by mouth Daily      alendronate (FOSAMAX) 35 MG tablet Take 35 mg by mouth every 7 days      aspirin EC 81 MG EC tablet Take 1 tablet by mouth daily 30 tablet 3    PARoxetine (PAXIL) 10 MG tablet Take 10 mg by mouth every morning      Coenzyme Q10-Fish Oil-Vit E (CO-Q 10 OMEGA-3 FISH OIL PO) Take by mouth daily      Vitamin D (CHOLECALCIFEROL) 1000 UNITS CAPS capsule Take by mouth daily 2 tab      Cinnamon 500 MG CAPS Take by mouth daily      Cyanocobalamin (VITAMIN B12 PO) Take 1,000 mcg by mouth daily        No current facility-administered medications for this visit.        Allergies   Allergen Reactions    Aluminum-Containing Compounds Anaphylaxis    Adhesive Tape Other (See Comments)     Takes top layer of skin off          Review of Systems:   Review of Systems   Pertinent review of systems noted in HPI, all other ROS negative. Objective:   Physical Exam   Vitals:    02/01/21 1100   BP: 134/67   Pulse: 84   Resp: 18   Temp: 97.3 °F (36.3 °C)   SpO2: 96%        General appearance: No apparent distress, well developed, chronically ill appearing, and cooperative. HEENT: Pupils equal, round, and reactive to light. Conjunctivae/corneas clear. Oral mucosa moist.   Neck: Supple, with full range of motion. Trachea midline. Respiratory:  Normal respiratory effort. Clear to auscultation, bilaterally without Rales/Wheezes/Rhonchi. Cardiovascular: Regular rate and rhythm with normal S1/S2  Chest: Status post bilateral mastectomy both incisions healed nicely no any palpable masses or nodules  Abdomen: Soft, non-tender, non-distended with active bowel sounds. There is a small, dime sized circular sore to the right of the umbilicus at the waist band level. There is surrounding warmth and erythema present. No pus or drainage noted. A blue ink marker margin was drawn around the redness. Musculoskeletal: No clubbing, cyanosis or edema bilaterally. Full range of motion without deformity. Skin: Skin color, texture, turgor normal.  No rashes or lesions. Neurologic:  Neurovascularly intact without any focal sensory/motor deficits.    Psychiatric: Alert and oriented      Imaging Studies and Labs:   CBC:   Lab Results   Component Value Date    WBC 7.6 02/12/2021    HGB 14.3 02/12/2021    HCT 46.1 02/12/2021    MCV 94 02/12/2021     (H) 02/12/2021     BMP:   Lab Results   Component Value Date     02/12/2021     12/01/2020    K 4.0 02/12/2021    K 4.6 12/01/2020    K 3.0 10/11/2020     12/01/2020    CO2 21 12/01/2020    BUN 12 12/01/2020    CREATININE 0.7 02/12/2021    CREATININE 0.5 12/01/2020    GLUCOSE 139 12/01/2020    CALCIUM 9.3 12/01/2020      LFT:   Lab Results   Component Value Date    ALT 12 02/12/2021    AST 23 02/12/2021    ALKPHOS 71 02/12/2021    BILITOT 0.2 (L) 02/12/2021      ECHO on November 17, 2020 showed:   Ejection fraction is visually estimated at 55%. Overall left ventricular function is normal.     Assessment and Plan:   1. Triple Positive Left Breast Cancer in upper outer quadrant with separate focus of triple negative bresat cancer in lower outer quadrant  Patient has received 3 cycles of neoadjuvant chemotherapy with Taxotere/carbo/Herceptin and Pertuzumab. Last treatment given in September 2020. Then the patient was hospitalized for Covid infection in October 2020. After she recovered from her Covid infection decision was made to proceed with breast surgery. She had surgery on November 30, 2020. Final pathology report showed residual cancer in both locations upper outer quadrant and lower outer quadrant. Residual triple negative breast cancer was smaller than 1 cm in size. Residual HER-2/jordan positive cancer was 11 mm in size. I had a lengthy discussion with the patient about further treatment. The CREATE-X trial randomly assigned approximately 900 patients with HER2-negative breast cancer (approximately one-third of whom had TNBC) and residual disease larger than 1 cm in size after neoadjuvant anthracycline and/or taxane therapy to either eight cycles of adjuvant Xeloda or no further chemotherapy. Patients with triple negative breast cancer receiving capecitabine had higher rates of five-year disease-free survival (DFS; 70 versus 56 %). However patient's residual tumor was smaller than 1 cm in size. Residual HER-2 positive breast cancer on the other hand was bigger than 1 cm in size.   In a randomized, open-label trial of 18 women with HER2-positive early breast cancer with residual invasive disease after neoadjuvant chemotherapy and Herceptin, adjuvant treatment with 14 cycles of T-DM1 versus trastuzumab improved three-year invasive DFS (88 versus 77%). The risk of distant recurrence was substantially lower with T-DM1 compared with trastuzumab. 2.  Adjuvant chemotherapy with Kadcyla. Patient started adjuvant Kadcyla on January 11, 2021. So far she denies having any side effects from first infusion. No joint or muscle pain and no nausea no constipation. She will be not treated today since she developed recurrent seroma and the surgeon placed a NOHELIA drain to the left chest.  Patient is afebrile. She will contact our clinic after NOHELIA drain is removed and at that time we will restart Kadcyla. Her HER-2 positive for tumor was also estrogen and progesterone receptor positive. After few cycles of Kadcyla we will introduce aromatase inhibitor. All patient questions answered. Pt voiced understanding. Patient agreed with treatment plan. Follow up as directed. Patient instructed to call for questions or concerns.       Electronically signed by   Robert Recinos MD

## 2021-02-01 ENCOUNTER — HOSPITAL ENCOUNTER (OUTPATIENT)
Dept: INFUSION THERAPY | Age: 71
Discharge: HOME | End: 2021-02-01
Payer: MEDICARE

## 2021-02-01 ENCOUNTER — OFFICE VISIT (OUTPATIENT)
Dept: ONCOLOGY | Age: 71
End: 2021-02-01
Payer: MEDICARE

## 2021-02-01 VITALS
SYSTOLIC BLOOD PRESSURE: 134 MMHG | OXYGEN SATURATION: 96 % | TEMPERATURE: 97.3 F | HEART RATE: 84 BPM | DIASTOLIC BLOOD PRESSURE: 67 MMHG | RESPIRATION RATE: 18 BRPM

## 2021-02-01 VITALS
DIASTOLIC BLOOD PRESSURE: 67 MMHG | OXYGEN SATURATION: 96 % | TEMPERATURE: 97.3 F | SYSTOLIC BLOOD PRESSURE: 134 MMHG | HEART RATE: 84 BPM | BODY MASS INDEX: 31.7 KG/M2 | HEIGHT: 69 IN | RESPIRATION RATE: 18 BRPM | WEIGHT: 214 LBS

## 2021-02-01 DIAGNOSIS — C50.412 MALIGNANT NEOPLASM OF UPPER-OUTER QUADRANT OF LEFT BREAST IN FEMALE, ESTROGEN RECEPTOR POSITIVE (HCC): ICD-10-CM

## 2021-02-01 DIAGNOSIS — Z90.13 STATUS POST BILATERAL MASTECTOMY: ICD-10-CM

## 2021-02-01 DIAGNOSIS — E55.9 VITAMIN D DEFICIENCY: ICD-10-CM

## 2021-02-01 DIAGNOSIS — D49.3 MULTIFOCAL BREAST TUMOR: ICD-10-CM

## 2021-02-01 DIAGNOSIS — C50.912 HER2-POSITIVE CARCINOMA OF LEFT BREAST (HCC): Primary | ICD-10-CM

## 2021-02-01 DIAGNOSIS — C50.212 MALIGNANT NEOPLASM OF UPPER-INNER QUADRANT OF LEFT BREAST IN FEMALE, ESTROGEN RECEPTOR POSITIVE (HCC): Primary | ICD-10-CM

## 2021-02-01 DIAGNOSIS — Z17.0 MALIGNANT NEOPLASM OF UPPER-INNER QUADRANT OF LEFT BREAST IN FEMALE, ESTROGEN RECEPTOR POSITIVE (HCC): Primary | ICD-10-CM

## 2021-02-01 DIAGNOSIS — Z51.11 ENCOUNTER FOR CHEMOTHERAPY MANAGEMENT: ICD-10-CM

## 2021-02-01 DIAGNOSIS — Z17.0 MALIGNANT NEOPLASM OF UPPER-OUTER QUADRANT OF LEFT BREAST IN FEMALE, ESTROGEN RECEPTOR POSITIVE (HCC): ICD-10-CM

## 2021-02-01 DIAGNOSIS — C50.919 TRIPLE NEGATIVE MALIGNANT NEOPLASM OF BREAST (HCC): ICD-10-CM

## 2021-02-01 LAB
ALBUMIN SERPL-MCNC: 3.6 G/DL (ref 3.5–5.1)
ALP BLD-CCNC: 65 U/L (ref 38–126)
ALT SERPL-CCNC: 15 U/L (ref 11–66)
AST SERPL-CCNC: 26 U/L (ref 5–40)
BILIRUB SERPL-MCNC: 0.3 MG/DL (ref 0.3–1.2)
BILIRUBIN DIRECT: < 0.2 MG/DL (ref 0–0.3)
BUN, WHOLE BLOOD: 12 MG/DL (ref 8–26)
CHLORIDE, WHOLE BLOOD: 102 MEQ/L (ref 98–109)
CREATININE, WHOLE BLOOD: 0.8 MG/DL (ref 0.5–1.2)
GFR, ESTIMATED: 75 ML/MIN/1.73M2
GLUCOSE, WHOLE BLOOD: 126 MG/DL (ref 70–108)
HCT VFR BLD CALC: 46.2 % (ref 37–47)
HEMOGLOBIN: 14.3 GM/DL (ref 12–16)
IONIZED CALCIUM, WHOLE BLOOD: 1.21 MMOL/L (ref 1.12–1.32)
MCH RBC QN AUTO: 30 PG (ref 26–33)
MCHC RBC AUTO-ENTMCNC: 31 GM/DL (ref 32.2–35.5)
MCV RBC AUTO: 97 FL (ref 81–99)
PDW BLD-RTO: 13.3 % (ref 11.5–14.5)
PLATELET # BLD: 416 THOU/MM3 (ref 130–400)
PMV BLD AUTO: 10.4 FL (ref 9.4–12.4)
POTASSIUM, WHOLE BLOOD: 3.8 MEQ/L (ref 3.5–4.9)
RBC # BLD: 4.77 MILL/MM3 (ref 4.2–5.4)
SEG NEUTROPHILS: 34 % (ref 43–75)
SEGMENTED NEUTROPHILS ABSOLUTE COUNT: 2.9 THOU/MM3 (ref 1.8–7.7)
SODIUM, WHOLE BLOOD: 140 MEQ/L (ref 138–146)
TOTAL CO2, WHOLE BLOOD: 28 MEQ/L (ref 23–33)
TOTAL PROTEIN: 7.3 G/DL (ref 6.1–8)
WBC # BLD: 8.4 THOU/MM3 (ref 4.8–10.8)

## 2021-02-01 PROCEDURE — 80076 HEPATIC FUNCTION PANEL: CPT

## 2021-02-01 PROCEDURE — 85027 COMPLETE CBC AUTOMATED: CPT

## 2021-02-01 PROCEDURE — 36591 DRAW BLOOD OFF VENOUS DEVICE: CPT

## 2021-02-01 PROCEDURE — 6360000002 HC RX W HCPCS: Performed by: INTERNAL MEDICINE

## 2021-02-01 PROCEDURE — 2580000003 HC RX 258: Performed by: INTERNAL MEDICINE

## 2021-02-01 PROCEDURE — 99211 OFF/OP EST MAY X REQ PHY/QHP: CPT

## 2021-02-01 PROCEDURE — 99213 OFFICE O/P EST LOW 20 MIN: CPT | Performed by: INTERNAL MEDICINE

## 2021-02-01 PROCEDURE — 80047 BASIC METABLC PNL IONIZED CA: CPT

## 2021-02-01 RX ORDER — SODIUM CHLORIDE 0.9 % (FLUSH) 0.9 %
20 SYRINGE (ML) INJECTION PRN
Status: CANCELLED | OUTPATIENT
Start: 2021-02-01

## 2021-02-01 RX ORDER — HEPARIN SODIUM (PORCINE) LOCK FLUSH IV SOLN 100 UNIT/ML 100 UNIT/ML
500 SOLUTION INTRAVENOUS PRN
Status: DISCONTINUED | OUTPATIENT
Start: 2021-02-01 | End: 2021-02-02 | Stop reason: HOSPADM

## 2021-02-01 RX ORDER — SODIUM CHLORIDE 0.9 % (FLUSH) 0.9 %
10 SYRINGE (ML) INJECTION PRN
Status: DISCONTINUED | OUTPATIENT
Start: 2021-02-01 | End: 2021-02-02 | Stop reason: HOSPADM

## 2021-02-01 RX ORDER — SODIUM CHLORIDE 0.9 % (FLUSH) 0.9 %
20 SYRINGE (ML) INJECTION PRN
Status: DISCONTINUED | OUTPATIENT
Start: 2021-02-01 | End: 2021-02-02 | Stop reason: HOSPADM

## 2021-02-01 RX ORDER — SODIUM CHLORIDE 0.9 % (FLUSH) 0.9 %
10 SYRINGE (ML) INJECTION PRN
Status: CANCELLED | OUTPATIENT
Start: 2021-02-01

## 2021-02-01 RX ORDER — HEPARIN SODIUM (PORCINE) LOCK FLUSH IV SOLN 100 UNIT/ML 100 UNIT/ML
500 SOLUTION INTRAVENOUS PRN
Status: CANCELLED | OUTPATIENT
Start: 2021-02-01

## 2021-02-01 RX ADMIN — SODIUM CHLORIDE, PRESERVATIVE FREE 10 ML: 5 INJECTION INTRAVENOUS at 10:50

## 2021-02-01 RX ADMIN — SODIUM CHLORIDE, PRESERVATIVE FREE 10 ML: 5 INJECTION INTRAVENOUS at 12:19

## 2021-02-01 RX ADMIN — HEPARIN 500 UNITS: 100 SYRINGE at 12:19

## 2021-02-01 RX ADMIN — SODIUM CHLORIDE, PRESERVATIVE FREE 20 ML: 5 INJECTION INTRAVENOUS at 10:51

## 2021-02-01 NOTE — PROGRESS NOTES
Pt tolerated lab draw by [employer] without any complications. Treatment held today due to recent NOHELIA drain placement. Patient to call office once NOHELIA drain removed to schedule next appointment. Discharge instructions given to patient. Patient verbalizes understanding. Pt ambulated off unit per self with belongings.

## 2021-02-01 NOTE — PATIENT INSTRUCTIONS
. No treatment today due to NOHELIA drain place mnt to left mastectomy site  2.  The patient will call us after drains are femoved

## 2021-02-04 ENCOUNTER — OFFICE VISIT (OUTPATIENT)
Dept: SURGERY | Age: 71
End: 2021-02-04

## 2021-02-04 VITALS
HEART RATE: 77 BPM | SYSTOLIC BLOOD PRESSURE: 117 MMHG | TEMPERATURE: 97 F | OXYGEN SATURATION: 93 % | BODY MASS INDEX: 31.7 KG/M2 | WEIGHT: 214 LBS | HEIGHT: 69 IN | DIASTOLIC BLOOD PRESSURE: 75 MMHG | RESPIRATION RATE: 14 BRPM

## 2021-02-04 DIAGNOSIS — Z90.13 S/P BILATERAL MASTECTOMY: Primary | ICD-10-CM

## 2021-02-04 DIAGNOSIS — C50.912 INVASIVE DUCTAL CARCINOMA OF BREAST, FEMALE, LEFT (HCC): ICD-10-CM

## 2021-02-04 DIAGNOSIS — N64.89 SEROMA OF BREAST: ICD-10-CM

## 2021-02-04 DIAGNOSIS — Z48.89 ENCOUNTER FOR POSTOPERATIVE CARE: ICD-10-CM

## 2021-02-04 DIAGNOSIS — Z51.89 VISIT FOR WOUND CHECK: ICD-10-CM

## 2021-02-04 PROCEDURE — 99024 POSTOP FOLLOW-UP VISIT: CPT | Performed by: SURGERY

## 2021-02-08 ENCOUNTER — TELEPHONE (OUTPATIENT)
Dept: ONCOLOGY | Age: 71
End: 2021-02-08

## 2021-02-08 NOTE — TELEPHONE ENCOUNTER
Dr. Rebeca Rodriguez talked to the patient. She will come to get her treatment on Friday, February 12, 2021 and March 5, 2021.   Dr. Rebeca Rdoriguez will see the patient again on March 26, 2021

## 2021-02-09 ENCOUNTER — OFFICE VISIT (OUTPATIENT)
Dept: SURGERY | Age: 71
End: 2021-02-09

## 2021-02-09 VITALS
WEIGHT: 213 LBS | DIASTOLIC BLOOD PRESSURE: 76 MMHG | OXYGEN SATURATION: 94 % | HEART RATE: 74 BPM | BODY MASS INDEX: 31.55 KG/M2 | TEMPERATURE: 98.2 F | HEIGHT: 69 IN | SYSTOLIC BLOOD PRESSURE: 122 MMHG | RESPIRATION RATE: 14 BRPM

## 2021-02-09 DIAGNOSIS — Z90.13 S/P BILATERAL MASTECTOMY: Primary | ICD-10-CM

## 2021-02-09 PROCEDURE — 99024 POSTOP FOLLOW-UP VISIT: CPT | Performed by: SURGERY

## 2021-02-10 NOTE — PROGRESS NOTES
[address] [address]  Dept: [phone]  Dept Fax: [phone]  Loc: [phone]    Visit Date: 2/9/2021    James Raymond is a 79 y.o. female who presentstoday for:  Chief Complaint   Patient presents with    Post-Op Check     5 day f/u- s/p 11/30-1. Left simple mastectomy with left axillary sentinel lymph node bx. 2. Right simple mastectomy.  -- NOHELIA drain came out this morning        HPI:     HPI as above patient had NOHELIA drain placed in the left mastectomy site and actually fell out this morning we were going to remove it today anyway here for check    Past Medical History:   Diagnosis Date    Breast cancer ([address] 2020    left-invasive ductal follows with Dr Keron Lynch COVID-19 10/07/2020    wears o2 at night    Hyperlipidemia     Numbness and tingling     left foot-chronic nerve damage    Shortness of breath       Past Surgical History:   Procedure Laterality Date    ABDOMINAL EXPLORATION SURGERY  2014    Dr butterfield-lysis of adhesions    BREAST LUMPECTOMY Left 11/2015    Left breast lumpectomy, retroareolar with preoperative needle loc-Dr. Magali Hunt BREAST SURGERY Left 06/26/2020    biopsy of left breast    CHOLECYSTECTOMY  10/2014    Dr Beti Mckinnon  03/30/2016    Dr Xena Nation  10/2014    x4 abdominal wall    HYSTERECTOMY      INCISIONAL HERNIA REPAIR  2014    Dr Kate Iraheta LIPECTOMY  2014    Dr Daren Garcia  7/16/2020    [facility] [address] LEFT 7/16/2020 Shira Rahman  [facility] Bilateral 11/30/2020    LEFT SIMPLE MASTECTOMY AND SENTINAL LYMPH NODE BIOPSY, RIGHT SIMPLE PROPHYLACTIC MASTECTOMY performed by Juan Nicole MD at [facility] Right 7/17/2020    SINGLE LUMEN SMART PORT INSERTION RIGHT SUBCLAVIAN performed by Juan Nicole MD at  [address]  10/2014 Dr Ami Florian Kosta      patient was 11years old    TUBAL LIGATION          Family History   Problem Relation Age of Onset    Heart Disease Father         cath stent blood to thin and bled    Heart Attack Mother     Other Other         blood clot    Breast Cancer Paternal Aunt 62    High Blood Pressure Sister     Cancer Brother 68        skin    Prostate Cancer Brother         had chemotherapy to treat     Prostate Cancer Brother 64        has had for 10 years    Ovarian Cancer Neg Hx     Diabetes Neg Hx     Stroke Neg Hx         Social History     Tobacco Use    Smoking status: Never Smoker    Smokeless tobacco: Never Used   Substance Use Topics    Alcohol use: No     Alcohol/week: 0.0 standard drinks          Current Outpatient Medications   Medication Sig Dispense Refill    ondansetron (ZOFRAN ODT) 4 MG disintegrating tablet Take 1 tablet by mouth every 8 hours as needed for Nausea 20 tablet 3    zinc sulfate (ZINCATE) 220 (50 Zn) MG capsule Take 1 capsule by mouth daily 30 capsule 0    vitamin C (VITAMIN C) 500 MG tablet Take 1 tablet by mouth daily 30 tablet 0    loperamide (IMODIUM) 2 MG capsule Take 2 mg by mouth 4 times daily as needed for Diarrhea      lidocaine-prilocaine (EMLA) 2.5-2.5 % cream Apply topically as needed.  30 g 1    levothyroxine (SYNTHROID) 25 MCG tablet Take 25 mcg by mouth Daily      alendronate (FOSAMAX) 35 MG tablet Take 35 mg by mouth every 7 days      aspirin EC 81 MG EC tablet Take 1 tablet by mouth daily 30 tablet 3    PARoxetine (PAXIL) 10 MG tablet Take 10 mg by mouth every morning      Coenzyme Q10-Fish Oil-Vit E (CO-Q 10 OMEGA-3 FISH OIL PO) Take by mouth daily      Vitamin D (CHOLECALCIFEROL) 1000 UNITS CAPS capsule Take by mouth daily 2 tab      Cinnamon 500 MG CAPS Take by mouth daily      Cyanocobalamin (VITAMIN B12 PO) Take 1,000 mcg by mouth daily No current facility-administered medications for this visit.       Allergies   Allergen Reactions    Aluminum-Containing Compounds Anaphylaxis    Adhesive Tape Other (See Comments)     Takes top layer of skin off       Subjective:      Review of Systems    Objective:   /76 (Site: Left Upper Arm, Position: Sitting, Cuff Size: Medium Adult)   Pulse 74   Temp 98.2 °F (36.8 °C) (Temporal)   Resp 14   Ht 5' 9\" (1.753 m)   Wt 213 lb (96.6 kg)   SpO2 94%   BMI 31.45 kg/m²     Physical Exam suture removed from drain site at this time flaps are stuck down to the deeper tissue no evidence of seroma on the left mild seroma on the right but should resolve       Results for orders placed or performed during the hospital encounter of 02/01/21   POC PANEL BMP W/IOCA   Result Value Ref Range    Sodium, Whole Blood 140 138 - 146 meq/l    Potassium, Whole Blood 3.8 3.5 - 4.9 meq/l    Chloride, Whole Blood 102 98 - 109 meq/l    TOTAL CO2, WHOLE BLOOD 28 23 - 33 meq/l    Glucose, Whole Blood 126 (H) 70 - 108 mg/dl    BUN, WHOLE BLOOD 12 8 - 26 mg/dl    CREATININE, WHOLE BLOOD 0.8 0.5 - 1.2 mg/dl    Ionized Calcium, WB 1.21 1.12 - 1.32 mmol/L   Hepatic Function Panel   Result Value Ref Range    Albumin 3.6 3.5 - 5.1 g/dL    Total Bilirubin 0.3 0.3 - 1.2 mg/dL    Bilirubin, Direct <0.2 0.0 - 0.3 mg/dL    Alkaline Phosphatase 65 38 - 126 U/L    AST 26 5 - 40 U/L    ALT 15 11 - 66 U/L    Total Protein 7.3 6.1 - 8.0 g/dL   CBC   Result Value Ref Range    WBC 8.4 4.8 - 10.8 thou/mm3    RBC 4.77 4.20 - 5.40 mill/mm3    Hemoglobin 14.3 12.0 - 16.0 gm/dl    Hematocrit 46.2 37.0 - 47.0 %    MCV 97 81 - 99 fL    MCH 30.0 26.0 - 33.0 pg    MCHC 31.0 (L) 32.2 - 35.5 gm/dl    RDW 13.3 11.5 - 14.5 %    Platelets 128 (H) 626 - 400 thou/mm3    MPV 10.4 9.4 - 12.4 fL   Absolute Neutrophil   Result Value Ref Range    Segs Absolute 2.9 1.8 - 7.7 thou/mm3   Neutrophils, Automated   Result Value Ref Range    Seg Neutrophils 34 (L) 43 - 75 % Glomerular Filtration Rate, Estimated   Result Value Ref Range    GFR, Estimated 75 ml/min/1.73m2       Assessment:     Overall doing well has had issues with a persistent seroma but seems to have responded to the NOHELIA drain    Plan:     Turn to office as needed follow-up with oncology      Electronicallysigned by Nick Richardson MD on 2/9/2021 at 8:25 PM

## 2021-02-11 DIAGNOSIS — C50.912 HER2-POSITIVE CARCINOMA OF LEFT BREAST (HCC): Primary | ICD-10-CM

## 2021-02-12 ENCOUNTER — HOSPITAL ENCOUNTER (OUTPATIENT)
Dept: INFUSION THERAPY | Age: 71
Discharge: HOME | End: 2021-02-12
Payer: MEDICARE

## 2021-02-12 VITALS
HEART RATE: 68 BPM | RESPIRATION RATE: 16 BRPM | DIASTOLIC BLOOD PRESSURE: 83 MMHG | SYSTOLIC BLOOD PRESSURE: 136 MMHG | BODY MASS INDEX: 31.49 KG/M2 | TEMPERATURE: 98 F | OXYGEN SATURATION: 94 % | HEIGHT: 69 IN | WEIGHT: 212.6 LBS

## 2021-02-12 DIAGNOSIS — C50.412 MALIGNANT NEOPLASM OF UPPER-OUTER QUADRANT OF LEFT BREAST IN FEMALE, ESTROGEN RECEPTOR POSITIVE (HCC): ICD-10-CM

## 2021-02-12 DIAGNOSIS — C50.912 HER2-POSITIVE CARCINOMA OF LEFT BREAST (HCC): ICD-10-CM

## 2021-02-12 DIAGNOSIS — Z51.11 CHEMOTHERAPY MANAGEMENT, ENCOUNTER FOR: ICD-10-CM

## 2021-02-12 DIAGNOSIS — Z17.0 MALIGNANT NEOPLASM OF UPPER-OUTER QUADRANT OF LEFT BREAST IN FEMALE, ESTROGEN RECEPTOR POSITIVE (HCC): ICD-10-CM

## 2021-02-12 DIAGNOSIS — E55.9 VITAMIN D DEFICIENCY: ICD-10-CM

## 2021-02-12 DIAGNOSIS — Z17.0 MALIGNANT NEOPLASM OF UPPER-INNER QUADRANT OF LEFT BREAST IN FEMALE, ESTROGEN RECEPTOR POSITIVE (HCC): Primary | ICD-10-CM

## 2021-02-12 DIAGNOSIS — C50.212 MALIGNANT NEOPLASM OF UPPER-INNER QUADRANT OF LEFT BREAST IN FEMALE, ESTROGEN RECEPTOR POSITIVE (HCC): Primary | ICD-10-CM

## 2021-02-12 LAB
ALBUMIN SERPL-MCNC: 3.6 G/DL (ref 3.5–5.1)
ALP BLD-CCNC: 71 U/L (ref 38–126)
ALT SERPL-CCNC: 12 U/L (ref 11–66)
AST SERPL-CCNC: 23 U/L (ref 5–40)
BILIRUB SERPL-MCNC: 0.2 MG/DL (ref 0.3–1.2)
BILIRUBIN DIRECT: < 0.2 MG/DL (ref 0–0.3)
BUN, WHOLE BLOOD: 11 MG/DL (ref 8–26)
CHLORIDE, WHOLE BLOOD: 103 MEQ/L (ref 98–109)
CREATININE, WHOLE BLOOD: 0.7 MG/DL (ref 0.5–1.2)
GFR, ESTIMATED: 88 ML/MIN/1.73M2
GLUCOSE, WHOLE BLOOD: 141 MG/DL (ref 70–108)
HCT VFR BLD CALC: 46.1 % (ref 37–47)
HEMOGLOBIN: 14.3 GM/DL (ref 12–16)
IONIZED CALCIUM, WHOLE BLOOD: 1.25 MMOL/L (ref 1.12–1.32)
MCH RBC QN AUTO: 29.2 PG (ref 26–33)
MCHC RBC AUTO-ENTMCNC: 31 GM/DL (ref 32.2–35.5)
MCV RBC AUTO: 94 FL (ref 81–99)
PDW BLD-RTO: 13.5 % (ref 11.5–14.5)
PLATELET # BLD: 418 THOU/MM3 (ref 130–400)
PMV BLD AUTO: 10.2 FL (ref 9.4–12.4)
POTASSIUM, WHOLE BLOOD: 4 MEQ/L (ref 3.5–4.9)
RBC # BLD: 4.89 MILL/MM3 (ref 4.2–5.4)
SEG NEUTROPHILS: 35 % (ref 43–75)
SEGMENTED NEUTROPHILS ABSOLUTE COUNT: 2.7 THOU/MM3 (ref 1.8–7.7)
SODIUM, WHOLE BLOOD: 140 MEQ/L (ref 138–146)
TOTAL CO2, WHOLE BLOOD: 30 MEQ/L (ref 23–33)
TOTAL PROTEIN: 7.4 G/DL (ref 6.1–8)
WBC # BLD: 7.6 THOU/MM3 (ref 4.8–10.8)

## 2021-02-12 PROCEDURE — 36591 DRAW BLOOD OFF VENOUS DEVICE: CPT

## 2021-02-12 PROCEDURE — 96375 TX/PRO/DX INJ NEW DRUG ADDON: CPT

## 2021-02-12 PROCEDURE — 80076 HEPATIC FUNCTION PANEL: CPT

## 2021-02-12 PROCEDURE — 80047 BASIC METABLC PNL IONIZED CA: CPT

## 2021-02-12 PROCEDURE — 85027 COMPLETE CBC AUTOMATED: CPT

## 2021-02-12 PROCEDURE — 6360000002 HC RX W HCPCS: Performed by: INTERNAL MEDICINE

## 2021-02-12 PROCEDURE — 2580000003 HC RX 258: Performed by: INTERNAL MEDICINE

## 2021-02-12 PROCEDURE — 96413 CHEMO IV INFUSION 1 HR: CPT

## 2021-02-12 RX ORDER — SODIUM CHLORIDE 0.9 % (FLUSH) 0.9 %
10 SYRINGE (ML) INJECTION PRN
Status: CANCELLED | OUTPATIENT
Start: 2021-02-12

## 2021-02-12 RX ORDER — DEXAMETHASONE SODIUM PHOSPHATE 4 MG/ML
8 INJECTION, SOLUTION INTRA-ARTICULAR; INTRALESIONAL; INTRAMUSCULAR; INTRAVENOUS; SOFT TISSUE ONCE
Status: COMPLETED | OUTPATIENT
Start: 2021-02-12 | End: 2021-02-12

## 2021-02-12 RX ORDER — SODIUM CHLORIDE 0.9 % (FLUSH) 0.9 %
20 SYRINGE (ML) INJECTION PRN
Status: CANCELLED | OUTPATIENT
Start: 2021-02-12

## 2021-02-12 RX ORDER — SODIUM CHLORIDE 9 MG/ML
20 INJECTION, SOLUTION INTRAVENOUS ONCE
Status: COMPLETED | OUTPATIENT
Start: 2021-02-12 | End: 2021-02-12

## 2021-02-12 RX ORDER — SODIUM CHLORIDE 0.9 % (FLUSH) 0.9 %
20 SYRINGE (ML) INJECTION PRN
Status: DISCONTINUED | OUTPATIENT
Start: 2021-02-12 | End: 2021-02-13 | Stop reason: HOSPADM

## 2021-02-12 RX ORDER — SODIUM CHLORIDE 0.9 % (FLUSH) 0.9 %
10 SYRINGE (ML) INJECTION PRN
Status: DISCONTINUED | OUTPATIENT
Start: 2021-02-12 | End: 2021-02-13 | Stop reason: HOSPADM

## 2021-02-12 RX ORDER — HEPARIN SODIUM (PORCINE) LOCK FLUSH IV SOLN 100 UNIT/ML 100 UNIT/ML
500 SOLUTION INTRAVENOUS PRN
Status: CANCELLED | OUTPATIENT
Start: 2021-02-12

## 2021-02-12 RX ORDER — HEPARIN SODIUM (PORCINE) LOCK FLUSH IV SOLN 100 UNIT/ML 100 UNIT/ML
500 SOLUTION INTRAVENOUS PRN
Status: DISCONTINUED | OUTPATIENT
Start: 2021-02-12 | End: 2021-02-13 | Stop reason: HOSPADM

## 2021-02-12 RX ADMIN — SODIUM CHLORIDE, PRESERVATIVE FREE 20 ML: 5 INJECTION INTRAVENOUS at 09:36

## 2021-02-12 RX ADMIN — Medication 500 UNITS: at 10:55

## 2021-02-12 RX ADMIN — SODIUM CHLORIDE 20 ML/HR: 9 INJECTION, SOLUTION INTRAVENOUS at 09:50

## 2021-02-12 RX ADMIN — SODIUM CHLORIDE, PRESERVATIVE FREE 10 ML: 5 INJECTION INTRAVENOUS at 10:55

## 2021-02-12 RX ADMIN — DEXAMETHASONE SODIUM PHOSPHATE 8 MG: 4 INJECTION, SOLUTION INTRAMUSCULAR; INTRAVENOUS at 09:52

## 2021-02-12 RX ADMIN — SODIUM CHLORIDE, PRESERVATIVE FREE 10 ML: 5 INJECTION INTRAVENOUS at 09:35

## 2021-02-12 RX ADMIN — ADO-TRASTUZUMAB EMTANSINE 320 MG: 20 INJECTION, POWDER, LYOPHILIZED, FOR SOLUTION INTRAVENOUS at 10:01

## 2021-02-12 NOTE — PROGRESS NOTES
Patient assessed for the following post chemotherapy:    Dizziness   No  Lightheadedness  No      Acute nausea/vomiting No  Headache   No  Chest pain/pressure  No  Rash/itching   No  Shortness of breath  No    Patient kept for 20 minutes observation post infusion chemotherapy. Patient tolerated chemotherapy treatment Kadcyla without any complications. Last vital signs:   /77   Pulse 76   Temp 97.8 °F (36.6 °C) (Oral)   Resp 18   Ht 5' 9\" (1.753 m)   Wt 212 lb 9.6 oz (96.4 kg)   SpO2 95%   BMI 31.40 kg/m²     Patient instructed if experience any of the above symptoms following today's infusion, she is to notify MD immediately or go to the emergency department. Discharge instructions given to patient. Verbalizes understanding. Ambulated off unit per self, with belongings.

## 2021-02-12 NOTE — PROGRESS NOTES
[address] [address]  Dept: [phone]  Dept Fax: [phone]  Loc: [phone]    Visit Date: 2/4/2021    Rachid Mccray is a 79 y.o. female who presentstoday for:  Chief Complaint   Patient presents with    Post-Op Check     s/p 11/30-1. Left simple mastectomy with left axillary sentinel lymph node bx. 2. Right simple mastectomy.  -- check seroma       HPI:     HPI status post placement of drain into the left mastectomy site seroma patient's drainage is persisting but decreasing    Past Medical History:   Diagnosis Date    Breast cancer ([address] 2020    left-invasive ductal follows with Dr Krystle Mitchell COVID-19 10/07/2020    wears o2 at night    Hyperlipidemia     Numbness and tingling     left foot-chronic nerve damage    Shortness of breath       Past Surgical History:   Procedure Laterality Date    ABDOMINAL EXPLORATION SURGERY  2014    Dr butterfield-lysis of adhesions    BREAST LUMPECTOMY Left 11/2015    Left breast lumpectomy, retroareolar with preoperative needle loc-Dr. Maylene Gitelman BREAST SURGERY Left 06/26/2020    biopsy of left breast    CHOLECYSTECTOMY  10/2014    Dr Negrita Michel  03/30/2016    Dr Cass Panda  10/2014    x4 abdominal wall    HYSTERECTOMY      INCISIONAL HERNIA REPAIR  2014    Dr Rodríguez Pew LIPECTOMY  2014    Dr Sintia Sweeney LEFT  7/16/2020    [facility] [address] LEFT 7/16/2020 Severo Reilly,  [facility] Bilateral 11/30/2020    LEFT SIMPLE MASTECTOMY AND SENTINAL LYMPH NODE BIOPSY, RIGHT SIMPLE PROPHYLACTIC MASTECTOMY performed by Isidra Skinner MD at [facility] Right 7/17/2020    SINGLE LUMEN SMART PORT INSERTION RIGHT SUBCLAVIAN performed by Isidra Skinner MD at [address]  10[facility]2014    [facility] 11[facility]    TUBAL LIGATION          Family History   Problem Relation Age of Onset    Heart Disease Father         cath stent blood to thin and bled    Heart Attack Mother     Other Other         blood clot    Breast Cancer Paternal Aunt 62    High Blood Pressure Sister     Cancer Brother 68        skin    Prostate Cancer Brother         had chemotherapy to treat     Prostate Cancer Brother 64        has had for 10 years    Ovarian Cancer Neg Hx     Diabetes Neg Hx     Stroke Neg Hx         Social History     Tobacco Use    Smoking status: Never Smoker    Smokeless tobacco: Never Used   Substance Use Topics    Alcohol use: No     Alcohol/week: 0.0 standard drinks          Current Outpatient Medications   Medication Sig Dispense Refill    zinc sulfate (ZINCATE) 220 (50 Zn) MG capsule Take 1 capsule by mouth daily 30 capsule 0    vitamin C (VITAMIN C) 500 MG tablet Take 1 tablet by mouth daily 30 tablet 0    loperamide (IMODIUM) 2 MG capsule Take 2 mg by mouth 4 times daily as needed for Diarrhea      lidocaine-prilocaine (EMLA) 2.5-2.5 % cream Apply topically as needed. 30 g 1    levothyroxine (SYNTHROID) 25 MCG tablet Take 25 mcg by mouth Daily      alendronate (FOSAMAX) 35 MG tablet Take 35 mg by mouth every 7 days      aspirin EC 81 MG EC tablet Take 1 tablet by mouth daily 30 tablet 3    PARoxetine (PAXIL) 10 MG tablet Take 10 mg by mouth every morning      Coenzyme Q10-Fish Oil-Vit E (CO-Q 10 OMEGA-3 FISH OIL PO) Take by mouth daily      Vitamin D (CHOLECALCIFEROL) 1000 UNITS CAPS capsule Take by mouth daily 2 tab      Cinnamon 500 MG CAPS Take by mouth daily      Cyanocobalamin (VITAMIN B12 PO) Take 1,000 mcg by mouth daily       ondansetron (ZOFRAN ODT) 4 MG disintegrating tablet Take 1 tablet by mouth every 8 hours as needed for Nausea 20 tablet 3     No current facility-administered medications for this visit.       Allergies   Allergen Reactions  Aluminum-Containing Compounds Anaphylaxis    Adhesive Tape Other (See Comments)     Takes top layer of skin off       Subjective:      Review of Systems    Objective:   /75 (Site: Right Lower Arm, Position: Sitting, Cuff Size: Medium Adult)   Pulse 77   Temp 97 °F (36.1 °C) (Tympanic)   Resp 14   Ht 5' 9\" (1.753 m)   Wt 214 lb (97.1 kg)   SpO2 93%   BMI 31.60 kg/m²     Physical Exam chest wall is clearly stuck down to the deeper tissue no signs of infection       Results for orders placed or performed during the hospital encounter of 02/01/21   POC PANEL BMP W/IOCA   Result Value Ref Range    Sodium, Whole Blood 140 138 - 146 meq/l    Potassium, Whole Blood 3.8 3.5 - 4.9 meq/l    Chloride, Whole Blood 102 98 - 109 meq/l    TOTAL CO2, WHOLE BLOOD 28 23 - 33 meq/l    Glucose, Whole Blood 126 (H) 70 - 108 mg/dl    BUN, WHOLE BLOOD 12 8 - 26 mg/dl    CREATININE, WHOLE BLOOD 0.8 0.5 - 1.2 mg/dl    Ionized Calcium, WB 1.21 1.12 - 1.32 mmol/L   Hepatic Function Panel   Result Value Ref Range    Albumin 3.6 3.5 - 5.1 g/dL    Total Bilirubin 0.3 0.3 - 1.2 mg/dL    Bilirubin, Direct <0.2 0.0 - 0.3 mg/dL    Alkaline Phosphatase 65 38 - 126 U/L    AST 26 5 - 40 U/L    ALT 15 11 - 66 U/L    Total Protein 7.3 6.1 - 8.0 g/dL   CBC   Result Value Ref Range    WBC 8.4 4.8 - 10.8 thou/mm3    RBC 4.77 4.20 - 5.40 mill/mm3    Hemoglobin 14.3 12.0 - 16.0 gm/dl    Hematocrit 46.2 37.0 - 47.0 %    MCV 97 81 - 99 fL    MCH 30.0 26.0 - 33.0 pg    MCHC 31.0 (L) 32.2 - 35.5 gm/dl    RDW 13.3 11.5 - 14.5 %    Platelets 555 (H) 312 - 400 thou/mm3    MPV 10.4 9.4 - 12.4 fL   Absolute Neutrophil   Result Value Ref Range    Segs Absolute 2.9 1.8 - 7.7 thou/mm3   Neutrophils, Automated   Result Value Ref Range    Seg Neutrophils 34 (L) 43 - 75 %   Glomerular Filtration Rate, Estimated   Result Value Ref Range    GFR, Estimated 75 ml/min/1.73m2       Assessment: Status post persistent seroma treated with a drain placed in the office recommend we keep drain in for another 5 days    Plan:     Return to office next week for eval and removal of NOHELIA      Electronicallysigned by Debbie Crawford MD on 2/11/2021 at 7:26 PM

## 2021-02-12 NOTE — PLAN OF CARE
Problem: Discharge Planning  Goal: Knowledge of discharge instructions  Description: Knowledge of discharge instructions  Outcome: Met This Shift  Note: Verbalized understanding of discharge instructions, follow-up appointments, and when to call the physician. Intervention: Discharge to appropriate level of care  Note: Discuss understanding of discharge instructions,follow-up appointments, and when to call the physician. Problem: Intellectual/Education/Knowledge Deficit  Goal: Teaching initiated upon admission  Outcome: Met This Shift  Note: Patient verbalizes understanding to verbal information given on Kadcyla,action and possible side effects. Aware to call MD if develop complications. Intervention: Verbal/written education provided  Note: Chemotherapy Teaching     What is Chemotherapy   Drug action [x]   Method of Administration [x]   Handouts given []     Side Effects  Nausea/vomiting [x]   Diarrhea [x]   Fatigue [x]   Signs / Symptoms of infection [x]   Neutropenia [x]   Thrombocytopenia [x]   Alopecia [x]   neuropathy [x]   [place] diet &  the importance of fluids [x]       Micellaneous  Importance of nutrition [x]   Importance of oral hygiene [x]   When to call the MD [x]   Monitoring labs [x]   Use of supportive services []     Explanation of Drug Regimen / Frequency  Kadcyla:  D1C2     Comments  Verbalized understanding to drug,action,side effects and when to call MD         Problem: Falls - Risk of:  Goal: Will remain free from falls  Description: Will remain free from falls  Outcome: Met This Shift  Note: Patient free from falls while in O.P. Oncology. Intervention: Assess risk factors for falls  Description: Assess risk factors for falls  Note: Patient assessed for fall risk on admission to [address] [place] [address]. Fall band placed on patient. Discussed the need to use the call light for assistance prior to getting up out of chair/bed. Problem: Infection - Central Venous Catheter-Associated Bloodstream Infection:  Goal: Will show no infection signs and symptoms  Description: Will show no infection signs and symptoms  Outcome: Met This Shift  Note: Mediport site with no redness or warmth. Skin over port site intact with no signs of breakdown noted. Patient verbalizes signs/symptoms of port infection and when to notify the physician. Intervention: Infection risk assessment  Description: Infection risk assessment  Note: Discuss port maintenance, infection prevention, signs and when to call Dr Lena Mendez reviewed with patient. Patient verbalize understanding of the plan of care and contribute to goal setting.

## 2021-02-12 NOTE — ONCOLOGY
Chemotherapy Administration    Pre-assessment Data: Antineoplastic Agents  Other:   See toxicity flow sheet for assessment [x]     Physician Notification of Concerns Related to Chemotherapy Administration:   Physician Notified Tevin Lions / Time of Notification      Interventions:   Lab work assessed  [x]   Height / Weight verified for dose [x]   Current MAR reviewed [x]   Emergency drugs available as appropriate [x]   Anaphylaxis assessment completed [x]   Pre-medications administered as ordered [x]   Blood return noted upon initiation of chemotherapy [x]   Blood return noted each 1-2ml of a vesicant medication if given IV push []   Blood return noted each 2-3ml of a non-vesicant medication if given IV push []   Monitor for signs / symptoms of hypersensitivity reaction [x]   Chemotherapy orders (drug/dose/rate) verified by 2 Chemo certified RNs [x]   Monitor IV site and blood return throughout the infusion of the medication [x]   Document IV site checks on the IV assessment form [x]   Document chemotherapy teaching on the Patient Education tab [x]   Document patient verbalizes understanding of medications being administered [x]   If IV infiltration, see ONS Guidelines []   Other:     Kadcyla []

## 2021-03-05 ENCOUNTER — HOSPITAL ENCOUNTER (OUTPATIENT)
Dept: INFUSION THERAPY | Age: 71
Discharge: HOME | End: 2021-03-05
Payer: MEDICARE

## 2021-03-05 VITALS
SYSTOLIC BLOOD PRESSURE: 120 MMHG | HEIGHT: 69 IN | RESPIRATION RATE: 16 BRPM | TEMPERATURE: 97.6 F | DIASTOLIC BLOOD PRESSURE: 69 MMHG | WEIGHT: 211.4 LBS | OXYGEN SATURATION: 95 % | BODY MASS INDEX: 31.31 KG/M2 | HEART RATE: 85 BPM

## 2021-03-05 DIAGNOSIS — C50.912 HER2-POSITIVE CARCINOMA OF LEFT BREAST (HCC): Primary | ICD-10-CM

## 2021-03-05 DIAGNOSIS — C50.212 MALIGNANT NEOPLASM OF UPPER-INNER QUADRANT OF LEFT BREAST IN FEMALE, ESTROGEN RECEPTOR POSITIVE (HCC): ICD-10-CM

## 2021-03-05 DIAGNOSIS — Z51.11 CHEMOTHERAPY MANAGEMENT, ENCOUNTER FOR: ICD-10-CM

## 2021-03-05 DIAGNOSIS — Z17.0 MALIGNANT NEOPLASM OF UPPER-INNER QUADRANT OF LEFT BREAST IN FEMALE, ESTROGEN RECEPTOR POSITIVE (HCC): ICD-10-CM

## 2021-03-05 LAB
ABSOLUTE IMMATURE GRANULOCYTE: 0.01 THOU/MM3 (ref 0–0.07)
ALBUMIN SERPL-MCNC: 3.7 G/DL (ref 3.5–5.1)
ALP BLD-CCNC: 77 U/L (ref 38–126)
ALT SERPL-CCNC: 16 U/L (ref 11–66)
AST SERPL-CCNC: 31 U/L (ref 5–40)
BASINOPHIL, AUTOMATED: 2 % (ref 0–3)
BASOPHILS ABSOLUTE: 0.1 THOU/MM3 (ref 0–0.1)
BILIRUB SERPL-MCNC: 0.4 MG/DL (ref 0.3–1.2)
BILIRUBIN DIRECT: < 0.2 MG/DL (ref 0–0.3)
BUN, WHOLE BLOOD: 12 MG/DL (ref 8–26)
CHLORIDE, WHOLE BLOOD: 104 MEQ/L (ref 98–109)
CREATININE, WHOLE BLOOD: 0.8 MG/DL (ref 0.5–1.2)
EOSINOPHILS ABSOLUTE: 0.3 THOU/MM3 (ref 0–0.4)
EOSINOPHILS RELATIVE PERCENT: 5 % (ref 0–4)
GFR, ESTIMATED: 75 ML/MIN/1.73M2
GLUCOSE, WHOLE BLOOD: 140 MG/DL (ref 70–108)
HCT VFR BLD CALC: 46.8 % (ref 37–47)
HEMOGLOBIN: 14.6 GM/DL (ref 12–16)
IMMATURE GRANULOCYTES: 0 %
IONIZED CALCIUM, WHOLE BLOOD: 1.24 MMOL/L (ref 1.12–1.32)
LYMPHOCYTES # BLD: 44 % (ref 15–47)
LYMPHOCYTES ABSOLUTE: 2.7 THOU/MM3 (ref 1–4.8)
MCH RBC QN AUTO: 28.2 PG (ref 26–33)
MCHC RBC AUTO-ENTMCNC: 31.2 GM/DL (ref 32.2–35.5)
MCV RBC AUTO: 91 FL (ref 81–99)
MONOCYTES ABSOLUTE: 1.1 THOU/MM3 (ref 0.4–1.3)
MONOCYTES: 18 % (ref 0–12)
PDW BLD-RTO: 14.2 % (ref 11.5–14.5)
PLATELET # BLD: 389 THOU/MM3 (ref 130–400)
PMV BLD AUTO: 10.9 FL (ref 9.4–12.4)
POTASSIUM, WHOLE BLOOD: 4 MEQ/L (ref 3.5–4.9)
RBC # BLD: 5.17 MILL/MM3 (ref 4.2–5.4)
SEG NEUTROPHILS: 31 % (ref 43–75)
SEGMENTED NEUTROPHILS ABSOLUTE COUNT: 1.9 THOU/MM3 (ref 1.8–7.7)
SODIUM, WHOLE BLOOD: 141 MEQ/L (ref 138–146)
TOTAL CO2, WHOLE BLOOD: 30 MEQ/L (ref 23–33)
TOTAL PROTEIN: 7.8 G/DL (ref 6.1–8)
WBC # BLD: 6.1 THOU/MM3 (ref 4.8–10.8)

## 2021-03-05 PROCEDURE — 96375 TX/PRO/DX INJ NEW DRUG ADDON: CPT

## 2021-03-05 PROCEDURE — 36591 DRAW BLOOD OFF VENOUS DEVICE: CPT

## 2021-03-05 PROCEDURE — 80076 HEPATIC FUNCTION PANEL: CPT

## 2021-03-05 PROCEDURE — 80047 BASIC METABLC PNL IONIZED CA: CPT

## 2021-03-05 PROCEDURE — 6360000002 HC RX W HCPCS: Performed by: INTERNAL MEDICINE

## 2021-03-05 PROCEDURE — 85025 COMPLETE CBC W/AUTO DIFF WBC: CPT

## 2021-03-05 PROCEDURE — 2580000003 HC RX 258: Performed by: INTERNAL MEDICINE

## 2021-03-05 PROCEDURE — 96413 CHEMO IV INFUSION 1 HR: CPT

## 2021-03-05 RX ORDER — SODIUM CHLORIDE 0.9 % (FLUSH) 0.9 %
5 SYRINGE (ML) INJECTION PRN
Status: CANCELLED | OUTPATIENT
Start: 2021-03-05

## 2021-03-05 RX ORDER — METHYLPREDNISOLONE SODIUM SUCCINATE 125 MG/2ML
125 INJECTION, POWDER, LYOPHILIZED, FOR SOLUTION INTRAMUSCULAR; INTRAVENOUS ONCE
Status: CANCELLED | OUTPATIENT
Start: 2021-03-05 | End: 2021-03-05

## 2021-03-05 RX ORDER — SODIUM CHLORIDE 9 MG/ML
20 INJECTION, SOLUTION INTRAVENOUS ONCE
Status: COMPLETED | OUTPATIENT
Start: 2021-03-05 | End: 2021-03-05

## 2021-03-05 RX ORDER — SODIUM CHLORIDE 9 MG/ML
20 INJECTION, SOLUTION INTRAVENOUS ONCE
Status: CANCELLED | OUTPATIENT
Start: 2021-03-05 | End: 2021-03-05

## 2021-03-05 RX ORDER — HEPARIN SODIUM (PORCINE) LOCK FLUSH IV SOLN 100 UNIT/ML 100 UNIT/ML
500 SOLUTION INTRAVENOUS PRN
Status: CANCELLED | OUTPATIENT
Start: 2021-03-05

## 2021-03-05 RX ORDER — DIPHENHYDRAMINE HYDROCHLORIDE 50 MG/ML
50 INJECTION INTRAMUSCULAR; INTRAVENOUS ONCE
Status: CANCELLED | OUTPATIENT
Start: 2021-03-05 | End: 2021-03-05

## 2021-03-05 RX ORDER — HEPARIN SODIUM (PORCINE) LOCK FLUSH IV SOLN 100 UNIT/ML 100 UNIT/ML
500 SOLUTION INTRAVENOUS PRN
Status: DISCONTINUED | OUTPATIENT
Start: 2021-03-05 | End: 2021-03-06 | Stop reason: HOSPADM

## 2021-03-05 RX ORDER — EPINEPHRINE 1 MG/ML
0.3 INJECTION, SOLUTION, CONCENTRATE INTRAVENOUS PRN
Status: CANCELLED | OUTPATIENT
Start: 2021-03-05

## 2021-03-05 RX ORDER — MEPERIDINE HYDROCHLORIDE 50 MG/ML
12.5 INJECTION INTRAMUSCULAR; INTRAVENOUS; SUBCUTANEOUS ONCE
Status: CANCELLED | OUTPATIENT
Start: 2021-03-05 | End: 2021-03-05

## 2021-03-05 RX ORDER — SODIUM CHLORIDE 0.9 % (FLUSH) 0.9 %
10 SYRINGE (ML) INJECTION PRN
Status: DISCONTINUED | OUTPATIENT
Start: 2021-03-05 | End: 2021-03-06 | Stop reason: HOSPADM

## 2021-03-05 RX ORDER — SODIUM CHLORIDE 9 MG/ML
INJECTION, SOLUTION INTRAVENOUS CONTINUOUS
Status: CANCELLED | OUTPATIENT
Start: 2021-03-05

## 2021-03-05 RX ORDER — DEXAMETHASONE SODIUM PHOSPHATE 4 MG/ML
8 INJECTION, SOLUTION INTRA-ARTICULAR; INTRALESIONAL; INTRAMUSCULAR; INTRAVENOUS; SOFT TISSUE ONCE
Status: COMPLETED | OUTPATIENT
Start: 2021-03-05 | End: 2021-03-05

## 2021-03-05 RX ORDER — SODIUM CHLORIDE 0.9 % (FLUSH) 0.9 %
10 SYRINGE (ML) INJECTION PRN
Status: CANCELLED | OUTPATIENT
Start: 2021-03-05

## 2021-03-05 RX ADMIN — DEXAMETHASONE SODIUM PHOSPHATE 8 MG: 4 INJECTION, SOLUTION INTRAMUSCULAR; INTRAVENOUS at 10:14

## 2021-03-05 RX ADMIN — SODIUM CHLORIDE, PRESERVATIVE FREE 10 ML: 5 INJECTION INTRAVENOUS at 10:11

## 2021-03-05 RX ADMIN — HEPARIN 500 UNITS: 100 SYRINGE at 11:20

## 2021-03-05 RX ADMIN — SODIUM CHLORIDE 20 ML/HR: 9 INJECTION, SOLUTION INTRAVENOUS at 10:11

## 2021-03-05 RX ADMIN — SODIUM CHLORIDE, PRESERVATIVE FREE 10 ML: 5 INJECTION INTRAVENOUS at 11:20

## 2021-03-05 RX ADMIN — SODIUM CHLORIDE, PRESERVATIVE FREE 10 ML: 5 INJECTION INTRAVENOUS at 09:26

## 2021-03-05 RX ADMIN — SODIUM CHLORIDE, PRESERVATIVE FREE 10 ML: 5 INJECTION INTRAVENOUS at 09:25

## 2021-03-05 RX ADMIN — ADO-TRASTUZUMAB EMTANSINE 320 MG: 20 INJECTION, POWDER, LYOPHILIZED, FOR SOLUTION INTRAVENOUS at 10:26

## 2021-03-05 NOTE — PLAN OF CARE

## 2021-03-05 NOTE — PROGRESS NOTES
Patient assessed for the following post chemotherapy:    Dizziness   No  Lightheadedness  No      Acute nausea/vomiting No  Headache   No  Chest pain/pressure  No  Rash/itching   No  Shortness of breath  No    Patient kept for 20 minutes observation post infusion chemotherapy. Patient tolerated chemotherapy treatment kadcyla without any complications. Last vital signs:   /69   Pulse 85   Temp 97.6 °F (36.4 °C) (Oral)   Resp 16   Ht 5' 9\" (1.753 m)   Wt 211 lb 6.4 oz (95.9 kg)   SpO2 95%   BMI 31.22 kg/m²         Patient instructed if experience any of the above symptoms following today's infusion,he/she is to notify MD immediately or go to the emergency department. Discharge instructions given to patient. Verbalizes understanding. Ambulated off unit per self with belongings.

## 2021-03-05 NOTE — PROGRESS NOTES
Chemotherapy Administration    Pre-assessment Data: Antineoplastic Agents  Other:   See toxicity flow sheet for assessment [x]     Physician Notification of Concerns Related to Chemotherapy Administration:   Physician Notified Evelin Tate / Time of Notification      Interventions:   Lab work assessed  [x]   Height / Weight verified for dose [x]   Current MAR reviewed [x]   Emergency drugs available as appropriate [x]   Anaphylaxis assessment completed [x]   Pre-medications administered as ordered [x]   Blood return noted upon initiation of chemotherapy [x]   Blood return noted each 1-2ml of a vesicant medication if given IV push []   Blood return noted each 2-3ml of a non-vesicant medication if given IV push []   Monitor for signs / symptoms of hypersensitivity reaction [x]   Chemotherapy orders (drug/dose/rate) verified by 2 Chemo certified RNs [x]   Monitor IV site and blood return throughout the infusion of the medication [x]   Document IV site checks on the IV assessment form [x]   Document chemotherapy teaching on the Patient Education tab [x]   Document patient verbalizes understanding of medications being administered [x]   If IV infiltration, see ONS Guidelines []   Other:      []

## 2021-03-25 RX ORDER — DIPHENHYDRAMINE HYDROCHLORIDE 50 MG/ML
50 INJECTION INTRAMUSCULAR; INTRAVENOUS ONCE
Status: CANCELLED | OUTPATIENT
Start: 2021-04-21 | End: 2021-03-26

## 2021-03-25 RX ORDER — SODIUM CHLORIDE 0.9 % (FLUSH) 0.9 %
10 SYRINGE (ML) INJECTION PRN
Status: CANCELLED | OUTPATIENT
Start: 2021-04-21

## 2021-03-25 RX ORDER — SODIUM CHLORIDE 0.9 % (FLUSH) 0.9 %
5 SYRINGE (ML) INJECTION PRN
Status: CANCELLED | OUTPATIENT
Start: 2021-04-21

## 2021-03-25 RX ORDER — MEPERIDINE HYDROCHLORIDE 25 MG/ML
12.5 INJECTION INTRAMUSCULAR; INTRAVENOUS; SUBCUTANEOUS ONCE
Status: CANCELLED | OUTPATIENT
Start: 2021-04-21 | End: 2021-03-26

## 2021-03-25 RX ORDER — SODIUM CHLORIDE 9 MG/ML
INJECTION, SOLUTION INTRAVENOUS CONTINUOUS
Status: CANCELLED | OUTPATIENT
Start: 2021-04-21

## 2021-03-25 RX ORDER — METHYLPREDNISOLONE SODIUM SUCCINATE 125 MG/2ML
125 INJECTION, POWDER, LYOPHILIZED, FOR SOLUTION INTRAMUSCULAR; INTRAVENOUS ONCE
Status: CANCELLED | OUTPATIENT
Start: 2021-04-21 | End: 2021-03-26

## 2021-03-25 RX ORDER — SODIUM CHLORIDE 9 MG/ML
20 INJECTION, SOLUTION INTRAVENOUS ONCE
Status: CANCELLED | OUTPATIENT
Start: 2021-04-21 | End: 2021-03-26

## 2021-03-25 RX ORDER — HEPARIN SODIUM (PORCINE) LOCK FLUSH IV SOLN 100 UNIT/ML 100 UNIT/ML
500 SOLUTION INTRAVENOUS PRN
Status: CANCELLED | OUTPATIENT
Start: 2021-04-21

## 2021-03-25 RX ORDER — DEXAMETHASONE SODIUM PHOSPHATE 4 MG/ML
8 INJECTION, SOLUTION INTRA-ARTICULAR; INTRALESIONAL; INTRAMUSCULAR; INTRAVENOUS; SOFT TISSUE ONCE
Status: CANCELLED | OUTPATIENT
Start: 2021-04-21

## 2021-03-26 ENCOUNTER — TELEPHONE (OUTPATIENT)
Dept: ONCOLOGY | Age: 71
End: 2021-03-26

## 2021-03-26 NOTE — TELEPHONE ENCOUNTER
Patient called stated she feels like she has a cold, no temp noted. States she is having small amount of blood when blowing nose. Asking if she should keep appointment today or reschedule. Spoke to , states she wants patient to reschedule. Notified patient, and transferred to [facility] to reschedule.

## 2021-03-30 ENCOUNTER — HOSPITAL ENCOUNTER (OUTPATIENT)
Dept: INFUSION THERAPY | Age: 71
End: 2021-03-30
Payer: MEDICARE

## 2021-04-21 ENCOUNTER — HOSPITAL ENCOUNTER (OUTPATIENT)
Dept: INFUSION THERAPY | Age: 71
Discharge: HOME | End: 2021-04-21
Payer: MEDICARE

## 2021-04-21 ENCOUNTER — OFFICE VISIT (OUTPATIENT)
Dept: ONCOLOGY | Age: 71
End: 2021-04-21
Payer: MEDICARE

## 2021-04-21 VITALS
RESPIRATION RATE: 16 BRPM | TEMPERATURE: 98.3 F | SYSTOLIC BLOOD PRESSURE: 141 MMHG | DIASTOLIC BLOOD PRESSURE: 77 MMHG | HEART RATE: 77 BPM | BODY MASS INDEX: 31.1 KG/M2 | WEIGHT: 210 LBS | HEIGHT: 69 IN | OXYGEN SATURATION: 95 %

## 2021-04-21 VITALS
SYSTOLIC BLOOD PRESSURE: 139 MMHG | OXYGEN SATURATION: 96 % | WEIGHT: 210 LBS | HEIGHT: 69 IN | TEMPERATURE: 98.2 F | BODY MASS INDEX: 31.1 KG/M2 | DIASTOLIC BLOOD PRESSURE: 75 MMHG | RESPIRATION RATE: 16 BRPM | HEART RATE: 77 BPM

## 2021-04-21 DIAGNOSIS — Z17.0 MALIGNANT NEOPLASM OF UPPER-INNER QUADRANT OF LEFT BREAST IN FEMALE, ESTROGEN RECEPTOR POSITIVE (HCC): Primary | ICD-10-CM

## 2021-04-21 DIAGNOSIS — Z51.11 ENCOUNTER FOR CHEMOTHERAPY MANAGEMENT: ICD-10-CM

## 2021-04-21 DIAGNOSIS — Z17.0 MALIGNANT NEOPLASM OF UPPER-OUTER QUADRANT OF LEFT BREAST IN FEMALE, ESTROGEN RECEPTOR POSITIVE (HCC): ICD-10-CM

## 2021-04-21 DIAGNOSIS — C50.912 HER2-POSITIVE CARCINOMA OF LEFT BREAST (HCC): Primary | ICD-10-CM

## 2021-04-21 DIAGNOSIS — C50.412 MALIGNANT NEOPLASM OF UPPER-OUTER QUADRANT OF LEFT BREAST IN FEMALE, ESTROGEN RECEPTOR POSITIVE (HCC): ICD-10-CM

## 2021-04-21 DIAGNOSIS — E55.9 VITAMIN D DEFICIENCY: ICD-10-CM

## 2021-04-21 DIAGNOSIS — Z51.11 CHEMOTHERAPY MANAGEMENT, ENCOUNTER FOR: ICD-10-CM

## 2021-04-21 DIAGNOSIS — C50.912 HER2-POSITIVE CARCINOMA OF LEFT BREAST (HCC): ICD-10-CM

## 2021-04-21 DIAGNOSIS — C50.212 MALIGNANT NEOPLASM OF UPPER-INNER QUADRANT OF LEFT BREAST IN FEMALE, ESTROGEN RECEPTOR POSITIVE (HCC): Primary | ICD-10-CM

## 2021-04-21 DIAGNOSIS — Z51.11 ENCOUNTER FOR ANTINEOPLASTIC CHEMOTHERAPY: ICD-10-CM

## 2021-04-21 DIAGNOSIS — Z90.13 S/P MASTECTOMY, BILATERAL: ICD-10-CM

## 2021-04-21 DIAGNOSIS — Z90.13 STATUS POST BILATERAL MASTECTOMY: ICD-10-CM

## 2021-04-21 DIAGNOSIS — D49.3 MULTIFOCAL BREAST TUMOR: ICD-10-CM

## 2021-04-21 LAB
ALBUMIN SERPL-MCNC: 3.9 G/DL (ref 3.5–5.1)
ALP BLD-CCNC: 76 U/L (ref 38–126)
ALT SERPL-CCNC: 14 U/L (ref 11–66)
AST SERPL-CCNC: 28 U/L (ref 5–40)
BILIRUB SERPL-MCNC: 0.4 MG/DL (ref 0.3–1.2)
BILIRUBIN DIRECT: < 0.2 MG/DL (ref 0–0.3)
BUN, WHOLE BLOOD: 15 MG/DL (ref 8–26)
CHLORIDE, WHOLE BLOOD: 102 MEQ/L (ref 98–109)
CREATININE, WHOLE BLOOD: 0.9 MG/DL (ref 0.5–1.2)
GFR, ESTIMATED: 66 ML/MIN/1.73M2
GLUCOSE, WHOLE BLOOD: 156 MG/DL (ref 70–108)
HCT VFR BLD CALC: 48 % (ref 37–47)
HEMOGLOBIN: 15.1 GM/DL (ref 12–16)
IONIZED CALCIUM, WHOLE BLOOD: 1.25 MMOL/L (ref 1.12–1.32)
MCH RBC QN AUTO: 28.4 PG (ref 26–33)
MCHC RBC AUTO-ENTMCNC: 31.5 GM/DL (ref 32.2–35.5)
MCV RBC AUTO: 90 FL (ref 81–99)
PDW BLD-RTO: 17.9 % (ref 11.5–14.5)
PLATELET # BLD: 417 THOU/MM3 (ref 130–400)
PMV BLD AUTO: 10.4 FL (ref 9.4–12.4)
POTASSIUM, WHOLE BLOOD: 4 MEQ/L (ref 3.5–4.9)
RBC # BLD: 5.32 MILL/MM3 (ref 4.2–5.4)
SEG NEUTROPHILS: 38 % (ref 43–75)
SEGMENTED NEUTROPHILS ABSOLUTE COUNT: 3.6 THOU/MM3 (ref 1.8–7.7)
SODIUM, WHOLE BLOOD: 140 MEQ/L (ref 138–146)
TOTAL CO2, WHOLE BLOOD: 27 MEQ/L (ref 23–33)
TOTAL PROTEIN: 7.6 G/DL (ref 6.1–8)
WBC # BLD: 9.3 THOU/MM3 (ref 4.8–10.8)

## 2021-04-21 PROCEDURE — 85027 COMPLETE CBC AUTOMATED: CPT

## 2021-04-21 PROCEDURE — 2580000003 HC RX 258: Performed by: INTERNAL MEDICINE

## 2021-04-21 PROCEDURE — 99211 OFF/OP EST MAY X REQ PHY/QHP: CPT

## 2021-04-21 PROCEDURE — 6360000002 HC RX W HCPCS: Performed by: INTERNAL MEDICINE

## 2021-04-21 PROCEDURE — 80047 BASIC METABLC PNL IONIZED CA: CPT

## 2021-04-21 PROCEDURE — 36415 COLL VENOUS BLD VENIPUNCTURE: CPT

## 2021-04-21 PROCEDURE — 96413 CHEMO IV INFUSION 1 HR: CPT

## 2021-04-21 PROCEDURE — 96375 TX/PRO/DX INJ NEW DRUG ADDON: CPT

## 2021-04-21 PROCEDURE — 99214 OFFICE O/P EST MOD 30 MIN: CPT | Performed by: INTERNAL MEDICINE

## 2021-04-21 PROCEDURE — 36591 DRAW BLOOD OFF VENOUS DEVICE: CPT

## 2021-04-21 PROCEDURE — 80076 HEPATIC FUNCTION PANEL: CPT

## 2021-04-21 RX ORDER — DEXAMETHASONE SODIUM PHOSPHATE 4 MG/ML
8 INJECTION, SOLUTION INTRA-ARTICULAR; INTRALESIONAL; INTRAMUSCULAR; INTRAVENOUS; SOFT TISSUE ONCE
Status: COMPLETED | OUTPATIENT
Start: 2021-04-21 | End: 2021-04-21

## 2021-04-21 RX ORDER — SODIUM CHLORIDE 0.9 % (FLUSH) 0.9 %
20 SYRINGE (ML) INJECTION PRN
Status: DISCONTINUED | OUTPATIENT
Start: 2021-04-21 | End: 2021-04-22 | Stop reason: HOSPADM

## 2021-04-21 RX ORDER — HEPARIN SODIUM (PORCINE) LOCK FLUSH IV SOLN 100 UNIT/ML 100 UNIT/ML
500 SOLUTION INTRAVENOUS PRN
Status: CANCELLED | OUTPATIENT
Start: 2021-04-21

## 2021-04-21 RX ORDER — SODIUM CHLORIDE 9 MG/ML
20 INJECTION, SOLUTION INTRAVENOUS ONCE
Status: COMPLETED | OUTPATIENT
Start: 2021-04-21 | End: 2021-04-21

## 2021-04-21 RX ORDER — SODIUM CHLORIDE 0.9 % (FLUSH) 0.9 %
10 SYRINGE (ML) INJECTION PRN
Status: CANCELLED | OUTPATIENT
Start: 2021-04-21

## 2021-04-21 RX ORDER — HEPARIN SODIUM (PORCINE) LOCK FLUSH IV SOLN 100 UNIT/ML 100 UNIT/ML
500 SOLUTION INTRAVENOUS PRN
Status: DISCONTINUED | OUTPATIENT
Start: 2021-04-21 | End: 2021-04-22 | Stop reason: HOSPADM

## 2021-04-21 RX ORDER — SODIUM CHLORIDE 0.9 % (FLUSH) 0.9 %
20 SYRINGE (ML) INJECTION PRN
Status: CANCELLED | OUTPATIENT
Start: 2021-04-21

## 2021-04-21 RX ORDER — SODIUM CHLORIDE 0.9 % (FLUSH) 0.9 %
10 SYRINGE (ML) INJECTION PRN
Status: DISCONTINUED | OUTPATIENT
Start: 2021-04-21 | End: 2021-04-22 | Stop reason: HOSPADM

## 2021-04-21 RX ADMIN — SODIUM CHLORIDE, PRESERVATIVE FREE 20 ML: 5 INJECTION INTRAVENOUS at 08:41

## 2021-04-21 RX ADMIN — Medication 500 UNITS: at 10:10

## 2021-04-21 RX ADMIN — SODIUM CHLORIDE, PRESERVATIVE FREE 10 ML: 5 INJECTION INTRAVENOUS at 10:10

## 2021-04-21 RX ADMIN — SODIUM CHLORIDE, PRESERVATIVE FREE 10 ML: 5 INJECTION INTRAVENOUS at 08:40

## 2021-04-21 RX ADMIN — DEXAMETHASONE SODIUM PHOSPHATE 8 MG: 4 INJECTION, SOLUTION INTRAMUSCULAR; INTRAVENOUS at 09:23

## 2021-04-21 RX ADMIN — ADO-TRASTUZUMAB EMTANSINE 320 MG: 20 INJECTION, POWDER, LYOPHILIZED, FOR SOLUTION INTRAVENOUS at 09:32

## 2021-04-21 RX ADMIN — SODIUM CHLORIDE 20 ML/HR: 9 INJECTION, SOLUTION INTRAVENOUS at 09:18

## 2021-04-21 NOTE — PATIENT INSTRUCTIONS
1. Kadcyla today, in 3 weeks, and in 6 weeks  2. ECHO in 2 weeks  3.   RTC to see me for labs: CBC, BMP, LFTs and the next dose of Kadcyla in in 9 weeks

## 2021-04-21 NOTE — PLAN OF CARE
Problem: Infection - Central Venous Catheter-Associated Bloodstream Infection:  Goal: Will show no infection signs and symptoms  Description: Will show no infection signs and symptoms  Outcome: Met This Shift  Note: Instructed to monitor for signs/symptoms of infection at medi-port site and call MD if problems develop. Intervention: Central line needs assessment  Note: Mediport site with no redness or warmth. Skin over port site intact with no signs of breakdown noted. Patient verbalizes signs/symptoms of port infection and when to notify the physician. Problem: Musculor/Skeletal Functional Status  Goal: Absence of falls  Outcome: Met This Shift  Note: Free from falls while in O.P. Oncology. Intervention: Fall precautions  Note: Discussed the need to use the call light for assistance when getting up to ambulate. Problem: Intellectual/Education/Knowledge Deficit  Goal: Teaching initiated upon admission  Outcome: Met This Shift  Note: Patient verbalizes understanding to verbal information given on Kadcyla,action and possible side effects. Aware to call MD if develop complications.     Intervention: Verbal/written education provided  Note: Chemotherapy Teaching     What is Chemotherapy   Drug action [x]   Method of Administration [x]   Handouts given []     Side Effects  Nausea/vomiting [x]   Diarrhea [x]   Fatigue [x]   Signs / Symptoms of infection [x]   Neutropenia [x]   Thrombocytopenia [x]   Alopecia [x]   neuropathy [x]   [place] diet &  the importance of fluids [x]       Micellaneous  Importance of nutrition [x]   Importance of oral hygiene [x]   When to call the MD [x]   Monitoring labs [x]   Use of supportive services []     Explanation of Drug Regimen / Frequency  Day 1 cycle 4 Kadcyla     Comments  Verbalized understanding to drug,action,side effects and when to call MD         Problem: Discharge Planning  Goal: Knowledge of discharge instructions  Description: Knowledge of discharge instructions  Outcome: Met This Shift  Note: Verbalize understanding of discharge instructions, follow up appointments, and when to call Physician. Intervention: Interaction with patient/family and care team  Note: Discuss understanding of discharge instructions, follow up appointments and when to call Physician. Care plan reviewed with patient. Patient verbalize understanding of the plan of care and contribute to goal setting.

## 2021-04-21 NOTE — PROGRESS NOTES
Denies dizziness, lightheadedness, acute nausea or vomiting, headache, heart palpitations, rash/itching or increased SOB. Last vital signs  BP (!) 141/77   Pulse 77   Temp 98.3 °F (36.8 °C) (Oral)   Resp 16   Ht 5' 9\" (1.753 m)   Wt 210 lb (95.3 kg)   SpO2 95%   BMI 31.01 kg/m²     Patient instructed if they experience any of the above symptoms following today's visit, he/she is to notify the Physician or go to the Emergency Dept. Discharge instructions given to patient, Verbalizes understanding. Ambulated off unit per self in stable condition with all belongings.

## 2021-04-21 NOTE — PROGRESS NOTES
[facility] [address] [facility] [place] [address], [address] [facility],[address]  [address] [address]  Dept: [phone]  Dept Fax: [phone]: [phone]      Visit Date:2021     Kimber Solitario is a 79 y.o. female who presents today for:   Chief Complaint   Patient presents with    Follow-up     HER2-positive carcinoma of left breast ([address]        HPI:   Kimber Solitario is a 79 y.o. female with  triple positive left breast cancer. She had annual screening mammogram on 2020 that showed 2 lesions in the left breast.  Subsequently she underwent breast ultrasound followed by ultrasound guided biopsy of those 2 lesions. The largest of these lesions is a lobulated mixed cystic and    solid appearing mass measuring approximately 3 x 2.1 x 2.7 cm. This is located at the anterior depth upper inner quadrant. Aspiration of the fluid component of this lesion and biopsy of    the solid component of this lesion was performed on 2020. The smaller adjacent lesion is located at the middle depth of the     upper inner quadrant left breast measuring approximately 1.6 x    1.1 x 1.6 cm. Biopsy of this lesion was performed on 2020.       Final pathology report showed:   A-B.  Left breast, upper inner quadrant, anterior and middle depth,   barbell and tribell clips, multiple core needle biopsies:   Syble Buttner, poorly differentiated ductal carcinoma, [place] score 3. Estrogen Receptor: Positive 100 % of cells (>10% of cells)   Progesterone Receptor:  Positive 90 % of cells   Ki-67 (clone 30-9)     Percentage of positive nuclei: 42 %   HER2 NEW POSITIVE      On 2020 the patient had breast MRI showin. A known biopsy-proven mass demonstrated within the upper     inner left breast anterior to middle depth.  This measures 3 cm x     4.1 cm x 3.1 cm middle depth located 4  cm from the nipple, 0.9    cm from the skin, and 6.5 cm from the chest wall.  This shows    heterogeneous enhancement and delayed washout type vascular    enhancement.      2. There are scattered small foci of enhancement demonstrated    bilaterally. The dominant focus of enhancement within the right    breast is located within the lower inner right breast anterior    depth on axial image 105 series 9. Recommend further evaluation    with second look ultrasound. If there is no sonographic    correlate, recommend 6 month follow-up breast MRI to document    stability.         3. Among the small foci of enhancement within the left breast,    the most prominent is located within the lower outer quadrant    posterior depth as seen on axial image 91 series 9. Recommend    further evaluation with second look ultrasound. If there is no    sonographic correlate, recommend 6 month follow-up breast MRI to    document stability.          Patient started neoadjuvant chemotherapy was Taxotere/carbo/Herceptin/Pertuzumab on July 27, 2020  The patient received cycle #3 of chemotherapy on September 8, 2020. On October 4, 2020 she was diagnosed with Covid infection. On October 9, 2020 she was admitted to the hospital for worsening shortness of breath and hypoxia. She was treated with convalescent plasma 2 doses and she required high with high flow oxygen. Chest x-ray showed a developing right perihilar and left basilar consolidation. She was also treated with the course of remdesivir and 10-day course of Decadron. She started improving, she was discharged home after 12-day hospitalization. On November 30, 2020 she underwent left breast mastectomy with sentinel lymph node excision and right breast prophylactic mastectomy. Final pathology report showed:  A.  Right breast, prophylactic mastectomy:    Fibrocystic changes including stromal fibrosis and cysts. B.  Left sentinel lymph nodes, excision:    Two lymph nodes, negative for malignancy (0/2).    C.  Left breast, mastectomy:       Residual multifocal invasive ductal carcinoma (11 mm, upper outer       quadrant mass) with treatment effect (ympT1c, snpN0).  One lymph node, negative for malignancy (0/1).  Atypical lobular hyperplasia.    Margins are negative.    Closest margin: 10 mm (deep margin, lower outer quadrant mass). Patient tolerated her surgery well. Patient started adjuvant Kadcyla on January 11, 2021. Interval History on 04/21/2021:   The patient presents to the medical oncology clinic to continue adjuvant chemotherapy with Kadcyla. Denies any side effects from first infusion of Kadcyla. She received the first 3 infusions on schedule. However she has a 6-week break between cycles 3 and today's cycle #4. She denies having fevers.   She denies having shortness of breath no chest pain    Past Medical History:   Diagnosis Date    Breast cancer ([facility] 2020    left-invasive ductal follows with Dr Damian Pantoja COVID-19 10/07/2020    wears o2 at night    Hyperlipidemia     Numbness and tingling     left foot-chronic nerve damage    Shortness of breath       Past Surgical History:   Procedure Laterality Date    ABDOMINAL EXPLORATION SURGERY  2014    Dr butterfield-lysis of adhesions    BREAST LUMPECTOMY Left 11/2015    Left breast lumpectomy, retroareolar with preoperative needle loc-Dr. Luz Harris BREAST SURGERY Left 06/26/2020    biopsy of left breast    CHOLECYSTECTOMY  10/2014    Dr Aguilar [facility]  03/30/2016    Dr Allison Bryant  10/2014    x4 abdominal wall    HYSTERECTOMY      INCISIONAL HERNIA REPAIR  2014    Dr Rachael Gallegos LIPECTOMY  2014    Dr Umair Koehler  7/16/2020    [address] LEFT 7/16/2020 Julia Alexis  [facility] Bilateral 11/30/2020    LEFT SIMPLE MASTECTOMY AND SENTINAL LYMPH NODE BIOPSY, RIGHT SIMPLE PROPHYLACTIC MASTECTOMY performed by Sonia Peña MD at [address] [place] [address] Right 7/17/2020    SINGLE LUMEN SMART PORT INSERTION RIGHT SUBCLAVIAN performed by Socrates Suarez Debbie Garza MD at [address] [facility]  10/2014    Dr [facility]      patient was 11years old    TUBAL LIGATION        Family History   Problem Relation Age of Onset    Heart Disease Father         cath stent blood to thin and bled    Heart Attack Mother     Other Other         blood clot    Breast Cancer Paternal Aunt 62    High Blood Pressure Sister     Cancer Brother 68        skin    Prostate Cancer Brother         had chemotherapy to treat     Prostate Cancer Brother 64        has had for 10 years    Ovarian Cancer Neg Hx     Diabetes Neg Hx     Stroke Neg Hx       Social History     Tobacco Use    Smoking status: Never Smoker    Smokeless tobacco: Never Used   Substance Use Topics    Alcohol use: No     Alcohol/week: 0.0 standard drinks      Current Outpatient Medications   Medication Sig Dispense Refill    ondansetron (ZOFRAN ODT) 4 MG disintegrating tablet Take 1 tablet by mouth every 8 hours as needed for Nausea 20 tablet 3    zinc sulfate (ZINCATE) 220 (50 Zn) MG capsule Take 1 capsule by mouth daily 30 capsule 0    vitamin C (VITAMIN C) 500 MG tablet Take 1 tablet by mouth daily 30 tablet 0    loperamide (IMODIUM) 2 MG capsule Take 2 mg by mouth 4 times daily as needed for Diarrhea      lidocaine-prilocaine (EMLA) 2.5-2.5 % cream Apply topically as needed.  30 g 1    levothyroxine (SYNTHROID) 25 MCG tablet Take 25 mcg by mouth Daily      alendronate (FOSAMAX) 35 MG tablet Take 35 mg by mouth every 7 days      aspirin EC 81 MG EC tablet Take 1 tablet by mouth daily 30 tablet 3    PARoxetine (PAXIL) 10 MG tablet Take 10 mg by mouth every morning      Coenzyme Q10-Fish Oil-Vit E (CO-Q 10 OMEGA-3 FISH OIL PO) Take by mouth daily      Vitamin D (CHOLECALCIFEROL) 1000 UNITS CAPS capsule Take by mouth daily 2 tab      Cinnamon 500 MG CAPS Take by mouth daily      Cyanocobalamin (VITAMIN B12 PO) Take 1,000 mcg by mouth daily        No or lesions. Neurologic:  Neurovascularly intact without any focal sensory/motor deficits. Psychiatric: Alert and oriented      Imaging Studies and Labs:   CBC:   Lab Results   Component Value Date    WBC 6.1 03/05/2021    HGB 14.6 03/05/2021    HCT 46.8 03/05/2021    MCV 91 03/05/2021     03/05/2021     BMP:   Lab Results   Component Value Date     03/05/2021     12/01/2020    K 4.0 03/05/2021    K 4.6 12/01/2020    K 3.0 10/11/2020     12/01/2020    CO2 21 12/01/2020    BUN 12 12/01/2020    CREATININE 0.8 03/05/2021    CREATININE 0.5 12/01/2020    GLUCOSE 139 12/01/2020    CALCIUM 9.3 12/01/2020      LFT:   Lab Results   Component Value Date    ALT 16 03/05/2021    AST 31 03/05/2021    ALKPHOS 77 03/05/2021    BILITOT 0.4 03/05/2021      ECHO on November 17, 2020 showed:   Ejection fraction is visually estimated at 55%. Overall left ventricular function is normal.     Assessment and Plan:   1. Triple Positive Left Breast Cancer in upper outer quadrant with separate focus of triple negative bresat cancer in lower outer quadrant  Patient has received 3 cycles of neoadjuvant chemotherapy with Taxotere/carbo/Herceptin and Pertuzumab. Last treatment given in September 2020. Then the patient was hospitalized for Covid infection in October 2020. After she recovered from her Covid infection decision was made to proceed with breast surgery. She had surgery on November 30, 2020. Final pathology report showed residual cancer in both locations upper outer quadrant and lower outer quadrant. Residual triple negative breast cancer was smaller than 1 cm in size. Residual HER-2/jordan positive cancer was 11 mm in size. I had a lengthy discussion with the patient about further treatment.   The CREATE-X trial randomly assigned approximately 900 patients with HER2-negative breast cancer (approximately one-third of whom had TNBC) and residual disease larger than 1 cm in size after neoadjuvant anthracycline and/or taxane therapy to either eight cycles of adjuvant Xeloda or no further chemotherapy. Patients with triple negative breast cancer receiving capecitabine had higher rates of five-year disease-free survival (DFS; 70 versus 56 %). However patient's residual tumor was smaller than 1 cm in size. Residual HER-2 positive breast cancer on the other hand was bigger than 1 cm in size. In a randomized, open-label trial of 18 women with HER2-positive early breast cancer with residual invasive disease after neoadjuvant chemotherapy and Herceptin, adjuvant treatment with 14 cycles of T-DM1 versus trastuzumab improved three-year invasive DFS (88 versus 77%). The risk of distant recurrence was substantially lower with T-DM1 compared with trastuzumab. 2.  Adjuvant chemotherapy with Kadcyla. Patient started adjuvant Kadcyla on January 11, 2021. Received first 3 first infusion on time. However between March and today she has had almost 6-week break due to viral infection. They the patient is afebrile, denies having any joint or muscle pain and no nausea no constipation. Her HER-2 positive for tumor was also estrogen and progesterone receptor positive. After few cycles of Kadcyla we will introduce aromatase inhibitor. The patient will have 2D echo. All patient questions answered. Pt voiced understanding. Patient agreed with treatment plan. Follow up as directed. Patient instructed to call for questions or concerns.       Electronically signed by   Richard Cheema MD

## 2021-04-21 NOTE — ONCOLOGY
Chemotherapy Administration    Pre-assessment Data: Antineoplastic Agents  Other:   See toxicity flow sheet for assessment [x]     Physician Notification of Concerns Related to Chemotherapy Administration:   Physician Notified Samir Larry / Time of Notification      Interventions:   Lab work assessed  [x]   Height / Weight verified for dose [x]   Current MAR reviewed [x]   Emergency drugs available as appropriate [x]   Anaphylaxis assessment completed [x]   Pre-medications administered as ordered [x]   Blood return noted upon initiation of chemotherapy [x]   Blood return noted each 1-2ml of a vesicant medication if given IV push []   Blood return noted each 2-3ml of a non-vesicant medication if given IV push []   Monitor for signs / symptoms of hypersensitivity reaction [x]   Chemotherapy orders (drug/dose/rate) verified by 2 Chemo certified RNs [x]   Monitor IV site and blood return throughout the infusion of the medication [x]   Document IV site checks on the IV assessment form [x]   Document chemotherapy teaching on the Patient Education tab [x]   Document patient verbalizes understanding of medications being administered- Kadcyla [x]   If IV infiltration, see ONS Guidelines []   Other:      []

## 2021-05-05 ENCOUNTER — HOSPITAL ENCOUNTER (OUTPATIENT)
Dept: NON INVASIVE DIAGNOSTICS | Age: 71
Discharge: HOME | End: 2021-05-05
Payer: MEDICARE

## 2021-05-05 LAB
LV EF: 63 %
LVEF MODALITY: NORMAL

## 2021-05-05 PROCEDURE — 93306 TTE W/DOPPLER COMPLETE: CPT

## 2021-05-12 ENCOUNTER — HOSPITAL ENCOUNTER (OUTPATIENT)
Dept: INFUSION THERAPY | Age: 71
Discharge: HOME | End: 2021-05-12
Payer: MEDICARE

## 2021-05-12 VITALS
DIASTOLIC BLOOD PRESSURE: 98 MMHG | TEMPERATURE: 98.1 F | SYSTOLIC BLOOD PRESSURE: 167 MMHG | HEART RATE: 98 BPM | RESPIRATION RATE: 16 BRPM | OXYGEN SATURATION: 94 %

## 2021-05-12 DIAGNOSIS — Z51.11 CHEMOTHERAPY MANAGEMENT, ENCOUNTER FOR: ICD-10-CM

## 2021-05-12 DIAGNOSIS — E55.9 VITAMIN D DEFICIENCY: ICD-10-CM

## 2021-05-12 DIAGNOSIS — C50.412 MALIGNANT NEOPLASM OF UPPER-OUTER QUADRANT OF LEFT BREAST IN FEMALE, ESTROGEN RECEPTOR POSITIVE (HCC): ICD-10-CM

## 2021-05-12 DIAGNOSIS — C50.212 MALIGNANT NEOPLASM OF UPPER-INNER QUADRANT OF LEFT BREAST IN FEMALE, ESTROGEN RECEPTOR POSITIVE (HCC): ICD-10-CM

## 2021-05-12 DIAGNOSIS — Z17.0 MALIGNANT NEOPLASM OF UPPER-OUTER QUADRANT OF LEFT BREAST IN FEMALE, ESTROGEN RECEPTOR POSITIVE (HCC): ICD-10-CM

## 2021-05-12 DIAGNOSIS — Z17.0 MALIGNANT NEOPLASM OF UPPER-INNER QUADRANT OF LEFT BREAST IN FEMALE, ESTROGEN RECEPTOR POSITIVE (HCC): ICD-10-CM

## 2021-05-12 DIAGNOSIS — C50.912 HER2-POSITIVE CARCINOMA OF LEFT BREAST (HCC): Primary | ICD-10-CM

## 2021-05-12 LAB
ABSOLUTE IMMATURE GRANULOCYTE: 0.02 THOU/MM3 (ref 0–0.07)
ALBUMIN SERPL-MCNC: 3.6 G/DL (ref 3.5–5.1)
ALP BLD-CCNC: 75 U/L (ref 38–126)
ALT SERPL-CCNC: 16 U/L (ref 11–66)
AST SERPL-CCNC: 31 U/L (ref 5–40)
BASINOPHIL, AUTOMATED: 1 % (ref 0–3)
BASOPHILS ABSOLUTE: 0.1 THOU/MM3 (ref 0–0.1)
BILIRUB SERPL-MCNC: 0.5 MG/DL (ref 0.3–1.2)
BILIRUBIN DIRECT: < 0.2 MG/DL (ref 0–0.3)
BUN, WHOLE BLOOD: 11 MG/DL (ref 8–26)
CHLORIDE, WHOLE BLOOD: 103 MEQ/L (ref 98–109)
CREATININE, WHOLE BLOOD: 0.5 MG/DL (ref 0.5–1.2)
EOSINOPHILS ABSOLUTE: 0.4 THOU/MM3 (ref 0–0.4)
EOSINOPHILS RELATIVE PERCENT: 4 % (ref 0–4)
GFR, ESTIMATED: > 90 ML/MIN/1.73M2
GLUCOSE, WHOLE BLOOD: 174 MG/DL (ref 70–108)
HCT VFR BLD CALC: 46.2 % (ref 37–47)
HEMOGLOBIN: 14.5 GM/DL (ref 12–16)
IMMATURE GRANULOCYTES: 0 %
IONIZED CALCIUM, WHOLE BLOOD: 1.24 MMOL/L (ref 1.12–1.32)
LYMPHOCYTES # BLD: 45 % (ref 15–47)
LYMPHOCYTES ABSOLUTE: 3.9 THOU/MM3 (ref 1–4.8)
MCH RBC QN AUTO: 28 PG (ref 26–33)
MCHC RBC AUTO-ENTMCNC: 31.4 GM/DL (ref 32.2–35.5)
MCV RBC AUTO: 89 FL (ref 81–99)
MONOCYTES ABSOLUTE: 1.3 THOU/MM3 (ref 0.4–1.3)
MONOCYTES: 15 % (ref 0–12)
PDW BLD-RTO: 18 % (ref 11.5–14.5)
PLATELET # BLD: 367 THOU/MM3 (ref 130–400)
PMV BLD AUTO: 10.5 FL (ref 9.4–12.4)
POTASSIUM, WHOLE BLOOD: 3.9 MEQ/L (ref 3.5–4.9)
RBC # BLD: 5.17 MILL/MM3 (ref 4.2–5.4)
SEG NEUTROPHILS: 34 % (ref 43–75)
SEGMENTED NEUTROPHILS ABSOLUTE COUNT: 2.9 THOU/MM3 (ref 1.8–7.7)
SODIUM, WHOLE BLOOD: 140 MEQ/L (ref 138–146)
TOTAL CO2, WHOLE BLOOD: 28 MEQ/L (ref 23–33)
TOTAL PROTEIN: 7.5 G/DL (ref 6.1–8)
WBC # BLD: 8.5 THOU/MM3 (ref 4.8–10.8)

## 2021-05-12 PROCEDURE — 96375 TX/PRO/DX INJ NEW DRUG ADDON: CPT

## 2021-05-12 PROCEDURE — 96413 CHEMO IV INFUSION 1 HR: CPT

## 2021-05-12 PROCEDURE — 2580000003 HC RX 258: Performed by: INTERNAL MEDICINE

## 2021-05-12 PROCEDURE — 6360000002 HC RX W HCPCS: Performed by: INTERNAL MEDICINE

## 2021-05-12 PROCEDURE — 36591 DRAW BLOOD OFF VENOUS DEVICE: CPT

## 2021-05-12 PROCEDURE — 85025 COMPLETE CBC W/AUTO DIFF WBC: CPT

## 2021-05-12 PROCEDURE — 80076 HEPATIC FUNCTION PANEL: CPT

## 2021-05-12 PROCEDURE — 80047 BASIC METABLC PNL IONIZED CA: CPT

## 2021-05-12 RX ORDER — HEPARIN SODIUM (PORCINE) LOCK FLUSH IV SOLN 100 UNIT/ML 100 UNIT/ML
500 SOLUTION INTRAVENOUS PRN
Status: CANCELLED | OUTPATIENT
Start: 2021-05-12

## 2021-05-12 RX ORDER — SODIUM CHLORIDE 9 MG/ML
20 INJECTION, SOLUTION INTRAVENOUS ONCE
Status: COMPLETED | OUTPATIENT
Start: 2021-05-12 | End: 2021-05-12

## 2021-05-12 RX ORDER — SODIUM CHLORIDE 0.9 % (FLUSH) 0.9 %
10 SYRINGE (ML) INJECTION PRN
Status: CANCELLED | OUTPATIENT
Start: 2021-05-12

## 2021-05-12 RX ORDER — HEPARIN SODIUM (PORCINE) LOCK FLUSH IV SOLN 100 UNIT/ML 100 UNIT/ML
500 SOLUTION INTRAVENOUS PRN
Status: DISCONTINUED | OUTPATIENT
Start: 2021-05-12 | End: 2021-05-13 | Stop reason: HOSPADM

## 2021-05-12 RX ORDER — DIPHENHYDRAMINE HYDROCHLORIDE 50 MG/ML
50 INJECTION INTRAMUSCULAR; INTRAVENOUS ONCE
Status: CANCELLED | OUTPATIENT
Start: 2021-05-12 | End: 2021-05-12

## 2021-05-12 RX ORDER — SODIUM CHLORIDE 0.9 % (FLUSH) 0.9 %
10 SYRINGE (ML) INJECTION PRN
Status: DISCONTINUED | OUTPATIENT
Start: 2021-05-12 | End: 2021-05-13 | Stop reason: HOSPADM

## 2021-05-12 RX ORDER — SODIUM CHLORIDE 0.9 % (FLUSH) 0.9 %
5 SYRINGE (ML) INJECTION PRN
Status: CANCELLED | OUTPATIENT
Start: 2021-05-12

## 2021-05-12 RX ORDER — SODIUM CHLORIDE 0.9 % (FLUSH) 0.9 %
20 SYRINGE (ML) INJECTION PRN
Status: CANCELLED | OUTPATIENT
Start: 2021-05-12

## 2021-05-12 RX ORDER — DEXAMETHASONE SODIUM PHOSPHATE 4 MG/ML
8 INJECTION, SOLUTION INTRA-ARTICULAR; INTRALESIONAL; INTRAMUSCULAR; INTRAVENOUS; SOFT TISSUE ONCE
Status: COMPLETED | OUTPATIENT
Start: 2021-05-12 | End: 2021-05-12

## 2021-05-12 RX ORDER — SODIUM CHLORIDE 0.9 % (FLUSH) 0.9 %
20 SYRINGE (ML) INJECTION PRN
Status: DISCONTINUED | OUTPATIENT
Start: 2021-05-12 | End: 2021-05-13 | Stop reason: HOSPADM

## 2021-05-12 RX ORDER — METHYLPREDNISOLONE SODIUM SUCCINATE 125 MG/2ML
125 INJECTION, POWDER, LYOPHILIZED, FOR SOLUTION INTRAMUSCULAR; INTRAVENOUS ONCE
Status: CANCELLED | OUTPATIENT
Start: 2021-05-12 | End: 2021-05-12

## 2021-05-12 RX ORDER — MEPERIDINE HYDROCHLORIDE 25 MG/ML
12.5 INJECTION INTRAMUSCULAR; INTRAVENOUS; SUBCUTANEOUS ONCE
Status: CANCELLED | OUTPATIENT
Start: 2021-05-12 | End: 2021-05-12

## 2021-05-12 RX ORDER — SODIUM CHLORIDE 9 MG/ML
INJECTION, SOLUTION INTRAVENOUS CONTINUOUS
Status: CANCELLED | OUTPATIENT
Start: 2021-05-12

## 2021-05-12 RX ADMIN — SODIUM CHLORIDE 20 ML/HR: 9 INJECTION, SOLUTION INTRAVENOUS at 11:00

## 2021-05-12 RX ADMIN — SODIUM CHLORIDE, PRESERVATIVE FREE 20 ML: 5 INJECTION INTRAVENOUS at 10:31

## 2021-05-12 RX ADMIN — SODIUM CHLORIDE, PRESERVATIVE FREE 10 ML: 5 INJECTION INTRAVENOUS at 10:30

## 2021-05-12 RX ADMIN — SODIUM CHLORIDE, PRESERVATIVE FREE 20 ML: 5 INJECTION INTRAVENOUS at 12:35

## 2021-05-12 RX ADMIN — DEXAMETHASONE SODIUM PHOSPHATE 8 MG: 4 INJECTION, SOLUTION INTRAMUSCULAR; INTRAVENOUS at 11:22

## 2021-05-12 RX ADMIN — ADO-TRASTUZUMAB EMTANSINE 320 MG: 20 INJECTION, POWDER, LYOPHILIZED, FOR SOLUTION INTRAVENOUS at 11:46

## 2021-05-12 RX ADMIN — HEPARIN 500 UNITS: 100 SYRINGE at 12:36

## 2021-05-12 NOTE — PLAN OF CARE
Problem: Infection - Central Venous Catheter-Associated Bloodstream Infection:  Goal: Will show no infection signs and symptoms  Description: Will show no infection signs and symptoms  Outcome: Met This Shift  Note: Instructed to monitor for signs/symptoms of infection at mediport and call MD if problems develop. Intervention: Central line needs assessment  Note: Mediport site with no redness or warmth. Skin over port site intact with no signs of breakdown noted. Patient verbalizes signs/symptoms of port infection and when to notify the physician. Problem: Musculor/Skeletal Functional Status  Goal: Absence of falls  Outcome: Met This Shift  Note: Free from falls while in O.P. Oncology. Intervention: Fall precautions  Note: Discussed the need to use the call light for assistance when getting up to ambulate. Problem: Intellectual/Education/Knowledge Deficit  Goal: Teaching initiated upon admission  Outcome: Met This Shift  Note: Patient verbalizes understanding to verbal information given on Kadcyla,action and possible side effects. Aware to call MD if develop complications.     Intervention: Verbal/written education provided  Note: Chemotherapy Teaching     What is Chemotherapy   Drug action [x]   Method of Administration [x]   Handouts given []     Side Effects  Nausea/vomiting [x]   Diarrhea [x]   Fatigue [x]   Signs / Symptoms of infection [x]   Neutropenia [x]   Thrombocytopenia [x]   Alopecia [x]   neuropathy [x]   [place] diet &  the importance of fluids [x]       Micellaneous  Importance of nutrition [x]   Importance of oral hygiene [x]   When to call the MD [x]   Monitoring labs [x]   Use of supportive services []     Explanation of Drug Regimen / Frequency  Kadcyla     Comments  Verbalized understanding to drug,action,side effects and when to call MD         Problem: Discharge Planning  Goal: Knowledge of discharge instructions  Description: Knowledge of discharge instructions  Outcome: Met This Shift  Note: Verbalize understanding of discharge instructions, follow up appointments, and when to call Physician. Intervention: Interaction with patient/family and care team  Note: Discuss understanding of discharge instructions, follow up appointments and when to call Physician. Care plan reviewed with patient. Patient verbalize understanding of the plan of care and contribute to goal setting.

## 2021-05-12 NOTE — PROGRESS NOTES
Patient assessed for the following post chemotherapy:    Dizziness   No  Lightheadedness  No      Acute nausea/vomiting No  Headache   No  Chest pain/pressure  No  Rash/itching   No  Shortness of breath  No    Patient opted to not stay for20 minutes observation post infusion chemotherapy. Patient tolerated chemotherapy treatment Kadcyla without any complications. Last vital signs:   BP (!) 167/98   Pulse 98   Temp 98.1 °F (36.7 °C) (Oral)   Resp 16   SpO2 94%       Patient instructed if experience any of the above symptoms following today's infusion,he/she is to notify MD immediately or go to the emergency department. Discharge instructions given to patient. Verbalizes understanding. Ambulated off unit per self with belongings.

## 2021-05-13 ENCOUNTER — OFFICE VISIT (OUTPATIENT)
Dept: SURGERY | Age: 71
End: 2021-05-13
Payer: MEDICARE

## 2021-05-13 VITALS
HEIGHT: 69 IN | BODY MASS INDEX: 31.49 KG/M2 | RESPIRATION RATE: 18 BRPM | SYSTOLIC BLOOD PRESSURE: 116 MMHG | WEIGHT: 212.6 LBS | TEMPERATURE: 96.7 F | DIASTOLIC BLOOD PRESSURE: 60 MMHG | HEART RATE: 77 BPM | OXYGEN SATURATION: 94 %

## 2021-05-13 DIAGNOSIS — N64.89 SEROMA OF BREAST: ICD-10-CM

## 2021-05-13 DIAGNOSIS — N61.1 ABSCESS OF RIGHT BREAST: ICD-10-CM

## 2021-05-13 DIAGNOSIS — Z90.13 S/P BILATERAL MASTECTOMY: Primary | ICD-10-CM

## 2021-05-13 PROCEDURE — 99211 OFF/OP EST MAY X REQ PHY/QHP: CPT | Performed by: SURGERY

## 2021-05-13 ASSESSMENT — ENCOUNTER SYMPTOMS
CHEST TIGHTNESS: 0
WHEEZING: 0
EYE PAIN: 0
APNEA: 0
EYE DISCHARGE: 0
ANAL BLEEDING: 0
SHORTNESS OF BREATH: 0
BACK PAIN: 0
VOICE CHANGE: 0
STRIDOR: 0
SORE THROAT: 0
COLOR CHANGE: 0
EYE REDNESS: 0
ABDOMINAL DISTENTION: 0
CONSTIPATION: 0
SINUS PRESSURE: 0
TROUBLE SWALLOWING: 0
PHOTOPHOBIA: 0
BLOOD IN STOOL: 0
FACIAL SWELLING: 0
CHOKING: 0
SINUS PAIN: 0
COUGH: 0
ABDOMINAL PAIN: 0
EYE ITCHING: 0
RHINORRHEA: 0

## 2021-05-13 NOTE — PROGRESS NOTES
7/16/2020 Venita Huffman  [facility] Bilateral 11/30/2020    LEFT SIMPLE MASTECTOMY AND SENTINAL LYMPH NODE BIOPSY, RIGHT SIMPLE PROPHYLACTIC MASTECTOMY performed by Davion Vences MD at [address] Right 7/17/2020    SINGLE LUMEN SMART PORT INSERTION RIGHT SUBCLAVIAN performed by Davion Vences MD at [address]  [facility]2014    [facility]      patient was 11[facility]    TUBAL LIGATION          Family History   Problem Relation Age of Onset    Heart Disease Father         cath stent blood to thin and bled    Heart Attack Mother     Other Other         blood clot    Breast Cancer Paternal Aunt 62    High Blood Pressure Sister     Cancer Brother 68        skin    Prostate Cancer Brother         had chemotherapy to treat     Prostate Cancer Brother 64        has had for 10 years    Ovarian Cancer Neg Hx     Diabetes Neg Hx     Stroke Neg Hx         Social History     Tobacco Use    Smoking status: Never Smoker    Smokeless tobacco: Never Used   Substance Use Topics    Alcohol use: No     Alcohol/week: 0.0 standard drinks          Current Outpatient Medications   Medication Sig Dispense Refill    ondansetron (ZOFRAN ODT) 4 MG disintegrating tablet Take 1 tablet by mouth every 8 hours as needed for Nausea 20 tablet 3    zinc sulfate (ZINCATE) 220 (50 Zn) MG capsule Take 1 capsule by mouth daily 30 capsule 0    vitamin C (VITAMIN C) 500 MG tablet Take 1 tablet by mouth daily 30 tablet 0    loperamide (IMODIUM) 2 MG capsule Take 2 mg by mouth 4 times daily as needed for Diarrhea      lidocaine-prilocaine (EMLA) 2.5-2.5 % cream Apply topically as needed. 30 g 1    levothyroxine (SYNTHROID) 25 MCG tablet Take 25 mcg by mouth Daily Take 50 MG for 6 weeks per PCP.       alendronate (FOSAMAX) 35 MG tablet Take 35 mg by mouth every 7 days      aspirin EC 81 MG EC tablet Take 1 tablet by mouth daily 30 tablet 3    PARoxetine (PAXIL) 10 MG tablet Take 10 mg by mouth every morning      Coenzyme Q10-Fish Oil-Vit E (CO-Q 10 OMEGA-3 FISH OIL PO) Take by mouth daily      Vitamin D (CHOLECALCIFEROL) 1000 UNITS CAPS capsule Take by mouth daily 2 tab      Cinnamon 500 MG CAPS Take by mouth daily      Cyanocobalamin (VITAMIN B12 PO) Take 1,000 mcg by mouth daily        No current facility-administered medications for this visit. Allergies   Allergen Reactions    Aluminum-Containing Compounds Anaphylaxis    Scopolamine      Other reaction(s): Dizziness    Adhesive Tape Other (See Comments)     Takes top layer of skin off       Subjective:      Review of Systems   Constitutional: Negative for activity change, appetite change, chills, diaphoresis, fatigue, fever and unexpected weight change. HENT: Negative for congestion, dental problem, drooling, ear discharge, ear pain, facial swelling, hearing loss, mouth sores, nosebleeds, postnasal drip, rhinorrhea, sinus pressure, sinus pain, sneezing, sore throat, tinnitus, trouble swallowing and voice change. Eyes: Negative for photophobia, pain, discharge, redness, itching and visual disturbance. Respiratory: Negative for apnea, cough, choking, chest tightness, shortness of breath, wheezing and stridor. Cardiovascular: Negative for chest pain, palpitations and leg swelling. Gastrointestinal: Negative for abdominal distention, abdominal pain, anal bleeding, blood in stool and constipation. Endocrine: Negative for cold intolerance, heat intolerance, polydipsia, polyphagia and polyuria. Genitourinary: Negative for decreased urine volume, difficulty urinating, dyspareunia, dysuria, enuresis, flank pain, frequency, genital sores, hematuria, menstrual problem, pelvic pain, urgency, vaginal bleeding, vaginal discharge and vaginal pain. Musculoskeletal: Negative for arthralgias, back pain, gait problem, joint swelling, myalgias, neck pain and neck stiffness.    Skin: Negative for color change, pallor, rash and wound. Allergic/Immunologic: Negative for environmental allergies, food allergies and immunocompromised state. Neurological: Negative for dizziness, tremors, seizures, syncope, facial asymmetry, speech difficulty, weakness, light-headedness, numbness and headaches. Hematological: Negative for adenopathy. Does not bruise/bleed easily. Psychiatric/Behavioral: Negative for agitation, behavioral problems, confusion, decreased concentration, dysphoric mood, hallucinations, self-injury, sleep disturbance and suicidal ideas. The patient is not nervous/anxious and is not hyperactive. Objective:   /60 (Site: Left Upper Arm, Position: Sitting, Cuff Size: Medium Adult)   Pulse 77   Temp 96.7 °F (35.9 °C) (Temporal)   Resp 18   Ht 5' 9\" (1.753 m)   Wt 212 lb 9.6 oz (96.4 kg)   SpO2 94%   BMI 31.40 kg/m²     Physical Exam  Constitutional:       Appearance: She is well-developed. HENT:      Head: Normocephalic and atraumatic. Eyes:      General: No scleral icterus. Right eye: No discharge. Left eye: No discharge. Conjunctiva/sclera: Conjunctivae normal.      Pupils: Pupils are equal, round, and reactive to light. Neck:      Musculoskeletal: Normal range of motion and neck supple. Thyroid: No thyromegaly. Vascular: No JVD. Cardiovascular:      Rate and Rhythm: Normal rate and regular rhythm. Heart sounds: No murmur. No friction rub. No gallop. Pulmonary:      Effort: Pulmonary effort is normal. No respiratory distress. Breath sounds: Normal breath sounds. No stridor. No wheezing. Chest:      Chest wall: No tenderness. Musculoskeletal: Normal range of motion. Skin:     General: Skin is warm and dry. Coloration: Skin is not pale. Findings: No erythema or rash. Neurological:      Mental Status: She is alert and oriented to person, place, and time.    Psychiatric:         Behavior: Behavior normal.         Thought Content:  Thought content normal.         Judgment: Judgment normal.            Results for orders placed or performed during the hospital encounter of 05/12/21   Hepatic Function Panel   Result Value Ref Range    Albumin 3.6 3.5 - 5.1 g/dL    Total Bilirubin 0.5 0.3 - 1.2 mg/dL    Bilirubin, Direct <0.2 0.0 - 0.3 mg/dL    Alkaline Phosphatase 75 38 - 126 U/L    AST 31 5 - 40 U/L    ALT 16 11 - 66 U/L    Total Protein 7.5 6.1 - 8.0 g/dL   POC PANEL BMP W/IOCA   Result Value Ref Range    Sodium, Whole Blood 140 138 - 146 meq/l    Potassium, Whole Blood 3.9 3.5 - 4.9 meq/l    Chloride, Whole Blood 103 98 - 109 meq/l    TOTAL CO2, WHOLE BLOOD 28 23 - 33 meq/l    Glucose, Whole Blood 174 (H) 70 - 108 mg/dl    BUN, WHOLE BLOOD 11 8 - 26 mg/dl    CREATININE, WHOLE BLOOD 0.5 0.5 - 1.2 mg/dl    Ionized Calcium, WB 1.24 1.12 - 1.32 mmol/L   CBC Auto Differential   Result Value Ref Range    WBC 8.5 4.8 - 10.8 thou/mm3    RBC 5.17 4.20 - 5.40 mill/mm3    Hemoglobin 14.5 12.0 - 16.0 gm/dl    Hematocrit 46.2 37.0 - 47.0 %    MCV 89 81 - 99 fL    MCH 28.0 26.0 - 33.0 pg    MCHC 31.4 (L) 32.2 - 35.5 gm/dl    RDW 18.0 (H) 11.5 - 14.5 %    Platelets 511 083 - 788 thou/mm3    MPV 10.5 9.4 - 12.4 fL    Seg Neutrophils 34 (L) 43 - 75 %    Lymphocytes 45 15 - 47 %    Monocytes 15 (H) 0 - 12 %    Eosinophils % 4 0 - 4 %    BASINOPHIL, AUTOMATED 1 0 - 3 %    Immature Granulocytes 0 %    Segs Absolute 2.9 1 - 7 thou/mm3    Lymphocytes Absolute 3.9 1.0 - 4.8 thou/mm3    Monocytes Absolute 1.3 0.4 - 1.3 thou/mm3    Eosinophils Absolute 0.4 0.0 - 0.4 thou/mm3    Basophils Absolute 0.1 0.0 - 0.1 thou/mm3    Absolute Immature Granulocyte 0.02 0.00 - 0.07 thou/mm3   Glomerular Filtration Rate, Estimated   Result Value Ref Range    GFR, Estimated > 90 ml/min/1.73m2       Assessment:     Unusual seroma on a delayed basis that is bloody she denies any trauma to the area however it was completely removed patient will return if seroma recurs    Plan:     Continue chemotherapy      Electronicallysigned by Nicky Hall MD on 5/13/2021 at 1:13 PM

## 2021-06-02 ENCOUNTER — HOSPITAL ENCOUNTER (OUTPATIENT)
Dept: INFUSION THERAPY | Age: 71
Discharge: HOME | End: 2021-06-02
Payer: MEDICARE

## 2021-06-02 VITALS
HEIGHT: 69 IN | SYSTOLIC BLOOD PRESSURE: 137 MMHG | BODY MASS INDEX: 31.73 KG/M2 | DIASTOLIC BLOOD PRESSURE: 67 MMHG | WEIGHT: 214.2 LBS | HEART RATE: 79 BPM | OXYGEN SATURATION: 97 % | RESPIRATION RATE: 16 BRPM | TEMPERATURE: 97.9 F

## 2021-06-02 DIAGNOSIS — Z17.0 MALIGNANT NEOPLASM OF UPPER-INNER QUADRANT OF LEFT BREAST IN FEMALE, ESTROGEN RECEPTOR POSITIVE (HCC): ICD-10-CM

## 2021-06-02 DIAGNOSIS — E55.9 VITAMIN D DEFICIENCY: ICD-10-CM

## 2021-06-02 DIAGNOSIS — Z17.0 MALIGNANT NEOPLASM OF UPPER-OUTER QUADRANT OF LEFT BREAST IN FEMALE, ESTROGEN RECEPTOR POSITIVE (HCC): ICD-10-CM

## 2021-06-02 DIAGNOSIS — C50.912 HER2-POSITIVE CARCINOMA OF LEFT BREAST (HCC): Primary | ICD-10-CM

## 2021-06-02 DIAGNOSIS — C50.212 MALIGNANT NEOPLASM OF UPPER-INNER QUADRANT OF LEFT BREAST IN FEMALE, ESTROGEN RECEPTOR POSITIVE (HCC): ICD-10-CM

## 2021-06-02 DIAGNOSIS — C50.412 MALIGNANT NEOPLASM OF UPPER-OUTER QUADRANT OF LEFT BREAST IN FEMALE, ESTROGEN RECEPTOR POSITIVE (HCC): ICD-10-CM

## 2021-06-02 DIAGNOSIS — Z51.11 CHEMOTHERAPY MANAGEMENT, ENCOUNTER FOR: ICD-10-CM

## 2021-06-02 LAB
ABSOLUTE IMMATURE GRANULOCYTE: 0.02 THOU/MM3 (ref 0–0.07)
ALBUMIN SERPL-MCNC: 3.5 G/DL (ref 3.5–5.1)
ALP BLD-CCNC: 76 U/L (ref 38–126)
ALT SERPL-CCNC: 13 U/L (ref 11–66)
AST SERPL-CCNC: 32 U/L (ref 5–40)
BASINOPHIL, AUTOMATED: 2 % (ref 0–3)
BASOPHILS ABSOLUTE: 0.1 THOU/MM3 (ref 0–0.1)
BILIRUB SERPL-MCNC: 0.5 MG/DL (ref 0.3–1.2)
BILIRUBIN DIRECT: < 0.2 MG/DL (ref 0–0.3)
BUN, WHOLE BLOOD: 7 MG/DL (ref 8–26)
CHLORIDE, WHOLE BLOOD: 104 MEQ/L (ref 98–109)
CREATININE, WHOLE BLOOD: 0.6 MG/DL (ref 0.5–1.2)
EOSINOPHILS ABSOLUTE: 0.4 THOU/MM3 (ref 0–0.4)
EOSINOPHILS RELATIVE PERCENT: 5 % (ref 0–4)
GFR, ESTIMATED: > 90 ML/MIN/1.73M2
GLUCOSE, WHOLE BLOOD: 169 MG/DL (ref 70–108)
HCT VFR BLD CALC: 43.6 % (ref 37–47)
HEMOGLOBIN: 13.5 GM/DL (ref 12–16)
IMMATURE GRANULOCYTES: 0 %
IONIZED CALCIUM, WHOLE BLOOD: 1.23 MMOL/L (ref 1.12–1.32)
LYMPHOCYTES # BLD: 40 % (ref 15–47)
LYMPHOCYTES ABSOLUTE: 2.8 THOU/MM3 (ref 1–4.8)
MCH RBC QN AUTO: 27.7 PG (ref 26–33)
MCHC RBC AUTO-ENTMCNC: 31 GM/DL (ref 32.2–35.5)
MCV RBC AUTO: 90 FL (ref 81–99)
MONOCYTES ABSOLUTE: 1.2 THOU/MM3 (ref 0.4–1.3)
MONOCYTES: 17 % (ref 0–12)
PDW BLD-RTO: 18 % (ref 11.5–14.5)
PLATELET # BLD: 375 THOU/MM3 (ref 130–400)
PMV BLD AUTO: 10.9 FL (ref 9.4–12.4)
POTASSIUM, WHOLE BLOOD: 3.6 MEQ/L (ref 3.5–4.9)
RBC # BLD: 4.87 MILL/MM3 (ref 4.2–5.4)
SEG NEUTROPHILS: 36 % (ref 43–75)
SEGMENTED NEUTROPHILS ABSOLUTE COUNT: 2.5 THOU/MM3 (ref 1.8–7.7)
SODIUM, WHOLE BLOOD: 142 MEQ/L (ref 138–146)
TOTAL CO2, WHOLE BLOOD: 29 MEQ/L (ref 23–33)
TOTAL PROTEIN: 7.4 G/DL (ref 6.1–8)
WBC # BLD: 7 THOU/MM3 (ref 4.8–10.8)

## 2021-06-02 PROCEDURE — 2580000003 HC RX 258: Performed by: INTERNAL MEDICINE

## 2021-06-02 PROCEDURE — 96375 TX/PRO/DX INJ NEW DRUG ADDON: CPT

## 2021-06-02 PROCEDURE — 80076 HEPATIC FUNCTION PANEL: CPT

## 2021-06-02 PROCEDURE — 80047 BASIC METABLC PNL IONIZED CA: CPT

## 2021-06-02 PROCEDURE — 36591 DRAW BLOOD OFF VENOUS DEVICE: CPT

## 2021-06-02 PROCEDURE — 96413 CHEMO IV INFUSION 1 HR: CPT

## 2021-06-02 PROCEDURE — 85025 COMPLETE CBC W/AUTO DIFF WBC: CPT

## 2021-06-02 PROCEDURE — 6360000002 HC RX W HCPCS: Performed by: INTERNAL MEDICINE

## 2021-06-02 RX ORDER — SODIUM CHLORIDE 9 MG/ML
20 INJECTION, SOLUTION INTRAVENOUS ONCE
Status: COMPLETED | OUTPATIENT
Start: 2021-06-02 | End: 2021-06-02

## 2021-06-02 RX ORDER — METHYLPREDNISOLONE SODIUM SUCCINATE 125 MG/2ML
125 INJECTION, POWDER, LYOPHILIZED, FOR SOLUTION INTRAMUSCULAR; INTRAVENOUS ONCE
Status: CANCELLED | OUTPATIENT
Start: 2021-06-02 | End: 2021-06-02

## 2021-06-02 RX ORDER — SODIUM CHLORIDE 0.9 % (FLUSH) 0.9 %
5 SYRINGE (ML) INJECTION PRN
Status: CANCELLED | OUTPATIENT
Start: 2021-06-02

## 2021-06-02 RX ORDER — SODIUM CHLORIDE 9 MG/ML
INJECTION, SOLUTION INTRAVENOUS CONTINUOUS
Status: CANCELLED | OUTPATIENT
Start: 2021-06-02

## 2021-06-02 RX ORDER — MEPERIDINE HYDROCHLORIDE 25 MG/ML
12.5 INJECTION INTRAMUSCULAR; INTRAVENOUS; SUBCUTANEOUS ONCE
Status: CANCELLED | OUTPATIENT
Start: 2021-06-02 | End: 2021-06-02

## 2021-06-02 RX ORDER — SODIUM CHLORIDE 0.9 % (FLUSH) 0.9 %
20 SYRINGE (ML) INJECTION PRN
Status: DISCONTINUED | OUTPATIENT
Start: 2021-06-02 | End: 2021-06-03 | Stop reason: HOSPADM

## 2021-06-02 RX ORDER — DIPHENHYDRAMINE HYDROCHLORIDE 50 MG/ML
50 INJECTION INTRAMUSCULAR; INTRAVENOUS ONCE
Status: CANCELLED | OUTPATIENT
Start: 2021-06-02 | End: 2021-06-02

## 2021-06-02 RX ORDER — HEPARIN SODIUM (PORCINE) LOCK FLUSH IV SOLN 100 UNIT/ML 100 UNIT/ML
500 SOLUTION INTRAVENOUS PRN
Status: CANCELLED | OUTPATIENT
Start: 2021-06-02

## 2021-06-02 RX ORDER — SODIUM CHLORIDE 0.9 % (FLUSH) 0.9 %
20 SYRINGE (ML) INJECTION PRN
Status: CANCELLED | OUTPATIENT
Start: 2021-06-02

## 2021-06-02 RX ORDER — SODIUM CHLORIDE 0.9 % (FLUSH) 0.9 %
10 SYRINGE (ML) INJECTION PRN
Status: DISCONTINUED | OUTPATIENT
Start: 2021-06-02 | End: 2021-06-03 | Stop reason: HOSPADM

## 2021-06-02 RX ORDER — SODIUM CHLORIDE 0.9 % (FLUSH) 0.9 %
10 SYRINGE (ML) INJECTION PRN
Status: CANCELLED | OUTPATIENT
Start: 2021-06-02

## 2021-06-02 RX ORDER — HEPARIN SODIUM (PORCINE) LOCK FLUSH IV SOLN 100 UNIT/ML 100 UNIT/ML
500 SOLUTION INTRAVENOUS PRN
Status: DISCONTINUED | OUTPATIENT
Start: 2021-06-02 | End: 2021-06-03 | Stop reason: HOSPADM

## 2021-06-02 RX ORDER — DEXAMETHASONE SODIUM PHOSPHATE 4 MG/ML
8 INJECTION, SOLUTION INTRA-ARTICULAR; INTRALESIONAL; INTRAMUSCULAR; INTRAVENOUS; SOFT TISSUE ONCE
Status: COMPLETED | OUTPATIENT
Start: 2021-06-02 | End: 2021-06-02

## 2021-06-02 RX ADMIN — SODIUM CHLORIDE 20 ML/HR: 9 INJECTION, SOLUTION INTRAVENOUS at 09:32

## 2021-06-02 RX ADMIN — SODIUM CHLORIDE, PRESERVATIVE FREE 10 ML: 5 INJECTION INTRAVENOUS at 10:26

## 2021-06-02 RX ADMIN — ADO-TRASTUZUMAB EMTANSINE 320 MG: 20 INJECTION, POWDER, LYOPHILIZED, FOR SOLUTION INTRAVENOUS at 09:48

## 2021-06-02 RX ADMIN — Medication 500 UNITS: at 10:26

## 2021-06-02 RX ADMIN — DEXAMETHASONE SODIUM PHOSPHATE 8 MG: 4 INJECTION, SOLUTION INTRA-ARTICULAR; INTRALESIONAL; INTRAMUSCULAR; INTRAVENOUS; SOFT TISSUE at 09:37

## 2021-06-02 RX ADMIN — SODIUM CHLORIDE, PRESERVATIVE FREE 20 ML: 5 INJECTION INTRAVENOUS at 09:06

## 2021-06-02 RX ADMIN — SODIUM CHLORIDE, PRESERVATIVE FREE 10 ML: 5 INJECTION INTRAVENOUS at 09:05

## 2021-06-02 NOTE — PROGRESS NOTES
Patient tolerated Kadcyla without any complications. Denies dizziness, lightheadedness, acute nausea or vomiting, headache, heart palpitations, rash/itching or increased SOB. Last vital signs  /67   Pulse 79   Temp 97.9 °F (36.6 °C) (Oral)   Resp 16   Ht 5' 9\" (1.753 m)   Wt 214 lb 3.2 oz (97.2 kg)   SpO2 97%   BMI 31.63 kg/m²     Patient instructed if they experience any of the above symptoms following today's visit, he/she is to notify the Physician or go to the Emergency Dept. Discharge instructions given to patient, Verbalizes understanding. Ambulated off unit per self in stable condition with all belongings.

## 2021-06-02 NOTE — PLAN OF CARE
Problem: Infection - Central Venous Catheter-Associated Bloodstream Infection:  Goal: Will show no infection signs and symptoms  Description: Will show no infection signs and symptoms  Outcome: Met This Shift  Note: Instructed to monitor for signs/symptoms of infection at medi-port site and call MD if problems develop. Intervention: Central line needs assessment  Note: Mediport site with no redness or warmth. Skin over port site intact with no signs of breakdown noted. Patient verbalizes signs/symptoms of port infection and when to notify the physician. Problem: Musculor/Skeletal Functional Status  Goal: Absence of falls  Outcome: Met This Shift  Note: Free from falls while in O.P. Oncology. Intervention: Fall precautions  Note: Discussed the need to use the call light for assistance when getting up to ambulate. Problem: Intellectual/Education/Knowledge Deficit  Goal: Teaching initiated upon admission  Outcome: Met This Shift  Note: Patient verbalizes understanding to verbal information given on Kadcyla,action and possible side effects. Aware to call MD if develop complications.     Intervention: Verbal/written education provided  Note: Chemotherapy Teaching     What is Chemotherapy   Drug action [x]   Method of Administration [x]   Handouts given []     Side Effects  Nausea/vomiting [x]   Diarrhea [x]   Fatigue [x]   Signs / Symptoms of infection [x]   Neutropenia [x]   Thrombocytopenia [x]   Alopecia [x]   neuropathy [x]   [place] diet &  the importance of fluids [x]       Micellaneous  Importance of nutrition [x]   Importance of oral hygiene [x]   When to call the MD [x]   Monitoring labs [x]   Use of supportive services []     Explanation of Drug Regimen / Frequency  Day 1 cycle 6 Kadcyla     Comments  Verbalized understanding to drug,action,side effects and when to call MD         Problem: Discharge Planning  Goal: Knowledge of discharge instructions  Description: Knowledge of discharge instructions  Outcome: Met This Shift  Note: Verbalize understanding of discharge instructions, follow up appointments, and when to call Physician. Intervention: Interaction with patient/family and care team  Note: Discuss understanding of discharge instructions, follow up appointments and when to call Physician. Care plan reviewed with patient. Patient verbalize understanding of the plan of care and contribute to goal setting.

## 2021-06-03 ENCOUNTER — OFFICE VISIT (OUTPATIENT)
Dept: SURGERY | Age: 71
End: 2021-06-03
Payer: MEDICARE

## 2021-06-03 VITALS
HEIGHT: 69 IN | TEMPERATURE: 97.3 F | OXYGEN SATURATION: 97 % | DIASTOLIC BLOOD PRESSURE: 78 MMHG | BODY MASS INDEX: 31.55 KG/M2 | HEART RATE: 78 BPM | SYSTOLIC BLOOD PRESSURE: 128 MMHG | WEIGHT: 213 LBS | RESPIRATION RATE: 18 BRPM

## 2021-06-03 DIAGNOSIS — R22.31 LUMP OF AXILLA, RIGHT: ICD-10-CM

## 2021-06-03 DIAGNOSIS — Z90.13 S/P BILATERAL MASTECTOMY: Primary | ICD-10-CM

## 2021-06-03 PROCEDURE — 99212 OFFICE O/P EST SF 10 MIN: CPT | Performed by: SURGERY

## 2021-06-03 NOTE — PROGRESS NOTES
[address] [address]  Dept: [phone]  Dept Fax: [phone]  Loc: [phone]    Visit Date: 6/3/2021    Lashay Ladd is a 79 y.o. female who presentstoday for:  Chief Complaint   Patient presents with   [facility]ain[facility] Surgical Consult      est pt-- s/p 11/30-1. Left simple mastectomy with left axillary sentinel lymph node bx. 2. Right simple mastectomy. -- has lump right axilla       HPI:     HPI as above patient is 6 months post left simple mastectomy with axillary sentinel lymph node and right prophylactic mastectomy for cancer of the left breast patient also had a Mediport placed she is still on chemotherapy with apparently 1 more cycle to be administered. Patient was seen 3 weeks ago and it should be noted she did have some problems with residual seromas but gradually cleared however 3 weeks ago had onset of a rather loculated mass along the right incision and we aspirated what appeared to be a hematoma.   Patient does state today that she had forgot to tell me then that she did bump her right chest rather hard on a door jam patient has had a gradual reaccumulation of this rather to find hematoma and is here for aspiration    Past Medical History:   Diagnosis Date    Breast cancer ([facility] [address] 2020    left-invasive ductal follows with Dr Cortez Villa COVID-19 10/07/2020    wears o2 at night    Hyperlipidemia     Numbness and tingling     left foot-chronic nerve damage    Shortness of breath       Past Surgical History:   Procedure Laterality Date    ABDOMINAL EXPLORATION SURGERY  2014    Dr butterfield-lysis of adhesions    BREAST LUMPECTOMY Left 11/2015    Left breast lumpectomy, retroareolar with preoperative needle loc-Dr. Thierry Fox BREAST SURGERY Left 06/26/2020    biopsy of left breast    CHOLECYSTECTOMY  10/2014    Dr Domo Nuñez  03/30/2016    Dr Cyrus Nguyễn  10/2014    x4 abdominal wall    HYSTERECTOMY      INCISIONAL HERNIA REPAIR  2014    Dr Bradley Little LIPECTOMY  2014    Dr Solomon Villavicencio  7/16/2020    [address] LEFT 7/16/2020 Jose Block  [facility] [address] Bilateral 11/30/2020    LEFT SIMPLE MASTECTOMY AND SENTINAL LYMPH NODE BIOPSY, RIGHT SIMPLE PROPHYLACTIC MASTECTOMY performed by Nicky Hall MD at [address] [place] Right 7/17/2020    SINGLE LUMEN SMART PORT INSERTION RIGHT SUBCLAVIAN performed by Nicky Hall MD at [address] [facility]  10[facility]2014    [facility]      patient was 11years old    TUBAL LIGATION          Family History   Problem Relation Age of Onset    Heart Disease Father         cath stent blood to thin and bled    Heart Attack Mother     Other Other         blood clot    Breast Cancer Paternal Aunt 62    High Blood Pressure Sister     Cancer Brother 68        skin    Prostate Cancer Brother         had chemotherapy to treat     Prostate Cancer Brother 64        has had for 10 years    Ovarian Cancer Neg Hx     Diabetes Neg Hx     Stroke Neg Hx         Social History     Tobacco Use    Smoking status: Never Smoker    Smokeless tobacco: Never Used   Substance Use Topics    Alcohol use: No     Alcohol/week: 0.0 standard drinks          Current Outpatient Medications   Medication Sig Dispense Refill    zinc sulfate (ZINCATE) 220 (50 Zn) MG capsule Take 1 capsule by mouth daily 30 capsule 0    vitamin C (VITAMIN C) 500 MG tablet Take 1 tablet by mouth daily 30 tablet 0    loperamide (IMODIUM) 2 MG capsule Take 2 mg by mouth 4 times daily as needed for Diarrhea      lidocaine-prilocaine (EMLA) 2.5-2.5 % cream Apply topically as needed. 30 g 1    levothyroxine (SYNTHROID) 25 MCG tablet Take 25 mcg by mouth Daily Take 50 MG for 6 weeks per PCP.       alendronate (FOSAMAX) 35 MG tablet Take 35 mg by mouth every 7 days      aspirin EC 81 MG EC tablet Take 1 tablet by mouth daily 30 tablet 3    PARoxetine (PAXIL) 10 MG tablet Take 10 mg by mouth every morning      Coenzyme Q10-Fish Oil-Vit E (CO-Q 10 OMEGA-3 FISH OIL PO) Take by mouth daily      Vitamin D (CHOLECALCIFEROL) 1000 UNITS CAPS capsule Take by mouth daily 2 tab      Cinnamon 500 MG CAPS Take by mouth daily      Cyanocobalamin (VITAMIN B12 PO) Take 1,000 mcg by mouth daily       ondansetron (ZOFRAN ODT) 4 MG disintegrating tablet Take 1 tablet by mouth every 8 hours as needed for Nausea (Patient not taking: Reported on 6/3/2021) 20 tablet 3     No current facility-administered medications for this visit.      Allergies   Allergen Reactions    Aluminum-Containing Compounds Anaphylaxis    Scopolamine      Other reaction(s): Dizziness    Adhesive Tape Other (See Comments)     Takes top layer of skin off       Subjective:      Review of Systems    Objective:   /78 (Site: Left Lower Arm, Position: Sitting, Cuff Size: Medium Adult)   Pulse 78   Temp 97.3 °F (36.3 °C) (Temporal)   Resp 18   Ht 5' 9\" (1.753 m)   Wt 213 lb (96.6 kg)   SpO2 97%   BMI 31.45 kg/m²     Physical Exam no signs of infection 140 cc of hematoma liquid was aspirated with resolution of the mass       Results for orders placed or performed during the hospital encounter of 06/02/21   Hepatic Function Panel   Result Value Ref Range    Albumin 3.5 3.5 - 5.1 g/dL    Total Bilirubin 0.5 0.3 - 1.2 mg/dL    Bilirubin, Direct <0.2 0.0 - 0.3 mg/dL    Alkaline Phosphatase 76 38 - 126 U/L    AST 32 5 - 40 U/L    ALT 13 11 - 66 U/L    Total Protein 7.4 6.1 - 8.0 g/dL   POC PANEL BMP W/IOCA   Result Value Ref Range    Sodium, Whole Blood 142 138 - 146 meq/l    Potassium, Whole Blood 3.6 3.5 - 4.9 meq/l    Chloride, Whole Blood 104 98 - 109 meq/l    TOTAL CO2, WHOLE BLOOD 29 23 - 33 meq/l    Glucose, Whole Blood 169 (H) 70 - 108 mg/dl    BUN, WHOLE BLOOD 7 (L) 8 - 26 mg/dl    CREATININE, WHOLE BLOOD 0.6 0.5 - 1.2 mg/dl    Ionized Calcium, WB 1.23 1.12 - 1.32 mmol/L   CBC Auto Differential   Result Value Ref Range    WBC 7.0 4.8 - 10.8 thou/mm3    RBC 4.87 4.20 - 5.40 mill/mm3    Hemoglobin 13.5 12.0 - 16.0 gm/dl    Hematocrit 43.6 37.0 - 47.0 %    MCV 90 81 - 99 fL    MCH 27.7 26.0 - 33.0 pg    MCHC 31.0 (L) 32.2 - 35.5 gm/dl    RDW 18.0 (H) 11.5 - 14.5 %    Platelets 233 415 - 613 thou/mm3    MPV 10.9 9.4 - 12.4 fL    Seg Neutrophils 36 (L) 43 - 75 %    Lymphocytes 40 15 - 47 %    Monocytes 17 (H) 0 - 12 %    Eosinophils % 5 (H) 0 - 4 %    BASINOPHIL, AUTOMATED 2 0 - 3 %    Immature Granulocytes 0 %    Segs Absolute 2.5 1 - 7 thou/mm3    Lymphocytes Absolute 2.8 1.0 - 4.8 thou/mm3    Monocytes Absolute 1.2 0.4 - 1.3 thou/mm3    Eosinophils Absolute 0.4 0.0 - 0.4 thou/mm3    Basophils Absolute 0.1 0.0 - 0.1 thou/mm3    Absolute Immature Granulocyte 0.02 0.00 - 0.07 thou/mm3   Glomerular Filtration Rate, Estimated   Result Value Ref Range    GFR, Estimated > 90 ml/min/1.73m2       Assessment:     Unusual delayed hematoma that is very well defined could be due to the trauma she had nonetheless we discussed that if it continues to fail she may need surgery to evacuate this area and place a drain we did discuss removing the Mediport when she is finished with her chemotherapy and this potentially could be combined she voices understanding patient will return if hematoma reaccumulates    Plan:     As above      Electronicallysigned by Oz Schultz MD on 6/3/2021 at 2:10 PM

## 2021-06-23 ENCOUNTER — HOSPITAL ENCOUNTER (OUTPATIENT)
Dept: INFUSION THERAPY | Age: 71
Discharge: HOME | End: 2021-06-23
Payer: MEDICARE

## 2021-06-23 ENCOUNTER — OFFICE VISIT (OUTPATIENT)
Dept: ONCOLOGY | Age: 71
End: 2021-06-23
Payer: MEDICARE

## 2021-06-23 VITALS
TEMPERATURE: 97.4 F | BODY MASS INDEX: 31.07 KG/M2 | HEART RATE: 74 BPM | WEIGHT: 209.8 LBS | DIASTOLIC BLOOD PRESSURE: 64 MMHG | RESPIRATION RATE: 18 BRPM | OXYGEN SATURATION: 96 % | SYSTOLIC BLOOD PRESSURE: 121 MMHG | HEIGHT: 69 IN

## 2021-06-23 VITALS
DIASTOLIC BLOOD PRESSURE: 62 MMHG | HEART RATE: 78 BPM | TEMPERATURE: 98.2 F | SYSTOLIC BLOOD PRESSURE: 122 MMHG | BODY MASS INDEX: 31.07 KG/M2 | RESPIRATION RATE: 16 BRPM | HEIGHT: 69 IN | WEIGHT: 209.8 LBS | OXYGEN SATURATION: 97 %

## 2021-06-23 DIAGNOSIS — C50.912 HER2-POSITIVE CARCINOMA OF LEFT BREAST (HCC): Primary | ICD-10-CM

## 2021-06-23 DIAGNOSIS — C50.212 MALIGNANT NEOPLASM OF UPPER-INNER QUADRANT OF LEFT BREAST IN FEMALE, ESTROGEN RECEPTOR POSITIVE (HCC): ICD-10-CM

## 2021-06-23 DIAGNOSIS — Z17.0 MALIGNANT NEOPLASM OF UPPER-OUTER QUADRANT OF LEFT BREAST IN FEMALE, ESTROGEN RECEPTOR POSITIVE (HCC): ICD-10-CM

## 2021-06-23 DIAGNOSIS — E55.9 VITAMIN D DEFICIENCY: ICD-10-CM

## 2021-06-23 DIAGNOSIS — C50.412 MALIGNANT NEOPLASM OF UPPER-OUTER QUADRANT OF LEFT BREAST IN FEMALE, ESTROGEN RECEPTOR POSITIVE (HCC): ICD-10-CM

## 2021-06-23 DIAGNOSIS — Z90.13 STATUS POST BILATERAL MASTECTOMY: ICD-10-CM

## 2021-06-23 DIAGNOSIS — Z17.0 MALIGNANT NEOPLASM OF UPPER-INNER QUADRANT OF LEFT BREAST IN FEMALE, ESTROGEN RECEPTOR POSITIVE (HCC): ICD-10-CM

## 2021-06-23 DIAGNOSIS — Z51.11 CHEMOTHERAPY MANAGEMENT, ENCOUNTER FOR: ICD-10-CM

## 2021-06-23 DIAGNOSIS — Z51.11 ENCOUNTER FOR ANTINEOPLASTIC CHEMOTHERAPY: ICD-10-CM

## 2021-06-23 LAB
ABSOLUTE IMMATURE GRANULOCYTE: 0.01 THOU/MM3 (ref 0–0.07)
ALBUMIN SERPL-MCNC: 3.7 G/DL (ref 3.5–5.1)
ALP BLD-CCNC: 86 U/L (ref 38–126)
ALT SERPL-CCNC: 15 U/L (ref 11–66)
AST SERPL-CCNC: 38 U/L (ref 5–40)
BASINOPHIL, AUTOMATED: 1 % (ref 0–3)
BASOPHILS ABSOLUTE: 0.1 THOU/MM3 (ref 0–0.1)
BILIRUB SERPL-MCNC: 0.6 MG/DL (ref 0.3–1.2)
BILIRUBIN DIRECT: < 0.2 MG/DL (ref 0–0.3)
BUN, WHOLE BLOOD: 10 MG/DL (ref 8–26)
CHLORIDE, WHOLE BLOOD: 104 MEQ/L (ref 98–109)
CREATININE, WHOLE BLOOD: 0.6 MG/DL (ref 0.5–1.2)
EOSINOPHILS ABSOLUTE: 0.4 THOU/MM3 (ref 0–0.4)
EOSINOPHILS RELATIVE PERCENT: 5 % (ref 0–4)
GFR, ESTIMATED: > 90 ML/MIN/1.73M2
GLUCOSE, WHOLE BLOOD: 162 MG/DL (ref 70–108)
HCT VFR BLD CALC: 45.5 % (ref 37–47)
HEMOGLOBIN: 14.2 GM/DL (ref 12–16)
IMMATURE GRANULOCYTES: 0 %
IONIZED CALCIUM, WHOLE BLOOD: 1.23 MMOL/L (ref 1.12–1.32)
LYMPHOCYTES # BLD: 37 % (ref 15–47)
LYMPHOCYTES ABSOLUTE: 2.9 THOU/MM3 (ref 1–4.8)
MCH RBC QN AUTO: 27.3 PG (ref 26–33)
MCHC RBC AUTO-ENTMCNC: 31.2 GM/DL (ref 32.2–35.5)
MCV RBC AUTO: 87 FL (ref 81–99)
MONOCYTES ABSOLUTE: 1.2 THOU/MM3 (ref 0.4–1.3)
MONOCYTES: 16 % (ref 0–12)
PDW BLD-RTO: 17.3 % (ref 11.5–14.5)
PLATELET # BLD: 320 THOU/MM3 (ref 130–400)
PMV BLD AUTO: 10.9 FL (ref 9.4–12.4)
POTASSIUM, WHOLE BLOOD: 3.8 MEQ/L (ref 3.5–4.9)
RBC # BLD: 5.21 MILL/MM3 (ref 4.2–5.4)
SEG NEUTROPHILS: 41 % (ref 43–75)
SEGMENTED NEUTROPHILS ABSOLUTE COUNT: 3.2 THOU/MM3 (ref 1.8–7.7)
SODIUM, WHOLE BLOOD: 140 MEQ/L (ref 138–146)
TOTAL CO2, WHOLE BLOOD: 27 MEQ/L (ref 23–33)
TOTAL PROTEIN: 7.5 G/DL (ref 6.1–8)
WBC # BLD: 7.7 THOU/MM3 (ref 4.8–10.8)

## 2021-06-23 PROCEDURE — 99214 OFFICE O/P EST MOD 30 MIN: CPT | Performed by: PHYSICIAN ASSISTANT

## 2021-06-23 PROCEDURE — 80047 BASIC METABLC PNL IONIZED CA: CPT

## 2021-06-23 PROCEDURE — 99211 OFF/OP EST MAY X REQ PHY/QHP: CPT

## 2021-06-23 PROCEDURE — 36591 DRAW BLOOD OFF VENOUS DEVICE: CPT

## 2021-06-23 PROCEDURE — 80076 HEPATIC FUNCTION PANEL: CPT

## 2021-06-23 PROCEDURE — 85025 COMPLETE CBC W/AUTO DIFF WBC: CPT

## 2021-06-23 PROCEDURE — 96375 TX/PRO/DX INJ NEW DRUG ADDON: CPT

## 2021-06-23 PROCEDURE — 2580000003 HC RX 258: Performed by: INTERNAL MEDICINE

## 2021-06-23 PROCEDURE — 6360000002 HC RX W HCPCS: Performed by: INTERNAL MEDICINE

## 2021-06-23 PROCEDURE — 96413 CHEMO IV INFUSION 1 HR: CPT

## 2021-06-23 RX ORDER — HEPARIN SODIUM (PORCINE) LOCK FLUSH IV SOLN 100 UNIT/ML 100 UNIT/ML
500 SOLUTION INTRAVENOUS PRN
Status: CANCELLED | OUTPATIENT
Start: 2021-06-23

## 2021-06-23 RX ORDER — METHYLPREDNISOLONE SODIUM SUCCINATE 125 MG/2ML
125 INJECTION, POWDER, LYOPHILIZED, FOR SOLUTION INTRAMUSCULAR; INTRAVENOUS ONCE
Status: CANCELLED | OUTPATIENT
Start: 2021-06-23 | End: 2021-06-23

## 2021-06-23 RX ORDER — HEPARIN SODIUM (PORCINE) LOCK FLUSH IV SOLN 100 UNIT/ML 100 UNIT/ML
500 SOLUTION INTRAVENOUS PRN
Status: DISCONTINUED | OUTPATIENT
Start: 2021-06-23 | End: 2021-06-24 | Stop reason: HOSPADM

## 2021-06-23 RX ORDER — SODIUM CHLORIDE 0.9 % (FLUSH) 0.9 %
10 SYRINGE (ML) INJECTION PRN
Status: DISCONTINUED | OUTPATIENT
Start: 2021-06-23 | End: 2021-06-24 | Stop reason: HOSPADM

## 2021-06-23 RX ORDER — SODIUM CHLORIDE 0.9 % (FLUSH) 0.9 %
20 SYRINGE (ML) INJECTION PRN
Status: CANCELLED | OUTPATIENT
Start: 2021-06-23

## 2021-06-23 RX ORDER — SODIUM CHLORIDE 9 MG/ML
INJECTION, SOLUTION INTRAVENOUS CONTINUOUS
Status: CANCELLED | OUTPATIENT
Start: 2021-06-23

## 2021-06-23 RX ORDER — DIPHENHYDRAMINE HYDROCHLORIDE 50 MG/ML
50 INJECTION INTRAMUSCULAR; INTRAVENOUS ONCE
Status: CANCELLED | OUTPATIENT
Start: 2021-06-23 | End: 2021-06-23

## 2021-06-23 RX ORDER — SODIUM CHLORIDE 0.9 % (FLUSH) 0.9 %
20 SYRINGE (ML) INJECTION PRN
Status: DISCONTINUED | OUTPATIENT
Start: 2021-06-23 | End: 2021-06-24 | Stop reason: HOSPADM

## 2021-06-23 RX ORDER — SODIUM CHLORIDE 0.9 % (FLUSH) 0.9 %
5 SYRINGE (ML) INJECTION PRN
Status: CANCELLED | OUTPATIENT
Start: 2021-06-23

## 2021-06-23 RX ORDER — SODIUM CHLORIDE 0.9 % (FLUSH) 0.9 %
10 SYRINGE (ML) INJECTION PRN
Status: CANCELLED | OUTPATIENT
Start: 2021-06-23

## 2021-06-23 RX ORDER — MEPERIDINE HYDROCHLORIDE 25 MG/ML
12.5 INJECTION INTRAMUSCULAR; INTRAVENOUS; SUBCUTANEOUS ONCE
Status: CANCELLED | OUTPATIENT
Start: 2021-06-23 | End: 2021-06-23

## 2021-06-23 RX ORDER — DEXAMETHASONE SODIUM PHOSPHATE 4 MG/ML
8 INJECTION, SOLUTION INTRA-ARTICULAR; INTRALESIONAL; INTRAMUSCULAR; INTRAVENOUS; SOFT TISSUE ONCE
Status: COMPLETED | OUTPATIENT
Start: 2021-06-23 | End: 2021-06-23

## 2021-06-23 RX ORDER — SODIUM CHLORIDE 9 MG/ML
20 INJECTION, SOLUTION INTRAVENOUS ONCE
Status: COMPLETED | OUTPATIENT
Start: 2021-06-23 | End: 2021-06-23

## 2021-06-23 RX ADMIN — SODIUM CHLORIDE 20 ML/HR: 9 INJECTION, SOLUTION INTRAVENOUS at 09:21

## 2021-06-23 RX ADMIN — Medication 500 UNITS: at 10:20

## 2021-06-23 RX ADMIN — SODIUM CHLORIDE, PRESERVATIVE FREE 10 ML: 5 INJECTION INTRAVENOUS at 10:20

## 2021-06-23 RX ADMIN — SODIUM CHLORIDE, PRESERVATIVE FREE 10 ML: 5 INJECTION INTRAVENOUS at 08:18

## 2021-06-23 RX ADMIN — ADO-TRASTUZUMAB EMTANSINE 320 MG: 20 INJECTION, POWDER, LYOPHILIZED, FOR SOLUTION INTRAVENOUS at 09:37

## 2021-06-23 RX ADMIN — SODIUM CHLORIDE, PRESERVATIVE FREE 20 ML: 5 INJECTION INTRAVENOUS at 08:19

## 2021-06-23 RX ADMIN — DEXAMETHASONE SODIUM PHOSPHATE 8 MG: 4 INJECTION, SOLUTION INTRA-ARTICULAR; INTRALESIONAL; INTRAMUSCULAR; INTRAVENOUS; SOFT TISSUE at 09:24

## 2021-06-23 NOTE — PATIENT INSTRUCTIONS
1. Proceed with Kadcyla today. 2. Return to clinic with Dr. Ivone Moreno in 6 weeks. 3. Labs on RTC: CBC, BMP, LFT  4. Please call for questions or concerns.

## 2021-06-23 NOTE — PLAN OF CARE
Problem: Infection - Central Venous Catheter-Associated Bloodstream Infection:  Goal: Will show no infection signs and symptoms  Description: Will show no infection signs and symptoms  Outcome: Met This Shift  Note: Instructed to monitor for signs/symptoms of infection at medi-port site and call MD if problems develop. Intervention: Central line needs assessment  Note: Mediport site with no redness or warmth. Skin over port site intact with no signs of breakdown noted. Patient verbalizes signs/symptoms of port infection and when to notify the physician. Problem: Musculor/Skeletal Functional Status  Goal: Absence of falls  Outcome: Met This Shift  Note: Free from falls while in O.P. Oncology. Intervention: Fall precautions  Note: Discussed the need to use the call light for assistance when getting up to ambulate. Problem: Intellectual/Education/Knowledge Deficit  Goal: Teaching initiated upon admission  Outcome: Met This Shift  Note: Patient verbalizes understanding to verbal information given on Kadcyla,action and possible side effects. Aware to call MD if develop complications.     Intervention: Verbal/written education provided  Note: Chemotherapy Teaching     What is Chemotherapy   Drug action [x]   Method of Administration [x]   Handouts given []     Side Effects  Nausea/vomiting [x]   Diarrhea [x]   Fatigue [x]   Signs / Symptoms of infection [x]   Neutropenia [x]   Thrombocytopenia [x]   Alopecia [x]   neuropathy [x]   [place] diet &  the importance of fluids [x]       Micellaneous  Importance of nutrition [x]   Importance of oral hygiene [x]   When to call the MD [x]   Monitoring labs [x]   Use of supportive services []     Explanation of Drug Regimen / Frequency  Kadcyla     Comments  Verbalized understanding to drug,action,side effects and when to call MD         Problem: Discharge Planning  Goal: Knowledge of discharge instructions  Description: Knowledge of discharge instructions  Outcome: Met This Shift  Note: Verbalize understanding of discharge instructions, follow up appointments, and when to call Physician. Intervention: Interaction with patient/family and care team  Note: Discuss understanding of discharge instructions, follow up appointments and when to call Physician. Care plan reviewed with patient. Patient verbalize understanding of the plan of care and contribute to goal setting.

## 2021-06-23 NOTE — PROGRESS NOTES
[facility] [address] [facility] [address] [facility] [address]  [address] [address]  Dept: [phone]  Dept Fax: [phone]: [phone]      Visit Date:2021     Lucio Austin is a 70 y.o. female who presents today for:   Chief Complaint   Patient presents with    Follow-up     HER2-positive carcinoma of left breast ([address]        HPI:   Lucio Austin is a 70 y.o. female followed by Dr. Marcial Phalen for triple positive left breast cancer. Per Dr. Jaren Valero note on 21: Lana Kelly had annual screening mammogram on 2020 that showed 2 lesions in the left breast.  Subsequently she underwent breast ultrasound followed by ultrasound guided biopsy of those 2 lesions. The largest of these lesions is a lobulated mixed cystic and    solid appearing mass measuring approximately 3 x 2.1 x 2.7 cm. This is located at the anterior depth upper inner quadrant. Aspiration of the fluid component of this lesion and biopsy of    the solid component of this lesion was performed on 2020. The smaller adjacent lesion is located at the middle depth of the     upper inner quadrant left breast measuring approximately 1.6 x    1.1 x 1.6 cm. Biopsy of this lesion was performed on 2020.       Final pathology report showed:   A-B.  Left breast, upper inner quadrant, anterior and middle depth,   barbell and tribell clips, multiple core needle biopsies:       Invasive, poorly differentiated ductal carcinoma, Somers score 3. Estrogen Receptor: Positive 100 % of cells (>10% of cells)   Progesterone Receptor:  Positive 90 % of cells   Ki-67 (clone 30-9)     Percentage of positive nuclei: 42 %   HER2 NEW POSITIVE      On 2020 the patient had breast MRI showin. A known biopsy-proven mass demonstrated within the upper     inner left breast anterior to middle depth.  This measures 3 cm x     4.1 cm x 3.1 cm middle depth located 4  cm from the nipple, 0.9    cm from the skin, and 6.5 cm from the chest wall.  This shows    heterogeneous enhancement and delayed washout type vascular    enhancement.      2. There are scattered small foci of enhancement demonstrated    bilaterally. The dominant focus of enhancement within the right    breast is located within the lower inner right breast anterior    depth on axial image 105 series 9. Recommend further evaluation    with second look ultrasound. If there is no sonographic    correlate, recommend 6 month follow-up breast MRI to document    stability.         3. Among the small foci of enhancement within the left breast,    the most prominent is located within the lower outer quadrant    posterior depth as seen on axial image 91 series 9. Recommend    further evaluation with second look ultrasound. If there is no    sonographic correlate, recommend 6 month follow-up breast MRI to    document stability.          Patient started neoadjuvant chemotherapy with Taxotere/carbo/Herceptin/Pertuzumab on July 27, 2020. The patient received cycle #3 of chemotherapy on September 8, 2020. On October 4, 2020 she was diagnosed with Covid infection. On October 9, 2020 she was admitted to the hospital for worsening shortness of breath and hypoxia. She was treated with convalescent plasma 2 doses and she required high with high flow oxygen. Chest x-ray showed a developing right perihilar and left basilar consolidation. She was also treated with the course of remdesivir and 10-day course of Decadron. She started improving, she was discharged home after 12-day hospitalization. On November 30, 2020 she underwent left breast mastectomy with sentinel lymph node excision and right breast prophylactic mastectomy. Final pathology report showed:  A.  Right breast, prophylactic mastectomy:    Fibrocystic changes including stromal fibrosis and cysts. B.  Left sentinel lymph nodes, excision:    Two lymph nodes, negative for malignancy (0/2).    C.  Left breast, mastectomy:   Vinny Medrano multifocal invasive ductal carcinoma (11 mm, upper outer       quadrant mass) with treatment effect (ympT1c, snpN0).  One lymph node, negative for malignancy (0/1).  Atypical lobular hyperplasia.    Margins are negative.    Closest margin: 10 mm (deep margin, lower outer quadrant mass). Patient tolerated her surgery well. Patient started adjuvant Kadcyla on January 11, 2021      Interval History 6/23/2021:   The patient is here for follow up evaluation and to receive next cycle of adjuvant Kadcyla. The patient had follow up with Dr. Chandan Baer on 5/13/2021 and had hematoma drained from the right mastectomy site. She had follow up on 6/3/2021 and had 140 cc of fluid removed described as bloody fluid per Dr. Chandan Baer. The patient states she bumped into a corner of her dresser and following developed the hematoma of the right mastectomy site. She is to have evaluation tomorrow on 6/24/21. The patient states she has tolerated her last cycle of Kadcyla well. She denies fever, chills or signs/symptoms of infection. Denies chest pain, shortness of breath, cough, abdominal pain, nausea, vomiting, bowel or urinary changes. Denies rashes or peripheral edema. PMH, SH, and FH:  I reviewed the patients medication list and allergy list as noted on the electronic medical record. The PMH, SH and FH were also reviewed as noted on the EMR. Review of Systems:   Review of Systems   Pertinent review of systems noted in HPI, all other ROS negative. Objective:   Physical Exam   /62 (Site: Left Upper Arm, Position: Sitting, Cuff Size: Medium Adult)   Pulse 78   Temp 98.2 °F (36.8 °C) (Tympanic)   Resp 16   Ht 5' 9\" (1.753 m)   Wt 209 lb 12.8 oz (95.2 kg)   SpO2 97%   BMI 30.98 kg/m²    General appearance: No apparent distress, well developed, and cooperative. HEENT: Pupils equal, round, and reactive to light. Conjunctivae/corneas clear. Neck: Supple, with full range of motion. Trachea midline. Respiratory:  Normal respiratory effort. Clear to auscultation, bilaterally without Rales/Wheezes/Rhonchi. Chest: S/P bilateral mastectomy with left mastectomy site well healed. There is a seroma to the mid lateral portion of the right mastectomy site. No surrounding erythema or drainage. No tenderness with palpation. Cardiovascular: Regular rate and rhythm with normal S1/S2  Abdomen: Soft, non-tender with active bowel sounds. Musculoskeletal: No clubbing, cyanosis or edema bilaterally. Skin: Skin color, texture, turgor normal.  No visible rashes or lesions. Neurologic:  Neurovascularly intact without any focal sensory/motor deficits. Psychiatric: Alert and oriented      Imaging Studies and Labs:   CBC:   Lab Results   Component Value Date    WBC 7.7 06/23/2021    HGB 14.2 06/23/2021    HCT 45.5 06/23/2021    MCV 87 06/23/2021     06/23/2021     BMP:   Lab Results   Component Value Date     06/23/2021     12/01/2020    K 3.8 06/23/2021    K 4.6 12/01/2020    K 3.0 10/11/2020     12/01/2020    CO2 21 12/01/2020    BUN 12 12/01/2020    CREATININE 0.6 06/23/2021    CREATININE 0.5 12/01/2020    GLUCOSE 139 12/01/2020    CALCIUM 9.3 12/01/2020      LFT:   Lab Results   Component Value Date    ALT 13 06/02/2021    AST 32 06/02/2021    ALKPHOS 76 06/02/2021    BILITOT 0.5 06/02/2021         Assessment and Plan:   1. Triple Positive Left Breast Cancer in upper outer quadrant with separate focus of triple negative breast cancer in lower outer quadrant   Patient has received 3 cycles of neoadjuvant chemotherapy with Taxotere/carbo/Herceptin and Pertuzumab. Last treatment given in September 2020. Then the patient was hospitalized for Covid infection in October 2020. After she recovered from her Covid infection decision was made to proceed with breast surgery. She had surgery on November 30, 2020.   Final pathology report showed residual cancer in both locations upper outer quadrant and lower outer quadrant. Residual triple negative breast cancer was smaller than 1 cm in size. Residual HER-2/jordan positive cancer was 11 mm in size. The patient was recommended adjuvant Kadcyla, started on 1/11/2021.   2. Adjuvant Chemotherapy with Kadcyla  She began Kadcyla on 1/11/2021. She received first 3 infusions on time; however, had a 6 week break in March 2021 due to viral infection. She has tolerated last cycle of Kadcyla well. The patient is due for cycle #7 today. She states she does not want to receive any more cycles of Kadcyla following today's treatment. Discussed overall she tolerates Kadcyla well and reasoning for continuing planned cycles. She states she does not want further treatment. Will proceed with Kadcyla cycle #7 today. Discussed with the patient she will be recommended adjuvant aromatase inhibitor as she had triple positive left breast cancer in the upper outer quadrant. Discussed reasoning for adjuvant AI and benefits. Discussed potential side effects of AI as well. The patient states she will think about.    -Will discuss with Dr. Kelly Luna patient's decision regarding Sharon Lango. Will return to clinic with Dr. Kelly Luna in 6 weeks. The patient states she will consider AI.   -Labs on RTC: CBC, BMP, LFT  3. Right Mastectomy Site Hematoma  Following with Dr. Damian Gallo. She has not required drain placement. Will follow visit on 6/24/21. RTC in 6 weeks      All patient questions answered. Pt voiced understanding. Patient agreed with treatment plan. Follow up as directed. Patient instructed to call for questions or concerns.          Electronically signed by   Sarwat Johnson PA-C

## 2021-06-23 NOTE — PROGRESS NOTES
Patient assessed for the following post chemotherapy:    Dizziness   No  Lightheadedness  No      Acute nausea/vomiting No  Headache   No  Chest pain/pressure  No  Rash/itching   No  Shortness of breath  No    Patient kept for 20 minutes observation post infusion chemotherapy. Patient tolerated chemotherapy treatment Kadcyla without any complications. Last vital signs:   /64   Pulse 74   Temp 97.4 °F (36.3 °C) (Tympanic)   Resp 18   Ht 5' 9\" (1.753 m)   Wt 209 lb 12.8 oz (95.2 kg)   SpO2 96%   BMI 30.98 kg/m²       Patient instructed if experience any of the above symptoms following today's infusion,he/she is to notify MD immediately or go to the emergency department. Discharge instructions given to patient. Verbalizes understanding. Ambulated off unit in stable condition per self with belongings.

## 2021-06-24 ENCOUNTER — OFFICE VISIT (OUTPATIENT)
Dept: SURGERY | Age: 71
End: 2021-06-24
Payer: MEDICARE

## 2021-06-24 VITALS
DIASTOLIC BLOOD PRESSURE: 64 MMHG | OXYGEN SATURATION: 98 % | SYSTOLIC BLOOD PRESSURE: 118 MMHG | RESPIRATION RATE: 18 BRPM | BODY MASS INDEX: 31.01 KG/M2 | TEMPERATURE: 97.3 F | HEART RATE: 82 BPM | WEIGHT: 210 LBS

## 2021-06-24 DIAGNOSIS — Z51.89 VISIT FOR WOUND CHECK: ICD-10-CM

## 2021-06-24 DIAGNOSIS — N64.89 SEROMA OF BREAST: Primary | ICD-10-CM

## 2021-06-24 PROCEDURE — 99212 OFFICE O/P EST SF 10 MIN: CPT | Performed by: SURGERY

## 2021-06-24 NOTE — PROGRESS NOTES
[address] [facility] [address] FAYE [place] [address]  Dept: [phone]  Dept Fax: [phone]  Loc: [phone]    Visit Date: 6/24/2021    Ashwini Fraser is a 70 y.o. female who presentstoday for:  Chief Complaint   Patient presents with    Surgical Consult     last seen in office 6/3--   s/p 11/30-1. Left simple mastectomy with left axillary sentinel lymph node bx. 2. Right simple mastectomy. 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 at the prior mastectomy site patient has had it reaccumulate it is been aspirated several times she is here today for evaluation we have considered placing a drain    Past Medical History:   Diagnosis Date    Breast cancer ([facility] [address] 2020    left-invasive ductal follows with Dr Feli Isaac COVID-19 10/07/2020    wears o2 at night    Hyperlipidemia     Numbness and tingling     left foot-chronic nerve damage    Shortness of breath       Past Surgical History:   Procedure Laterality Date    ABDOMINAL EXPLORATION SURGERY  2014    Dr butterfield-lysis of adhesions    BREAST LUMPECTOMY Left 11/2015    Left breast lumpectomy, retroareolar with preoperative needle loc-Dr. Anupama Partida BREAST SURGERY Left 06/26/2020    biopsy of left breast    CHOLECYSTECTOMY  10/2014    Dr Mari De La Torre  03/30/2016    Dr Etelvina Barahona  10/2014    x4 abdominal wall    HYSTERECTOMY      INCISIONAL HERNIA REPAIR  2014    Dr López Height LIPECTOMY  2014    Dr Kalyani Vivar  7/16/2020    [address] LEFT 7/16/2020 Ashley Alva  [facility] Bilateral 11/30/2020    LEFT SIMPLE MASTECTOMY AND SENTINAL LYMPH NODE BIOPSY, RIGHT SIMPLE PROPHYLACTIC MASTECTOMY performed by [address] F[address] MD at [address] Right 7/17/2020    SINGLE LUMEN SMART PORT INSERTION RIGHT SUBCLAVIAN performed by Charleen Paiz MD at [address]  10/2014    Dr Francois-[facility]      patient was 11years old    TUBAL LIGATION          Family History   Problem Relation Age of Onset    Heart Disease Father         cath stent blood to thin and bled    Heart Attack Mother     Other Other         blood clot    Breast Cancer Paternal Aunt 62    High Blood Pressure Sister     Cancer Brother 68        skin    Prostate Cancer Brother         had chemotherapy to treat     Prostate Cancer Brother 64        has had for 10 years    Ovarian Cancer Neg Hx     Diabetes Neg Hx     Stroke Neg Hx         Social History     Tobacco Use    Smoking status: Never Smoker    Smokeless tobacco: Never Used   Substance Use Topics    Alcohol use: No     Alcohol/week: 0.0 standard drinks          Current Outpatient Medications   Medication Sig Dispense Refill    ondansetron (ZOFRAN ODT) 4 MG disintegrating tablet Take 1 tablet by mouth every 8 hours as needed for Nausea 20 tablet 3    zinc sulfate (ZINCATE) 220 (50 Zn) MG capsule Take 1 capsule by mouth daily 30 capsule 0    vitamin C (VITAMIN C) 500 MG tablet Take 1 tablet by mouth daily 30 tablet 0    loperamide (IMODIUM) 2 MG capsule Take 2 mg by mouth 4 times daily as needed for Diarrhea      lidocaine-prilocaine (EMLA) 2.5-2.5 % cream Apply topically as needed. 30 g 1    levothyroxine (SYNTHROID) 25 MCG tablet Take 25 mcg by mouth Daily Take 50 MG for 6 weeks per PCP.       alendronate (FOSAMAX) 35 MG tablet Take 35 mg by mouth every 7 days      aspirin EC 81 MG EC tablet Take 1 tablet by mouth daily 30 tablet 3    PARoxetine (PAXIL) 10 MG tablet Take 10 mg by mouth every morning      Coenzyme Q10-Fish Oil-Vit E (CO-Q 10 OMEGA-3 FISH OIL PO) Take by mouth daily      Vitamin D (CHOLECALCIFEROL) 1000 UNITS CAPS capsule Take by mouth daily 2 tab      Cinnamon 500 MG CAPS Take by mouth daily      Cyanocobalamin (VITAMIN B12 PO) Take 1,000 mcg by mouth daily        No current facility-administered medications for this visit.      Allergies   Allergen Reactions    Aluminum-Containing Compounds Anaphylaxis    Scopolamine      Other reaction(s): Dizziness    Adhesive Tape Other (See Comments)     Takes top layer of skin off       Subjective:      Review of Systems    Objective:   /64 (Site: Left Lower Arm, Position: Sitting, Cuff Size: Medium Adult)   Pulse 82   Temp 97.3 °F (36.3 °C) (Temporal)   Resp 18   Wt 210 lb (95.3 kg)   SpO2 98%   BMI 31.01 kg/m²     Physical Exam hematoma is probably half the size that it has been in the past however we did aspirate 85 mL of bloody fluid from same       Results for orders placed or performed during the hospital encounter of 06/23/21   Hepatic Function Panel   Result Value Ref Range    Albumin 3.7 3.5 - 5.1 g/dL    Total Bilirubin 0.6 0.3 - 1.2 mg/dL    Bilirubin, Direct <0.2 0.0 - 0.3 mg/dL    Alkaline Phosphatase 86 38 - 126 U/L    AST 38 5 - 40 U/L    ALT 15 11 - 66 U/L    Total Protein 7.5 6.1 - 8.0 g/dL   POC PANEL BMP W/IOCA   Result Value Ref Range    Sodium, Whole Blood 140 138 - 146 meq/l    Potassium, Whole Blood 3.8 3.5 - 4.9 meq/l    Chloride, Whole Blood 104 98 - 109 meq/l    TOTAL CO2, WHOLE BLOOD 27 23 - 33 meq/l    Glucose, Whole Blood 162 (H) 70 - 108 mg/dl    BUN, WHOLE BLOOD 10 8 - 26 mg/dl    CREATININE, WHOLE BLOOD 0.6 0.5 - 1.2 mg/dl    Ionized Calcium, WB 1.23 1.12 - 1.32 mmol/L   CBC Auto Differential   Result Value Ref Range    WBC 7.7 4.8 - 10.8 thou/mm3    RBC 5.21 4.20 - 5.40 mill/mm3    Hemoglobin 14.2 12.0 - 16.0 gm/dl    Hematocrit 45.5 37.0 - 47.0 %    MCV 87 81 - 99 fL    MCH 27.3 26.0 - 33.0 pg    MCHC 31.2 (L) 32.2 - 35.5 gm/dl    RDW 17.3 (H) 11.5 - 14.5 %    Platelets 176 330 - 105 thou/mm3    MPV 10.9 9.4 - 12.4 fL    Seg Neutrophils 41 (L) 43 - 75 %    Lymphocytes 37 15 - 47 %    Monocytes 16 (H) 0 - 12 %    Eosinophils % 5 (H) 0 - 4 %    BASINOPHIL, AUTOMATED 1 0 - 3 %    Immature Granulocytes 0 %    Segs Absolute 3.2 1 - 7 thou/mm3    Lymphocytes Absolute 2.9 1.0 - 4.8 thou/mm3    Monocytes Absolute 1.2 0.4 - 1.3 thou/mm3    Eosinophils Absolute 0.4 0.0 - 0.4 thou/mm3    Basophils Absolute 0.1 0.0 - 0.1 thou/mm3    Absolute Immature Granulocyte 0.01 0.00 - 0.07 thou/mm3   Glomerular Filtration Rate, Estimated   Result Value Ref Range    GFR, Estimated > 90 ml/min/1.73m2       Assessment:     Chronic hematoma it is smaller than usual discussed with patient we elected to aspirated today however if it continues we will consider drain placement or possible surgical excision of the capsule    Plan:     Return to office in 3 weeks      Electronicallysigned by Alan Echeverria MD on 6/24/2021 at 9:41 AM

## 2021-08-04 ENCOUNTER — HOSPITAL ENCOUNTER (OUTPATIENT)
Dept: INFUSION THERAPY | Age: 71
Discharge: HOME | End: 2021-08-04
Payer: MEDICARE

## 2021-08-04 ENCOUNTER — TELEPHONE (OUTPATIENT)
Dept: SURGERY | Age: 71
End: 2021-08-04

## 2021-08-04 ENCOUNTER — OFFICE VISIT (OUTPATIENT)
Dept: ONCOLOGY | Age: 71
End: 2021-08-04
Payer: MEDICARE

## 2021-08-04 VITALS
HEIGHT: 69 IN | BODY MASS INDEX: 31.1 KG/M2 | TEMPERATURE: 98 F | SYSTOLIC BLOOD PRESSURE: 122 MMHG | RESPIRATION RATE: 18 BRPM | DIASTOLIC BLOOD PRESSURE: 63 MMHG | OXYGEN SATURATION: 94 % | WEIGHT: 210 LBS | HEART RATE: 71 BPM

## 2021-08-04 DIAGNOSIS — E55.9 VITAMIN D DEFICIENCY: ICD-10-CM

## 2021-08-04 DIAGNOSIS — C50.912 HER2-POSITIVE CARCINOMA OF LEFT BREAST (HCC): Primary | ICD-10-CM

## 2021-08-04 DIAGNOSIS — Z90.13 STATUS POST BILATERAL MASTECTOMY: ICD-10-CM

## 2021-08-04 DIAGNOSIS — Z90.13 S/P MASTECTOMY, BILATERAL: ICD-10-CM

## 2021-08-04 DIAGNOSIS — Z17.0 MALIGNANT NEOPLASM OF UPPER-INNER QUADRANT OF LEFT BREAST IN FEMALE, ESTROGEN RECEPTOR POSITIVE (HCC): ICD-10-CM

## 2021-08-04 DIAGNOSIS — Z17.0 ER+ (ESTROGEN RECEPTOR POSITIVE STATUS): ICD-10-CM

## 2021-08-04 DIAGNOSIS — C50.412 MALIGNANT NEOPLASM OF UPPER-OUTER QUADRANT OF LEFT BREAST IN FEMALE, ESTROGEN RECEPTOR POSITIVE (HCC): ICD-10-CM

## 2021-08-04 DIAGNOSIS — Z17.0 MALIGNANT NEOPLASM OF UPPER-OUTER QUADRANT OF LEFT BREAST IN FEMALE, ESTROGEN RECEPTOR POSITIVE (HCC): ICD-10-CM

## 2021-08-04 DIAGNOSIS — C50.212 MALIGNANT NEOPLASM OF UPPER-INNER QUADRANT OF LEFT BREAST IN FEMALE, ESTROGEN RECEPTOR POSITIVE (HCC): ICD-10-CM

## 2021-08-04 LAB
ABSOLUTE IMMATURE GRANULOCYTE: 0.02 THOU/MM3 (ref 0–0.07)
ALBUMIN SERPL-MCNC: 3.5 G/DL (ref 3.5–5.1)
ALP BLD-CCNC: 85 U/L (ref 38–126)
ALT SERPL-CCNC: 14 U/L (ref 11–66)
AST SERPL-CCNC: 31 U/L (ref 5–40)
BASINOPHIL, AUTOMATED: 1 % (ref 0–3)
BASOPHILS ABSOLUTE: 0.1 THOU/MM3 (ref 0–0.1)
BILIRUB SERPL-MCNC: 0.6 MG/DL (ref 0.3–1.2)
BILIRUBIN DIRECT: < 0.2 MG/DL (ref 0–0.3)
BUN, WHOLE BLOOD: 11 MG/DL (ref 8–26)
CHLORIDE, WHOLE BLOOD: 103 MEQ/L (ref 98–109)
CREATININE, WHOLE BLOOD: 0.6 MG/DL (ref 0.5–1.2)
EOSINOPHILS ABSOLUTE: 0.7 THOU/MM3 (ref 0–0.4)
EOSINOPHILS RELATIVE PERCENT: 8 % (ref 0–4)
GFR, ESTIMATED: > 90 ML/MIN/1.73M2
GLUCOSE, WHOLE BLOOD: 140 MG/DL (ref 70–108)
HCT VFR BLD CALC: 44.8 % (ref 37–47)
HEMOGLOBIN: 14 GM/DL (ref 12–16)
IMMATURE GRANULOCYTES: 0 %
IONIZED CALCIUM, WHOLE BLOOD: 1.24 MMOL/L (ref 1.12–1.32)
LYMPHOCYTES # BLD: 33 % (ref 15–47)
LYMPHOCYTES ABSOLUTE: 2.7 THOU/MM3 (ref 1–4.8)
MCH RBC QN AUTO: 27.7 PG (ref 26–33)
MCHC RBC AUTO-ENTMCNC: 31.3 GM/DL (ref 32.2–35.5)
MCV RBC AUTO: 89 FL (ref 81–99)
MONOCYTES ABSOLUTE: 1.3 THOU/MM3 (ref 0.4–1.3)
MONOCYTES: 15 % (ref 0–12)
PDW BLD-RTO: 19.7 % (ref 11.5–14.5)
PLATELET # BLD: 307 THOU/MM3 (ref 130–400)
PMV BLD AUTO: 10.6 FL (ref 9.4–12.4)
POTASSIUM, WHOLE BLOOD: 3.8 MEQ/L (ref 3.5–4.9)
RBC # BLD: 5.06 MILL/MM3 (ref 4.2–5.4)
SEG NEUTROPHILS: 43 % (ref 43–75)
SEGMENTED NEUTROPHILS ABSOLUTE COUNT: 3.6 THOU/MM3 (ref 1.8–7.7)
SODIUM, WHOLE BLOOD: 141 MEQ/L (ref 138–146)
TOTAL CO2, WHOLE BLOOD: 27 MEQ/L (ref 23–33)
TOTAL PROTEIN: 7.1 G/DL (ref 6.1–8)
WBC # BLD: 8.3 THOU/MM3 (ref 4.8–10.8)

## 2021-08-04 PROCEDURE — 99214 OFFICE O/P EST MOD 30 MIN: CPT | Performed by: INTERNAL MEDICINE

## 2021-08-04 PROCEDURE — 36591 DRAW BLOOD OFF VENOUS DEVICE: CPT

## 2021-08-04 PROCEDURE — 99211 OFF/OP EST MAY X REQ PHY/QHP: CPT

## 2021-08-04 PROCEDURE — 80076 HEPATIC FUNCTION PANEL: CPT

## 2021-08-04 PROCEDURE — 85025 COMPLETE CBC W/AUTO DIFF WBC: CPT

## 2021-08-04 PROCEDURE — 80047 BASIC METABLC PNL IONIZED CA: CPT

## 2021-08-04 PROCEDURE — 2580000003 HC RX 258: Performed by: INTERNAL MEDICINE

## 2021-08-04 PROCEDURE — 6360000002 HC RX W HCPCS: Performed by: INTERNAL MEDICINE

## 2021-08-04 RX ORDER — SODIUM CHLORIDE 0.9 % (FLUSH) 0.9 %
20 SYRINGE (ML) INJECTION PRN
Status: DISCONTINUED | OUTPATIENT
Start: 2021-08-04 | End: 2021-08-05 | Stop reason: HOSPADM

## 2021-08-04 RX ORDER — SODIUM CHLORIDE 0.9 % (FLUSH) 0.9 %
10 SYRINGE (ML) INJECTION PRN
Status: DISCONTINUED | OUTPATIENT
Start: 2021-08-04 | End: 2021-08-05 | Stop reason: HOSPADM

## 2021-08-04 RX ORDER — HEPARIN SODIUM (PORCINE) LOCK FLUSH IV SOLN 100 UNIT/ML 100 UNIT/ML
500 SOLUTION INTRAVENOUS PRN
Status: DISCONTINUED | OUTPATIENT
Start: 2021-08-04 | End: 2021-08-05 | Stop reason: HOSPADM

## 2021-08-04 RX ORDER — SODIUM CHLORIDE 0.9 % (FLUSH) 0.9 %
10 SYRINGE (ML) INJECTION PRN
OUTPATIENT
Start: 2021-08-04

## 2021-08-04 RX ORDER — ANASTROZOLE 1 MG/1
1 TABLET ORAL DAILY
Qty: 30 TABLET | Refills: 3 | Status: SHIPPED | OUTPATIENT
Start: 2021-08-04 | End: 2021-11-05 | Stop reason: SDUPTHER

## 2021-08-04 RX ORDER — SODIUM CHLORIDE 0.9 % (FLUSH) 0.9 %
20 SYRINGE (ML) INJECTION PRN
OUTPATIENT
Start: 2021-08-04

## 2021-08-04 RX ORDER — HEPARIN SODIUM (PORCINE) LOCK FLUSH IV SOLN 100 UNIT/ML 100 UNIT/ML
500 SOLUTION INTRAVENOUS PRN
OUTPATIENT
Start: 2021-08-04

## 2021-08-04 RX ORDER — ROSUVASTATIN CALCIUM 5 MG/1
TABLET, COATED ORAL
COMMUNITY
Start: 2021-08-03

## 2021-08-04 RX ADMIN — SODIUM CHLORIDE, PRESERVATIVE FREE 10 ML: 5 INJECTION INTRAVENOUS at 10:04

## 2021-08-04 RX ADMIN — SODIUM CHLORIDE, PRESERVATIVE FREE 20 ML: 5 INJECTION INTRAVENOUS at 09:41

## 2021-08-04 RX ADMIN — Medication 500 UNITS: at 10:04

## 2021-08-04 RX ADMIN — SODIUM CHLORIDE, PRESERVATIVE FREE 10 ML: 5 INJECTION INTRAVENOUS at 09:40

## 2021-08-04 NOTE — PROGRESS NOTES
[facility] [address] [place] [address]  [address] [address]  Dept: [phone]  Dept Fax: [phone]: [phone]      Visit Date:2021     Felix Selby is a 70 y.o. female who presents today for:   Chief Complaint   Patient presents with    Follow-up     HER2-positive carcinoma of left breast ([address]        HPI:   Felix Selby is a 70 y.o. female with h/o triple positive left breast cancer. She had annual screening mammogram on 2020 that showed 2 lesions in the left breast.  Subsequently she underwent breast ultrasound followed by ultrasound guided biopsy of those 2 lesions. The largest of these lesions is a lobulated mixed cystic and    solid appearing mass measuring approximately 3 x 2.1 x 2.7 cm. This is located at the anterior depth upper inner quadrant. Aspiration of the fluid component of this lesion and biopsy of    the solid component of this lesion was performed on 2020. The smaller adjacent lesion is located at the middle depth of the     upper inner quadrant left breast measuring approximately 1.6 x    1.1 x 1.6 cm. Biopsy of this lesion was performed on 2020.       Final pathology report showed:   A-B.  Left breast, upper inner quadrant, anterior and middle depth,   barbell and tribell clips, multiple core needle biopsies:       Invasive, poorly differentiated ductal carcinoma, Pepe score 3. Estrogen Receptor: Positive 100 % of cells (>10% of cells)   Progesterone Receptor:  Positive 90 % of cells   Ki-67 (clone 30-9)     Percentage of positive nuclei: 42 %   HER2 NEW POSITIVE      On 2020 the patient had breast MRI showin. A known biopsy-proven mass demonstrated within the upper     inner left breast anterior to middle depth.  This measures 3 cm x     4.1 cm x 3.1 cm middle depth located 4  cm from the nipple, 0.9    cm from the skin, and 6.5 cm from the chest wall.  This shows    heterogeneous enhancement and outer       quadrant mass) with treatment effect (ympT1c, snpN0).  One lymph node, negative for malignancy (0/1).  Atypical lobular hyperplasia.    Margins are negative.    Closest margin: 10 mm (deep margin, lower outer quadrant mass). Patient tolerated her surgery well. Patient started adjuvant Kadcyla on January 11, 2021      Interval History 8/23/2021:   The patient is here for follow up evaluation. She received her seventh dose of Kadcyla on June 23, 2021. The patient states she has tolerated her last cycle of Kadcyla well. However she made decision to stop Kadcyla after seventh infusions. During last visit we discussed the role and the benefit of adjuvant hormonal treatment. The patient is still undecided whether she will take aromatase inhibitor. She denies fever, chills or signs/symptoms of infection. Denies chest pain, shortness of breath, cough, abdominal pain, nausea, vomiting, bowel or urinary changes. Denies rashes or peripheral edema. PMH, SH, and FH:  I reviewed the patients medication list and allergy list as noted on the electronic medical record. The PMH, SH and FH were also reviewed as noted on the EMR. Review of Systems:   Review of Systems   Pertinent review of systems noted in HPI, all other ROS negative. Objective:   Physical Exam   /63 (Site: Right Upper Arm, Position: Sitting, Cuff Size: Medium Adult)   Pulse 71   Temp 98 °F (36.7 °C) (Oral)   Resp 18   Ht 5' 9\" (1.753 m)   Wt 210 lb (95.3 kg)   SpO2 94%   BMI 31.01 kg/m²    General appearance: No apparent distress, well developed, and cooperative. HEENT: Pupils equal, round, and reactive to light. Conjunctivae/corneas clear. Neck: Supple, with full range of motion. Trachea midline. Respiratory:  Normal respiratory effort. Clear to auscultation, bilaterally without Rales/Wheezes/Rhonchi. Chest: S/P bilateral mastectomy with left mastectomy site well healed.  There is a seroma to the mid lateral portion of the right mastectomy site. No surrounding erythema or drainage. No tenderness with palpation. Cardiovascular: Regular rate and rhythm with normal S1/S2  Abdomen: Soft, non-tender with active bowel sounds. Musculoskeletal: No clubbing, cyanosis or edema bilaterally. Skin: Skin color, texture, turgor normal.  No visible rashes or lesions. Neurologic:  Neurovascularly intact without any focal sensory/motor deficits. Psychiatric: Alert and oriented      Imaging Studies and Labs:   CBC:   Lab Results   Component Value Date    WBC 8.3 08/04/2021    HGB 14.0 08/04/2021    HCT 44.8 08/04/2021    MCV 89 08/04/2021     08/04/2021     BMP:   Lab Results   Component Value Date     08/04/2021     12/01/2020    K 3.8 08/04/2021    K 4.6 12/01/2020    K 3.0 10/11/2020     12/01/2020    CO2 21 12/01/2020    BUN 12 12/01/2020    CREATININE 0.6 08/04/2021    CREATININE 0.5 12/01/2020    GLUCOSE 139 12/01/2020    CALCIUM 9.3 12/01/2020      LFT:   Lab Results   Component Value Date    ALT 14 08/04/2021    AST 31 08/04/2021    ALKPHOS 85 08/04/2021    BILITOT 0.6 08/04/2021         Assessment and Plan:   1. Triple Positive Left Breast Cancer in upper outer quadrant with separate focus of triple negative breast cancer in lower outer quadrant   Patient has received 3 cycles of neoadjuvant chemotherapy with Taxotere/carbo/Herceptin and Pertuzumab. Last treatment given in September 2020. Then the patient was hospitalized for Covid infection in October 2020. After she recovered from her Covid infection decision was made to proceed with breast surgery. She had surgery on November 30, 2020. Final pathology report showed residual cancer in both locations upper outer quadrant and lower outer quadrant. Residual triple negative breast cancer was smaller than 1 cm in size. Residual HER-2/jordan positive cancer was 11 mm in size.  The patient was recommended adjuvant Kadcyla, started on 1/11/2021.   2. Adjuvant Chemotherapy with Kadcyla  She began Kadcyla on 1/11/2021. She received first 3 infusions on time; however, had a 6 week break in March 2021 due to viral infection. She has tolerated last cycle #7 Kadcyla well given on June 23, 2021. She states she does not want to receive any more cycles of Kadcyla. We will make referral to Dr. Suraj Myers for port removal.   2.  Adjuvant hormonal treatment. Discussed with the patient  reasoning for adjuvant AI and benefits. Discussed potential side effects of AI as well. The patient states she will think about, she agreed for us to send prescription to the pharmacy. There is no evidence of disease recurrence on today's physical examination. The patient denies having any new complaints. All patient questions answered. Pt voiced understanding. Patient agreed with treatment plan. Follow up as directed. Patient instructed to call for questions or concerns.          Electronically signed by   Ling Lara MD

## 2021-08-04 NOTE — PLAN OF CARE
Problem: Infection - Central Venous Catheter-Associated Bloodstream Infection:  Goal: Will show no infection signs and symptoms  Description: Will show no infection signs and symptoms  Outcome: Met This Shift  Note: Instructed to monitor for signs/symptoms of infection at medi-port site and call MD if problems develop. Intervention: Central line needs assessment  Note: Instructed to monitor for signs/symptoms of infection at medi-port site and call MD if problems develop. Intervention: Infection risk assessment  Note: Mediport site with no redness or warmth. Skin over port site intact with no signs of breakdown noted. Patient verbalizes signs/symptoms of port infection and when to notify the physician. Problem: Musculor/Skeletal Functional Status  Goal: Absence of falls  Outcome: Met This Shift  Note: Free from falls while in O.P. Oncology. Intervention: Fall precautions  Note: Discussed the need to use the call light for assistance when getting up to ambulate. Problem: Discharge Planning  Goal: Knowledge of discharge instructions  Description: Knowledge of discharge instructions  Outcome: Met This Shift  Note: Verbalize understanding of discharge instructions, follow up appointments, and when to call Physician. Intervention: Interaction with patient/family and care team  Note: Discuss understanding of discharge instructions, follow up appointments and when to call Physician. Care plan reviewed with patient. Patient verbalize understanding of the plan of care and contribute to goal setting.

## 2021-08-17 ENCOUNTER — PROCEDURE VISIT (OUTPATIENT)
Dept: SURGERY | Age: 71
End: 2021-08-17
Payer: MEDICARE

## 2021-08-17 VITALS
BODY MASS INDEX: 30.51 KG/M2 | HEART RATE: 107 BPM | OXYGEN SATURATION: 94 % | HEIGHT: 69 IN | RESPIRATION RATE: 20 BRPM | DIASTOLIC BLOOD PRESSURE: 80 MMHG | TEMPERATURE: 98.2 F | SYSTOLIC BLOOD PRESSURE: 130 MMHG | WEIGHT: 206 LBS

## 2021-08-17 DIAGNOSIS — D05.12 DUCTAL CARCINOMA IN SITU (DCIS) OF LEFT BREAST: Primary | ICD-10-CM

## 2021-08-17 PROCEDURE — 36590 REMOVAL TUNNELED CV CATH: CPT | Performed by: SURGERY

## 2021-08-17 PROCEDURE — 10160 PNXR ASPIR ABSC HMTMA BULLA: CPT | Performed by: SURGERY

## 2021-08-17 NOTE — PROGRESS NOTES
[address] [facility], [facility]    Encounter Date: 8/17/2021  Patient:  Paris Prieto   Age: 70 y.o. YOB: 1950   MRN: [id number]      Injury Date:***    PCP: Damon Lopez     Subjective   Chief Complaint:   Chief Complaint   Patient presents with    Procedure     port removal okay per Dr. Brina Pablo       History Obtained From: {source of history:136279}  Reason for Admission: Active Problems:    * No active hospital problems. *  Resolved Problems:    * No resolved hospital problems. *    History of Present Illness:  She is a 70 y.o. female who presents with   Chief Complaint   Patient presents with    Procedure     port removal okay per Dr. Brina Pablo    .   ***        Review of Systems:   Review of Systems  {Blank single:15402::\"All systems were reviewed and negative other than HPI\",\"Review of systems omitted at this time, pt unable to reliably answer questions secondary to ***\"\"}  History   Aluminum-containing compounds, Scopolamine, and Adhesive tape  Past Surgical History:   Procedure Laterality Date    ABDOMINAL EXPLORATION SURGERY  2014    Dr butterfield-lysis of adhesions    BREAST LUMPECTOMY Left 11/2015    Left breast lumpectomy, retroareolar with preoperative needle loc-Dr. Ennis Lanes BREAST SURGERY Left 06/26/2020    biopsy of left breast    CHOLECYSTECTOMY  10/2014    Dr David Bennett  03/30/2016    Dr Caryn Aschoff  10/2014    x4 abdominal wall    HYSTERECTOMY      INCISIONAL HERNIA REPAIR  2014    Dr Lupe Coello LIPECTOMY  2014    Dr Governor Muñiz LEFT  7/16/2020    [address] LEFT 7/16/2020 Yosef Padilla  [facility] Bilateral 11/30/2020    LEFT SIMPLE MASTECTOMY AND SENTINAL LYMPH NODE BIOPSY, RIGHT SIMPLE PROPHYLACTIC MASTECTOMY performed by Nakul Fuentes MD at [address] [place] [address] Right 7/17/2020    SINGLE LUMEN SMART PORT INSERTION RIGHT SUBCLAVIAN performed by Nakul Fuentes MD at [facility]    SPLENECTOMY  10/2014    Dr Carole Rivera Apo      patient was 11years old    TUBAL LIGATION       Past Medical History:   Diagnosis Date    Breast cancer ([address] 2020    left-invasive ductal follows with Dr mAanda HERNANDEZ-19 10/07/2020    wears o2 at night    Hyperlipidemia     Numbness and tingling     left foot-chronic nerve damage    Shortness of breath      Past Surgical History:   Procedure Laterality Date    ABDOMINAL EXPLORATION SURGERY  2014    Dr butterfield-lysis of adhesions    BREAST LUMPECTOMY Left 11/2015    Left breast lumpectomy, retroareolar with preoperative needle loc-Dr. Carmencita Schaumann BREAST SURGERY Left 06/26/2020    biopsy of left breast    CHOLECYSTECTOMY  10/2014    Dr Melly Joseph  03/30/2016    Dr Myron Schumacher  10/2014    x4 abdominal wall    HYSTERECTOMY      INCISIONAL HERNIA REPAIR  2014    Dr Clarke Hardy LIPECTOMY  2014    Dr Phyllis Abrams  7/16/2020    [address] LEFT 7/16/2020 Suzette Clayton  [facility] Bilateral 11/30/2020    LEFT SIMPLE MASTECTOMY AND SENTINAL LYMPH NODE BIOPSY, RIGHT SIMPLE PROPHYLACTIC MASTECTOMY performed by Laurel Lynch MD at [facility] Right 7/17/2020    SINGLE LUMEN SMART PORT INSERTION RIGHT SUBCLAVIAN performed by Laurel Lynch MD at [address]  10[facility]2014    [facility]      patient was 11years old    TUBAL LIGATION       Social History     Socioeconomic History    Marital status:      Spouse name: Not on file    Number of children: 3    Years of education: Not on file    Highest education level: Not on file   Occupational History    Not on file   Tobacco Use    Smoking status: Never Smoker    Smokeless tobacco: Never Used   Vaping Use    Vaping Use: Never used   Substance and Sexual Activity    Alcohol use:  No Alcohol/week: 0.0 standard drinks    Drug use: No    Sexual activity: Not on file   Other Topics Concern    Not on file   Social History Narrative    Not on file     Social Determinants of Health     Financial Resource Strain:     Difficulty of Paying Living Expenses:    Food Insecurity:     Worried About Running Out of Food in the Last Year:     [address] in the Last Year:    Transportation Needs:     Lack of Transportation (Medical):      Lack of Transportation (Non-Medical):    Physical Activity:     Days of Exercise per Week:     Minutes of Exercise per Session:    Stress:     Feeling of Stress :    Social Connections:     Frequency of Communication with Friends and Family:     Frequency of Social Gatherings with Friends and Family:     Attends [employer] Services:     Active Member of Clubs or Organizations:     Attends Club or Organization Meetings:     Marital Status:    Intimate Partner Violence:     Fear of Current or Ex-Partner:     Emotionally Abused:     Physically Abused:     Sexually Abused:      Family History   Problem Relation Age of Onset    Heart Disease Father         cath stent blood to thin and bled    Heart Attack Mother     Other Other         blood clot    Breast Cancer Paternal Aunt 62    High Blood Pressure Sister     Cancer Brother 68        skin    Prostate Cancer Brother         had chemotherapy to treat     Prostate Cancer Brother 64        has had for 10 years    Ovarian Cancer Neg Hx     Diabetes Neg Hx     Stroke Neg Hx        Home medications:    Current Outpatient Medications   Medication Sig Dispense Refill    rosuvastatin (CRESTOR) 5 MG tablet       anastrozole (ARIMIDEX) 1 MG tablet Take 1 tablet by mouth daily 30 tablet 3    ondansetron (ZOFRAN ODT) 4 MG disintegrating tablet Take 1 tablet by mouth every 8 hours as needed for Nausea 20 tablet 3    zinc sulfate (ZINCATE) 220 (50 Zn) MG capsule Take 1 capsule by mouth daily 30 capsule 0  vitamin C (VITAMIN C) 500 MG tablet Take 1 tablet by mouth daily 30 tablet 0    loperamide (IMODIUM) 2 MG capsule Take 2 mg by mouth 4 times daily as needed for Diarrhea      lidocaine-prilocaine (EMLA) 2.5-2.5 % cream Apply topically as needed. 30 g 1    levothyroxine (SYNTHROID) 25 MCG tablet Take 50 mcg by mouth Daily Take 50 MG for 6 weeks per PCP.  alendronate (FOSAMAX) 35 MG tablet Take 35 mg by mouth every 7 days      aspirin EC 81 MG EC tablet Take 1 tablet by mouth daily 30 tablet 3    PARoxetine (PAXIL) 10 MG tablet Take 10 mg by mouth every morning      Coenzyme Q10-Fish Oil-Vit E (CO-Q 10 OMEGA-3 FISH OIL PO) Take by mouth daily      Vitamin D (CHOLECALCIFEROL) 1000 UNITS CAPS capsule Take by mouth daily 2 tab      Cinnamon 500 MG CAPS Take by mouth daily      Cyanocobalamin (VITAMIN B12 PO) Take 1,000 mcg by mouth daily        No current facility-administered medications for this visit. Objective   Vitals:     I @VMVDQALL(1)@ I   I @XZSWSHEE(5)@ I   I @KRQCNS(57)@; @AJAOGN(79)@ I   I @FLOWSTAT(9)@ I   I @FLOWSTAT(10)@ I   I   I   I Facility age limit for growth percentiles is 20 years. I        Physical Exam:  Physical Exam      Radiology:   Radiology reports reviewed: {YES***/NO:60}    Labs:   Laboratory Studies reviewed: {YES***/NO:60}    Assessment:       Diagnosis Orders   1. Ductal carcinoma in situ (DCIS) of left breast         Plan:    Follow Up: No follow-ups on file.   ***  Electronically signed

## 2021-08-18 NOTE — PROGRESS NOTES
[facility] [address] [facility] [address]  Dept: [phone]  Dept Fax: [phone]  Loc: [phone]    Visit Date: 8/17/2021    Nayely Vaughn is a 70 y.o. female who presentstoday for:  Chief Complaint   Patient presents with    Procedure     port removal okay per Dr. Cathleen Sood       HPI:     HPI patient here for right subclavian vein Mediport removal and aspiration of persistent seroma right chest wall    Past Medical History:   Diagnosis Date    Breast cancer ([address] 2020    left-invasive ductal follows with Dr Hortensia Ma COVID-19 10/07/2020    wears o2 at night    Hyperlipidemia     Numbness and tingling     left foot-chronic nerve damage    Shortness of breath       Past Surgical History:   Procedure Laterality Date    ABDOMINAL EXPLORATION SURGERY  2014    Dr butterfield-lysis of adhesions    BREAST LUMPECTOMY Left 11/2015    Left breast lumpectomy, retroareolar with preoperative needle loc-Dr. Soraya Billingsley BREAST SURGERY Left 06/26/2020    biopsy of left breast    CHOLECYSTECTOMY  10/2014    Dr Wallace Hall  03/30/2016    Dr Hoda Sauceda  10/2014    x4 abdominal wall    HYSTERECTOMY      INCISIONAL HERNIA REPAIR  2014    Dr Shana Burns LIPECTOMY  2014    Dr Eunice Peralta  07/16/2020    [facility] 7/16/2020 Bryan Davis  [facility] Bilateral 11/30/2020    LEFT SIMPLE MASTECTOMY AND SENTINAL LYMPH NODE BIOPSY, RIGHT SIMPLE PROPHYLACTIC MASTECTOMY performed by Lucio Charles MD at [address] [place] [address]  08/17/2021    port removal done in the procedure room Dr Olinda Summers Right 07/17/2020    [address] [place] [address] performed by Lucio Charles MD at [address] [facility]  [facility]2014    [facility]      patient was 11years old  TUBAL LIGATION          Family History   Problem Relation Age of Onset    Heart Disease Father         cath stent blood to thin and bled    Heart Attack Mother     Other Other         blood clot    Breast Cancer Paternal Aunt 62    High Blood Pressure Sister     Cancer Brother 68        skin    Prostate Cancer Brother         had chemotherapy to treat     Prostate Cancer Brother 64        has had for 10 years    Ovarian Cancer Neg Hx     Diabetes Neg Hx     Stroke Neg Hx         Social History     Tobacco Use    Smoking status: Never Smoker    Smokeless tobacco: Never Used   Substance Use Topics    Alcohol use: No     Alcohol/week: 0.0 standard drinks          Current Outpatient Medications   Medication Sig Dispense Refill    rosuvastatin (CRESTOR) 5 MG tablet       anastrozole (ARIMIDEX) 1 MG tablet Take 1 tablet by mouth daily 30 tablet 3    ondansetron (ZOFRAN ODT) 4 MG disintegrating tablet Take 1 tablet by mouth every 8 hours as needed for Nausea 20 tablet 3    zinc sulfate (ZINCATE) 220 (50 Zn) MG capsule Take 1 capsule by mouth daily 30 capsule 0    vitamin C (VITAMIN C) 500 MG tablet Take 1 tablet by mouth daily 30 tablet 0    loperamide (IMODIUM) 2 MG capsule Take 2 mg by mouth 4 times daily as needed for Diarrhea      lidocaine-prilocaine (EMLA) 2.5-2.5 % cream Apply topically as needed. 30 g 1    levothyroxine (SYNTHROID) 25 MCG tablet Take 50 mcg by mouth Daily Take 50 MG for 6 weeks per PCP.       alendronate (FOSAMAX) 35 MG tablet Take 35 mg by mouth every 7 days      aspirin EC 81 MG EC tablet Take 1 tablet by mouth daily 30 tablet 3    PARoxetine (PAXIL) 10 MG tablet Take 10 mg by mouth every morning      Coenzyme Q10-Fish Oil-Vit E (CO-Q 10 OMEGA-3 FISH OIL PO) Take by mouth daily      Vitamin D (CHOLECALCIFEROL) 1000 UNITS CAPS capsule Take by mouth daily 2 tab      Cinnamon 500 MG CAPS Take by mouth daily      Cyanocobalamin (VITAMIN B12 PO) Take 1,000 mcg by mouth daily        No current facility-administered medications for this visit.      Allergies   Allergen Reactions    Aluminum-Containing Compounds Anaphylaxis    Scopolamine      Other reaction(s): Dizziness    Adhesive Tape Other (See Comments)     Takes top layer of skin off       Subjective:      Review of Systems    Objective:   /80 (Position: Sitting, Cuff Size: Medium Adult)   Pulse 107   Temp 98.2 °F (36.8 °C) (Temporal)   Resp 20   Ht 5' 9\" (1.753 m)   Wt 206 lb (93.4 kg)   SpO2 94%   BMI 30.42 kg/m²     Physical Exam       Results for orders placed or performed during the hospital encounter of 08/04/21   Hepatic Function Panel   Result Value Ref Range    Albumin 3.5 3.5 - 5.1 g/dL    Total Bilirubin 0.6 0.3 - 1.2 mg/dL    Bilirubin, Direct <0.2 0.0 - 0.3 mg/dL    Alkaline Phosphatase 85 38 - 126 U/L    AST 31 5 - 40 U/L    ALT 14 11 - 66 U/L    Total Protein 7.1 6.1 - 8.0 g/dL   POC PANEL BMP W/IOCA   Result Value Ref Range    Sodium, Whole Blood 141 138 - 146 meq/l    Potassium, Whole Blood 3.8 3.5 - 4.9 meq/l    Chloride, Whole Blood 103 98 - 109 meq/l    TOTAL CO2, WHOLE BLOOD 27 23 - 33 meq/l    Glucose, Whole Blood 140 (H) 70 - 108 mg/dl    BUN, WHOLE BLOOD 11 8 - 26 mg/dl    CREATININE, WHOLE BLOOD 0.6 0.5 - 1.2 mg/dl    Ionized Calcium, WB 1.24 1.12 - 1.32 mmol/L   CBC Auto Differential   Result Value Ref Range    WBC 8.3 4.8 - 10.8 thou/mm3    RBC 5.06 4.20 - 5.40 mill/mm3    Hemoglobin 14.0 12.0 - 16.0 gm/dl    Hematocrit 44.8 37.0 - 47.0 %    MCV 89 81 - 99 fL    MCH 27.7 26.0 - 33.0 pg    MCHC 31.3 (L) 32.2 - 35.5 gm/dl    RDW 19.7 (H) 11.5 - 14.5 %    Platelets 044 428 - 928 thou/mm3    MPV 10.6 9.4 - 12.4 fL    Seg Neutrophils 43 43 - 75 %    Lymphocytes 33 15 - 47 %    Monocytes 15 (H) 0 - 12 %    Eosinophils % 8 (H) 0 - 4 %    BASINOPHIL, AUTOMATED 1 0 - 3 %    Immature Granulocytes 0 %    Segs Absolute 3.6 1 - 7 thou/mm3    Lymphocytes Absolute 2.7 1.0 - 4.8 thou/mm3    Monocytes Absolute 1.3 0.4 - 1.3 thou/mm3    Eosinophils Absolute 0.7 (H) 0.0 - 0.4 thou/mm3    Basophils Absolute 0.1 0.0 - 0.1 thou/mm3    Absolute Immature Granulocyte 0.02 0.00 - 0.07 thou/mm3   Glomerular Filtration Rate, Estimated   Result Value Ref Range    GFR, Estimated > 90 ml/min/1.73m2       Assessment:     1. Status post Mediport placement here for removal of same 2. Persistent seroma right mastectomy site    Plan:     Procedure Note:    Preoperative Diagnosis: History of breast cancer placement of Mediport persistent seroma right chest wall    Postoperative Diagnosis: Same    Operation: 1. Removal of right subclavian vein Mediport  2.  Needle aspiration of right seroma chest wall    Surgeon:  Dr. William Dawson mL Xylocaine with epinephrine    Complication:none    Indication for Procedure: Status post Mediport placement and persistent seroma    Procedure: Patient brought into the procedure room and placed supine on the table right chest wall was prepped and draped in usual sterile fashion Xylocaine was used to anesthetize the Mediport incision and incision was made in the Mediport was sharply excised from the surrounding tissue it was then easily removed out of the vein and through the tract and removed hemostasis was obtained with pressure interrupted 3-0 Vicryl was used to close the subcutaneous and running 4-0 Vicryl was used to close the skin and Steri-Strips were applied needle was then placed in the seroma and 60 cc of fluid was aspirated dark brown patient tired procedure well      Electronicallysigned by Loan Marin MD on 8/18/2021 at 11:16 AM

## 2021-08-31 ENCOUNTER — OFFICE VISIT (OUTPATIENT)
Dept: SURGERY | Age: 71
End: 2021-08-31
Payer: MEDICARE

## 2021-08-31 ENCOUNTER — TELEPHONE (OUTPATIENT)
Dept: SURGERY | Age: 71
End: 2021-08-31

## 2021-08-31 VITALS
HEART RATE: 84 BPM | SYSTOLIC BLOOD PRESSURE: 127 MMHG | DIASTOLIC BLOOD PRESSURE: 72 MMHG | HEIGHT: 69 IN | RESPIRATION RATE: 15 BRPM | TEMPERATURE: 97.3 F | OXYGEN SATURATION: 93 % | WEIGHT: 203 LBS | BODY MASS INDEX: 30.07 KG/M2

## 2021-08-31 DIAGNOSIS — R22.31 LUMP OF AXILLA, RIGHT: Primary | ICD-10-CM

## 2021-08-31 DIAGNOSIS — N64.89 SEROMA OF BREAST: Primary | ICD-10-CM

## 2021-08-31 DIAGNOSIS — T81.40XS: ICD-10-CM

## 2021-08-31 PROCEDURE — 99212 OFFICE O/P EST SF 10 MIN: CPT | Performed by: SURGERY

## 2021-08-31 ASSESSMENT — ENCOUNTER SYMPTOMS
BACK PAIN: 0
VOICE CHANGE: 0
CHOKING: 0
SHORTNESS OF BREATH: 0
CONSTIPATION: 1
ALLERGIC/IMMUNOLOGIC NEGATIVE: 1
SORE THROAT: 0
FACIAL SWELLING: 0
EYE ITCHING: 0
EYES NEGATIVE: 1
STRIDOR: 0
RESPIRATORY NEGATIVE: 1
EYE DISCHARGE: 0
WHEEZING: 0
EYE REDNESS: 0
TROUBLE SWALLOWING: 0
COUGH: 0
RHINORRHEA: 0
COLOR CHANGE: 0
SINUS PAIN: 0
EYE PAIN: 0
APNEA: 0
SINUS PRESSURE: 0
PHOTOPHOBIA: 0

## 2021-08-31 NOTE — PROGRESS NOTES
[address] [place] [address] [address] [facility] [address]  Dept: [phone]  Dept Fax: [phone]  Loc: [phone]    Visit Date: 8/31/2021    Juhi Mosley is a 70 y.o. 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 along the incision it is been aspirated several times and just was aspirated when we remove the Mediport patient reports that the seroma came back right away patient needs debridement of the capsule that is subcutaneous with placement of a drain and she is here to discuss that process it should also be noted she has been having problems with nasal bleeding on and off she did stop her aspirin it seemed to improve    Past Medical History:   Diagnosis Date    Breast cancer ([facility] [address] 2020    left-invasive ductal follows with Dr Carlos Collins COVID-19 10/07/2020    wears o2 at night    Hyperlipidemia     Numbness and tingling     left foot-chronic nerve damage    Shortness of breath       Past Surgical History:   Procedure Laterality Date    ABDOMINAL EXPLORATION SURGERY  2014    Dr butterfield-lysis of adhesions    BREAST LUMPECTOMY Left 11/2015    Left breast lumpectomy, retroareolar with preoperative needle loc-Dr. Gregory Faustin BREAST SURGERY Left 06/26/2020    biopsy of left breast    CHOLECYSTECTOMY  10/2014    Dr Roberth Salazar  03/30/2016    Dr Francisco Hampton  10/2014    x4 abdominal wall    HYSTERECTOMY      INCISIONAL HERNIA REPAIR  2014    Dr Arellano Oh LIPECTOMY  2014    Dr Rylee Freeman  07/16/2020    [address] LEFT 7/16/2020 Taylor Mccarthy  [facility] Bilateral 11/30/2020    LEFT SIMPLE MASTECTOMY AND SENTINAL LYMPH NODE BIOPSY, RIGHT SIMPLE PROPHYLACTIC MASTECTOMY performed by Sylvester Hanley MD at [address] [facility]  08/17/2021    port removal done in the procedure room Dr Elda Cabrera Right 07/17/2020    SINGLE LUMEN SMART PORT INSERTION RIGHT SUBCLAVIAN performed by Sylvester Hanley MD at [address]  10[facility]2014    [facility]      patient was 11[facility] old    TUBAL LIGATION          Family History   Problem Relation Age of Onset    Heart Disease Father         cath stent blood to thin and bled    Heart Attack Mother     Other Other         blood clot    Breast Cancer Paternal Aunt 62    High Blood Pressure Sister     Cancer Brother 68        skin    Prostate Cancer Brother         had chemotherapy to treat     Prostate Cancer Brother 64        has had for 10 years    Ovarian Cancer Neg Hx     Diabetes Neg Hx     Stroke Neg Hx         Social History     Tobacco Use    Smoking status: Never Smoker    Smokeless tobacco: Never Used   Substance Use Topics    Alcohol use: No     Alcohol/week: 0.0 standard drinks          Current Outpatient Medications   Medication Sig Dispense Refill    rosuvastatin (CRESTOR) 5 MG tablet       anastrozole (ARIMIDEX) 1 MG tablet Take 1 tablet by mouth daily 30 tablet 3    vitamin C (VITAMIN C) 500 MG tablet Take 1 tablet by mouth daily 30 tablet 0    levothyroxine (SYNTHROID) 25 MCG tablet Take 50 mcg by mouth Daily Take 50 MG for 6 weeks per PCP.       alendronate (FOSAMAX) 35 MG tablet Take 35 mg by mouth every 7 days      PARoxetine (PAXIL) 10 MG tablet Take 10 mg by mouth every morning      Coenzyme Q10-Fish Oil-Vit E (CO-Q 10 OMEGA-3 FISH OIL PO) Take by mouth daily      Vitamin D (CHOLECALCIFEROL) 1000 UNITS CAPS capsule Take by mouth daily 2 tab      Cinnamon 500 MG CAPS Take by mouth daily      Cyanocobalamin (VITAMIN B12 PO) Take 1,000 mcg by mouth daily       ondansetron (ZOFRAN ODT) 4 MG disintegrating tablet Take 1 tablet by mouth every 8 hours as needed for Nausea (Patient not taking: Reported on 8/31/2021) 20 tablet 3    zinc sulfate (ZINCATE) 220 (50 Zn) MG capsule Take 1 capsule by mouth daily (Patient not taking: Reported on 8/31/2021) 30 capsule 0    loperamide (IMODIUM) 2 MG capsule Take 2 mg by mouth 4 times daily as needed for Diarrhea (Patient not taking: Reported on 8/31/2021)      lidocaine-prilocaine (EMLA) 2.5-2.5 % cream Apply topically as needed. (Patient not taking: Reported on 8/31/2021) 30 g 1    aspirin EC 81 MG EC tablet Take 1 tablet by mouth daily (Patient not taking: Reported on 8/31/2021) 30 tablet 3     No current facility-administered medications for this visit. Allergies   Allergen Reactions    Aluminum-Containing Compounds Anaphylaxis    Scopolamine      Other reaction(s): Dizziness    Adhesive Tape Other (See Comments)     Takes top layer of skin off       Subjective:      Review of Systems   Constitutional: Negative. Negative for activity change, appetite change, chills, diaphoresis, fatigue, fever and unexpected weight change. HENT: Positive for nosebleeds. Negative for congestion, dental problem, drooling, ear discharge, ear pain, facial swelling, hearing loss, mouth sores, postnasal drip, rhinorrhea, sinus pressure, sinus pain, sneezing, sore throat, tinnitus, trouble swallowing and voice change. Eyes: Negative. Negative for photophobia, pain, discharge, redness, itching and visual disturbance. Respiratory: Negative. Negative for apnea, cough, choking, shortness of breath, wheezing and stridor. Cardiovascular: Negative. Negative for chest pain, palpitations and leg swelling. Gastrointestinal: Positive for constipation. Endocrine: Negative. Negative for cold intolerance, heat intolerance, polydipsia, polyphagia and polyuria. Genitourinary: Negative.   Negative for decreased urine volume, difficulty urinating, dyspareunia, dysuria, enuresis, flank pain, frequency, genital sores, hematuria, menstrual problem, pelvic pain, urgency, vaginal bleeding, vaginal discharge and vaginal pain. Musculoskeletal: Negative. Negative for arthralgias, back pain, gait problem, joint swelling, myalgias, neck pain and neck stiffness. Skin: Negative for color change, pallor, rash and wound. Allergic/Immunologic: Negative. Negative for environmental allergies, food allergies and immunocompromised state. Neurological: Negative. Negative for dizziness, tremors, seizures, syncope, facial asymmetry, speech difficulty, weakness, light-headedness, numbness and headaches. Hematological: Negative. Negative for adenopathy. Does not bruise/bleed easily. Psychiatric/Behavioral: Negative. Negative for agitation, behavioral problems, confusion, decreased concentration, dysphoric mood, hallucinations, self-injury, sleep disturbance and suicidal ideas. The patient is not nervous/anxious and is not hyperactive. Objective:   /72 (Site: Right Lower Arm, Position: Sitting, Cuff Size: Medium Adult)   Pulse 84   Temp 97.3 °F (36.3 °C) (Tympanic)   Resp 15   Ht 5' 9\" (1.753 m)   Wt 203 lb (92.1 kg)   SpO2 93%   BMI 29.98 kg/m²     Physical Exam  Constitutional:       Appearance: She is well-developed. HENT:      Head: Normocephalic and atraumatic. Eyes:      General: No scleral icterus. Right eye: No discharge. Left eye: No discharge. Conjunctiva/sclera: Conjunctivae normal.      Pupils: Pupils are equal, round, and reactive to light. Neck:      Thyroid: No thyromegaly. Vascular: No JVD. Cardiovascular:      Rate and Rhythm: Normal rate and regular rhythm. Heart sounds: No murmur heard. No friction rub. No gallop. Pulmonary:      Effort: Pulmonary effort is normal. No respiratory distress. Breath sounds: Normal breath sounds. No stridor. No wheezing.    Chest: Chest wall: No tenderness. Musculoskeletal:         General: Normal range of motion. Cervical back: Normal range of motion and neck supple. Skin:     General: Skin is warm and dry. Coloration: Skin is not pale. Findings: No erythema or rash. Neurological:      Mental Status: She is alert and oriented to person, place, and time. Psychiatric:         Behavior: Behavior normal.         Thought Content:  Thought content normal.         Judgment: Judgment normal.            Results for orders placed or performed during the hospital encounter of 08/04/21   Hepatic Function Panel   Result Value Ref Range    Albumin 3.5 3.5 - 5.1 g/dL    Total Bilirubin 0.6 0.3 - 1.2 mg/dL    Bilirubin, Direct <0.2 0.0 - 0.3 mg/dL    Alkaline Phosphatase 85 38 - 126 U/L    AST 31 5 - 40 U/L    ALT 14 11 - 66 U/L    Total Protein 7.1 6.1 - 8.0 g/dL   POC PANEL BMP W/IOCA   Result Value Ref Range    Sodium, Whole Blood 141 138 - 146 meq/l    Potassium, Whole Blood 3.8 3.5 - 4.9 meq/l    Chloride, Whole Blood 103 98 - 109 meq/l    TOTAL CO2, WHOLE BLOOD 27 23 - 33 meq/l    Glucose, Whole Blood 140 (H) 70 - 108 mg/dl    BUN, WHOLE BLOOD 11 8 - 26 mg/dl    CREATININE, WHOLE BLOOD 0.6 0.5 - 1.2 mg/dl    Ionized Calcium, WB 1.24 1.12 - 1.32 mmol/L   CBC Auto Differential   Result Value Ref Range    WBC 8.3 4.8 - 10.8 thou/mm3    RBC 5.06 4.20 - 5.40 mill/mm3    Hemoglobin 14.0 12.0 - 16.0 gm/dl    Hematocrit 44.8 37.0 - 47.0 %    MCV 89 81 - 99 fL    MCH 27.7 26.0 - 33.0 pg    MCHC 31.3 (L) 32.2 - 35.5 gm/dl    RDW 19.7 (H) 11.5 - 14.5 %    Platelets 216 936 - 230 thou/mm3    MPV 10.6 9.4 - 12.4 fL    Seg Neutrophils 43 43 - 75 %    Lymphocytes 33 15 - 47 %    Monocytes 15 (H) 0 - 12 %    Eosinophils % 8 (H) 0 - 4 %    BASINOPHIL, AUTOMATED 1 0 - 3 %    Immature Granulocytes 0 %    Segs Absolute 3.6 1 - 7 thou/mm3    Lymphocytes Absolute 2.7 1.0 - 4.8 thou/mm3    Monocytes Absolute 1.3 0.4 - 1.3 thou/mm3 Eosinophils Absolute 0.7 (H) 0.0 - 0.4 thou/mm3    Basophils Absolute 0.1 0.0 - 0.1 thou/mm3    Absolute Immature Granulocyte 0.02 0.00 - 0.07 thou/mm3   Glomerular Filtration Rate, Estimated   Result Value Ref Range    GFR, Estimated > 90 ml/min/1.73m2       Assessment:     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 Schedule Simin Villasenor for debridement of right chest wall seroma  2. The risks, benefits and alternatives were discussed with Simin Villasenor. She understands and wishes to proceed with surgical intervention. 3. Restrictions discussed with Simin Villasenor and she expresses understanding. 4. She is advised to call back directly if there are further questions/concerns, or if her symptoms worsen prior to surgery.             Electronicallysigned by Louie Peck MD on 8/31/2021 at 10:27 AM

## 2021-09-07 NOTE — H&P
[address] [place] [address] Dr Kevin[address] [facility] [address]  Dept: [phone]  Dept Fax: [phone]  Loc: [phone]    Visit Date: 8/31/2021    Giuliana Allen is a 70 y.o. 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 along the incision it is been aspirated several times and just was aspirated when we remove the Mediport patient reports that the seroma came back right away patient needs debridement of the capsule that is subcutaneous with placement of a drain and she is here to discuss that process it should also be noted she has been having problems with nasal bleeding on and off she did stop her aspirin it seemed to improve    Past Medical History:   Diagnosis Date    Breast cancer ([facility] [address] 2020    left-invasive ductal follows with Dr Oli Howe COVID-19 10/07/2020    wears o2 at night    Hyperlipidemia     Numbness and tingling     left foot-chronic nerve damage    Shortness of breath       Past Surgical History:   Procedure Laterality Date    ABDOMINAL EXPLORATION SURGERY  2014    Dr butterfield-lysis of adhesions    BREAST LUMPECTOMY Left 11/2015    Left breast lumpectomy, retroareolar with preoperative needle loc-Dr. Cristal Muñiz BREAST SURGERY Left 06/26/2020    biopsy of left breast    CHOLECYSTECTOMY  10/2014    Dr Dione Merrill  03/30/2016    Dr Yong Disla  10/2014    x4 abdominal wall    HYSTERECTOMY      INCISIONAL HERNIA REPAIR  2014    Dr Moreira Letters LIPECTOMY  2014    Dr Titi Herndon  07/16/2020    [address] LEFT 7/16/2020 García Alexander  [facility] Bilateral 11/30/2020    LEFT SIMPLE MASTECTOMY AND SENTINAL LYMPH NODE BIOPSY, RIGHT SIMPLE PROPHYLACTIC MASTECTOMY performed by Newton Wharton MD at [address]  08/17/2021    port removal done in the procedure room Dr Indiana Fairchild 07/17/2020    SINGLE LUMEN SMART PORT INSERTION RIGHT SUBCLAVIAN performed by Newton Wharton MD at [address] [facility]  10/2014    Dr Francois-[facility]      patient was 11years old    TUBAL LIGATION          Family History   Problem Relation Age of Onset    Heart Disease Father         cath stent blood to thin and bled    Heart Attack Mother     Other Other         blood clot    Breast Cancer Paternal Aunt 62    High Blood Pressure Sister     Cancer Brother 68        skin    Prostate Cancer Brother         had chemotherapy to treat     Prostate Cancer Brother 64        has had for 10 years    Ovarian Cancer Neg Hx     Diabetes Neg Hx     Stroke Neg Hx         Social History     Tobacco Use    Smoking status: Never Smoker    Smokeless tobacco: Never Used   Substance Use Topics    Alcohol use: No     Alcohol/week: 0.0 standard drinks          Current Outpatient Medications   Medication Sig Dispense Refill    rosuvastatin (CRESTOR) 5 MG tablet       anastrozole (ARIMIDEX) 1 MG tablet Take 1 tablet by mouth daily 30 tablet 3    vitamin C (VITAMIN C) 500 MG tablet Take 1 tablet by mouth daily 30 tablet 0    levothyroxine (SYNTHROID) 25 MCG tablet Take 50 mcg by mouth Daily Take 50 MG for 6 weeks per PCP.       alendronate (FOSAMAX) 35 MG tablet Take 35 mg by mouth every 7 days      PARoxetine (PAXIL) 10 MG tablet Take 10 mg by mouth every morning      Coenzyme Q10-Fish Oil-Vit E (CO-Q 10 OMEGA-3 FISH OIL PO) Take by mouth daily      Vitamin D (CHOLECALCIFEROL) 1000 UNITS CAPS capsule Take by mouth daily 2 tab      Cinnamon 500 MG CAPS Take by mouth daily      Cyanocobalamin (VITAMIN B12 PO) Take 1,000 mcg by mouth daily       ondansetron (ZOFRAN ODT) 4 MG disintegrating tablet Take 1 tablet by mouth every 8 hours as needed for Nausea (Patient not taking: Reported on 8/31/2021) 20 tablet 3    zinc sulfate (ZINCATE) 220 (50 Zn) MG capsule Take 1 capsule by mouth daily (Patient not taking: Reported on 8/31/2021) 30 capsule 0    loperamide (IMODIUM) 2 MG capsule Take 2 mg by mouth 4 times daily as needed for Diarrhea (Patient not taking: Reported on 8/31/2021)      lidocaine-prilocaine (EMLA) 2.5-2.5 % cream Apply topically as needed. (Patient not taking: Reported on 8/31/2021) 30 g 1    aspirin EC 81 MG EC tablet Take 1 tablet by mouth daily (Patient not taking: Reported on 8/31/2021) 30 tablet 3     No current facility-administered medications for this visit. Allergies   Allergen Reactions    Aluminum-Containing Compounds Anaphylaxis    Scopolamine      Other reaction(s): Dizziness    Adhesive Tape Other (See Comments)     Takes top layer of skin off       Subjective:      Review of Systems   Constitutional: Negative. Negative for activity change, appetite change, chills, diaphoresis, fatigue, fever and unexpected weight change. HENT: Positive for nosebleeds. Negative for congestion, dental problem, drooling, ear discharge, ear pain, facial swelling, hearing loss, mouth sores, postnasal drip, rhinorrhea, sinus pressure, sinus pain, sneezing, sore throat, tinnitus, trouble swallowing and voice change. Eyes: Negative. Negative for photophobia, pain, discharge, redness, itching and visual disturbance. Respiratory: Negative. Negative for apnea, cough, choking, shortness of breath, wheezing and stridor. Cardiovascular: Negative. Negative for chest pain, palpitations and leg swelling. Gastrointestinal: Positive for constipation. Endocrine: Negative. Negative for cold intolerance, heat intolerance, polydipsia, polyphagia and polyuria. Genitourinary: Negative.   Negative for decreased urine volume, difficulty urinating, dyspareunia, dysuria, enuresis, flank pain, frequency, genital sores, hematuria, menstrual problem, pelvic pain, urgency, vaginal bleeding, vaginal discharge and vaginal pain. Musculoskeletal: Negative. Negative for arthralgias, back pain, gait problem, joint swelling, myalgias, neck pain and neck stiffness. Skin: Negative for color change, pallor, rash and wound. Allergic/Immunologic: Negative. Negative for environmental allergies, food allergies and immunocompromised state. Neurological: Negative. Negative for dizziness, tremors, seizures, syncope, facial asymmetry, speech difficulty, weakness, light-headedness, numbness and headaches. Hematological: Negative. Negative for adenopathy. Does not bruise/bleed easily. Psychiatric/Behavioral: Negative. Negative for agitation, behavioral problems, confusion, decreased concentration, dysphoric mood, hallucinations, self-injury, sleep disturbance and suicidal ideas. The patient is not nervous/anxious and is not hyperactive. Objective:   /72 (Site: Right Lower Arm, Position: Sitting, Cuff Size: Medium Adult)   Pulse 84   Temp 97.3 °F (36.3 °C) (Tympanic)   Resp 15   Ht 5' 9\" (1.753 m)   Wt 203 lb (92.1 kg)   SpO2 93%   BMI 29.98 kg/m²     Physical Exam  Constitutional:       Appearance: She is well-developed. HENT:      Head: Normocephalic and atraumatic. Eyes:      General: No scleral icterus. Right eye: No discharge. Left eye: No discharge. Conjunctiva/sclera: Conjunctivae normal.      Pupils: Pupils are equal, round, and reactive to light. Neck:      Thyroid: No thyromegaly. Vascular: No JVD. Cardiovascular:      Rate and Rhythm: Normal rate and regular rhythm. Heart sounds: No murmur heard. No friction rub. No gallop. Pulmonary:      Effort: Pulmonary effort is normal. No respiratory distress. Breath sounds: Normal breath sounds. No stridor. No wheezing.    Chest: Chest wall: No tenderness. Musculoskeletal:         General: Normal range of motion. Cervical back: Normal range of motion and neck supple. Skin:     General: Skin is warm and dry. Coloration: Skin is not pale. Findings: No erythema or rash. Neurological:      Mental Status: She is alert and oriented to person, place, and time. Psychiatric:         Behavior: Behavior normal.         Thought Content:  Thought content normal.         Judgment: Judgment normal.            Results for orders placed or performed during the hospital encounter of 08/04/21   Hepatic Function Panel   Result Value Ref Range    Albumin 3.5 3.5 - 5.1 g/dL    Total Bilirubin 0.6 0.3 - 1.2 mg/dL    Bilirubin, Direct <0.2 0.0 - 0.3 mg/dL    Alkaline Phosphatase 85 38 - 126 U/L    AST 31 5 - 40 U/L    ALT 14 11 - 66 U/L    Total Protein 7.1 6.1 - 8.0 g/dL   POC PANEL BMP W/IOCA   Result Value Ref Range    Sodium, Whole Blood 141 138 - 146 meq/l    Potassium, Whole Blood 3.8 3.5 - 4.9 meq/l    Chloride, Whole Blood 103 98 - 109 meq/l    TOTAL CO2, WHOLE BLOOD 27 23 - 33 meq/l    Glucose, Whole Blood 140 (H) 70 - 108 mg/dl    BUN, WHOLE BLOOD 11 8 - 26 mg/dl    CREATININE, WHOLE BLOOD 0.6 0.5 - 1.2 mg/dl    Ionized Calcium, WB 1.24 1.12 - 1.32 mmol/L   CBC Auto Differential   Result Value Ref Range    WBC 8.3 4.8 - 10.8 thou/mm3    RBC 5.06 4.20 - 5.40 mill/mm3    Hemoglobin 14.0 12.0 - 16.0 gm/dl    Hematocrit 44.8 37.0 - 47.0 %    MCV 89 81 - 99 fL    MCH 27.7 26.0 - 33.0 pg    MCHC 31.3 (L) 32.2 - 35.5 gm/dl    RDW 19.7 (H) 11.5 - 14.5 %    Platelets 419 585 - 172 thou/mm3    MPV 10.6 9.4 - 12.4 fL    Seg Neutrophils 43 43 - 75 %    Lymphocytes 33 15 - 47 %    Monocytes 15 (H) 0 - 12 %    Eosinophils % 8 (H) 0 - 4 %    BASINOPHIL, AUTOMATED 1 0 - 3 %    Immature Granulocytes 0 %    Segs Absolute 3.6 1 - 7 thou/mm3    Lymphocytes Absolute 2.7 1.0 - 4.8 thou/mm3    Monocytes Absolute 1.3 0.4 - 1.3 thou/mm3 Eosinophils Absolute 0.7 (H) 0.0 - 0.4 thou/mm3    Basophils Absolute 0.1 0.0 - 0.1 thou/mm3    Absolute Immature Granulocyte 0.02 0.00 - 0.07 thou/mm3   Glomerular Filtration Rate, Estimated   Result Value Ref Range    GFR, Estimated > 90 ml/min/1.73m2       Assessment:     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 Schedule Natalee Ruiz for debridement of right chest wall seroma  2. The risks, benefits and alternatives were discussed with Natalee Ruiz. She understands and wishes to proceed with surgical intervention. 3. Restrictions discussed with Natalee Ruiz and she expresses understanding. 4. She is advised to call back directly if there are further questions/concerns, or if her symptoms worsen prior to surgery.             Electronicallysigned by Gume Schmidt MD on 8/31/2021 at 10:27 AM

## 2021-09-08 ENCOUNTER — ANESTHESIA (OUTPATIENT)
Dept: OPERATING ROOM | Age: 71
End: 2021-09-08
Payer: MEDICARE

## 2021-09-08 ENCOUNTER — ANESTHESIA EVENT (OUTPATIENT)
Dept: OPERATING ROOM | Age: 71
End: 2021-09-08
Payer: MEDICARE

## 2021-09-08 ENCOUNTER — HOSPITAL ENCOUNTER (OUTPATIENT)
Age: 71
Setting detail: OUTPATIENT SURGERY
Discharge: HOME | End: 2021-09-08
Attending: SURGERY | Admitting: SURGERY
Payer: MEDICARE

## 2021-09-08 VITALS
OXYGEN SATURATION: 98 % | SYSTOLIC BLOOD PRESSURE: 145 MMHG | TEMPERATURE: 95.9 F | DIASTOLIC BLOOD PRESSURE: 75 MMHG | RESPIRATION RATE: 18 BRPM

## 2021-09-08 VITALS
HEART RATE: 69 BPM | TEMPERATURE: 97.2 F | OXYGEN SATURATION: 94 % | RESPIRATION RATE: 12 BRPM | SYSTOLIC BLOOD PRESSURE: 121 MMHG | DIASTOLIC BLOOD PRESSURE: 58 MMHG

## 2021-09-08 DIAGNOSIS — N64.89 SEROMA OF BREAST: Primary | ICD-10-CM

## 2021-09-08 PROCEDURE — 3700000000 HC ANESTHESIA ATTENDED CARE: Performed by: SURGERY

## 2021-09-08 PROCEDURE — 2720000010 HC SURG SUPPLY STERILE: Performed by: SURGERY

## 2021-09-08 PROCEDURE — 3600000002 HC SURGERY LEVEL 2 BASE: Performed by: SURGERY

## 2021-09-08 PROCEDURE — 10140 I&D HMTMA SEROMA/FLUID COLLJ: CPT | Performed by: SURGERY

## 2021-09-08 PROCEDURE — 7100000001 HC PACU RECOVERY - ADDTL 15 MIN: Performed by: SURGERY

## 2021-09-08 PROCEDURE — 3600000012 HC SURGERY LEVEL 2 ADDTL 15MIN: Performed by: SURGERY

## 2021-09-08 PROCEDURE — 6360000002 HC RX W HCPCS: Performed by: SURGERY

## 2021-09-08 PROCEDURE — 2580000003 HC RX 258

## 2021-09-08 PROCEDURE — 2500000003 HC RX 250 WO HCPCS: Performed by: NURSE ANESTHETIST, CERTIFIED REGISTERED

## 2021-09-08 PROCEDURE — 2709999900 HC NON-CHARGEABLE SUPPLY: Performed by: SURGERY

## 2021-09-08 PROCEDURE — 6360000002 HC RX W HCPCS: Performed by: NURSE ANESTHETIST, CERTIFIED REGISTERED

## 2021-09-08 PROCEDURE — 7100000010 HC PHASE II RECOVERY - FIRST 15 MIN: Performed by: SURGERY

## 2021-09-08 PROCEDURE — 7100000000 HC PACU RECOVERY - FIRST 15 MIN: Performed by: SURGERY

## 2021-09-08 PROCEDURE — 3700000001 HC ADD 15 MINUTES (ANESTHESIA): Performed by: SURGERY

## 2021-09-08 PROCEDURE — 7100000011 HC PHASE II RECOVERY - ADDTL 15 MIN: Performed by: SURGERY

## 2021-09-08 RX ORDER — HYDRALAZINE HYDROCHLORIDE 20 MG/ML
5 INJECTION INTRAMUSCULAR; INTRAVENOUS EVERY 10 MIN PRN
Status: DISCONTINUED | OUTPATIENT
Start: 2021-09-08 | End: 2021-09-08 | Stop reason: HOSPADM

## 2021-09-08 RX ORDER — KETAMINE HCL IN NACL, ISO-OSM 100MG/10ML
SYRINGE (ML) INJECTION PRN
Status: DISCONTINUED | OUTPATIENT
Start: 2021-09-08 | End: 2021-09-08 | Stop reason: SDUPTHER

## 2021-09-08 RX ORDER — MEPERIDINE HYDROCHLORIDE 25 MG/ML
12.5 INJECTION INTRAMUSCULAR; INTRAVENOUS; SUBCUTANEOUS EVERY 5 MIN PRN
Status: DISCONTINUED | OUTPATIENT
Start: 2021-09-08 | End: 2021-09-08 | Stop reason: HOSPADM

## 2021-09-08 RX ORDER — LABETALOL 20 MG/4 ML (5 MG/ML) INTRAVENOUS SYRINGE
5 4 TIMES DAILY PRN
Status: DISCONTINUED | OUTPATIENT
Start: 2021-09-08 | End: 2021-09-08 | Stop reason: HOSPADM

## 2021-09-08 RX ORDER — ONDANSETRON 2 MG/ML
INJECTION INTRAMUSCULAR; INTRAVENOUS PRN
Status: DISCONTINUED | OUTPATIENT
Start: 2021-09-08 | End: 2021-09-08 | Stop reason: SDUPTHER

## 2021-09-08 RX ORDER — OXYCODONE HYDROCHLORIDE AND ACETAMINOPHEN 5; 325 MG/1; MG/1
1 TABLET ORAL EVERY 6 HOURS PRN
Qty: 16 TABLET | Refills: 0 | Status: SHIPPED | OUTPATIENT
Start: 2021-09-08 | End: 2021-09-11

## 2021-09-08 RX ORDER — MIDAZOLAM HYDROCHLORIDE 1 MG/ML
INJECTION INTRAMUSCULAR; INTRAVENOUS PRN
Status: DISCONTINUED | OUTPATIENT
Start: 2021-09-08 | End: 2021-09-08 | Stop reason: SDUPTHER

## 2021-09-08 RX ORDER — SODIUM CHLORIDE 9 MG/ML
INJECTION, SOLUTION INTRAVENOUS CONTINUOUS
Status: DISCONTINUED | OUTPATIENT
Start: 2021-09-08 | End: 2021-09-08 | Stop reason: HOSPADM

## 2021-09-08 RX ORDER — ONDANSETRON 2 MG/ML
4 INJECTION INTRAMUSCULAR; INTRAVENOUS
Status: DISCONTINUED | OUTPATIENT
Start: 2021-09-08 | End: 2021-09-08 | Stop reason: HOSPADM

## 2021-09-08 RX ORDER — FENTANYL CITRATE 50 UG/ML
50 INJECTION, SOLUTION INTRAMUSCULAR; INTRAVENOUS EVERY 5 MIN PRN
Status: DISCONTINUED | OUTPATIENT
Start: 2021-09-08 | End: 2021-09-08 | Stop reason: HOSPADM

## 2021-09-08 RX ORDER — FENTANYL CITRATE 50 UG/ML
INJECTION, SOLUTION INTRAMUSCULAR; INTRAVENOUS PRN
Status: DISCONTINUED | OUTPATIENT
Start: 2021-09-08 | End: 2021-09-08 | Stop reason: SDUPTHER

## 2021-09-08 RX ORDER — LIDOCAINE HYDROCHLORIDE 20 MG/ML
INJECTION, SOLUTION INFILTRATION; PERINEURAL PRN
Status: DISCONTINUED | OUTPATIENT
Start: 2021-09-08 | End: 2021-09-08 | Stop reason: SDUPTHER

## 2021-09-08 RX ORDER — DIPHENHYDRAMINE HYDROCHLORIDE 50 MG/ML
12.5 INJECTION INTRAMUSCULAR; INTRAVENOUS
Status: DISCONTINUED | OUTPATIENT
Start: 2021-09-08 | End: 2021-09-08 | Stop reason: HOSPADM

## 2021-09-08 RX ORDER — MORPHINE SULFATE 2 MG/ML
2 INJECTION, SOLUTION INTRAMUSCULAR; INTRAVENOUS EVERY 5 MIN PRN
Status: DISCONTINUED | OUTPATIENT
Start: 2021-09-08 | End: 2021-09-08 | Stop reason: HOSPADM

## 2021-09-08 RX ADMIN — Medication 10 MG: at 12:55

## 2021-09-08 RX ADMIN — SODIUM CHLORIDE: 9 INJECTION, SOLUTION INTRAVENOUS at 11:07

## 2021-09-08 RX ADMIN — CEFAZOLIN SODIUM 2000 MG: 10 INJECTION, POWDER, FOR SOLUTION INTRAVENOUS at 12:33

## 2021-09-08 RX ADMIN — PROPOFOL 140 MG: 10 INJECTION, EMULSION INTRAVENOUS at 12:26

## 2021-09-08 RX ADMIN — FENTANYL CITRATE 25 MCG: 50 INJECTION, SOLUTION INTRAMUSCULAR; INTRAVENOUS at 13:30

## 2021-09-08 RX ADMIN — FENTANYL CITRATE 75 MCG: 50 INJECTION, SOLUTION INTRAMUSCULAR; INTRAVENOUS at 12:26

## 2021-09-08 RX ADMIN — ONDANSETRON HYDROCHLORIDE 4 MG: 4 INJECTION, SOLUTION INTRAMUSCULAR; INTRAVENOUS at 12:22

## 2021-09-08 RX ADMIN — MIDAZOLAM 1 MG: 1 INJECTION INTRAMUSCULAR; INTRAVENOUS at 12:22

## 2021-09-08 RX ADMIN — Medication 5 MG: at 13:05

## 2021-09-08 RX ADMIN — LIDOCAINE HYDROCHLORIDE 80 MG: 20 INJECTION, SOLUTION INFILTRATION; PERINEURAL at 12:26

## 2021-09-08 RX ADMIN — Medication 10 MG: at 12:44

## 2021-09-08 ASSESSMENT — PULMONARY FUNCTION TESTS
PIF_VALUE: 8
PIF_VALUE: 6
PIF_VALUE: 8
PIF_VALUE: 7
PIF_VALUE: 4
PIF_VALUE: 11
PIF_VALUE: 3
PIF_VALUE: 1
PIF_VALUE: 3
PIF_VALUE: 12
PIF_VALUE: 0
PIF_VALUE: 0
PIF_VALUE: 9
PIF_VALUE: 0
PIF_VALUE: 12
PIF_VALUE: 2
PIF_VALUE: 3
PIF_VALUE: 9
PIF_VALUE: 4
PIF_VALUE: 7
PIF_VALUE: 4
PIF_VALUE: 9
PIF_VALUE: 9
PIF_VALUE: 0
PIF_VALUE: 9
PIF_VALUE: 1
PIF_VALUE: 13
PIF_VALUE: 12
PIF_VALUE: 11
PIF_VALUE: 7
PIF_VALUE: 11
PIF_VALUE: 4
PIF_VALUE: 9
PIF_VALUE: 9
PIF_VALUE: 11
PIF_VALUE: 4
PIF_VALUE: 4
PIF_VALUE: 7
PIF_VALUE: 10
PIF_VALUE: 0
PIF_VALUE: 11
PIF_VALUE: 9
PIF_VALUE: 3
PIF_VALUE: 12
PIF_VALUE: 11
PIF_VALUE: 4
PIF_VALUE: 6
PIF_VALUE: 4
PIF_VALUE: 4
PIF_VALUE: 3
PIF_VALUE: 4
PIF_VALUE: 7
PIF_VALUE: 3
PIF_VALUE: 11
PIF_VALUE: 1
PIF_VALUE: 12
PIF_VALUE: 9
PIF_VALUE: 5
PIF_VALUE: 11
PIF_VALUE: 4
PIF_VALUE: 11
PIF_VALUE: 4
PIF_VALUE: 7
PIF_VALUE: 4
PIF_VALUE: 3
PIF_VALUE: 4
PIF_VALUE: 11
PIF_VALUE: 4
PIF_VALUE: 10
PIF_VALUE: 8
PIF_VALUE: 0
PIF_VALUE: 8
PIF_VALUE: 11
PIF_VALUE: 4
PIF_VALUE: 0

## 2021-09-08 ASSESSMENT — PAIN SCALES - GENERAL
PAINLEVEL_OUTOF10: 0
PAINLEVEL_OUTOF10: 0

## 2021-09-08 ASSESSMENT — ENCOUNTER SYMPTOMS: SHORTNESS OF BREATH: 1

## 2021-09-08 NOTE — BRIEF OP NOTE
Brief Postoperative Note      Patient: Paris Prieto  YOB: 1950  MRN: [id number]    Date of Procedure: 9/8/2021    Pre-Op Diagnosis: CHEST WALL SEROMA    Post-Op Diagnosis: same       Procedure(s):  DEBRIDEMENT CHEST WALL SEROMA    Surgeon(s):  Nakul Fuentes MD    Assistant:      Anesthesia: General    Estimated Blood Loss (NB):48 ml    Complications: none    Specimens:       Implants:        Drains:   Closed/Suction Drain Right RUQ Bulb 15 Uzbek (Active)       Findings: see op note    Electronically signed by Nakul Fuentes MD on 9/8/2021 at 1:21 PM

## 2021-09-08 NOTE — PROGRESS NOTES
ADMITTED TO [facility] AND ORIENTED TO UNIT. SCDS ON. FALL AND ALLERGY BANDS ON. PT VERBALIZED APPROVAL FOR FIRST NAME, LAST INITIAL AND PHYSICIAN NAME ON UNIT WHITEBOARD.

## 2021-09-08 NOTE — ANESTHESIA POSTPROCEDURE EVALUATION
Department of Anesthesiology  Postprocedure Note    Patient: Willy Holcomb  MRN: [id number]  YOB: 1950  Date of evaluation: 9/8/2021  Time:  3:17 PM     Procedure Summary     Date: 09/08/21 Room / Location: [facility] 01 / Dane Perez    Anesthesia Start: 1218 Anesthesia Stop: 1343    Procedure: DEBRIDEMENT CHEST WALL SEROMA (N/A Chest) Diagnosis: (CHEST WALL SEROMA)    Surgeons: Amber Lopez MD Responsible Provider: Alfredito Love MD    Anesthesia Type: general ASA Status: 2          Anesthesia Type: general    Lisa Phase I: Lisa Score: 6    Lisa Phase II:      Last vitals: Reviewed and per EMR flowsheets.        Anesthesia Post Evaluation    Complications: no  Cardiovascular status: hemodynamically stable  Respiratory status: acceptable

## 2021-09-08 NOTE — PROGRESS NOTES
Discharge instructions discussed with the patient and family. VErbalized understanding. Discharge complete. Off unit by w/c to car.

## 2021-09-08 NOTE — PROGRESS NOTES
1340- pt received to pacu, drowsy, responsive, vss.     1350- pt with frequent coughing producing moderate amount of thick cream colored mucous. 1355- pt resting in bed, eyes closed, resp easy, unlabored. Continues to deny pain. 1405- pt resting comfortably in bed, resp easy, encouraging frequent cough and deep breathing to help wean oxygen. 1410- pt tolerating ice chips and sips, denies nausea. 1422- pt sitting up in bed, eyes open, resp easy, continue to wean oxygen as able. tolerating sips of water, denies pain, nausea. 1430- pt meets criteria for discharge from pacu. [facility] Friend of patient waiting at the bedside upon return to [address] [place] [address].  Report given to [facility]

## 2021-09-08 NOTE — ANESTHESIA PRE PROCEDURE
mouth every 7 days    Historical Provider, MD       Current medications:    Current Facility-Administered Medications   Medication Dose Route Frequency Provider Last Rate Last Admin    ceFAZolin (ANCEF) 2000 mg in dextrose 5 % 50 mL IVPB  2,000 mg IntraVENous 30 Min Pre-Op Mary Kay Stanford MD        0.9 % sodium chloride infusion   IntraVENous Continuous Linda Steiner  mL/hr at 85/29/69 1107 New Bag at 09/08/21 1107       Allergies:     Allergies   Allergen Reactions    Aluminum-Containing Compounds Anaphylaxis    Scopolamine      Other reaction(s): Dizziness    Adhesive Tape Other (See Comments)     Takes top layer of skin off       Problem List:    Patient Active Problem List   Diagnosis Code    Left leg numbness R20.0    Mental status alteration R41.82    Cognitive impairment  judgement  recent memory R41.89    Depression F32.9    Vitamin D deficiency E55.9    Altered mental status R41.82    Malignant neoplasm of upper-inner quadrant of left breast in female, estrogen receptor positive (HCC) C50.212, Z17.0    Malignant neoplasm of upper-outer quadrant of left breast in female, estrogen receptor positive (HCC) C50.412, Z17.0    Chemotherapy management, encounter for Z51.11    Hypokalemia E87.6    COVID-19 virus infection U07.1    Acute respiratory failure with hypoxia (HCC) J96.01    COVID-19 U07.1    Pneumonia due to COVID-19 virus U07.1, J12.82    Hyponatremia E87.1    Leukocytosis D72.829    Hyperglycemia R73.9    High anion gap metabolic acidosis R05.3    History of left breast cancer Z85.3    Hyperlipidemia E78.5    History of hypothyroidism Z86.39    Chronic diarrhea K52.9    Severe malnutrition (HCC) E43    S/P mastectomy, bilateral Z90.13    HER2-positive carcinoma of right breast (HCC) C50.911    HER2-positive carcinoma of left breast (HCC) C50.912       Past Medical History:        Diagnosis Date    Breast cancer ([facility] [address] 2020    left-invasive ductal follows with  Sukhjinder PRIESTID-19 10/07/2020    wears o2 at night    Hyperlipidemia     Numbness and tingling     left foot-chronic nerve damage    Shortness of breath        Past Surgical History:        Procedure Laterality Date    ABDOMINAL EXPLORATION SURGERY  2014    Dr butterfield-lysis of adhesions    BREAST LUMPECTOMY Left 11/2015    Left breast lumpectomy, retroareolar with preoperative needle loc-Dr. Ennis Lanes BREAST SURGERY Left 06/26/2020    biopsy of left breast    CHOLECYSTECTOMY  10/2014    Dr David Bennett  03/30/2016    Dr Caryn Aschoff  10/2014    x4 abdominal wall    HYSTERECTOMY      INCISIONAL HERNIA REPAIR  2014    Dr Lupe Coello LIPECTOMY  2014    Dr Governor Muñiz LEFT  07/16/2020    [address] LEFT 7/16/2020 Yosef Padilla  [facility] Bilateral 11/30/2020    LEFT SIMPLE MASTECTOMY AND SENTINAL LYMPH NODE BIOPSY, RIGHT SIMPLE PROPHYLACTIC MASTECTOMY performed by Nakul Fuentes MD at [address] [place] [address]  08/17/2021    port removal done in the procedure room Dr Isidro Garcia Right 07/17/2020    [address] [place] [address] performed by Nakul Fuentes MD at [address] [facility]  [facility]      patient was 11years old    TUBAL LIGATION         Social History:    Social History     Tobacco Use    Smoking status: Never Smoker    Smokeless tobacco: Never Used   Substance Use Topics    Alcohol use: No     Alcohol/week: 0.0 standard drinks                                Counseling given: Not Answered      Vital Signs (Current):   Vitals:    09/08/21 1027 09/08/21 1027   BP: 120/66    Pulse: 69 69   Resp: 18    Temp: 98.1 °F (36.7 °C)    SpO2: 92% 95%                                              BP Readings from Last 3 Encounters:   09/08/21 120/66   08/31/21 127/72   08/17/21 130/80       NPO Status: Time of last liquid consumption: 0800 (sip with meds)                        Time of last solid consumption: 2230                        Date of last liquid consumption: 09/08/21                        Date of last solid food consumption: 09/07/21    BMI:   Wt Readings from Last 3 Encounters:   08/31/21 203 lb (92.1 kg)   08/17/21 206 lb (93.4 kg)   08/04/21 210 lb (95.3 kg)     There is no height or weight on file to calculate BMI.    CBC:   Lab Results   Component Value Date    WBC 8.3 08/04/2021    RBC 5.06 08/04/2021    HGB 14.0 08/04/2021    HCT 44.8 08/04/2021    MCV 89 08/04/2021    RDW 19.7 08/04/2021     08/04/2021       CMP:   Lab Results   Component Value Date     08/04/2021     12/01/2020    K 3.8 08/04/2021    K 4.6 12/01/2020    K 3.0 10/11/2020     12/01/2020    CO2 21 12/01/2020    BUN 12 12/01/2020    CREATININE 0.6 08/04/2021    CREATININE 0.5 12/01/2020    LABGLOM >90 12/01/2020    GLUCOSE 139 12/01/2020    PROT 7.1 08/04/2021    CALCIUM 9.3 12/01/2020    BILITOT 0.6 08/04/2021    ALKPHOS 85 08/04/2021    AST 31 08/04/2021    ALT 14 08/04/2021       POC Tests: No results for input(s): POCGLU, POCNA, POCK, POCCL, POCBUN, POCHEMO, POCHCT in the last 72 hours.     Coags:   Lab Results   Component Value Date    INR 0.86 02/08/2017       HCG (If Applicable): No results found for: PREGTESTUR, PREGSERUM, HCG, HCGQUANT     ABGs: No results found for: PHART, PO2ART, DFH7BKC, DFH3XKI, BEART, J6NIKYAZ     Type & Screen (If Applicable):  Lab Results   Component Value Date    LABRH POS 10/10/2020       Drug/Infectious Status (If Applicable):  No results found for: HIV, HEPCAB    COVID-19 Screening (If Applicable):   Lab Results   Component Value Date    COVID19 NOT DETECTED 11/23/2020           Anesthesia Evaluation    Airway: Mallampati: II        Dental:          Pulmonary:   (+) shortness of breath:                             Cardiovascular:                      Neuro/Psych:   (+) neuromuscular disease:, psychiatric history:            GI/Hepatic/Renal:             Endo/Other:                     Abdominal:             Vascular: Other Findings:             Anesthesia Plan      general     ASA 2       Induction: intravenous. Anesthetic plan and risks discussed with patient. Plan discussed with CRNA.                   Noa Zamora MD   9/8/2021

## 2021-09-08 NOTE — OP NOTE
[address] [place] [address]                                OPERATIVE REPORT    PATIENT NAME: Myrna Dawkins                     :        1950  MED REC NO:   [id number]                           ROOM:  ACCOUNT NO:   [de-identified]                           ADMIT DATE: 2021  PROVIDER:     Maru Whitlock. Kareen Renee MD    DATE OF PROCEDURE:  2021    PREOPERATIVE DIAGNOSIS:  Chronic seroma of the right chest wall post  right mastectomy. POSTOPERATIVE DIAGNOSIS:  Chronic seroma of the right chest wall post  right mastectomy. OPERATIONS:  1.  Evacuation of chronic seroma. 2.  Excisional debridement of skin and subcutaneous tissue, 3 x 4 cm. SURGEON:  Maru Whitlock. MD Porsha    ANESTHESIA:  General.    COMPLICATIONS:  None. BLOOD LOSS:  10 mL. INDICATIONS FOR PROCEDURE:  The patient is a 66-year-old white female  who has a chronic seroma of the right chest wall status post mastectomy. The patient is here for debridement of same and capsulotomy. FINDINGS:  The patient did have a capsule surrounding the seroma, it was  debrided off the subcutaneous tissue. A drain was placed. DESCRIPTION OF PROCEDURE:  The patient was brought to the operating  suite, placed supine on the operating room table. After adequate  inhalational anesthesia was administered, the patient's right chest wall  was prepped and draped in the usual sterile fashion. An incision was  made through the previously placed incision right over the capsule and  we incised the capsule and had a fair amount of fluid, probably 30 mL,  under pressure come rolling out. We suctioned this out. There was a  clear-cut capsule as expected. There was some old clot that was  evacuated out.   At this point in time, we began to debride the capsule  off the subcutaneous tissue both inferior and superior, and we did  debride some of the base of the wound, although some of the capsule was  left as it was quite adherent to the muscle. We then irrigated with  Irrisept. We used electrocautery to obtain hemostasis and then we  placed Evicel into the wound. I placed a 15-mm Shubham drain through a  separate stab wound, sutured it into place and interrupted 3-0 Vicryl  was used to close the subcutaneous and running 4-0 Vicryl was used to  close the skin. Steri-Strips were applied. CHELSEA KINSEY [facility] TREATMENT [facility], MD    D: 09/08/2021 13:49:49       T: 09/08/2021 13:53:23     [email or web address]  Job#: [id number]     Doc#: [id number]    CC:

## 2021-09-09 ENCOUNTER — TELEPHONE (OUTPATIENT)
Dept: SURGERY | Age: 71
End: 2021-09-09

## 2021-09-09 NOTE — TELEPHONE ENCOUNTER
Pt states she is feeling well this morning after surgery. Pt states she is urinating with no issues, and will take stool softeners/laxatives if needed for BM. Pt states that incisions are clean, dry and intact- denies any s/sx of infection. NOHELIA drain is intact. Advised to call the office with any questions or concerns.

## 2021-09-16 ENCOUNTER — OFFICE VISIT (OUTPATIENT)
Dept: SURGERY | Age: 71
End: 2021-09-16

## 2021-09-16 VITALS
SYSTOLIC BLOOD PRESSURE: 117 MMHG | BODY MASS INDEX: 30.07 KG/M2 | DIASTOLIC BLOOD PRESSURE: 72 MMHG | RESPIRATION RATE: 15 BRPM | HEART RATE: 87 BPM | TEMPERATURE: 97.3 F | WEIGHT: 203 LBS | HEIGHT: 69 IN | OXYGEN SATURATION: 96 %

## 2021-09-16 DIAGNOSIS — Z09 POSTOPERATIVE EXAMINATION: Primary | ICD-10-CM

## 2021-09-16 PROCEDURE — 99024 POSTOP FOLLOW-UP VISIT: CPT | Performed by: NURSE PRACTITIONER

## 2021-09-16 ASSESSMENT — ENCOUNTER SYMPTOMS
EYE DISCHARGE: 0
DIARRHEA: 0
EYE ITCHING: 0
RECTAL PAIN: 0
EYE PAIN: 0
BACK PAIN: 0
COUGH: 0
BLOOD IN STOOL: 0
CHEST TIGHTNESS: 0
CONSTIPATION: 0
ABDOMINAL DISTENTION: 0
FACIAL SWELLING: 0
VOMITING: 0
COLOR CHANGE: 0
SINUS PRESSURE: 0
STRIDOR: 0
RHINORRHEA: 0
EYE REDNESS: 0
PHOTOPHOBIA: 0
VOICE CHANGE: 0
SHORTNESS OF BREATH: 0
ANAL BLEEDING: 0
CHOKING: 0
WHEEZING: 0
APNEA: 0
NAUSEA: 0
TROUBLE SWALLOWING: 0
SINUS PAIN: 0
ABDOMINAL PAIN: 0
SORE THROAT: 0

## 2021-09-16 NOTE — PROGRESS NOTES
[address] [place] [address]  Dept: [phone]  Dept Fax: [phone]  Loc: [phone]    Visit Date: 9/16/2021       Chema Young is a 70 y.o. female who presents today for:  Chief Complaint   Patient presents with    Post-Op Check     s/p 9/8 DEBRIDEMENT CHEST WALL SEROMA/NOHELIA DRAIN       HPI:     Rey Monday presents today for a post op check. Today She has no complaints today. The NOHELIA drain outputs were reviewed, drain was removed at visit, tolerated well. Steri strips in incision is intact, did not removed. Clinically looks good otherwise. No pain medication required. No new needs, follow up in one week for evaluation of seroma and incision. Call with any questions or concerns.         Past Medical History:   Diagnosis Date    Breast cancer ([facility] [address] 2020    left-invasive ductal follows with Dr Gerson Flores COVID-19 10/07/2020    wears o2 at night    Hyperlipidemia     Numbness and tingling     left foot-chronic nerve damage    Shortness of breath       Past Surgical History:   Procedure Laterality Date    ABDOMINAL EXPLORATION SURGERY  2014    Dr butterfield-lysis of adhesions    BREAST LUMPECTOMY Left 11/2015    Left breast lumpectomy, retroareolar with preoperative needle loc-Dr. Salas Session BREAST SURGERY Left 06/26/2020    biopsy of left breast    CHEST WALL RESECTION N/A 9/8/2021    DEBRIDEMENT CHEST WALL SEROMA performed by Milvia Renteria MD at [address] [place] [address]  10/2014    Dr Jerry Dockery  03/30/2016    Dr Yariel Cast  10/2014    x4 abdominal wall    HYSTERECTOMY      INCISIONAL HERNIA REPAIR  2014    Dr Jaycee Pruitt LIPECTOMY  2014    Dr Hammad Bobo  07/16/2020    [address] LEFT 7/16/2020 [facility]  [facility] Bilateral 11/30/2020    LEFT SIMPLE MASTECTOMY AND SENTINAL LYMPH NODE BIOPSY, RIGHT SIMPLE PROPHYLACTIC MASTECTOMY performed by Adelso Yanez MD at [address] [facility]  08/17/2021    port removal done in the procedure room Dr Reinier Banegas Right 07/17/2020    SINGLE LUMEN SMART PORT INSERTION RIGHT SUBCLAVIAN performed by Adelso Yanez MD at [address]  10/2014    [facility]      patient was 11years old    TUBAL LIGATION       Family History   Problem Relation Age of Onset    Heart Disease Father         cath stent blood to thin and bled    Heart Attack Mother     Other Other         blood clot    Breast Cancer Paternal Aunt 62    High Blood Pressure Sister     Cancer Brother 68        skin    Prostate Cancer Brother         had chemotherapy to treat     Prostate Cancer Brother 64        has had for 10 years    Ovarian Cancer Neg Hx     Diabetes Neg Hx     Stroke Neg Hx      Social History     Tobacco Use    Smoking status: Never Smoker    Smokeless tobacco: Never Used   Substance Use Topics    Alcohol use: No     Alcohol/week: 0.0 standard drinks        Current Outpatient Medications   Medication Sig Dispense Refill    rosuvastatin (CRESTOR) 5 MG tablet       anastrozole (ARIMIDEX) 1 MG tablet Take 1 tablet by mouth daily 30 tablet 3    vitamin C (VITAMIN C) 500 MG tablet Take 1 tablet by mouth daily 30 tablet 0    levothyroxine (SYNTHROID) 25 MCG tablet Take 50 mcg by mouth Daily Take 50 MG for 6 weeks per PCP.       alendronate (FOSAMAX) 35 MG tablet Take 35 mg by mouth every 7 days      aspirin EC 81 MG EC tablet Take 1 tablet by mouth daily 30 tablet 3    PARoxetine (PAXIL) 10 MG tablet Take 10 mg by mouth every morning      Coenzyme Q10-Fish Oil-Vit E (CO-Q 10 OMEGA-3 FISH OIL PO) Take by mouth daily      Vitamin D (CHOLECALCIFEROL) 1000 UNITS CAPS capsule Take by mouth daily 2 tab      Cinnamon 500 MG CAPS Take by mouth daily      Cyanocobalamin (VITAMIN B12 PO) Take 1,000 mcg by mouth daily       ondansetron (ZOFRAN ODT) 4 MG disintegrating tablet Take 1 tablet by mouth every 8 hours as needed for Nausea (Patient not taking: Reported on 9/16/2021) 20 tablet 3    loperamide (IMODIUM) 2 MG capsule Take 2 mg by mouth 4 times daily as needed for Diarrhea (Patient not taking: Reported on 9/16/2021)      lidocaine-prilocaine (EMLA) 2.5-2.5 % cream Apply topically as needed. (Patient not taking: Reported on 9/16/2021) 30 g 1     No current facility-administered medications for this visit. Allergies   Allergen Reactions    Aluminum-Containing Compounds Anaphylaxis    Scopolamine      Other reaction(s): Dizziness    Adhesive Tape Other (See Comments)     Takes top layer of skin off       Subjective:      Review of Systems   Constitutional: Negative for activity change, appetite change, chills, diaphoresis, fatigue, fever and unexpected weight change. HENT: Negative for congestion, dental problem, drooling, ear discharge, ear pain, facial swelling, hearing loss, mouth sores, nosebleeds, postnasal drip, rhinorrhea, sinus pressure, sinus pain, sneezing, sore throat, tinnitus, trouble swallowing and voice change. Eyes: Negative for photophobia, pain, discharge, redness, itching and visual disturbance. Respiratory: Negative for apnea, cough, choking, chest tightness, shortness of breath, wheezing and stridor. Cardiovascular: Negative for chest pain, palpitations and leg swelling. Gastrointestinal: Negative for abdominal distention, abdominal pain, anal bleeding, blood in stool, constipation, diarrhea, nausea, rectal pain and vomiting. Endocrine: Negative for cold intolerance, heat intolerance, polydipsia, polyphagia and polyuria.    Genitourinary: Negative for decreased urine volume, difficulty urinating, dyspareunia, dysuria, enuresis, flank pain, frequency, genital sores, hematuria, menstrual problem, pelvic pain, urgency, vaginal bleeding, vaginal discharge and vaginal pain. Musculoskeletal: Negative for arthralgias, back pain, gait problem, joint swelling, myalgias, neck pain and neck stiffness. Skin: Positive for wound (right breast incision clean, dry and intact). Negative for color change, pallor and rash. Allergic/Immunologic: Negative for environmental allergies, food allergies and immunocompromised state. Neurological: Negative for dizziness, tremors, seizures, syncope, facial asymmetry, speech difficulty, weakness, light-headedness, numbness and headaches. Hematological: Negative for adenopathy. Does not bruise/bleed easily. Psychiatric/Behavioral: Negative for agitation, behavioral problems, confusion, decreased concentration, dysphoric mood, hallucinations, self-injury, sleep disturbance and suicidal ideas. The patient is not nervous/anxious and is not hyperactive. Objective:     /72 (Site: Right Lower Arm, Position: Sitting, Cuff Size: Medium Adult)   Pulse 87   Temp 97.3 °F (36.3 °C) (Tympanic)   Resp 15   Ht 5' 9\" (1.753 m)   Wt 203 lb (92.1 kg)   SpO2 96%   BMI 29.98 kg/m²     Wt Readings from Last 3 Encounters:   09/16/21 203 lb (92.1 kg)   08/31/21 203 lb (92.1 kg)   08/17/21 206 lb (93.4 kg)       Physical Exam  Vitals reviewed. Constitutional:       Appearance: She is well-developed. HENT:      Head: Normocephalic. Eyes:      Pupils: Pupils are equal, round, and reactive to light. Cardiovascular:      Rate and Rhythm: Normal rate. Pulmonary:      Effort: Pulmonary effort is normal.   Chest:       Abdominal:      Palpations: Abdomen is soft. Musculoskeletal:         General: Normal range of motion. Cervical back: Normal range of motion. Skin:     General: Skin is warm and dry. Neurological:      Mental Status: She is alert and oriented to person, place, and time. Assessment/Plan:     Ashlee Sánchez was seen today for post-op check.     Diagnoses and all orders for this visit:    Postoperative examination        Return in 1 week (on 9/23/2021). Patient Instructions     Call back if fluid collection worsens         Discusseduse, benefit, and side effects of prescribed medications. All patient questionsanswered. Pt voiced understanding.      Electronically signed by ANNALISA Kathleen CNP on 9/16/2021 at 3:25 PM

## 2021-10-05 ENCOUNTER — OFFICE VISIT (OUTPATIENT)
Dept: SURGERY | Age: 71
End: 2021-10-05

## 2021-10-05 VITALS
TEMPERATURE: 97 F | HEART RATE: 67 BPM | SYSTOLIC BLOOD PRESSURE: 127 MMHG | HEIGHT: 69 IN | OXYGEN SATURATION: 98 % | BODY MASS INDEX: 30.07 KG/M2 | WEIGHT: 203 LBS | RESPIRATION RATE: 15 BRPM | DIASTOLIC BLOOD PRESSURE: 68 MMHG

## 2021-10-05 DIAGNOSIS — Z09 POSTOPERATIVE EXAMINATION: Primary | ICD-10-CM

## 2021-10-05 PROCEDURE — 99024 POSTOP FOLLOW-UP VISIT: CPT | Performed by: NURSE PRACTITIONER

## 2021-10-05 ASSESSMENT — ENCOUNTER SYMPTOMS
COUGH: 0
VOMITING: 0
WHEEZING: 0
APNEA: 0
DIARRHEA: 0
CONSTIPATION: 0
ABDOMINAL DISTENTION: 0
TROUBLE SWALLOWING: 0
ABDOMINAL PAIN: 0
PHOTOPHOBIA: 0
SHORTNESS OF BREATH: 0
RHINORRHEA: 0
BACK PAIN: 0
EYE DISCHARGE: 0
RECTAL PAIN: 0
CHOKING: 0
VOICE CHANGE: 0
BLOOD IN STOOL: 0
STRIDOR: 0
SINUS PAIN: 0
COLOR CHANGE: 0
EYE ITCHING: 0
EYE REDNESS: 0
SINUS PRESSURE: 0
SORE THROAT: 0
FACIAL SWELLING: 0
ANAL BLEEDING: 0
EYE PAIN: 0
CHEST TIGHTNESS: 0
NAUSEA: 0

## 2021-10-05 NOTE — PROGRESS NOTES
[address] [place] [address]  Dept: [phone]  Dept Fax: [phone]  Loc: [phone]    Visit Date: 10/5/2021       Yosi Swann is a 70 y.o. female who presents today for:  Chief Complaint   Patient presents with    Post-Op Check     1 week follow up-- s/p 9/8 1. Evacuation of chronic seroma. 2.  Excisional debridement of skin and subcutaneous tissue, 3 x 4 cm. HPI:     Miesha Cage presents today for a post op check. Today She has no complaints today. The drain was removed at last visit. The is no noted seroma today. The incision is approximated, steri strips are off. Denies pain at this time. No need for a follow up. Call if any questions or concerns.         Past Medical History:   Diagnosis Date    Breast cancer ([facility] [address] 2020    left-invasive ductal follows with Dr Dylan Toscano COVID-19 10/07/2020    wears o2 at night    Hyperlipidemia     Numbness and tingling     left foot-chronic nerve damage    Shortness of breath       Past Surgical History:   Procedure Laterality Date    ABDOMINAL EXPLORATION SURGERY  2014    Dr butterfield-lysis of adhesions    BREAST LUMPECTOMY Left 11/2015    Left breast lumpectomy, retroareolar with preoperative needle loc-Dr. Valeri Schilder BREAST SURGERY Left 06/26/2020    biopsy of left breast    CHEST WALL RESECTION N/A 9/8/2021    DEBRIDEMENT CHEST WALL SEROMA performed by Patterson Bosworth, MD at [address]   10/2014    Dr Rebeka Chauhan  03/30/2016    Dr Carpenter Pod  10/2014    x4 abdominal wall    HYSTERECTOMY      INCISIONAL HERNIA REPAIR  2014    Dr Erasmo Mojica LIPECTOMY  2014    Dr Ren Valdez LEFT  07/16/2020    [address] LEFT 7/16/2020 Rocael Hayes  [facility] Bilateral 11/30/2020    LEFT SIMPLE MASTECTOMY AND SENTINAL LYMPH NODE BIOPSY, RIGHT SIMPLE PROPHYLACTIC MASTECTOMY performed by Anusha Bowers MD at [address] [address]  08/17/2021    port removal done in the procedure room Dr Andra Fairchild 07/17/2020    SINGLE LUMEN SMART PORT INSERTION RIGHT SUBCLAVIAN performed by Anusha Bowers MD at [address]  10[facility]2014    [facility]      patient was 11years old    TUBAL LIGATION       Family History   Problem Relation Age of Onset    Heart Disease Father         cath stent blood to thin and bled    Heart Attack Mother     Other Other         blood clot    Breast Cancer Paternal Aunt 62    High Blood Pressure Sister     Cancer Brother 68        skin    Prostate Cancer Brother         had chemotherapy to treat     Prostate Cancer Brother 64        has had for 10 years    Ovarian Cancer Neg Hx     Diabetes Neg Hx     Stroke Neg Hx      Social History     Tobacco Use    Smoking status: Never Smoker    Smokeless tobacco: Never Used   Substance Use Topics    Alcohol use: No     Alcohol/week: 0.0 standard drinks        Current Outpatient Medications   Medication Sig Dispense Refill    rosuvastatin (CRESTOR) 5 MG tablet       anastrozole (ARIMIDEX) 1 MG tablet Take 1 tablet by mouth daily 30 tablet 3    vitamin C (VITAMIN C) 500 MG tablet Take 1 tablet by mouth daily 30 tablet 0    lidocaine-prilocaine (EMLA) 2.5-2.5 % cream Apply topically as needed. 30 g 1    levothyroxine (SYNTHROID) 25 MCG tablet Take 50 mcg by mouth Daily Take 50 MG for 6 weeks per PCP.       alendronate (FOSAMAX) 35 MG tablet Take 35 mg by mouth every 7 days      aspirin EC 81 MG EC tablet Take 1 tablet by mouth daily 30 tablet 3    PARoxetine (PAXIL) 10 MG tablet Take 10 mg by mouth every morning      Coenzyme Q10-Fish Oil-Vit E (CO-Q 10 OMEGA-3 FISH OIL PO) Take by mouth daily      Vitamin D (CHOLECALCIFEROL) 1000 UNITS CAPS capsule Take by mouth daily 2 tab      Cinnamon 500 MG CAPS Take by mouth daily      Cyanocobalamin (VITAMIN B12 PO) Take 1,000 mcg by mouth daily       ondansetron (ZOFRAN ODT) 4 MG disintegrating tablet Take 1 tablet by mouth every 8 hours as needed for Nausea (Patient not taking: Reported on 10/5/2021) 20 tablet 3    loperamide (IMODIUM) 2 MG capsule Take 2 mg by mouth 4 times daily as needed for Diarrhea (Patient not taking: Reported on 10/5/2021)       No current facility-administered medications for this visit. Allergies   Allergen Reactions    Aluminum-Containing Compounds Anaphylaxis    Scopolamine      Other reaction(s): Dizziness    Adhesive Tape Other (See Comments)     Takes top layer of skin off       Subjective:      Review of Systems   Constitutional: Negative for activity change, appetite change, chills, diaphoresis, fatigue, fever and unexpected weight change. HENT: Negative for congestion, dental problem, drooling, ear discharge, ear pain, facial swelling, hearing loss, mouth sores, nosebleeds, postnasal drip, rhinorrhea, sinus pressure, sinus pain, sneezing, sore throat, tinnitus, trouble swallowing and voice change. Eyes: Negative for photophobia, pain, discharge, redness, itching and visual disturbance. Respiratory: Negative for apnea, cough, choking, chest tightness, shortness of breath, wheezing and stridor. Cardiovascular: Negative for chest pain, palpitations and leg swelling. Gastrointestinal: Negative for abdominal distention, abdominal pain, anal bleeding, blood in stool, constipation, diarrhea, nausea, rectal pain and vomiting. Endocrine: Negative for cold intolerance, heat intolerance, polydipsia, polyphagia and polyuria. Genitourinary: Negative for decreased urine volume, difficulty urinating, dyspareunia, dysuria, enuresis, flank pain, frequency, genital sores, hematuria, menstrual problem, pelvic pain, urgency, vaginal bleeding, vaginal discharge and vaginal pain.    Musculoskeletal: Negative for arthralgias, back pain, gait problem, joint swelling, myalgias, neck pain and neck stiffness. Skin: Negative for color change, pallor, rash and wound. Allergic/Immunologic: Negative for environmental allergies, food allergies and immunocompromised state. Neurological: Negative for dizziness, tremors, seizures, syncope, facial asymmetry, speech difficulty, weakness, light-headedness, numbness and headaches. Hematological: Negative for adenopathy. Does not bruise/bleed easily. Psychiatric/Behavioral: Negative for agitation, behavioral problems, confusion, decreased concentration, dysphoric mood, hallucinations, self-injury, sleep disturbance and suicidal ideas. The patient is not nervous/anxious and is not hyperactive. Objective:     /68 (Site: Left Upper Arm, Position: Sitting, Cuff Size: Medium Adult)   Pulse 67   Temp 97 °F (36.1 °C) (Tympanic)   Resp 15   Ht 5' 9\" (1.753 m)   Wt 203 lb (92.1 kg)   SpO2 98%   BMI 29.98 kg/m²     Wt Readings from Last 3 Encounters:   10/05/21 203 lb (92.1 kg)   09/16/21 203 lb (92.1 kg)   08/31/21 203 lb (92.1 kg)       Physical Exam  Vitals reviewed. Constitutional:       Appearance: She is well-developed. HENT:      Head: Normocephalic. Eyes:      Pupils: Pupils are equal, round, and reactive to light. Cardiovascular:      Rate and Rhythm: Normal rate. Pulmonary:      Effort: Pulmonary effort is normal.   Chest:       Abdominal:      Palpations: Abdomen is soft. Musculoskeletal:         General: Normal range of motion. Cervical back: Normal range of motion. Skin:     General: Skin is warm and dry. Neurological:      Mental Status: She is alert and oriented to person, place, and time. Assessment/Plan:     Odalis Severino was seen today for post-op check. Diagnoses and all orders for this visit:    Postoperative examination        Return if symptoms worsen or fail to improve.     Patient Instructions     Call if any issues Discusseduse, benefit, and side effects of prescribed medications. All patient questionsanswered. Pt voiced understanding.      Electronically signed by ANNALISA Gilbert CNP on 10/5/2021 at 11:19 AM

## 2021-11-05 ENCOUNTER — HOSPITAL ENCOUNTER (OUTPATIENT)
Dept: INFUSION THERAPY | Age: 71
Discharge: HOME | End: 2021-11-05
Payer: MEDICARE

## 2021-11-05 ENCOUNTER — OFFICE VISIT (OUTPATIENT)
Dept: ONCOLOGY | Age: 71
End: 2021-11-05
Payer: MEDICARE

## 2021-11-05 VITALS
HEART RATE: 73 BPM | TEMPERATURE: 98.3 F | BODY MASS INDEX: 31.19 KG/M2 | HEIGHT: 69 IN | WEIGHT: 210.6 LBS | OXYGEN SATURATION: 95 % | RESPIRATION RATE: 18 BRPM | SYSTOLIC BLOOD PRESSURE: 125 MMHG | DIASTOLIC BLOOD PRESSURE: 60 MMHG

## 2021-11-05 DIAGNOSIS — Z17.0 ER+ (ESTROGEN RECEPTOR POSITIVE STATUS): ICD-10-CM

## 2021-11-05 DIAGNOSIS — Z51.81 VISIT FOR MONITORING ARIMIDEX THERAPY: ICD-10-CM

## 2021-11-05 DIAGNOSIS — C50.912 HER2-POSITIVE CARCINOMA OF LEFT BREAST (HCC): Primary | ICD-10-CM

## 2021-11-05 DIAGNOSIS — C50.912 HER2-POSITIVE CARCINOMA OF LEFT BREAST (HCC): ICD-10-CM

## 2021-11-05 DIAGNOSIS — Z90.13 STATUS POST BILATERAL MASTECTOMY: ICD-10-CM

## 2021-11-05 DIAGNOSIS — Z08 ENCOUNTER FOR FOLLOW-UP SURVEILLANCE OF BREAST CANCER: ICD-10-CM

## 2021-11-05 DIAGNOSIS — Z79.811 VISIT FOR MONITORING ARIMIDEX THERAPY: ICD-10-CM

## 2021-11-05 DIAGNOSIS — Z85.3 ENCOUNTER FOR FOLLOW-UP SURVEILLANCE OF BREAST CANCER: ICD-10-CM

## 2021-11-05 LAB
ABSOLUTE IMMATURE GRANULOCYTE: 0.03 THOU/MM3 (ref 0–0.07)
ALBUMIN SERPL-MCNC: 3.9 G/DL (ref 3.5–5.1)
ALP BLD-CCNC: 76 U/L (ref 38–126)
ALT SERPL-CCNC: 11 U/L (ref 11–66)
AST SERPL-CCNC: 25 U/L (ref 5–40)
BASINOPHIL, AUTOMATED: 1 % (ref 0–3)
BASOPHILS ABSOLUTE: 0.1 THOU/MM3 (ref 0–0.1)
BILIRUB SERPL-MCNC: 0.6 MG/DL (ref 0.3–1.2)
BILIRUBIN DIRECT: < 0.2 MG/DL (ref 0–0.3)
BUN, WHOLE BLOOD: 15 MG/DL (ref 8–26)
CHLORIDE, WHOLE BLOOD: 105 MEQ/L (ref 98–109)
CREATININE, WHOLE BLOOD: 0.5 MG/DL (ref 0.5–1.2)
EOSINOPHILS ABSOLUTE: 0.3 THOU/MM3 (ref 0–0.4)
EOSINOPHILS RELATIVE PERCENT: 4 % (ref 0–4)
GFR, ESTIMATED: > 90 ML/MIN/1.73M2
GLUCOSE, WHOLE BLOOD: 143 MG/DL (ref 70–108)
HCT VFR BLD CALC: 49.5 % (ref 37–47)
HEMOGLOBIN: 15.6 GM/DL (ref 12–16)
IMMATURE GRANULOCYTES: 0 %
IONIZED CALCIUM, WHOLE BLOOD: 1.23 MMOL/L (ref 1.12–1.32)
LYMPHOCYTES # BLD: 45 % (ref 15–47)
LYMPHOCYTES ABSOLUTE: 3.2 THOU/MM3 (ref 1–4.8)
MCH RBC QN AUTO: 28.9 PG (ref 26–33)
MCHC RBC AUTO-ENTMCNC: 31.5 GM/DL (ref 32.2–35.5)
MCV RBC AUTO: 92 FL (ref 81–99)
MONOCYTES ABSOLUTE: 1.1 THOU/MM3 (ref 0.4–1.3)
MONOCYTES: 15 % (ref 0–12)
PDW BLD-RTO: 15.9 % (ref 11.5–14.5)
PLATELET # BLD: 318 THOU/MM3 (ref 130–400)
PMV BLD AUTO: 10.4 FL (ref 9.4–12.4)
POTASSIUM, WHOLE BLOOD: 4.7 MEQ/L (ref 3.5–4.9)
RBC # BLD: 5.39 MILL/MM3 (ref 4.2–5.4)
SEG NEUTROPHILS: 34 % (ref 43–75)
SEGMENTED NEUTROPHILS ABSOLUTE COUNT: 2.4 THOU/MM3 (ref 1.8–7.7)
SODIUM, WHOLE BLOOD: 142 MEQ/L (ref 138–146)
TOTAL CO2, WHOLE BLOOD: 30 MEQ/L (ref 23–33)
TOTAL PROTEIN: 7.6 G/DL (ref 6.1–8)
WBC # BLD: 7.1 THOU/MM3 (ref 4.8–10.8)

## 2021-11-05 PROCEDURE — 99211 OFF/OP EST MAY X REQ PHY/QHP: CPT

## 2021-11-05 PROCEDURE — 36415 COLL VENOUS BLD VENIPUNCTURE: CPT

## 2021-11-05 PROCEDURE — 99214 OFFICE O/P EST MOD 30 MIN: CPT | Performed by: INTERNAL MEDICINE

## 2021-11-05 PROCEDURE — 80076 HEPATIC FUNCTION PANEL: CPT

## 2021-11-05 PROCEDURE — 80047 BASIC METABLC PNL IONIZED CA: CPT

## 2021-11-05 PROCEDURE — 85025 COMPLETE CBC W/AUTO DIFF WBC: CPT

## 2021-11-05 RX ORDER — ANASTROZOLE 1 MG/1
1 TABLET ORAL DAILY
Qty: 30 TABLET | Refills: 5 | Status: SHIPPED | OUTPATIENT
Start: 2021-11-05 | End: 2022-03-04 | Stop reason: SDUPTHER

## 2021-11-05 NOTE — PROGRESS NOTES
[facility] [address] [facility] [address], [address] [facility] [address]  [address] [address]  Dept: [phone]  Dept Fax: [phone]: [phone]      Visit Date:2021     Megan Bhardwaj is a 70 y.o. female who presents today for:   Chief Complaint   Patient presents with    Follow-up     HER2-positive carcinoma of left breast         HPI:   Megan Bhardwaj is a 70 y.o. female with h/o triple positive left breast cancer. She had annual screening mammogram on 2020 that showed 2 lesions in the left breast.  Subsequently she underwent breast ultrasound followed by ultrasound guided biopsy of those 2 lesions. The largest of these lesions is a lobulated mixed cystic and    solid appearing mass measuring approximately 3 x 2.1 x 2.7 cm. This is located at the anterior depth upper inner quadrant. Aspiration of the fluid component of this lesion and biopsy of    the solid component of this lesion was performed on 2020. The smaller adjacent lesion is located at the middle depth of the     upper inner quadrant left breast measuring approximately 1.6 x    1.1 x 1.6 cm. Biopsy of this lesion was performed on 2020.       Final pathology report showed:   A-B.  Left breast, upper inner quadrant, anterior and middle depth,   barbell and tribell clips, multiple core needle biopsies:       Invasive, poorly differentiated ductal carcinoma, [place] score 3. Estrogen Receptor: Positive 100 % of cells (>10% of cells)   Progesterone Receptor:  Positive 90 % of cells   Ki-67 (clone 30-9)     Percentage of positive nuclei: 42 %   HER2 NEW POSITIVE      On 2020 the patient had breast MRI showin. A known biopsy-proven mass demonstrated within the upper     inner left breast anterior to middle depth.  This measures 3 cm x     4.1 cm x 3.1 cm middle depth located 4  cm from the nipple, 0.9    cm from the skin, and 6.5 cm from the chest wall.  This shows    heterogeneous enhancement and delayed washout type vascular    enhancement.      2. There are scattered small foci of enhancement demonstrated    bilaterally. The dominant focus of enhancement within the right    breast is located within the lower inner right breast anterior    depth on axial image 105 series 9. Recommend further evaluation    with second look ultrasound. If there is no sonographic    correlate, recommend 6 month follow-up breast MRI to document    stability.         3. Among the small foci of enhancement within the left breast,    the most prominent is located within the lower outer quadrant    posterior depth as seen on axial image 91 series 9. Recommend    further evaluation with second look ultrasound. If there is no    sonographic correlate, recommend 6 month follow-up breast MRI to    document stability.          Patient started neoadjuvant chemotherapy with Taxotere/carbo/Herceptin/Pertuzumab on July 27, 2020. The patient received cycle #3 of chemotherapy on September 8, 2020. On October 4, 2020 she was diagnosed with Covid infection. On October 9, 2020 she was admitted to the hospital for worsening shortness of breath and hypoxia. She was treated with convalescent plasma 2 doses and she required high with high flow oxygen. Chest x-ray showed a developing right perihilar and left basilar consolidation. She was also treated with the course of remdesivir and 10-day course of Decadron. She started improving, she was discharged home after 12-day hospitalization. On November 30, 2020 she underwent left breast mastectomy with sentinel lymph node excision and right breast prophylactic mastectomy. Final pathology report showed:  A.  Right breast, prophylactic mastectomy:    Fibrocystic changes including stromal fibrosis and cysts. B.  Left sentinel lymph nodes, excision:    Two lymph nodes, negative for malignancy (0/2).    C.  Left breast, mastectomy:       Residual multifocal invasive ductal carcinoma (11 mm, upper outer       quadrant mass) with treatment effect (ympT1c, snpN0).  One lymph node, negative for malignancy (0/1).  Atypical lobular hyperplasia.    Margins are negative.    Closest margin: 10 mm (deep margin, lower outer quadrant mass). Patient tolerated her surgery well. Patient started adjuvant Kadcyla on January 11, 2021. She received her seventh dose of Kadcyla on June 23, 2021. Interval History 11/6/2021:   The patient is here for follow up evaluation. She made decision to stop Kadcyla after seventh infusions. During last visit we discussed the role and the benefit of adjuvant hormonal treatment. Started aromatase inhibitor in August 2021 and she tolerated it very well. She denies having any hot flashes minimal arthralgia, no mood changes. She denies fever, chills or signs/symptoms of infection. Denies chest pain, shortness of breath, cough, abdominal pain, nausea, vomiting, bowel or urinary changes. Denies rashes or peripheral edema. PMH, SH, and FH:  I reviewed the patients medication list and allergy list as noted on the electronic medical record. The PMH, SH and FH were also reviewed as noted on the EMR. Review of Systems:   Review of Systems   Pertinent review of systems noted in HPI, all other ROS negative. Objective:   Physical Exam   /60 (Site: Right Upper Arm, Position: Sitting, Cuff Size: Medium Adult)   Pulse 73   Temp 98.3 °F (36.8 °C) (Oral)   Resp 18   Ht 5' 9\" (1.753 m)   Wt 210 lb 9.6 oz (95.5 kg)   SpO2 95%   BMI 31.10 kg/m²    General appearance: No apparent distress, well developed, and cooperative. HEENT: Pupils equal, round, and reactive to light. Conjunctivae/corneas clear. Neck: Supple, with full range of motion. Trachea midline. Respiratory:  Normal respiratory effort. Clear to auscultation, bilaterally without Rales/Wheezes/Rhonchi. Chest: S/P bilateral mastectomy with left mastectomy site well healed.  There is a seroma to the mid lateral portion of the right mastectomy site. No surrounding erythema or drainage. No tenderness with palpation. Cardiovascular: Regular rate and rhythm with normal S1/S2  Abdomen: Soft, non-tender with active bowel sounds. Musculoskeletal: No clubbing, cyanosis or edema bilaterally. Skin: Skin color, texture, turgor normal.  No visible rashes or lesions. Neurologic:  Neurovascularly intact without any focal sensory/motor deficits. Psychiatric: Alert and oriented      Imaging Studies and Labs:   CBC:   Lab Results   Component Value Date    WBC 7.1 11/05/2021    HGB 15.6 11/05/2021    HCT 49.5 (H) 11/05/2021    MCV 92 11/05/2021     11/05/2021     BMP:   Lab Results   Component Value Date     11/05/2021     12/01/2020    K 4.7 11/05/2021    K 4.6 12/01/2020    K 3.0 10/11/2020     12/01/2020    CO2 21 12/01/2020    BUN 12 12/01/2020    CREATININE 0.5 11/05/2021    CREATININE 0.5 12/01/2020    GLUCOSE 139 12/01/2020    CALCIUM 9.3 12/01/2020      LFT:   Lab Results   Component Value Date    ALT 11 11/05/2021    AST 25 11/05/2021    ALKPHOS 76 11/05/2021    BILITOT 0.6 11/05/2021         Assessment and Plan:   1. Triple Positive Left Breast Cancer in upper outer quadrant with separate focus of triple negative breast cancer in lower outer quadrant   Patient has received 3 cycles of neoadjuvant chemotherapy with Taxotere/carbo/Herceptin and Pertuzumab. Last treatment given in September 2020. Then the patient was hospitalized for Covid infection in October 2020. After she recovered from  Covid infection decision was made to proceed with breast surgery. She had surgery: bilateral mastectomy on November 30, 2020. Final pathology report showed residual cancer in both locations upper outer quadrant and lower outer quadrant. Residual triple negative breast cancer was smaller than 1 cm in size. Residual HER-2/jordan positive cancer was 11 mm in size.  The patient was recommended to proceed with adjuvant Kadcyla, started on 1/11/2021.   2. Adjuvant Chemotherapy with Kadcyla. She received first 3 infusions on time; however, had a 6 week break in March 2021 due to viral infection. Last cycle #7 Kadcyla given on June 23, 2021. Afterwards she declined further treatment with Kadcyla. Last echo in May 2021 showed normal left ventricular ejection fraction. 3.  Adjuvant hormonal treatment. Discussed with the patient  reasoning for adjuvant AI and benefits. Discussed potential side effects of AI as well. The patient started AI in August 2021. She tolerates it very well. Minimal arthralgia no hot flashes. There is no evidence of disease recurrence on today's physical examination. Patient does not report any symptoms suspicious for recurrent breast cancer. She was instructed to continue Arimidex. 4.  Breast cancer surveillance. Management of breast cancer survivors who have completed active treatment and have no evidence of disease are cancer surveillance, lifestyle modifications including pursuing healthy regular exercise program, minimizing alcohol intake, and refraining from smoking. Survivor follow-up will include updated history, regular physical examination. Radiologic imaging to screen for distant recurrence will be not performed unless the patient will develop new symptoms. Symptoms suspicious for recurrent /metastic disease include:  ?Constitutional symptoms - Anorexia, weight loss, malaise, fatigue, insomnia. ? Bone pain  ? Pulmonary symptoms - persistent cough or dyspnea (at rest or with exertion). ?Neurologic symptoms - Headache, nausea, vomiting, confusion, weakness, numbness or tingling. ?Gastrointestinal symptoms - Right upper quadrant pain, change in bowel habits, presence of bloody or tarry stools.          Electronically signed by   Sydney Archuleta MD

## 2022-03-03 NOTE — PROGRESS NOTES
[facility] [address] [facility] [address], [address] [facility] [address]  [address] [address]  Dept: [phone]  Dept Fax: [phone]: [phone]      Visit Date:3/4/2022     Yuli Herrera is a 70 y.o. female who presents today for:   Chief Complaint   Patient presents with    Follow-up     HER2-positive carcinoma of left breast ([address]        HPI:   Yuli Herrera is a 70 y.o. female with h/o triple positive left breast cancer. She had annual screening mammogram on 2020 that showed 2 lesions in the left breast.  Subsequently she underwent breast ultrasound followed by ultrasound guided biopsy of those 2 lesions. The largest of these lesions is a lobulated mixed cystic and    solid appearing mass measuring approximately 3 x 2.1 x 2.7 cm. This is located at the anterior depth upper inner quadrant. Aspiration of the fluid component of this lesion and biopsy of    the solid component of this lesion was performed on 2020. The smaller adjacent lesion is located at the middle depth of the     upper inner quadrant left breast measuring approximately 1.6 x    1.1 x 1.6 cm. Biopsy of this lesion was performed on 2020.       Final pathology report showed:   A-B.  Left breast, upper inner quadrant, anterior and middle depth,   barbell and tribell clips, multiple core needle biopsies:       Invasive, poorly differentiated ductal carcinoma, [place] score 3. Estrogen Receptor: Positive 100 % of cells (>10% of cells)   Progesterone Receptor:  Positive 90 % of cells   Ki-67 (clone 30-9)     Percentage of positive nuclei: 42 %   HER2 NEW POSITIVE      On 2020 the patient had breast MRI showin. A known biopsy-proven mass demonstrated within the upper     inner left breast anterior to middle depth.  This measures 3 cm x     4.1 cm x 3.1 cm middle depth located 4  cm from the nipple, 0.9    cm from the skin, and 6.5 cm from the chest wall.  This shows    heterogeneous enhancement and delayed washout type vascular    enhancement.      2. There are scattered small foci of enhancement demonstrated    bilaterally. The dominant focus of enhancement within the right    breast is located within the lower inner right breast anterior    depth on axial image 105 series 9. Recommend further evaluation    with second look ultrasound. If there is no sonographic    correlate, recommend 6 month follow-up breast MRI to document    stability.         3. Among the small foci of enhancement within the left breast,    the most prominent is located within the lower outer quadrant    posterior depth as seen on axial image 91 series 9. Recommend    further evaluation with second look ultrasound. If there is no    sonographic correlate, recommend 6 month follow-up breast MRI to    document stability.          Patient started neoadjuvant chemotherapy with Taxotere/carbo/Herceptin/Pertuzumab on July 27, 2020. The patient received cycle #3 of chemotherapy on September 8, 2020. On October 4, 2020 she was diagnosed with Covid infection. On October 9, 2020 she was admitted to the hospital for worsening shortness of breath and hypoxia. She was treated with convalescent plasma 2 doses and she required high with high flow oxygen. Chest x-ray showed a developing right perihilar and left basilar consolidation. She was also treated with the course of remdesivir and 10-day course of Decadron. She started improving, she was discharged home after 12-day hospitalization. On November 30, 2020 she underwent left breast mastectomy with sentinel lymph node excision and right breast prophylactic mastectomy. Final pathology report showed:  A.  Right breast, prophylactic mastectomy:    Fibrocystic changes including stromal fibrosis and cysts. B.  Left sentinel lymph nodes, excision:    Two lymph nodes, negative for malignancy (0/2).    C.  Left breast, mastectomy:       Residual multifocal invasive ductal carcinoma (11 mm, upper outer       quadrant mass) with treatment effect (ympT1c, snpN0).  One lymph node, negative for malignancy (0/1).  Atypical lobular hyperplasia.    Margins are negative.    Closest margin: 10 mm (deep margin, lower outer quadrant mass). Patient tolerated her surgery well. Patient started adjuvant Kadcyla on January 11, 2021. She received her seventh dose of Kadcyla on June 23, 2021. Interval History 3/24/2022:   The patient is here for follow up evaluation. She made decision to stop Kadcyla after seventh infusions. Started aromatase inhibitor in August 2021 and she tolerated it very well. She denies having any hot flashes minimal arthralgia, no mood changes. She denies fever, chills or signs/symptoms of infection. Denies chest pain, shortness of breath, cough, abdominal pain, nausea, vomiting, bowel or urinary changes. Denies rashes or peripheral edema. PMH, SH, and FH:  I reviewed the patients medication list and allergy list as noted on the electronic medical record. The PMH, SH and FH were also reviewed as noted on the EMR. Review of Systems:   Review of Systems   Pertinent review of systems noted in HPI, all other ROS negative. Objective:   Physical Exam   /74 (Site: Right Upper Arm, Position: Sitting, Cuff Size: Medium Adult)   Pulse 88   Temp 98.1 °F (36.7 °C) (Oral)   Resp 18   Ht 5' 9\" (1.753 m)   Wt 219 lb (99.3 kg)   SpO2 96%   BMI 32.34 kg/m²    General appearance: No apparent distress, well developed, and cooperative. HEENT: Pupils equal, round, and reactive to light. Conjunctivae/corneas clear. Neck: Supple, with full range of motion. Trachea midline. Respiratory:  Normal respiratory effort. Clear to auscultation, bilaterally without Rales/Wheezes/Rhonchi. Chest: S/P bilateral mastectomy with left mastectomy site well healed. There is a seroma to the mid lateral portion of the right mastectomy site. No surrounding erythema or drainage.  No tenderness with palpation. Cardiovascular: Regular rate and rhythm with normal S1/S2  Abdomen: Soft, non-tender with active bowel sounds. Musculoskeletal: No clubbing, cyanosis or edema bilaterally. Skin: Skin color, texture, turgor normal.  No visible rashes or lesions. Neurologic:  Neurovascularly intact without any focal sensory/motor deficits. Psychiatric: Alert and oriented      Imaging Studies and Labs:   CBC:   Lab Results   Component Value Date    WBC 10.2 03/04/2022    HGB 16.0 03/04/2022    HCT 49.6 (H) 03/04/2022    MCV 97 03/04/2022     03/04/2022     BMP:   Lab Results   Component Value Date     03/04/2022     12/01/2020    K 4.4 03/04/2022    K 4.6 12/01/2020    K 3.0 10/11/2020     12/01/2020    CO2 21 12/01/2020    BUN 12 12/01/2020    CREATININE 0.8 03/04/2022    CREATININE 0.5 12/01/2020    GLUCOSE 139 12/01/2020    CALCIUM 9.3 12/01/2020      LFT:   Lab Results   Component Value Date    ALT 20 03/04/2022    AST 37 03/04/2022    ALKPHOS 73 03/04/2022    BILITOT 0.6 03/04/2022         Assessment and Plan:   1. Triple Positive Left Breast Cancer in upper outer quadrant with separate focus of triple negative breast cancer in lower outer quadrant   Patient has received 3 cycles of neoadjuvant chemotherapy with Taxotere/carbo/Herceptin and Pertuzumab. Last treatment given in September 2020. Then the patient was hospitalized for Covid infection in October 2020. After she recovered from  Covid infection decision was made to proceed with breast surgery. She had surgery: bilateral mastectomy on November 30, 2020. Final pathology report showed residual cancer in both locations upper outer quadrant and lower outer quadrant. Residual triple negative breast cancer was smaller than 1 cm in size. Residual HER-2/jordan positive cancer was 11 mm in size. The patient was recommended to proceed with adjuvant Kadcyla, started on 1/11/2021.   2. Adjuvant Chemotherapy with Kadcyla.  She received first 3 infusions on time; however, had a 6 week break in March 2021 due to viral infection. Last cycle #7 Kadcyla given on June 23, 2021. Afterwards she declined further treatment with Kadcyla. Last echo in May 2021 showed normal left ventricular ejection fraction. 3.  Adjuvant hormonal treatment. The patient started AI in August 2021. She tolerates it very well. Minimal arthralgia no hot flashes. There is no evidence of disease recurrence on today's physical examination. Patient does not report any symptoms suspicious for recurrent breast cancer. She was instructed to continue Arimidex. 4.  Breast cancer surveillance. Management of breast cancer survivors who have completed active treatment and have no evidence of disease are cancer surveillance, lifestyle modifications including pursuing healthy regular exercise program, minimizing alcohol intake, and refraining from smoking. Survivor follow-up will include updated history, regular physical examination. Radiologic imaging to screen for distant recurrence will be not performed unless the patient will develop new symptoms. Symptoms suspicious for recurrent /metastic disease include:  ?Constitutional symptoms - Anorexia, weight loss, malaise, fatigue, insomnia. ? Bone pain  ? Pulmonary symptoms - persistent cough or dyspnea (at rest or with exertion). ?Neurologic symptoms - Headache, nausea, vomiting, confusion, weakness, numbness or tingling. ?Gastrointestinal symptoms - Right upper quadrant pain, change in bowel habits, presence of bloody or tarry stools.          Electronically signed by   Sloane Guthrie MD

## 2022-03-04 ENCOUNTER — HOSPITAL ENCOUNTER (OUTPATIENT)
Dept: INFUSION THERAPY | Age: 72
Discharge: HOME | End: 2022-03-04
Payer: MEDICARE

## 2022-03-04 ENCOUNTER — OFFICE VISIT (OUTPATIENT)
Dept: ONCOLOGY | Age: 72
End: 2022-03-04
Payer: MEDICARE

## 2022-03-04 VITALS
OXYGEN SATURATION: 96 % | HEIGHT: 69 IN | HEART RATE: 88 BPM | TEMPERATURE: 98.1 F | BODY MASS INDEX: 32.44 KG/M2 | SYSTOLIC BLOOD PRESSURE: 124 MMHG | WEIGHT: 219 LBS | DIASTOLIC BLOOD PRESSURE: 74 MMHG | RESPIRATION RATE: 18 BRPM

## 2022-03-04 VITALS
DIASTOLIC BLOOD PRESSURE: 74 MMHG | HEART RATE: 88 BPM | SYSTOLIC BLOOD PRESSURE: 124 MMHG | TEMPERATURE: 98.1 F | OXYGEN SATURATION: 96 % | RESPIRATION RATE: 20 BRPM

## 2022-03-04 DIAGNOSIS — C50.912 HER2-POSITIVE CARCINOMA OF LEFT BREAST (HCC): Primary | ICD-10-CM

## 2022-03-04 DIAGNOSIS — Z90.13 STATUS POST BILATERAL MASTECTOMY: ICD-10-CM

## 2022-03-04 DIAGNOSIS — Z51.81 VISIT FOR MONITORING ARIMIDEX THERAPY: ICD-10-CM

## 2022-03-04 DIAGNOSIS — Z51.11 ENCOUNTER FOR ANTINEOPLASTIC CHEMOTHERAPY: ICD-10-CM

## 2022-03-04 DIAGNOSIS — Z85.3 ENCOUNTER FOR FOLLOW-UP SURVEILLANCE OF BREAST CANCER: ICD-10-CM

## 2022-03-04 DIAGNOSIS — Z08 ENCOUNTER FOR FOLLOW-UP SURVEILLANCE OF BREAST CANCER: ICD-10-CM

## 2022-03-04 DIAGNOSIS — Z17.0 ER+ (ESTROGEN RECEPTOR POSITIVE STATUS): ICD-10-CM

## 2022-03-04 DIAGNOSIS — Z79.811 VISIT FOR MONITORING ARIMIDEX THERAPY: ICD-10-CM

## 2022-03-04 DIAGNOSIS — C50.912 HER2-POSITIVE CARCINOMA OF LEFT BREAST (HCC): ICD-10-CM

## 2022-03-04 LAB
ABSOLUTE IMMATURE GRANULOCYTE: 0.06 THOU/MM3 (ref 0–0.07)
ALBUMIN SERPL-MCNC: 4.2 G/DL (ref 3.5–5.1)
ALP BLD-CCNC: 73 U/L (ref 38–126)
ALT SERPL-CCNC: 20 U/L (ref 11–66)
AST SERPL-CCNC: 37 U/L (ref 5–40)
BASINOPHIL, AUTOMATED: 1 % (ref 0–3)
BASOPHILS ABSOLUTE: 0.1 THOU/MM3 (ref 0–0.1)
BILIRUB SERPL-MCNC: 0.6 MG/DL (ref 0.3–1.2)
BILIRUBIN DIRECT: < 0.2 MG/DL (ref 0–0.3)
BUN, WHOLE BLOOD: 13 MG/DL (ref 8–26)
CHLORIDE, WHOLE BLOOD: 102 MEQ/L (ref 98–109)
CREATININE, WHOLE BLOOD: 0.8 MG/DL (ref 0.5–1.2)
EOSINOPHILS ABSOLUTE: 0.4 THOU/MM3 (ref 0–0.4)
EOSINOPHILS RELATIVE PERCENT: 4 % (ref 0–4)
GFR, ESTIMATED: 75 ML/MIN/1.73M2
GLUCOSE, WHOLE BLOOD: 150 MG/DL (ref 70–108)
HCT VFR BLD CALC: 49.6 % (ref 37–47)
HEMOGLOBIN: 16 GM/DL (ref 12–16)
IMMATURE GRANULOCYTES: 1 %
IONIZED CALCIUM, WHOLE BLOOD: 1.18 MMOL/L (ref 1.12–1.32)
LYMPHOCYTES # BLD: 46 % (ref 15–47)
LYMPHOCYTES ABSOLUTE: 4.7 THOU/MM3 (ref 1–4.8)
MCH RBC QN AUTO: 31.4 PG (ref 26–33)
MCHC RBC AUTO-ENTMCNC: 32.3 GM/DL (ref 32.2–35.5)
MCV RBC AUTO: 97 FL (ref 81–99)
MONOCYTES ABSOLUTE: 1.2 THOU/MM3 (ref 0.4–1.3)
MONOCYTES: 12 % (ref 0–12)
PDW BLD-RTO: 12.8 % (ref 11.5–14.5)
PLATELET # BLD: 223 THOU/MM3 (ref 130–400)
PMV BLD AUTO: 10.8 FL (ref 9.4–12.4)
POTASSIUM, WHOLE BLOOD: 4.4 MEQ/L (ref 3.5–4.9)
RBC # BLD: 5.1 MILL/MM3 (ref 4.2–5.4)
SEG NEUTROPHILS: 37 % (ref 43–75)
SEGMENTED NEUTROPHILS ABSOLUTE COUNT: 3.8 THOU/MM3 (ref 1.8–7.7)
SODIUM, WHOLE BLOOD: 139 MEQ/L (ref 138–146)
TOTAL CO2, WHOLE BLOOD: 27 MEQ/L (ref 23–33)
TOTAL PROTEIN: 8.1 G/DL (ref 6.1–8)
WBC # BLD: 10.2 THOU/MM3 (ref 4.8–10.8)

## 2022-03-04 PROCEDURE — G8399 PT W/DXA RESULTS DOCUMENT: HCPCS | Performed by: INTERNAL MEDICINE

## 2022-03-04 PROCEDURE — 80076 HEPATIC FUNCTION PANEL: CPT

## 2022-03-04 PROCEDURE — 99214 OFFICE O/P EST MOD 30 MIN: CPT | Performed by: INTERNAL MEDICINE

## 2022-03-04 PROCEDURE — 4040F PNEUMOC VAC/ADMIN/RCVD: CPT | Performed by: INTERNAL MEDICINE

## 2022-03-04 PROCEDURE — 99211 OFF/OP EST MAY X REQ PHY/QHP: CPT

## 2022-03-04 PROCEDURE — 80047 BASIC METABLC PNL IONIZED CA: CPT

## 2022-03-04 PROCEDURE — G8427 DOCREV CUR MEDS BY ELIG CLIN: HCPCS | Performed by: INTERNAL MEDICINE

## 2022-03-04 PROCEDURE — 1036F TOBACCO NON-USER: CPT | Performed by: INTERNAL MEDICINE

## 2022-03-04 PROCEDURE — 1090F PRES/ABSN URINE INCON ASSESS: CPT | Performed by: INTERNAL MEDICINE

## 2022-03-04 PROCEDURE — 85025 COMPLETE CBC W/AUTO DIFF WBC: CPT

## 2022-03-04 PROCEDURE — G8484 FLU IMMUNIZE NO ADMIN: HCPCS | Performed by: INTERNAL MEDICINE

## 2022-03-04 PROCEDURE — 3017F COLORECTAL CA SCREEN DOC REV: CPT | Performed by: INTERNAL MEDICINE

## 2022-03-04 PROCEDURE — 1123F ACP DISCUSS/DSCN MKR DOCD: CPT | Performed by: INTERNAL MEDICINE

## 2022-03-04 PROCEDURE — G8417 CALC BMI ABV UP PARAM F/U: HCPCS | Performed by: INTERNAL MEDICINE

## 2022-03-04 PROCEDURE — 36415 COLL VENOUS BLD VENIPUNCTURE: CPT

## 2022-03-04 RX ORDER — ANASTROZOLE 1 MG/1
1 TABLET ORAL DAILY
Qty: 90 TABLET | Refills: 3 | Status: SHIPPED | OUTPATIENT
Start: 2022-03-04

## 2022-03-04 NOTE — PATIENT INSTRUCTIONS
1.  RTC in 4 months  2. Patient was instructed to continue Arimidex.   She was also encouraged to try supplement called glucosamine/chondroitin for her joints

## 2022-03-30 ENCOUNTER — TELEPHONE (OUTPATIENT)
Dept: ONCOLOGY | Age: 72
End: 2022-03-30

## 2022-05-09 ENCOUNTER — OFFICE VISIT (OUTPATIENT)
Dept: ONCOLOGY | Age: 72
End: 2022-05-09
Payer: MEDICARE

## 2022-05-09 ENCOUNTER — HOSPITAL ENCOUNTER (OUTPATIENT)
Dept: INFUSION THERAPY | Age: 72
Discharge: HOME | End: 2022-05-09
Payer: MEDICARE

## 2022-05-09 VITALS
TEMPERATURE: 98.4 F | WEIGHT: 221 LBS | HEART RATE: 85 BPM | BODY MASS INDEX: 32.73 KG/M2 | SYSTOLIC BLOOD PRESSURE: 116 MMHG | RESPIRATION RATE: 18 BRPM | HEIGHT: 69 IN | DIASTOLIC BLOOD PRESSURE: 68 MMHG | OXYGEN SATURATION: 94 %

## 2022-05-09 VITALS
DIASTOLIC BLOOD PRESSURE: 68 MMHG | OXYGEN SATURATION: 94 % | BODY MASS INDEX: 32.73 KG/M2 | TEMPERATURE: 98.4 F | HEIGHT: 69 IN | WEIGHT: 221 LBS | SYSTOLIC BLOOD PRESSURE: 116 MMHG | RESPIRATION RATE: 18 BRPM | HEART RATE: 85 BPM

## 2022-05-09 DIAGNOSIS — Z08 ENCOUNTER FOR FOLLOW-UP SURVEILLANCE OF BREAST CANCER: ICD-10-CM

## 2022-05-09 DIAGNOSIS — Z79.811 VISIT FOR MONITORING ARIMIDEX THERAPY: ICD-10-CM

## 2022-05-09 DIAGNOSIS — Z17.0 ER+ (ESTROGEN RECEPTOR POSITIVE STATUS): ICD-10-CM

## 2022-05-09 DIAGNOSIS — Z85.3 ENCOUNTER FOR FOLLOW-UP SURVEILLANCE OF BREAST CANCER: ICD-10-CM

## 2022-05-09 DIAGNOSIS — Z90.13 STATUS POST BILATERAL MASTECTOMY: ICD-10-CM

## 2022-05-09 DIAGNOSIS — C50.912 HER2-POSITIVE CARCINOMA OF LEFT BREAST (HCC): Primary | ICD-10-CM

## 2022-05-09 DIAGNOSIS — Z51.81 VISIT FOR MONITORING ARIMIDEX THERAPY: ICD-10-CM

## 2022-05-09 PROCEDURE — G8417 CALC BMI ABV UP PARAM F/U: HCPCS | Performed by: INTERNAL MEDICINE

## 2022-05-09 PROCEDURE — 99214 OFFICE O/P EST MOD 30 MIN: CPT | Performed by: INTERNAL MEDICINE

## 2022-05-09 PROCEDURE — G8427 DOCREV CUR MEDS BY ELIG CLIN: HCPCS | Performed by: INTERNAL MEDICINE

## 2022-05-09 PROCEDURE — 99211 OFF/OP EST MAY X REQ PHY/QHP: CPT

## 2022-05-09 PROCEDURE — 1123F ACP DISCUSS/DSCN MKR DOCD: CPT | Performed by: INTERNAL MEDICINE

## 2022-05-09 PROCEDURE — 3017F COLORECTAL CA SCREEN DOC REV: CPT | Performed by: INTERNAL MEDICINE

## 2022-05-09 PROCEDURE — 1036F TOBACCO NON-USER: CPT | Performed by: INTERNAL MEDICINE

## 2022-05-09 PROCEDURE — G8399 PT W/DXA RESULTS DOCUMENT: HCPCS | Performed by: INTERNAL MEDICINE

## 2022-05-09 PROCEDURE — 1090F PRES/ABSN URINE INCON ASSESS: CPT | Performed by: INTERNAL MEDICINE

## 2022-05-09 NOTE — PROGRESS NOTES
[facility], [address] [facility],[address]  [address] [address]  Dept: [phone]  Dept Fax: [phone]: [phone]      Visit Date:2022     Mary Holcomb is a 70 y.o. female who presents today for:   Chief Complaint   Patient presents with    Follow-up     HER2-positive carcinoma of left breast ([address]        HPI:   Mary Holcomb is a 70 y.o. female with h/o triple positive left breast cancer. She had annual screening mammogram on 2020 that showed 2 lesions in the left breast.  Subsequently she underwent breast ultrasound followed by ultrasound guided biopsy of those 2 lesions. The largest of these lesions is a lobulated mixed cystic and    solid appearing mass measuring approximately 3 x 2.1 x 2.7 cm. This is located at the anterior depth upper inner quadrant. Aspiration of the fluid component of this lesion and biopsy of    the solid component of this lesion was performed on 2020. The smaller adjacent lesion is located at the middle depth of the     upper inner quadrant left breast measuring approximately 1.6 x    1.1 x 1.6 cm. Biopsy of this lesion was performed on 2020.       Final pathology report showed:   A-B.  Left breast, upper inner quadrant, anterior and middle depth,   barbell and tribell clips, multiple core needle biopsies:       Invasive, poorly differentiated ductal carcinoma, [place] score 3. Estrogen Receptor: Positive 100 % of cells (>10% of cells)   Progesterone Receptor:  Positive 90 % of cells   Ki-67 (clone 30-9)     Percentage of positive nuclei: 42 %   HER2 NEW POSITIVE      On 2020 the patient had breast MRI showin. A known biopsy-proven mass demonstrated within the upper     inner left breast anterior to middle depth.  This measures 3 cm x     4.1 cm x 3.1 cm middle depth located 4  cm from the nipple, 0.9    cm from the skin, and 6.5 cm from the chest wall.  This shows    heterogeneous enhancement and delayed washout type vascular    enhancement.      2. There are scattered small foci of enhancement demonstrated    bilaterally. The dominant focus of enhancement within the right    breast is located within the lower inner right breast anterior    depth on axial image 105 series 9. Recommend further evaluation    with second look ultrasound. If there is no sonographic    correlate, recommend 6 month follow-up breast MRI to document    stability.         3. Among the small foci of enhancement within the left breast,    the most prominent is located within the lower outer quadrant    posterior depth as seen on axial image 91 series 9. Recommend    further evaluation with second look ultrasound. If there is no    sonographic correlate, recommend 6 month follow-up breast MRI to    document stability.          Patient started neoadjuvant chemotherapy with Taxotere/carbo/Herceptin/Pertuzumab on July 27, 2020. The patient received cycle #3 of chemotherapy on September 8, 2020. On October 4, 2020 she was diagnosed with Covid infection. On October 9, 2020 she was admitted to the hospital for worsening shortness of breath and hypoxia. She was treated with convalescent plasma 2 doses and she required high with high flow oxygen. Chest x-ray showed a developing right perihilar and left basilar consolidation. She was also treated with the course of remdesivir and 10-day course of Decadron. She started improving, she was discharged home after 12-day hospitalization. On November 30, 2020 she underwent left breast mastectomy with sentinel lymph node excision and right breast prophylactic mastectomy. Final pathology report showed:  A.  Right breast, prophylactic mastectomy:    Fibrocystic changes including stromal fibrosis and cysts. B.  Left sentinel lymph nodes, excision:    Two lymph nodes, negative for malignancy (0/2).    C.  Left breast, mastectomy:       Residual multifocal invasive ductal carcinoma (11 mm, upper outer       quadrant mass) with treatment effect (ympT1c, snpN0).  One lymph node, negative for malignancy (0/1).  Atypical lobular hyperplasia.    Margins are negative.    Closest margin: 10 mm (deep margin, lower outer quadrant mass). Patient tolerated her surgery well. Patient started adjuvant Kadcyla on January 11, 2021. She received her seventh dose of Kadcyla on June 23, 2021. She made decision to stop Kadcyla after seventh infusions  Started aromatase inhibitor in August 2021    Interval History 5/10/2022:   The patient is here for 3-month follow up evaluation. She reports that she tolerates Arimidex well. She denies having any hot flashes minimal arthralgia, no mood changes. She denies fever, chills or signs/symptoms of infection. Denies chest pain, shortness of breath, cough, abdominal pain, nausea, vomiting, bowel or urinary changes. Denies rashes or peripheral edema. PMH, SH, and FH:  I reviewed the patients medication list and allergy list as noted on the electronic medical record. The PMH, SH and FH were also reviewed as noted on the EMR. Review of Systems:   Review of Systems   Pertinent review of systems noted in HPI, all other ROS negative. Objective:   Physical Exam   /68 (Site: Right Upper Arm, Position: Sitting, Cuff Size: Medium Adult)   Pulse 85   Temp 98.4 °F (36.9 °C) (Oral)   Resp 18   Ht 5' 9\" (1.753 m)   Wt 221 lb (100.2 kg)   SpO2 94%   BMI 32.64 kg/m²    General appearance: No apparent distress, well developed, and cooperative. HEENT: Pupils equal, round, and reactive to light. Conjunctivae/corneas clear. Neck: Supple, with full range of motion. Trachea midline. Respiratory:  Normal respiratory effort. Clear to auscultation, bilaterally without Rales/Wheezes/Rhonchi. Chest: S/P bilateral mastectomy with left mastectomy site well healed. There is a seroma to the mid lateral portion of the right mastectomy site.  No surrounding erythema or drainage. No tenderness with palpation. Cardiovascular: Regular rate and rhythm with normal S1/S2  Abdomen: Soft, non-tender with active bowel sounds. Musculoskeletal: No clubbing, cyanosis or edema bilaterally. Skin: Skin color, texture, turgor normal.  No visible rashes or lesions. Neurologic:  Neurovascularly intact without any focal sensory/motor deficits. Psychiatric: Alert and oriented      Imaging Studies and Labs:   CBC:   Lab Results   Component Value Date    WBC 10.2 03/04/2022    HGB 16.0 03/04/2022    HCT 49.6 (H) 03/04/2022    MCV 97 03/04/2022     03/04/2022     BMP:   Lab Results   Component Value Date     03/04/2022     12/01/2020    K 4.4 03/04/2022    K 4.6 12/01/2020    K 3.0 10/11/2020     12/01/2020    CO2 21 12/01/2020    BUN 12 12/01/2020    CREATININE 0.8 03/04/2022    CREATININE 0.5 12/01/2020    GLUCOSE 139 12/01/2020    CALCIUM 9.3 12/01/2020      LFT:   Lab Results   Component Value Date    ALT 20 03/04/2022    AST 37 03/04/2022    ALKPHOS 73 03/04/2022    BILITOT 0.6 03/04/2022         Assessment and Plan:   1. Triple Positive Left Breast Cancer in upper outer quadrant with separate focus of triple negative breast cancer in lower outer quadrant   Patient has received 3 cycles of neoadjuvant chemotherapy with Taxotere/carbo/Herceptin and Pertuzumab. Last treatment given in September 2020. Then the patient was hospitalized for Covid infection in October 2020. After she recovered from  Covid infection decision was made to proceed with breast surgery. She had surgery: bilateral mastectomy on November 30, 2020. Final pathology report showed residual cancer in both locations upper outer quadrant and lower outer quadrant. Residual triple negative breast cancer was smaller than 1 cm in size. Residual HER-2/jordan positive cancer was 11 mm in size.  The patient was recommended to proceed with adjuvant Kadcyla, started on 1/11/2021.   2. Adjuvant Chemotherapy with Kadcyla. She received first 3 infusions on time; however, had a 6 week break in March 2021 due to viral infection. Last cycle #7 Kadcyla given on June 23, 2021. Afterwards she declined further treatment with Kadcyla. Last echo in May 2021 showed normal left ventricular ejection fraction. 3.  Adjuvant hormonal treatment. The patient started AI in August 2021. She tolerates Arimidex well. Minimal arthralgia no hot flashes. There is no evidence of disease recurrence on today's physical examination. Patient does not report any symptoms suspicious for recurrent breast cancer. She was instructed to continue Arimidex. 4.  Breast cancer surveillance. Management of breast cancer survivors who have completed active treatment and have no evidence of disease are cancer surveillance, lifestyle modifications including pursuing healthy regular exercise program, minimizing alcohol intake, and refraining from smoking. Survivor follow-up will include updated history, regular physical examination. Radiologic imaging to screen for distant recurrence will be not performed unless the patient will develop new symptoms. Symptoms suspicious for recurrent /metastic disease include:  ?Constitutional symptoms - Anorexia, weight loss, malaise, fatigue, insomnia. ? Bone pain  ? Pulmonary symptoms - persistent cough or dyspnea (at rest or with exertion). ?Neurologic symptoms - Headache, nausea, vomiting, confusion, weakness, numbness or tingling. ?Gastrointestinal symptoms - Right upper quadrant pain, change in bowel habits, presence of bloody or tarry stools.          Electronically signed by   Avelina Arizmendi MD

## 2022-05-12 ENCOUNTER — OFFICE VISIT (OUTPATIENT)
Dept: INTERNAL MEDICINE CLINIC | Age: 72
End: 2022-05-12
Payer: MEDICARE

## 2022-05-12 VITALS
HEART RATE: 84 BPM | TEMPERATURE: 97.7 F | DIASTOLIC BLOOD PRESSURE: 68 MMHG | BODY MASS INDEX: 33.74 KG/M2 | HEIGHT: 68 IN | WEIGHT: 222.6 LBS | SYSTOLIC BLOOD PRESSURE: 120 MMHG

## 2022-05-12 DIAGNOSIS — Z90.13 S/P MASTECTOMY, BILATERAL: ICD-10-CM

## 2022-05-12 DIAGNOSIS — E66.9 OBESITY (BMI 30.0-34.9): ICD-10-CM

## 2022-05-12 DIAGNOSIS — E78.5 HYPERLIPIDEMIA, UNSPECIFIED HYPERLIPIDEMIA TYPE: ICD-10-CM

## 2022-05-12 DIAGNOSIS — Z85.3 HISTORY OF LEFT BREAST CANCER: ICD-10-CM

## 2022-05-12 DIAGNOSIS — I87.2 VENOUS INSUFFICIENCY: ICD-10-CM

## 2022-05-12 DIAGNOSIS — Z01.818 PREOP EXAM FOR INTERNAL MEDICINE: Primary | ICD-10-CM

## 2022-05-12 DIAGNOSIS — E03.9 ACQUIRED HYPOTHYROIDISM: ICD-10-CM

## 2022-05-12 DIAGNOSIS — M85.80 OSTEOPENIA, UNSPECIFIED LOCATION: ICD-10-CM

## 2022-05-12 DIAGNOSIS — R73.9 HYPERGLYCEMIA: ICD-10-CM

## 2022-05-12 DIAGNOSIS — M16.11 PRIMARY OSTEOARTHRITIS OF RIGHT HIP: ICD-10-CM

## 2022-05-12 PROCEDURE — G8417 CALC BMI ABV UP PARAM F/U: HCPCS | Performed by: INTERNAL MEDICINE

## 2022-05-12 PROCEDURE — 1090F PRES/ABSN URINE INCON ASSESS: CPT | Performed by: INTERNAL MEDICINE

## 2022-05-12 PROCEDURE — 99214 OFFICE O/P EST MOD 30 MIN: CPT | Performed by: INTERNAL MEDICINE

## 2022-05-12 PROCEDURE — G8427 DOCREV CUR MEDS BY ELIG CLIN: HCPCS | Performed by: INTERNAL MEDICINE

## 2022-05-12 SDOH — ECONOMIC STABILITY: FOOD INSECURITY: WITHIN THE PAST 12 MONTHS, THE FOOD YOU BOUGHT JUST DIDN'T LAST AND YOU DIDN'T HAVE MONEY TO GET MORE.: NEVER TRUE

## 2022-05-12 SDOH — ECONOMIC STABILITY: FOOD INSECURITY: WITHIN THE PAST 12 MONTHS, YOU WORRIED THAT YOUR FOOD WOULD RUN OUT BEFORE YOU GOT MONEY TO BUY MORE.: NEVER TRUE

## 2022-05-12 ASSESSMENT — PATIENT HEALTH QUESTIONNAIRE - PHQ9
3. TROUBLE FALLING OR STAYING ASLEEP: 1
9. THOUGHTS THAT YOU WOULD BE BETTER OFF DEAD, OR OF HURTING YOURSELF: 0
SUM OF ALL RESPONSES TO PHQ QUESTIONS 1-9: 5
7. TROUBLE CONCENTRATING ON THINGS, SUCH AS READING THE NEWSPAPER OR WATCHING TELEVISION: 1
4. FEELING TIRED OR HAVING LITTLE ENERGY: 0
10. IF YOU CHECKED OFF ANY PROBLEMS, HOW DIFFICULT HAVE THESE PROBLEMS MADE IT FOR YOU TO DO YOUR WORK, TAKE CARE OF THINGS AT HOME, OR GET ALONG WITH OTHER PEOPLE: 0
6. FEELING BAD ABOUT YOURSELF - OR THAT YOU ARE A FAILURE OR HAVE LET YOURSELF OR YOUR FAMILY DOWN: 0
SUM OF ALL RESPONSES TO PHQ QUESTIONS 1-9: 5
1. LITTLE INTEREST OR PLEASURE IN DOING THINGS: 1
8. MOVING OR SPEAKING SO SLOWLY THAT OTHER PEOPLE COULD HAVE NOTICED. OR THE OPPOSITE, BEING SO FIGETY OR RESTLESS THAT YOU HAVE BEEN MOVING AROUND A LOT MORE THAN USUAL: 1
SUM OF ALL RESPONSES TO PHQ9 QUESTIONS 1 & 2: 1
5. POOR APPETITE OR OVEREATING: 1
2. FEELING DOWN, DEPRESSED OR HOPELESS: 0

## 2022-05-12 ASSESSMENT — SOCIAL DETERMINANTS OF HEALTH (SDOH): HOW HARD IS IT FOR YOU TO PAY FOR THE VERY BASICS LIKE FOOD, HOUSING, MEDICAL CARE, AND HEATING?: NOT HARD AT ALL

## 2022-05-12 NOTE — PROGRESS NOTES
1950    Chief Complaint   Patient presents with   Bonnie Shabazz / right total hip / 2022 / Arnel Barreto        Pt is a 70 y.o. female who presents for a preoperative medical consultation at the request of Dr. Gamaliel Shabazz prior to Right total hip arthroplasty. Pt complains of right hip pain which is moderate. Pain is improved with walking. Pain is worsened by sitting and lying down. Pt has failed conservative measures, therefore she wishes to proceed with surgical intervention. Reactions to anesthesia: none    History of excessive bleeding: none    History of blood clots: none    History of blood transfusions: none      Reactions to blood transfusion: none     Past Medical History:   Diagnosis Date    Acquired hypothyroidism     Breast cancer ([facility] [address]     left-invasive ductal follows with Dr Antoinette Ochoa COVID-19 10/07/2020    wears o2 at night - no longer on oxygen at night.     Depression     Hyperlipidemia     Numbness and tingling     left foot-chronic nerve damage since hysterectomy    Obese     Osteopenia of multiple sites        Past Surgical History:   Procedure Laterality Date    ABDOMINAL EXPLORATION SURGERY      Dr butterfield-lysis of adhesions    BREAST LUMPECTOMY Left 2015    Left breast lumpectomy, retroareolar with preoperative needle loc-Dr. Cassandra Manriquez BREAST SURGERY Left 2020    biopsy of left breast    CHEST WALL RESECTION N/A 2021    DEBRIDEMENT CHEST WALL SEROMA performed by Dustin Barnhart MD at [address] [place] [address]  10/2014    Dr Prasad Swenson  2016    Dr Todd Goldmann  10/2014    x4 abdominal wall    HYSTERECTOMY      INCISIONAL HERNIA REPAIR      Dr Michael Saunders LIPECTOMY      Dr Kristian Tomas  2020    [facility] [address] LEFT 2020 Walker Griffin  [facility] Bilateral 2020    LEFT SIMPLE MASTECTOMY AND SENTINAL LYMPH NODE BIOPSY, RIGHT SIMPLE PROPHYLACTIC MASTECTOMY performed by Lyle Marroquin MD at [place] [address]  08/17/2021    port removal done in the procedure room Dr Tim Wilson Right 07/17/2020    SINGLE LUMEN SMART PORT INSERTION RIGHT SUBCLAVIAN performed by Lyle Marroquin MD at [address]  10[facility]2014    [facility]      patient was 11years old    TUBAL LIGATION         Current Outpatient Medications   Medication Sig Dispense Refill    Probiotic Product (PROBIOTIC DAILY PO) Take by mouth daily      anastrozole (ARIMIDEX) 1 MG tablet Take 1 tablet by mouth daily 90 tablet 3    rosuvastatin (CRESTOR) 5 MG tablet       vitamin C (VITAMIN C) 500 MG tablet Take 1 tablet by mouth daily 30 tablet 0    levothyroxine (SYNTHROID) 25 MCG tablet Take 50 mcg by mouth Daily Take 50 MG for 6 weeks per PCP.  alendronate (FOSAMAX) 35 MG tablet Take 35 mg by mouth every 7 days      aspirin EC 81 MG EC tablet Take 1 tablet by mouth daily 30 tablet 3    PARoxetine (PAXIL) 10 MG tablet Take 10 mg by mouth every morning      Coenzyme Q10-Fish Oil-Vit E (CO-Q 10 OMEGA-3 FISH OIL PO) Take by mouth daily      Vitamin D (CHOLECALCIFEROL) 1000 UNITS CAPS capsule Take by mouth daily 2 tab      Cinnamon 500 MG CAPS Take by mouth daily      Cyanocobalamin (VITAMIN B12 PO) Take 1,000 mcg by mouth daily        No current facility-administered medications for this visit. Allergies   Allergen Reactions    Aluminum-Containing Compounds Anaphylaxis    Scopolamine      Other reaction(s): Dizziness    Adhesive Tape Other (See Comments)     Takes top layer of skin off       Social History     Socioeconomic History    Marital status:       Spouse name: Not on file    Number of children: 3    Years of education: Not on file    Highest education level: Not on file   Occupational History    Not on file   Tobacco Use    Smoking status: Never Smoker    Smokeless tobacco: Never Used   Vaping Use    Vaping Use: Never used   Substance and Sexual Activity    Alcohol use: No     Alcohol/week: 0.0 standard drinks    Drug use: No    Sexual activity: Not on file   Other Topics Concern    Not on file   Social History Narrative    Not on file     Social Determinants of Health     Financial Resource Strain: Low Risk     Difficulty of Paying Living Expenses: Not hard at all   Food Insecurity: No Food Insecurity    Worried About [address] in the Last Year: Never true    [facility] in the Last Year: Never true   Transportation Needs:     Lack of Transportation (Medical): Not on file    Lack of Transportation (Non-Medical):  Not on file   Physical Activity:     Days of Exercise per Week: Not on file    Minutes of Exercise per Session: Not on file   Stress:     Feeling of Stress : Not on file   Social Connections:     Frequency of Communication with Friends and Family: Not on file    Frequency of Social Gatherings with Friends and Family: Not on file    Attends Voodoo Services: Not on file    Active Member of [address] [place] [address] or Organizations: Not on file    Attends Club or Organization Meetings: Not on file    Marital Status: Not on file   Intimate Partner Violence:     Fear of Current or Ex-Partner: Not on file    Emotionally Abused: Not on file    Physically Abused: Not on file    Sexually Abused: Not on file   Housing Stability:     Unable to Pay for Housing in the Last Year: Not on file    Number of [place] in the Last Year: Not on file    Unstable Housing in the Last Year: Not on file       Family History   Problem Relation Age of Onset    Heart Disease Father         cath stent blood to thin and bled    Heart Attack Mother     Other Other         blood clot    Breast Cancer Paternal Aunt 62    High Blood Pressure Sister     Cancer Brother 68        skin    Prostate Cancer Brother         had chemotherapy to treat     Prostate Cancer Brother 64        has had for 10 years    Ovarian Cancer Neg Hx     Diabetes Neg Hx     Stroke Neg Hx        Review of Systems - General ROS: negative for - chills or fever  Psychological ROS: negative for - anxiety and depression  Hematological and Lymphatic ROS: No history of blood clots or bleeding disorder. Respiratory ROS: no cough, shortness of breath, or wheezing  Cardiovascular ROS: no chest pain or dyspnea on exertion, tolerates a flight of stairs, vacuuming  Gastrointestinal ROS: no abdominal pain, change in bowel habits, or black or bloody stools  Genito-Urinary ROS: no dysuria, trouble voiding, or hematuria  Musculoskeletal ROS: negative for - muscle pain or muscular weakness, positive right hip pain  Neurological ROS: negative for - headaches, numbness/tingling, seizures or weakness  Dermatological ROS: negative for - rash or skin lesion changes    Blood pressure 120/68, pulse 84, temperature 97.7 °F (36.5 °C), height 5' 8\" (1.727 m), weight 222 lb 9.6 oz (101 kg). Physical Examination: General appearance -alert, well appearing, and in no distress  Mental status - alert, oriented to person, place, and time  Head - atraumatic, normocephalic  Eyes - pupils equal and reactive to light, extraocular muscles intact  Ears - external ears are normal, hearing is grossly normal  Mouth - oral pharynx clear, mucous membranes moist  Neck - supple, no significant adenopathy  Chest - clear to auscultation, no wheezes, rales or rhonchi, symmetric air entry  Heart - normal rate, regular rhythm, normal S1, S2, no murmurs, rubs, clicks or gallops  Abdomen -soft, nontender, nondistended  Neurological - alert, oriented, cranial nerves II-XII intact, motor and sensation are grossly intact bilateral upper and lower extremities.   Extremities - peripheral pulses normal, right leg larger than right due to venous insufficiency in right leg and nerve damage and atrophy in left leg, mild right LE edema, no clubbing or cyanosis  Skin - warm and dry    Diagnostic data:   I have reviewed recent diagnostic testing including labs - CBC, BMP, MRSA, EKG, CXR. Ca just slightly elevated at 10.6, Glucose 116. Assessment and Plan:    Patient is an acceptable surgical candidate for planned procedure. Cardiac risk assessment:  Patient has no clinical predictors of cardiac risk and tolerates greater than 4 mets of activity. Patient is scheduled for an elective intermediate risk surgery and is considered at an acceptable cardiac risk based on ACC/AHA criteria. Diagnosis Orders   1. Preop exam for internal medicine     2. Primary osteoarthritis of right hip     3. History of left breast cancer     4. S/P mastectomy, bilateral     5. Hyperglycemia     6. Acquired hypothyroidism     7. Hyperlipidemia, unspecified hyperlipidemia type     8. Obesity (BMI 30.0-34.9)     9. Osteopenia, unspecified location     10. Venous insufficiency       Patient was reminded to stop NSAIDs, ASA, herbal medication - especially vitamin E, C, fish oil one week prior to surgery. History of breast cancer s/p bilateral mastectomy - due to extensive surgery on left - Avoid using BP cuff and IV in left arm. Currently cared for by Dr. Joleen Malone and is on Arimidex. Hyperglycemia - glucose 116, educated patient to limit glucose and carbohydrates. She could also benefit from weight loss. Hypothyroidism - on levothyroxine 50 mcg daily, asymptomatic. Recently increased per PCP from 25->50 mcg daily. Hyperlipidemia - on Crestor 5 mg daily. Obesity - BMI 33.85, recommended weight loss, decrease calories, increase exercise. Denied sleep apnea questions. Osteopenia - on Fosamax and vitamin D.      Venous insufficiency right leg - 2 veins removed, chronic edema. DVT prophylaxis - Recommend early ambulation, venous return exercises, SCDs, LA hose and defer medications such as Xarelto to surgical service.     Allergies: Aluminum-containing compounds, Scopolamine, and Adhesive tape     Radha Zaragoza MD    [facility]  [address]  Dept: [phone]  Dept Fax: [phone] : [phone]

## 2022-05-13 PROBLEM — E03.9 ACQUIRED HYPOTHYROIDISM: Status: ACTIVE | Noted: 2022-05-13

## 2022-05-13 PROBLEM — M85.80 OSTEOPENIA: Status: ACTIVE | Noted: 2022-05-13

## 2022-05-13 PROBLEM — E66.9 OBESITY (BMI 30.0-34.9): Status: ACTIVE | Noted: 2022-05-13

## 2022-05-13 PROBLEM — E66.811 OBESITY (BMI 30.0-34.9): Status: ACTIVE | Noted: 2022-05-13

## 2022-05-13 PROBLEM — M16.11 PRIMARY OSTEOARTHRITIS OF RIGHT HIP: Status: ACTIVE | Noted: 2022-05-13

## 2022-05-20 ENCOUNTER — HOSPITAL ENCOUNTER (OUTPATIENT)
Dept: NUCLEAR MEDICINE | Age: 72
Discharge: HOME | End: 2022-05-20
Payer: MEDICARE

## 2022-05-20 ENCOUNTER — HOSPITAL ENCOUNTER (OUTPATIENT)
Dept: NUCLEAR MEDICINE | Age: 72
Discharge: HOME | End: 2022-05-20

## 2022-05-20 DIAGNOSIS — M16.11 PRIMARY OSTEOARTHRITIS OF RIGHT HIP: ICD-10-CM

## 2022-05-20 PROCEDURE — A9503 TC99M MEDRONATE: HCPCS | Performed by: ORTHOPAEDIC SURGERY

## 2022-05-20 PROCEDURE — 78306 BONE IMAGING WHOLE BODY: CPT

## 2022-05-20 PROCEDURE — 3430000000 HC RX DIAGNOSTIC RADIOPHARMACEUTICAL: Performed by: ORTHOPAEDIC SURGERY

## 2022-05-20 RX ORDER — TC 99M MEDRONATE 20 MG/10ML
26 INJECTION, POWDER, LYOPHILIZED, FOR SOLUTION INTRAVENOUS
Status: COMPLETED | OUTPATIENT
Start: 2022-05-20 | End: 2022-05-20

## 2022-05-20 RX ADMIN — TC 99M MEDRONATE 26 MILLICURIE: 20 INJECTION, POWDER, LYOPHILIZED, FOR SOLUTION INTRAVENOUS at 11:05

## 2022-07-20 ENCOUNTER — HOSPITAL ENCOUNTER (OUTPATIENT)
Dept: WOMENS IMAGING | Age: 72
Discharge: HOME | End: 2022-07-20
Payer: MEDICARE

## 2022-07-20 DIAGNOSIS — Z78.0 POSTMENOPAUSAL STATUS: ICD-10-CM

## 2022-07-20 PROCEDURE — 77080 DXA BONE DENSITY AXIAL: CPT

## 2022-09-19 ENCOUNTER — APPOINTMENT (OUTPATIENT)
Dept: CT IMAGING | Age: 72
End: 2022-09-19
Payer: MEDICARE

## 2022-09-19 ENCOUNTER — HOSPITAL ENCOUNTER (EMERGENCY)
Age: 72
Discharge: HOME | End: 2022-09-19
Attending: EMERGENCY MEDICINE
Payer: MEDICARE

## 2022-09-19 VITALS
OXYGEN SATURATION: 99 % | HEART RATE: 100 BPM | DIASTOLIC BLOOD PRESSURE: 88 MMHG | RESPIRATION RATE: 18 BRPM | SYSTOLIC BLOOD PRESSURE: 158 MMHG | TEMPERATURE: 97.5 F

## 2022-09-19 DIAGNOSIS — S00.03XA HEMATOMA OF SCALP, INITIAL ENCOUNTER: ICD-10-CM

## 2022-09-19 DIAGNOSIS — S09.90XA CLOSED HEAD INJURY, INITIAL ENCOUNTER: ICD-10-CM
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
## 2022-09-19 LAB
ANION GAP SERPL CALCULATED.3IONS-SCNC: 13 MEQ/L (ref 8–16)
ATYPICAL LYMPHOCYTES: ABNORMAL %
BASOPHILS # BLD: 1.4 %
BASOPHILS ABSOLUTE: 0.2 THOU/MM3 (ref 0–0.1)
BUN BLDV-MCNC: 12 MG/DL (ref 7–22)
CALCIUM SERPL-MCNC: 9.5 MG/DL (ref 8.5–10.5)
CHLORIDE BLD-SCNC: 104 MEQ/L (ref 98–111)
CO2: 19 MEQ/L (ref 23–33)
CREAT SERPL-MCNC: 0.5 MG/DL (ref 0.4–1.2)
EOSINOPHIL # BLD: 3.9 %
EOSINOPHILS ABSOLUTE: 0.4 THOU/MM3 (ref 0–0.4)
ERYTHROCYTE [DISTWIDTH] IN BLOOD BY AUTOMATED COUNT: 14.1 % (ref 11.5–14.5)
ERYTHROCYTE [DISTWIDTH] IN BLOOD BY AUTOMATED COUNT: 45.1 FL (ref 35–45)
GFR SERPL CREATININE-BSD FRML MDRD: > 90 ML/MIN/1.73M2
GLUCOSE BLD-MCNC: 103 MG/DL (ref 70–108)
HCT VFR BLD CALC: 44.7 % (ref 37–47)
HEMOGLOBIN: 15.3 GM/DL (ref 12–16)
IMMATURE GRANS (ABS): 0.09 THOU/MM3 (ref 0–0.07)
IMMATURE GRANULOCYTES: 0.8 %
LYMPHOCYTES # BLD: 42 %
LYMPHOCYTES ABSOLUTE: 4.7 THOU/MM3 (ref 1–4.8)
MCH RBC QN AUTO: 30.5 PG (ref 26–33)
MCHC RBC AUTO-ENTMCNC: 34.2 GM/DL (ref 32.2–35.5)
MCV RBC AUTO: 89 FL (ref 81–99)
MONOCYTES # BLD: 13.6 %
MONOCYTES ABSOLUTE: 1.5 THOU/MM3 (ref 0.4–1.3)
NUCLEATED RED BLOOD CELLS: 0 /100 WBC
OSMOLALITY CALCULATION: 272 MOSMOL/KG (ref 275–300)
PLATELET # BLD: 330 THOU/MM3 (ref 130–400)
PLATELET ESTIMATE: ADEQUATE
PMV BLD AUTO: 11.2 FL (ref 9.4–12.4)
POTASSIUM REFLEX MAGNESIUM: 4.7 MEQ/L (ref 3.5–5.2)
RBC # BLD: 5.02 MILL/MM3 (ref 4.2–5.4)
SCAN OF BLOOD SMEAR: NORMAL
SEG NEUTROPHILS: 38.3 %
SEGMENTED NEUTROPHILS ABSOLUTE COUNT: 4.3 THOU/MM3 (ref 1.8–7.7)
SODIUM BLD-SCNC: 136 MEQ/L (ref 135–145)
WBC # BLD: 11.1 THOU/MM3 (ref 4.8–10.8)

## 2022-09-19 PROCEDURE — 85025 COMPLETE CBC W/AUTO DIFF WBC: CPT

## 2022-09-19 PROCEDURE — 70480 CT ORBIT/EAR/FOSSA W/O DYE: CPT

## 2022-09-19 PROCEDURE — 80048 BASIC METABOLIC PNL TOTAL CA: CPT

## 2022-09-19 PROCEDURE — 72125 CT NECK SPINE W/O DYE: CPT

## 2022-09-19 PROCEDURE — 99284 EMERGENCY DEPT VISIT MOD MDM: CPT

## 2022-09-19 PROCEDURE — 70450 CT HEAD/BRAIN W/O DYE: CPT

## 2022-09-19 PROCEDURE — 36415 COLL VENOUS BLD VENIPUNCTURE: CPT

## 2022-09-19 ASSESSMENT — ENCOUNTER SYMPTOMS
SHORTNESS OF BREATH: 0
SORE THROAT: 0
COUGH: 0
VOMITING: 0
DIARRHEA: 0
COLOR CHANGE: 0
SINUS PRESSURE: 0
ABDOMINAL PAIN: 0
CHEST TIGHTNESS: 0
BACK PAIN: 0
NAUSEA: 0
CONSTIPATION: 0
SINUS PAIN: 0
WHEEZING: 0
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
## 2022-09-19 NOTE — ED PROVIDER NOTES
[place] ENCOUNTER          Pt Name: Elvin Malik  MRN: [id number]  [place] 1950  Date of evaluation: 9/19/2022  Treating Resident Physician: Cristhian Montero DO  Supervising Physician: Jovany    History obtained from chart review and the patient. CHIEF COMPLAINT       Chief Complaint   Patient presents with    Fall    Head Injury           HISTORY OF PRESENT ILLNESS    HPI  Elvin Malik is a 67 y.o. female who presents to the emergency department for evaluation of fall and head pain. Patient states she was traveling in [place] on Friday during a bus trip. She lost her balance and fell onto a curb striking the front of her head. Kailyn Castellano says that she was taken to a local hospital but was unable to get a CAT scan because her boss was leaving. She was told to follow-up when she got home for imaging and further evaluation. She denies any loss of consciousness during the fall, she is not anticoagulated. She denies any current headache or vision issues. She has not experienced any numbness or tingling. Patient does recall associated clear runny nose over the last 2 days that is spontaneously resolved. Additionally, she has had worsening bruising around both of her eyes. The patient has no other acute complaints at this time. REVIEW OF SYSTEMS   Review of Systems   Constitutional:  Negative for chills and fever. HENT:  Negative for sinus pressure, sinus pain and sore throat. Eyes:  Positive for visual disturbance (Improved after swelling is decreased). Respiratory:  Negative for cough, chest tightness, shortness of breath and wheezing. Cardiovascular:  Negative for chest pain, palpitations and leg swelling. Gastrointestinal:  Negative for abdominal pain, constipation, diarrhea, nausea and vomiting. Endocrine: Negative for cold intolerance and heat intolerance.    Genitourinary:  Negative for difficulty urinating and dysuria. Musculoskeletal:  Positive for arthralgias and myalgias. Negative for back pain, neck pain and neck stiffness. Skin:  Negative for color change and rash. Neurological:  Negative for dizziness, syncope, weakness, light-headedness, numbness and headaches. PAST MEDICAL AND SURGICAL HISTORY     Past Medical History:   Diagnosis Date    Acquired hypothyroidism     Breast cancer ([facility] [address] 2020    left-invasive ductal follows with Dr Jefe Vela    COVID-19 10/07/2020    wears o2 at night - no longer on oxygen at night.     Depression     Hyperlipidemia     Numbness and tingling     left foot-chronic nerve damage since hysterectomy    Obese     Osteopenia of multiple sites      Past Surgical History:   Procedure Laterality Date    ABDOMINAL EXPLORATION SURGERY  2014    Dr butterfield-lysis of adhesions    BREAST LUMPECTOMY Left 11/2015    Left breast lumpectomy, retroareolar with preoperative needle loc-Dr. Janie Mcgregor    BREAST SURGERY Left 06/26/2020    biopsy of left breast    CHEST WALL RESECTION N/A 9/8/2021    DEBRIDEMENT CHEST WALL SEROMA performed by Carito Yates MD at [address] [facility]  10/2014    Dr Nicole Barrett  03/30/2016    Dr Garrett Melendez  10/2014    x4 abdominal wall    HYSTERECTOMY (CERVIX STATUS UNKNOWN)      INCISIONAL HERNIA REPAIR  2014    Dr Taryn Powers    LIPECTOMY  2014    Dr Felicie Runner  07/16/2020    [address] LEFT 7/16/2020 Faith Gordon MD [facility] Bilateral 11/30/2020    LEFT SIMPLE MASTECTOMY AND SENTINAL LYMPH NODE BIOPSY, RIGHT SIMPLE PROPHYLACTIC MASTECTOMY performed by Carito Yates MD at [address] [facility]  08/17/2021    port removal done in the procedure room Dr Frankey Raker Right 07/17/2020    SINGLE LUMEN SMART PORT INSERTION RIGHT SUBCLAVIAN performed by Carito Yates MD at [address] [facility]  10/2014    Dr Fabiano Silva TONSILLECTOMY AND ADENOIDECTOMY      patient was 11years old    TUBAL LIGATION           MEDICATIONS   No current facility-administered medications for this encounter.     Current Outpatient Medications:     Probiotic Product (PROBIOTIC DAILY PO), Take by mouth daily, Disp: , Rfl:     anastrozole (ARIMIDEX) 1 MG tablet, Take 1 tablet by mouth daily, Disp: 90 tablet, Rfl: 3    rosuvastatin (CRESTOR) 5 MG tablet, , Disp: , Rfl:     vitamin C (VITAMIN C) 500 MG tablet, Take 1 tablet by mouth daily, Disp: 30 tablet, Rfl: 0    levothyroxine (SYNTHROID) 25 MCG tablet, Take 50 mcg by mouth Daily Take 50 MG for 6 weeks per PCP., Disp: , Rfl:     alendronate (FOSAMAX) 35 MG tablet, Take 35 mg by mouth every 7 days, Disp: , Rfl:     aspirin EC 81 MG EC tablet, Take 1 tablet by mouth daily, Disp: 30 tablet, Rfl: 3    PARoxetine (PAXIL) 10 MG tablet, Take 10 mg by mouth every morning, Disp: , Rfl:     Coenzyme Q10-Fish Oil-Vit E (CO-Q 10 OMEGA-3 FISH OIL PO), Take by mouth daily, Disp: , Rfl:     Vitamin D (CHOLECALCIFEROL) 1000 UNITS CAPS capsule, Take by mouth daily 2 tab, Disp: , Rfl:     Cinnamon 500 MG CAPS, Take by mouth daily, Disp: , Rfl:     Cyanocobalamin (VITAMIN B12 PO), Take 1,000 mcg by mouth daily , Disp: , Rfl:       SOCIAL HISTORY     Social History     Social History Narrative    Not on file     Social History     Tobacco Use    Smoking status: Never    Smokeless tobacco: Never   Vaping Use    Vaping Use: Never used   Substance Use Topics    Alcohol use: No     Alcohol/week: 0.0 standard drinks    Drug use: No         ALLERGIES     Allergies   Allergen Reactions    Aluminum-Containing Compounds Anaphylaxis    Scopolamine      Other reaction(s): Dizziness    Adhesive Tape Other (See Comments)     Takes top layer of skin off         FAMILY HISTORY     Family History   Problem Relation Age of Onset    Heart Disease Father         cath stent blood to thin and bled    Heart Attack Mother     Other Other blood clot    Breast Cancer Paternal Aunt 62    High Blood Pressure Sister     Cancer Brother 68        skin    Prostate Cancer Brother         had chemotherapy to treat     Prostate Cancer Brother 64        has had for 10 years    Ovarian Cancer Neg Hx     Diabetes Neg Hx     Stroke Neg Hx          PREVIOUS RECORDS   Previous records reviewed: This is this patient's first visit to [facility] ED, no previous records available on EMR. .        PHYSICAL EXAM     ED Triage Vitals [09/19/22 1209]   BP Temp Temp Source Heart Rate Resp SpO2 Height Weight   (!) 158/88 97.5 °F (36.4 °C) Oral 100 18 99 % -- --     Initial vital signs and nursing assessment reviewed and normal. There is no height or weight on file to calculate BMI. Pulsoximetry is normal per my interpretation. Additional Vital Signs:  Vitals:    09/19/22 1209   BP: (!) 158/88   Pulse: 100   Resp: 18   Temp: 97.5 °F (36.4 °C)   SpO2: 99%       Physical Exam  Constitutional:       General: She is not in acute distress. Appearance: She is obese. She is not toxic-appearing or diaphoretic. HENT:      Head:      Comments: Bilateral periorbital ecchymosis. Hematoma to the right forehead. No abrasion or active bleeding. Left Ear: Tympanic membrane, ear canal and external ear normal. There is no impacted cerumen. Ears:      Comments: Possible right canal dried blood versus cerumen. Nose: Nose normal. No congestion or rhinorrhea. Mouth/Throat:      Mouth: Mucous membranes are moist.      Pharynx: Oropharynx is clear. No oropharyngeal exudate or posterior oropharyngeal erythema. Eyes:      General: No scleral icterus. Right eye: No discharge. Left eye: No discharge. Extraocular Movements: Extraocular movements intact. Conjunctiva/sclera: Conjunctivae normal.      Pupils: Pupils are equal, round, and reactive to light. Cardiovascular:      Rate and Rhythm: Normal rate and regular rhythm. Pulses: Normal pulses. for the following components:    CO2 19 (*)     All other components within normal limits   OSMOLALITY - Abnormal; Notable for the following components:    Osmolality Calc 272.0 (*)     All other components within normal limits   ANION GAP   GLOMERULAR FILTRATION RATE, ESTIMATED   SCAN OF BLOOD SMEAR       Radiologic studies results:  CT HEAD WO CONTRAST   Final Result      1. Acute hematoma in the scalp overlying the right frontal calvarium. 2. The underlying calvarium is intact. There is no intraorbital emphysema. There is no orbital fracture. 3. There is mild atrophy and probable ischemic changes in the white matter. **This report has been created using voice recognition software. It may contain minor errors which are inherent in voice recognition technology. **      Final report electronically signed by DR Nadege Dos Santos on 9/19/2022 1:18 PM      CT ORBITS WO CONTRAST   Final Result      1. Acute hematoma in the scalp overlying the right frontal calvarium. 2. The underlying orbits are intact. There is no orbital fracture. There is no intraorbital emphysema. .               **This report has been created using voice recognition software. It may contain minor errors which are inherent in voice recognition technology. **      Final report electronically signed by DR Nadege Dos Santos on 9/19/2022 1:22 PM      CT CERVICAL SPINE WO CONTRAST   Final Result   No acute finding            **This report has been created using voice recognition software. It may contain minor errors which are inherent in voice recognition technology. **      Final report electronically signed by Dr. Meagan Reeder on 9/19/2022 1:27 PM          ED Medications administered this visit: Medications - No data to display      ED COURSE     ED Course as of 09/19/22 1422   Mon Sep 19, 2022   1330 CT without evidence of skull fracture, neck fracture, intracranial hemorrhage. Evidence of right frontal hematoma.  [EM]   4558 Plan for discharge and outpatient follow-up with primary doctor and ENT if persistent nasal drainage. [EM]      ED Course User Index  [EM] Anju Roberts DO         Strict return precautions and follow up instructions were discussed with the patient prior to discharge, with which the patient agrees. MEDICATION CHANGES     Current Discharge Medication List            FINAL DISPOSITION     Final diagnoses:   Fall, initial encounter   Hematoma of scalp, initial encounter   Closed head injury, initial encounter     Condition: condition: stable  Dispo: Discharge to home      This transcription was electronically signed. Parts of this transcriptions may have been dictated by use of voice recognition software and electronically transcribed, and parts may have been transcribed with the assistance of an ED scribe. The transcription may contain errors not detected in proofreading. Please refer to my supervising physician's documentation if my documentation differs.     Electronically Signed: Anju Roberts DO, 09/19/22, 2:22 PM        Anju Roberts DO  Resident  09/19/22 4898

## 2022-09-19 NOTE — DISCHARGE INSTRUCTIONS
Please follow-up with your primary doctor to ensure symptomatic improvement over the next week. Contact information for an ear nose and throat doctor if you continue to have unexplained nasal drainage or worsening of your symptoms. If it anytime you feel significant headache that is uncontrollable, nausea, vomiting, numbness or tingling return to the emergency department immediately.

## 2022-11-15 ENCOUNTER — HOSPITAL ENCOUNTER (OUTPATIENT)
Dept: INFUSION THERAPY | Age: 72
Discharge: HOME | End: 2022-11-15
Payer: MEDICARE

## 2022-11-15 ENCOUNTER — OFFICE VISIT (OUTPATIENT)
Dept: ONCOLOGY | Age: 72
End: 2022-11-15
Payer: MEDICARE

## 2022-11-15 VITALS
DIASTOLIC BLOOD PRESSURE: 74 MMHG | TEMPERATURE: 97.8 F | RESPIRATION RATE: 12 BRPM | SYSTOLIC BLOOD PRESSURE: 128 MMHG | HEART RATE: 77 BPM

## 2022-11-15 VITALS
OXYGEN SATURATION: 95 % | BODY MASS INDEX: 34.1 KG/M2 | HEIGHT: 68 IN | SYSTOLIC BLOOD PRESSURE: 128 MMHG | RESPIRATION RATE: 12 BRPM | DIASTOLIC BLOOD PRESSURE: 74 MMHG | HEART RATE: 77 BPM | WEIGHT: 225 LBS | TEMPERATURE: 97.8 F

## 2022-11-15 DIAGNOSIS — Z90.13 STATUS POST BILATERAL MASTECTOMY: ICD-10-CM

## 2022-11-15 DIAGNOSIS — Z08 ENCOUNTER FOR FOLLOW-UP SURVEILLANCE OF BREAST CANCER: ICD-10-CM

## 2022-11-15 DIAGNOSIS — Z85.3 ENCOUNTER FOR FOLLOW-UP SURVEILLANCE OF BREAST CANCER: ICD-10-CM

## 2022-11-15 DIAGNOSIS — C50.912 HER2-POSITIVE CARCINOMA OF LEFT BREAST (HCC): Primary | ICD-10-CM

## 2022-11-15 DIAGNOSIS — Z51.81 VISIT FOR MONITORING ARIMIDEX THERAPY: ICD-10-CM

## 2022-11-15 DIAGNOSIS — Z79.811 VISIT FOR MONITORING ARIMIDEX THERAPY: ICD-10-CM

## 2022-11-15 PROCEDURE — G8417 CALC BMI ABV UP PARAM F/U: HCPCS | Performed by: PHYSICIAN ASSISTANT

## 2022-11-15 PROCEDURE — G8484 FLU IMMUNIZE NO ADMIN: HCPCS | Performed by: PHYSICIAN ASSISTANT

## 2022-11-15 PROCEDURE — 3017F COLORECTAL CA SCREEN DOC REV: CPT | Performed by: PHYSICIAN ASSISTANT

## 2022-11-15 PROCEDURE — G8427 DOCREV CUR MEDS BY ELIG CLIN: HCPCS | Performed by: PHYSICIAN ASSISTANT

## 2022-11-15 PROCEDURE — 1036F TOBACCO NON-USER: CPT | Performed by: PHYSICIAN ASSISTANT

## 2022-11-15 PROCEDURE — 99214 OFFICE O/P EST MOD 30 MIN: CPT | Performed by: PHYSICIAN ASSISTANT

## 2022-11-15 PROCEDURE — 1123F ACP DISCUSS/DSCN MKR DOCD: CPT | Performed by: PHYSICIAN ASSISTANT

## 2022-11-15 PROCEDURE — G8399 PT W/DXA RESULTS DOCUMENT: HCPCS | Performed by: PHYSICIAN ASSISTANT

## 2022-11-15 PROCEDURE — 1090F PRES/ABSN URINE INCON ASSESS: CPT | Performed by: PHYSICIAN ASSISTANT

## 2022-11-15 PROCEDURE — 99211 OFF/OP EST MAY X REQ PHY/QHP: CPT

## 2022-11-15 RX ORDER — ANASTROZOLE 1 MG/1
1 TABLET ORAL DAILY
Qty: 90 TABLET | Refills: 3 | Status: SHIPPED | OUTPATIENT
Start: 2022-11-15

## 2022-11-15 NOTE — PROGRESS NOTES
[facility] [address] [facility] [address] [facility] [address]  [address] [address]  Dept: [phone]  Dept Fax: [phone]: [phone]      Visit Date:11/15/2022     Bonifacio Lewis is a 67 y.o. female who presents today for:   Chief Complaint   Patient presents with    Follow-up     HER2-positive carcinoma of left breast ([address]        HPI:   Bonifacio Lewis is a 67 y.o. female previously followed by Dr. Krista Plunkett for history of triple positive left breast cancer. Per Dr. Marcelina Roberts note on 22:   She had annual screening mammogram on 2020 that showed 2 lesions in the left breast.  Subsequently she underwent breast ultrasound followed by ultrasound guided biopsy of those 2 lesions. The largest of these lesions is a lobulated mixed cystic and    solid appearing mass measuring approximately 3 x 2.1 x 2.7 cm. This is located at the anterior depth upper inner quadrant. Aspiration of the fluid component of this lesion and biopsy of    the solid component of this lesion was performed on 2020. The smaller adjacent lesion is located at the middle depth of the     upper inner quadrant left breast measuring approximately 1.6 x    1.1 x 1.6 cm. Biopsy of this lesion was performed on 2020. Final pathology report showed:   A-B. Left breast, upper inner quadrant, anterior and middle depth,   barbell and tribell clips, multiple core needle biopsies:       Invasive, poorly differentiated ductal carcinoma, Pepe score 3. Estrogen Receptor: Positive 100 % of cells (>10% of cells)   Progesterone Receptor:  Positive 90 % of cells   Ki-67 (clone 30-9)     Percentage of positive nuclei: 42 %   HER2 NEW POSITIVE      On 2020 the patient had breast MRI showin. A known biopsy-proven mass demonstrated within the upper     inner left breast anterior to middle depth.  This measures 3 cm x     4.1 cm x 3.1 cm middle depth located 4  cm from the nipple, 0.9    cm from the skin, and 6.5 cm from the chest wall. This shows    heterogeneous enhancement and delayed washout type vascular    enhancement. 2. There are scattered small foci of enhancement demonstrated    bilaterally. The dominant focus of enhancement within the right    breast is located within the lower inner right breast anterior    depth on axial image 105 series 9. Recommend further evaluation    with second look ultrasound. If there is no sonographic    correlate, recommend 6 month follow-up breast MRI to document    stability. 3. Among the small foci of enhancement within the left breast,    the most prominent is located within the lower outer quadrant    posterior depth as seen on axial image 91 series 9. Recommend    further evaluation with second look ultrasound. If there is no    sonographic correlate, recommend 6 month follow-up breast MRI to    document stability. Patient started neoadjuvant chemotherapy with Taxotere/carbo/Herceptin/Pertuzumab on July 27, 2020. The patient received cycle #3 of chemotherapy on September 8, 2020. On October 4, 2020 she was diagnosed with Covid infection. On October 9, 2020 she was admitted to the hospital for worsening shortness of breath and hypoxia. She was treated with convalescent plasma 2 doses and she required high with high flow oxygen. Chest x-ray showed a developing right perihilar and left basilar consolidation. She was also treated with the course of remdesivir and 10-day course of Decadron. She started improving, she was discharged home after 12-day hospitalization. On November 30, 2020 she underwent left breast mastectomy with sentinel lymph node excision and right breast prophylactic mastectomy. Final pathology report showed:  A. Right breast, prophylactic mastectomy:    Fibrocystic changes including stromal fibrosis and cysts. B.  Left sentinel lymph nodes, excision:    Two lymph nodes, negative for malignancy (0/2).    C.  Left breast, mastectomy:       Residual multifocal invasive ductal carcinoma (11 mm, upper outer       quadrant mass) with treatment effect (ympT1c, snpN0). One lymph node, negative for malignancy (0/1). Atypical lobular hyperplasia. Margins are negative. Closest margin: 10 mm (deep margin, lower outer quadrant mass). Patient tolerated her surgery well. Patient started adjuvant Kadcyla on January 11, 2021. She received her seventh dose of Kadcyla on June 23, 2021. She made decision to stop Kadcyla after seventh infusions  Started aromatase inhibitor in August 2021    Interval History 11/15/2022:   The patient is here for follow up evaluation. She was last seen by Dr. Crystal Carrizales in May 2022 and reports she has been feeling well. She denies any concerns related to chest wall. Denies edema, skin changes, or palpable masses. She denies unintentional weight loss, poor appetite, early satiety, headaches, shortness of breath, dyspnea on exertion, or new bone pain. She tolerates Arimidex well without significant side effects. The patient states in September 2022 she was on a tour bus trip in [place] and fell getting off the bus. She went to the ED but did not pursue imaging as the bus needed to leave. She went [facility] the ED at [facility] on 9/19/2022 and had CT of the head which showed no orbital fracture, no intraorbital emphysema. Acute hematoma in the scalp overlying the right frontal calvarium. CT of the cervical spine showed no acute findings. PMH, SH, and FH:  I reviewed the patients medication list and allergy list as noted on the electronic medical record. The PMH, SH and FH were also reviewed as noted on the EMR. Review of Systems:   Review of Systems   Pertinent review of systems noted in HPI, all other ROS negative.    Objective:   Physical Exam   /74 (Site: Right Lower Arm, Position: Sitting, Cuff Size: Medium Adult)   Pulse 77   Temp 97.8 °F (36.6 °C) (Oral)   Resp 12   Ht 5' 8\" (1.727 m)   Wt 225 lb (102.1 kg)   SpO2 95%   BMI 34.21 kg/m²    General appearance: No apparent distress, well developed, elderly, and cooperative. HEENT: Pupils equal, round, and reactive to light. Conjunctivae/corneas clear. Oral mucosa moist.   Neck: Supple, with full range of motion. Trachea midline. Respiratory:  Normal respiratory effort. Clear to auscultation, bilaterally without Rales/Wheezes/Rhonchi. Cardiovascular: Regular rate and rhythm with normal S1/S2  Chest: s/p bilateral mastectomy. No palpable masses, skin changes noted. Abdomen: Soft, active bowel sounds. Musculoskeletal: No clubbing, cyanosis or edema bilaterally. Skin: Skin color, texture, turgor normal.  No visible rashes or lesions. Neurologic:  Neurovascularly intact without any focal sensory/motor deficits. Psychiatric: Alert and oriented      Imaging Studies and Labs:   CBC:   Lab Results   Component Value Date    WBC 11.1 (H) 09/19/2022    HGB 15.3 09/19/2022    HCT 44.7 09/19/2022    MCV 89.0 09/19/2022     09/19/2022     BMP:   Lab Results   Component Value Date/Time     09/19/2022 01:15 PM    K 4.7 09/19/2022 01:15 PM     09/19/2022 01:15 PM    CO2 19 09/19/2022 01:15 PM    BUN 12 09/19/2022 01:15 PM    CREATININE 0.5 09/19/2022 01:15 PM    GLUCOSE 103 09/19/2022 01:15 PM    CALCIUM 9.5 09/19/2022 01:15 PM      LFT:   Lab Results   Component Value Date    ALT 20 03/04/2022    AST 37 03/04/2022    ALKPHOS 73 03/04/2022    BILITOT 0.6 03/04/2022         Assessment and Plan:   1. Triple Positive Left Breast Cancer in upper outer quadrant with separate focus of triple negative breast cancer in lower outer quadrant   Patient has received 3 cycles of neoadjuvant chemotherapy with Taxotere/carbo/Herceptin and Pertuzumab. Last treatment given in September 2020. Then the patient was hospitalized for Covid infection in October 2020.   After she recovered from  Covid infection decision was made to proceed with breast surgery. She had surgery: bilateral mastectomy on November 30, 2020. Final pathology report showed residual cancer in both locations upper outer quadrant and lower outer quadrant. Residual triple negative breast cancer was smaller than 1 cm in size. Residual HER-2/jordan positive cancer was 11 mm in size. The patient was recommended to proceed with adjuvant Kadcyla, started on 1/11/2021.   2. Adjuvant Chemotherapy with Kadcyla  She received first 3 infusions on time; however, had a 6 week break in March 2021 due to viral infection. Last cycle #7 Kadcyla given on June 23, 2021. Afterwards she declined further treatment with Kadcyla. Last echo in May 2021 showed normal left ventricular ejection fraction. 3.  Adjuvant hormonal treatment   The patient started AI in August 2021. She tolerates Arimidex well. Denies new side effects. -DEXA on 7/20/22 showed osteopenia. Currently on vitamin D and calcium. 4.  Breast cancer surveillance   Management of breast cancer survivors who have completed active treatment and have no evidence of disease are cancer surveillance, lifestyle modifications including pursuing healthy regular exercise program, minimizing alcohol intake, and refraining from smoking. Survivor follow-up will include updated history, regular physical examination. Radiologic imaging to screen for distant recurrence will be not performed unless the patient will develop new symptoms. Symptoms suspicious for recurrent /metastic disease include:  ?Constitutional symptoms - Anorexia, weight loss, malaise, fatigue, insomnia. ? Bone pain  ? Pulmonary symptoms - persistent cough or dyspnea (at rest or with exertion). ?Neurologic symptoms - Headache, nausea, vomiting, confusion, weakness, numbness or tingling. ?Gastrointestinal symptoms - Right upper quadrant pain, change in bowel habits, presence of bloody or tarry stools. RTC in 6 months    All patient questions answered.  Pt voiced understanding. Patient agreed with treatment plan. Follow up as directed. Patient instructed to call for questions or concerns.           Electronically signed by   Geraldo Zendejas PA-C

## 2023-04-02 ENCOUNTER — HOSPITAL ENCOUNTER (EMERGENCY)
Age: 73
Discharge: HOME | End: 2023-04-02
Attending: STUDENT IN AN ORGANIZED HEALTH CARE EDUCATION/TRAINING PROGRAM
Payer: MEDICARE

## 2023-04-02 ENCOUNTER — APPOINTMENT (OUTPATIENT)
Dept: INTERVENTIONAL RADIOLOGY/VASCULAR | Age: 73
End: 2023-04-02
Payer: MEDICARE

## 2023-04-02 VITALS
TEMPERATURE: 98.2 F | RESPIRATION RATE: 18 BRPM | BODY MASS INDEX: 34.84 KG/M2 | DIASTOLIC BLOOD PRESSURE: 81 MMHG | SYSTOLIC BLOOD PRESSURE: 156 MMHG | HEIGHT: 67 IN | WEIGHT: 222 LBS | HEART RATE: 79 BPM | OXYGEN SATURATION: 95 %

## 2023-04-02 DIAGNOSIS — M71.21 SYNOVIAL CYST OF RIGHT POPLITEAL SPACE: Primary | ICD-10-CM

## 2023-04-02 DIAGNOSIS — M79.604 RIGHT LEG PAIN: ICD-10-CM

## 2023-04-02 PROCEDURE — 6370000000 HC RX 637 (ALT 250 FOR IP): Performed by: STUDENT IN AN ORGANIZED HEALTH CARE EDUCATION/TRAINING PROGRAM

## 2023-04-02 PROCEDURE — 99284 EMERGENCY DEPT VISIT MOD MDM: CPT

## 2023-04-02 PROCEDURE — 93971 EXTREMITY STUDY: CPT

## 2023-04-02 RX ORDER — ACETAMINOPHEN 325 MG/1
650 TABLET ORAL ONCE
Status: COMPLETED | OUTPATIENT
Start: 2023-04-02 | End: 2023-04-02

## 2023-04-02 RX ADMIN — ACETAMINOPHEN 650 MG: 325 TABLET ORAL at 22:15

## 2023-04-02 ASSESSMENT — PAIN SCALES - GENERAL: PAINLEVEL_OUTOF10: 8

## 2023-04-02 ASSESSMENT — PAIN DESCRIPTION - LOCATION: LOCATION: LEG

## 2023-04-02 ASSESSMENT — PAIN DESCRIPTION - ORIENTATION: ORIENTATION: RIGHT

## 2023-04-02 ASSESSMENT — PAIN - FUNCTIONAL ASSESSMENT: PAIN_FUNCTIONAL_ASSESSMENT: 0-10

## 2023-04-03 NOTE — DISCHARGE INSTRUCTIONS
Wrap your knee for comfort. Follow-up with your primary doctor or orthopedics regarding the Baker's Cyst.  Return to the emergency department if you develop any worsening symptoms including pain, swelling, chest pain or shortness of breath.

## 2023-04-03 NOTE — ED NOTES
Pt presents to the ED with concern for DVT in her R leg. Pt states she hit her leg a few days go. Pt has hematoma noted to R shin and bruising. Pt states she is having pain posterior knee. Pt states pain is 8/10.      Luiz Goldstein RN  04/02/23 2013

## 2023-04-03 NOTE — ED PROVIDER NOTES
pain          DISPOSITION/PLAN   DISPOSITION Decision To Discharge 04/02/2023 10:19:24 PM      OUTPATIENT FOLLOW UP THE PATIENT:  ANNALISA Decker CNP  [address] [place] [address] EMERGENCY DEPT  [address] [address]  [phone]  Go to   If symptoms worsen    Talisha Clark [address]  [address] [facility] [address] MD Anny Chino MD  04/03/23 0115

## 2023-05-15 ENCOUNTER — TELEPHONE (OUTPATIENT)
Dept: ONCOLOGY | Age: 73
End: 2023-05-15

## 2023-06-07 ENCOUNTER — HOSPITAL ENCOUNTER (OUTPATIENT)
Dept: INFUSION THERAPY | Age: 73
Discharge: HOME | End: 2023-06-07
Payer: MEDICARE

## 2023-06-07 VITALS
DIASTOLIC BLOOD PRESSURE: 70 MMHG | OXYGEN SATURATION: 93 % | HEART RATE: 75 BPM | SYSTOLIC BLOOD PRESSURE: 140 MMHG | TEMPERATURE: 98.2 F | RESPIRATION RATE: 18 BRPM

## 2023-06-07 DIAGNOSIS — C50.912 HER2-POSITIVE CARCINOMA OF LEFT BREAST (HCC): ICD-10-CM

## 2023-06-07 LAB
BASOPHILS ABSOLUTE: 0.1 THOU/MM3 (ref 0–0.1)
BASOPHILS NFR BLD AUTO: 1.2 %
DEPRECATED RDW RBC AUTO: 48.4 FL (ref 35–45)
EOSINOPHIL NFR BLD AUTO: 2.2 %
EOSINOPHILS ABSOLUTE: 0.2 THOU/MM3 (ref 0–0.4)
ERYTHROCYTE [DISTWIDTH] IN BLOOD BY AUTOMATED COUNT: 13.2 % (ref 11.5–14.5)
HCT VFR BLD AUTO: 50.7 % (ref 37–47)
HGB BLD-MCNC: 16 GM/DL (ref 12–16)
IMM GRANULOCYTES # BLD AUTO: 0.09 THOU/MM3 (ref 0–0.07)
IMM GRANULOCYTES NFR BLD AUTO: 0.8 %
LYMPHOCYTES ABSOLUTE: 4.3 THOU/MM3 (ref 1–4.8)
LYMPHOCYTES NFR BLD AUTO: 37.7 %
MCH RBC QN AUTO: 31.3 PG (ref 26–33)
MCHC RBC AUTO-ENTMCNC: 31.6 GM/DL (ref 32.2–35.5)
MCV RBC AUTO: 99.2 FL (ref 81–99)
MONOCYTES ABSOLUTE: 1.2 THOU/MM3 (ref 0.4–1.3)
MONOCYTES NFR BLD AUTO: 10.3 %
NEUTROPHILS NFR BLD AUTO: 47.8 %
NRBC BLD AUTO-RTO: 0 /100 WBC
PLATELET # BLD AUTO: 447 THOU/MM3 (ref 130–400)
PMV BLD AUTO: 11.6 FL (ref 9.4–12.4)
RBC # BLD AUTO: 5.11 MILL/MM3 (ref 4.2–5.4)
SEGMENTED NEUTROPHILS ABSOLUTE COUNT: 5.4 THOU/MM3 (ref 1.8–7.7)
WBC # BLD AUTO: 11.3 THOU/MM3 (ref 4.8–10.8)

## 2023-06-07 PROCEDURE — 36415 COLL VENOUS BLD VENIPUNCTURE: CPT

## 2023-06-07 PROCEDURE — 85025 COMPLETE CBC W/AUTO DIFF WBC: CPT

## 2023-06-07 PROCEDURE — 99211 OFF/OP EST MAY X REQ PHY/QHP: CPT

## 2023-06-16 ENCOUNTER — TELEPHONE (OUTPATIENT)
Dept: ONCOLOGY | Age: 73
End: 2023-06-16

## 2023-06-23 ENCOUNTER — OFFICE VISIT (OUTPATIENT)
Dept: ONCOLOGY | Age: 73
End: 2023-06-23
Payer: MEDICARE

## 2023-06-23 ENCOUNTER — HOSPITAL ENCOUNTER (OUTPATIENT)
Dept: INFUSION THERAPY | Age: 73
Discharge: HOME | End: 2023-06-23
Payer: MEDICARE

## 2023-06-23 VITALS
RESPIRATION RATE: 16 BRPM | BODY MASS INDEX: 34.09 KG/M2 | HEART RATE: 72 BPM | OXYGEN SATURATION: 93 % | DIASTOLIC BLOOD PRESSURE: 66 MMHG | HEIGHT: 67 IN | WEIGHT: 217.2 LBS | SYSTOLIC BLOOD PRESSURE: 138 MMHG

## 2023-06-23 VITALS
RESPIRATION RATE: 16 BRPM | OXYGEN SATURATION: 93 % | HEART RATE: 72 BPM | DIASTOLIC BLOOD PRESSURE: 66 MMHG | SYSTOLIC BLOOD PRESSURE: 138 MMHG

## 2023-06-23 DIAGNOSIS — C50.912 HER2-POSITIVE CARCINOMA OF LEFT BREAST (HCC): Primary | ICD-10-CM

## 2023-06-23 PROCEDURE — 1036F TOBACCO NON-USER: CPT | Performed by: INTERNAL MEDICINE

## 2023-06-23 PROCEDURE — G8417 CALC BMI ABV UP PARAM F/U: HCPCS | Performed by: INTERNAL MEDICINE

## 2023-06-23 PROCEDURE — G8428 CUR MEDS NOT DOCUMENT: HCPCS | Performed by: INTERNAL MEDICINE

## 2023-06-23 PROCEDURE — 3017F COLORECTAL CA SCREEN DOC REV: CPT | Performed by: INTERNAL MEDICINE

## 2023-06-23 PROCEDURE — 99214 OFFICE O/P EST MOD 30 MIN: CPT | Performed by: INTERNAL MEDICINE

## 2023-06-23 PROCEDURE — 1090F PRES/ABSN URINE INCON ASSESS: CPT | Performed by: INTERNAL MEDICINE

## 2023-06-23 PROCEDURE — 99211 OFF/OP EST MAY X REQ PHY/QHP: CPT

## 2023-06-23 PROCEDURE — G8399 PT W/DXA RESULTS DOCUMENT: HCPCS | Performed by: INTERNAL MEDICINE

## 2023-06-23 PROCEDURE — 1123F ACP DISCUSS/DSCN MKR DOCD: CPT | Performed by: INTERNAL MEDICINE

## 2023-06-23 RX ORDER — SITAGLIPTIN 100 MG/1
100 TABLET, FILM COATED ORAL DAILY
COMMUNITY
Start: 2023-06-15

## 2023-07-28 ENCOUNTER — HOSPITAL ENCOUNTER (OUTPATIENT)
Age: 73
Discharge: HOME | End: 2023-07-28
Payer: MEDICARE

## 2023-07-28 DIAGNOSIS — C50.912 HER2-POSITIVE CARCINOMA OF LEFT BREAST (HCC): ICD-10-CM

## 2023-07-28 LAB
BASOPHILS ABSOLUTE: 0.1 THOU/MM3 (ref 0–0.1)
BASOPHILS NFR BLD AUTO: 1.1 %
DEPRECATED RDW RBC AUTO: 46.6 FL (ref 35–45)
EOSINOPHIL NFR BLD AUTO: 2.3 %
EOSINOPHILS ABSOLUTE: 0.2 THOU/MM3 (ref 0–0.4)
ERYTHROCYTE [DISTWIDTH] IN BLOOD BY AUTOMATED COUNT: 13 % (ref 11.5–14.5)
HCT VFR BLD AUTO: 47.8 % (ref 37–47)
HGB BLD-MCNC: 15.3 GM/DL (ref 12–16)
IMM GRANULOCYTES # BLD AUTO: 0.06 THOU/MM3 (ref 0–0.07)
IMM GRANULOCYTES NFR BLD AUTO: 0.6 %
LYMPHOCYTES ABSOLUTE: 3.2 THOU/MM3 (ref 1–4.8)
LYMPHOCYTES NFR BLD AUTO: 31.6 %
MCH RBC QN AUTO: 31 PG (ref 26–33)
MCHC RBC AUTO-ENTMCNC: 32 GM/DL (ref 32.2–35.5)
MCV RBC AUTO: 97 FL (ref 81–99)
MONOCYTES ABSOLUTE: 1.1 THOU/MM3 (ref 0.4–1.3)
MONOCYTES NFR BLD AUTO: 11.4 %
NEUTROPHILS NFR BLD AUTO: 53 %
NRBC BLD AUTO-RTO: 0 /100 WBC
PLATELET # BLD AUTO: 386 THOU/MM3 (ref 130–400)
PMV BLD AUTO: 11.8 FL (ref 9.4–12.4)
RBC # BLD AUTO: 4.93 MILL/MM3 (ref 4.2–5.4)
SEGMENTED NEUTROPHILS ABSOLUTE COUNT: 5.3 THOU/MM3 (ref 1.8–7.7)
WBC # BLD AUTO: 10 THOU/MM3 (ref 4.8–10.8)

## 2023-07-28 PROCEDURE — 36415 COLL VENOUS BLD VENIPUNCTURE: CPT

## 2023-07-28 PROCEDURE — 85025 COMPLETE CBC W/AUTO DIFF WBC: CPT

## 2023-12-04 DIAGNOSIS — D75.1 ERYTHROCYTOSIS: Primary | ICD-10-CM

## 2023-12-06 ENCOUNTER — HOSPITAL ENCOUNTER (OUTPATIENT)
Dept: INFUSION THERAPY | Age: 73
Discharge: HOME | End: 2023-12-06
Payer: MEDICARE

## 2023-12-06 ENCOUNTER — OFFICE VISIT (OUTPATIENT)
Dept: ONCOLOGY | Age: 73
End: 2023-12-06
Payer: MEDICARE

## 2023-12-06 VITALS
HEART RATE: 87 BPM | WEIGHT: 221.8 LBS | TEMPERATURE: 98.7 F | RESPIRATION RATE: 20 BRPM | BODY MASS INDEX: 34.81 KG/M2 | DIASTOLIC BLOOD PRESSURE: 59 MMHG | SYSTOLIC BLOOD PRESSURE: 116 MMHG | HEIGHT: 67 IN | OXYGEN SATURATION: 96 %

## 2023-12-06 VITALS
SYSTOLIC BLOOD PRESSURE: 116 MMHG | RESPIRATION RATE: 20 BRPM | HEIGHT: 67 IN | DIASTOLIC BLOOD PRESSURE: 59 MMHG | BODY MASS INDEX: 34.81 KG/M2 | HEART RATE: 87 BPM | TEMPERATURE: 98.7 F | OXYGEN SATURATION: 96 % | WEIGHT: 221.8 LBS

## 2023-12-06 DIAGNOSIS — Z85.3 ENCOUNTER FOR FOLLOW-UP SURVEILLANCE OF BREAST CANCER: ICD-10-CM

## 2023-12-06 DIAGNOSIS — Z51.81 VISIT FOR MONITORING ARIMIDEX THERAPY: ICD-10-CM

## 2023-12-06 DIAGNOSIS — Z08 ENCOUNTER FOR FOLLOW-UP SURVEILLANCE OF BREAST CANCER: ICD-10-CM

## 2023-12-06 DIAGNOSIS — D75.1 ERYTHROCYTOSIS: ICD-10-CM

## 2023-12-06 DIAGNOSIS — Z79.811 VISIT FOR MONITORING ARIMIDEX THERAPY: ICD-10-CM

## 2023-12-06 DIAGNOSIS — C50.912 HER2-POSITIVE CARCINOMA OF LEFT BREAST (HCC): Primary | ICD-10-CM

## 2023-12-06 LAB
ABSOLUTE IMMATURE GRANULOCYTE: 0.06 THOU/MM3 (ref 0–0.07)
BASOPHILS ABSOLUTE: 0.1 THOU/MM3 (ref 0–0.1)
BASOPHILS NFR BLD AUTO: 1 % (ref 0–3)
EOSINOPHIL NFR BLD AUTO: 2 % (ref 0–4)
EOSINOPHILS ABSOLUTE: 0.2 THOU/MM3 (ref 0–0.4)
ERYTHROCYTE [DISTWIDTH] IN BLOOD BY AUTOMATED COUNT: 12.6 % (ref 11.5–14.5)
HCT VFR BLD AUTO: 48.4 % (ref 37–47)
HGB BLD-MCNC: 15.6 GM/DL (ref 12–16)
IMMATURE GRANULOCYTES: 1 %
LYMPHOCYTES ABSOLUTE: 3.7 THOU/MM3 (ref 1–4.8)
LYMPHOCYTES NFR BLD AUTO: 35 % (ref 15–47)
MCH RBC QN AUTO: 31.2 PG (ref 26–33)
MCHC RBC AUTO-ENTMCNC: 32.2 GM/DL (ref 32.2–35.5)
MCV RBC AUTO: 97 FL (ref 81–99)
MONOCYTES ABSOLUTE: 1.3 THOU/MM3 (ref 0.4–1.3)
MONOCYTES NFR BLD AUTO: 12 % (ref 0–12)
NEUTROPHILS NFR BLD AUTO: 49 % (ref 43–75)
PLATELET # BLD AUTO: 359 THOU/MM3 (ref 130–400)
PMV BLD AUTO: 10.7 FL (ref 9.4–12.4)
RBC # BLD AUTO: 5 MILL/MM3 (ref 4.2–5.4)
SEGMENTED NEUTROPHILS ABSOLUTE COUNT: 5.2 THOU/MM3 (ref 1.8–7.7)
WBC # BLD AUTO: 10.5 THOU/MM3 (ref 4.8–10.8)

## 2023-12-06 PROCEDURE — G8417 CALC BMI ABV UP PARAM F/U: HCPCS | Performed by: NURSE PRACTITIONER

## 2023-12-06 PROCEDURE — G8399 PT W/DXA RESULTS DOCUMENT: HCPCS | Performed by: NURSE PRACTITIONER

## 2023-12-06 PROCEDURE — 3017F COLORECTAL CA SCREEN DOC REV: CPT | Performed by: NURSE PRACTITIONER

## 2023-12-06 PROCEDURE — G8427 DOCREV CUR MEDS BY ELIG CLIN: HCPCS | Performed by: NURSE PRACTITIONER

## 2023-12-06 PROCEDURE — 36415 COLL VENOUS BLD VENIPUNCTURE: CPT

## 2023-12-06 PROCEDURE — 99211 OFF/OP EST MAY X REQ PHY/QHP: CPT

## 2023-12-06 PROCEDURE — 1123F ACP DISCUSS/DSCN MKR DOCD: CPT | Performed by: NURSE PRACTITIONER

## 2023-12-06 PROCEDURE — 99214 OFFICE O/P EST MOD 30 MIN: CPT | Performed by: NURSE PRACTITIONER

## 2023-12-06 PROCEDURE — 85025 COMPLETE CBC W/AUTO DIFF WBC: CPT

## 2023-12-06 PROCEDURE — G8484 FLU IMMUNIZE NO ADMIN: HCPCS | Performed by: NURSE PRACTITIONER

## 2023-12-06 PROCEDURE — 1090F PRES/ABSN URINE INCON ASSESS: CPT | Performed by: NURSE PRACTITIONER

## 2023-12-06 PROCEDURE — 1036F TOBACCO NON-USER: CPT | Performed by: NURSE PRACTITIONER

## 2023-12-06 RX ORDER — ANASTROZOLE 1 MG/1
1 TABLET ORAL DAILY
Qty: 90 TABLET | Refills: 3 | Status: SHIPPED | OUTPATIENT
Start: 2023-12-06

## 2023-12-06 NOTE — PATIENT INSTRUCTIONS
Return to clinic in 6 mos to see Sachi Senior with labs:  CBC, JAK2   Try to increase your water intake

## 2023-12-06 NOTE — PROGRESS NOTES
multifocal invasive ductal carcinoma (11 mm, upper outer       quadrant mass) with treatment effect (ympT1c, snpN0). One lymph node, negative for malignancy (0/1). Atypical lobular hyperplasia. Margins are negative. Closest margin: 10 mm (deep margin, lower outer quadrant mass). Patient tolerated her surgery well. Patient started adjuvant Kadcyla on January 11, 2021. She received her seventh dose of Kadcyla on June 23, 2021. She made decision to stop Kadcyla after seventh infusions  Started aromatase inhibitor in August 2021      Interval History 12/6/2023:   The patient presents to the office today for follow up and evaluation of history of breast cancer. The patient reports she feels fine today. She continues on Arimidex and tolerates well. She reports chronic RLE edema, diarrhea at times. She denies recurrent fevers/infections, headaches, dizziness, cough, SOB, CP, heart palpitations, abdominal pain, N/V/C, peripheral neuropathy, s/s bleeding. Hx erythrocytosis in the past, mild. She denies any history of blood clots. She does not smoke. No family members in the home smoke. She is not on a diuretic. She relates to low water intake, sometimes up to 32 oz daily. Recommended pt increase water intake. She declines JAK2 testing today, she is agreeable to having it done at f/u appnt. VSS. She denies changes to her breasts, no palpable lumps/masses, nipple changes/inversion/discharge, or edema to chest wall/UE. PMH, SH, and FH:  I reviewed the patient's medication and allergy lists as noted on the electronic medical record. The PMH, SH, and FH were also reviewed as noted on the EMR. Past Medical History:   Diagnosis Date    Acquired hypothyroidism     Breast cancer ([address]) 2020    left-invasive ductal follows with Dr Viridiana Pepper    COVID-19 10/07/2020    wears o2 at night - no longer on oxygen at night.     Depression     Hyperlipidemia     Numbness and tingling     left

## 2024-06-05 ENCOUNTER — OFFICE VISIT (OUTPATIENT)
Dept: ONCOLOGY | Age: 74
End: 2024-06-05

## 2024-06-05 ENCOUNTER — HOSPITAL ENCOUNTER (OUTPATIENT)
Dept: INFUSION THERAPY | Age: 74
Discharge: HOME | End: 2024-06-05
Payer: MEDICARE

## 2024-06-05 VITALS
HEIGHT: 67 IN | TEMPERATURE: 97.8 F | HEART RATE: 68 BPM | WEIGHT: 219.2 LBS | SYSTOLIC BLOOD PRESSURE: 143 MMHG | OXYGEN SATURATION: 97 % | BODY MASS INDEX: 34.4 KG/M2 | RESPIRATION RATE: 16 BRPM | DIASTOLIC BLOOD PRESSURE: 74 MMHG

## 2024-06-05 VITALS
TEMPERATURE: 97.8 F | OXYGEN SATURATION: 97 % | SYSTOLIC BLOOD PRESSURE: 143 MMHG | RESPIRATION RATE: 16 BRPM | HEART RATE: 68 BPM | DIASTOLIC BLOOD PRESSURE: 74 MMHG

## 2024-06-05 DIAGNOSIS — D75.1 ERYTHROCYTOSIS: ICD-10-CM

## 2024-06-05 DIAGNOSIS — M85.80 OSTEOPENIA, UNSPECIFIED LOCATION: ICD-10-CM

## 2024-06-05 DIAGNOSIS — Z79.811 VISIT FOR MONITORING ARIMIDEX THERAPY: ICD-10-CM

## 2024-06-05 DIAGNOSIS — C50.912 HER2-POSITIVE CARCINOMA OF LEFT BREAST (HCC): ICD-10-CM

## 2024-06-05 DIAGNOSIS — Z08 ENCOUNTER FOR FOLLOW-UP SURVEILLANCE OF BREAST CANCER: ICD-10-CM

## 2024-06-05 DIAGNOSIS — I89.0 LYMPHEDEMA: ICD-10-CM

## 2024-06-05 DIAGNOSIS — Z51.81 VISIT FOR MONITORING ARIMIDEX THERAPY: ICD-10-CM

## 2024-06-05 DIAGNOSIS — Z85.3 ENCOUNTER FOR FOLLOW-UP SURVEILLANCE OF BREAST CANCER: ICD-10-CM

## 2024-06-05 DIAGNOSIS — Z09 ENCOUNTER FOR FOLLOW-UP EXAMINATION AFTER COMPLETED TREATMENT FOR CONDITIONS OTHER THAN MALIGNANT NEOPLASM: ICD-10-CM

## 2024-06-05 DIAGNOSIS — D75.1 ERYTHROCYTOSIS: Primary | ICD-10-CM

## 2024-06-05 LAB
BASOPHILS ABSOLUTE: 0.1 THOU/MM3 (ref 0–0.1)
BASOPHILS NFR BLD AUTO: 1 % (ref 0–3)
EOSINOPHIL NFR BLD AUTO: 2 % (ref 0–4)
EOSINOPHILS ABSOLUTE: 0.2 THOU/MM3 (ref 0–0.4)
ERYTHROCYTE [DISTWIDTH] IN BLOOD BY AUTOMATED COUNT: 12.9 % (ref 11.5–14.5)
HCT VFR BLD AUTO: 47 % (ref 37–47)
HGB BLD-MCNC: 15.5 GM/DL (ref 12–16)
IMMATURE GRANULOCYTES %: 1 %
IMMATURE GRANULOCYTES ABSOLUTE: 0.05 THOU/MM3 (ref 0–0.07)
JAK2 QUAL, SOURCE: NORMAL
LYMPHOCYTES ABSOLUTE: 3.3 THOU/MM3 (ref 1–4.8)
LYMPHOCYTES NFR BLD AUTO: 30 % (ref 15–47)
MCH RBC QN AUTO: 31.6 PG (ref 26–33)
MCHC RBC AUTO-ENTMCNC: 33 GM/DL (ref 32.2–35.5)
MCV RBC AUTO: 96 FL (ref 81–99)
MONOCYTES ABSOLUTE: 1.2 THOU/MM3 (ref 0.4–1.3)
MONOCYTES NFR BLD AUTO: 11 % (ref 0–12)
NEUTROPHILS ABSOLUTE: 6.1 THOU/MM3 (ref 1.8–7.7)
NEUTROPHILS NFR BLD AUTO: 56 % (ref 43–75)
PLATELET # BLD AUTO: 363 THOU/MM3 (ref 130–400)
PMV BLD AUTO: 10.6 FL (ref 9.4–12.4)
RBC # BLD AUTO: 4.9 MILL/MM3 (ref 4.2–5.4)
WBC # BLD AUTO: 10.9 THOU/MM3 (ref 4.8–10.8)

## 2024-06-05 PROCEDURE — 99211 OFF/OP EST MAY X REQ PHY/QHP: CPT

## 2024-06-05 PROCEDURE — 81338 MPL GENE COMMON VARIANTS: CPT

## 2024-06-05 PROCEDURE — 81270 JAK2 GENE: CPT

## 2024-06-05 PROCEDURE — 85025 COMPLETE CBC W/AUTO DIFF WBC: CPT

## 2024-06-05 PROCEDURE — 36415 COLL VENOUS BLD VENIPUNCTURE: CPT

## 2024-06-05 PROCEDURE — 81219 CALR GENE COM VARIANTS: CPT

## 2024-06-05 RX ORDER — PIOGLITAZONEHYDROCHLORIDE 15 MG/1
TABLET ORAL
COMMUNITY
Start: 2024-04-25

## 2024-06-05 NOTE — PROGRESS NOTES
directed. Patient instructed to call for questions or concerns.        NOTE: This report was transcribed using voice recognition software.  Every effort was made to ensure accuracy; however, inadvertent computerized transcription errors may be present.    Electronically signed by   ANNALISA Hernadez CNP       I spent a total of 34 minutes on the day of the visit.

## 2024-06-05 NOTE — PATIENT INSTRUCTIONS
Return to clinic in 6 mos to see Dr. Gutierrez   Referral to lymphedema clinic    Check labs today:  will call with results

## 2024-06-11 ENCOUNTER — TELEPHONE (OUTPATIENT)
Dept: ONCOLOGY | Age: 74
End: 2024-06-11

## 2024-06-11 NOTE — TELEPHONE ENCOUNTER
Patient is calling in wondering why no one has notified her of the blood work results that were taken 2 weeks ago. I let her know that she just had them drawn on 6/5, which is less than 1 week ago, and that one of the lab values is still not fully finalized yet. I let her know that Brittany will be calling her once the results are back. She stated understanding.

## 2024-06-12 LAB
JAK2 P.V617F BLD/T QL: NOT DETECTED
SPECIMEN SOURCE: NORMAL

## 2024-06-13 LAB — GENE XXX MUT ANL BLD/T: NOT DETECTED

## 2024-06-14 LAB — MPL GENE MUT TESTED BLD/T: NOT DETECTED

## 2024-12-09 ENCOUNTER — OFFICE VISIT (OUTPATIENT)
Dept: ONCOLOGY | Age: 74
End: 2024-12-09
Payer: MEDICARE

## 2024-12-09 ENCOUNTER — HOSPITAL ENCOUNTER (OUTPATIENT)
Dept: INFUSION THERAPY | Age: 74
Discharge: HOME | End: 2024-12-09
Payer: MEDICARE

## 2024-12-09 VITALS
DIASTOLIC BLOOD PRESSURE: 68 MMHG | SYSTOLIC BLOOD PRESSURE: 144 MMHG | OXYGEN SATURATION: 93 % | TEMPERATURE: 98.4 F | WEIGHT: 219 LBS | RESPIRATION RATE: 18 BRPM | HEIGHT: 67 IN | HEART RATE: 70 BPM | BODY MASS INDEX: 34.37 KG/M2

## 2024-12-09 VITALS
HEART RATE: 70 BPM | SYSTOLIC BLOOD PRESSURE: 144 MMHG | OXYGEN SATURATION: 93 % | TEMPERATURE: 98.4 F | RESPIRATION RATE: 18 BRPM | DIASTOLIC BLOOD PRESSURE: 68 MMHG

## 2024-12-09 DIAGNOSIS — C50.912 HER2-POSITIVE CARCINOMA OF LEFT BREAST (HCC): Primary | ICD-10-CM

## 2024-12-09 DIAGNOSIS — Z17.31 HER2-POSITIVE CARCINOMA OF LEFT BREAST (HCC): Primary | ICD-10-CM

## 2024-12-09 PROCEDURE — 1159F MED LIST DOCD IN RCRD: CPT | Performed by: INTERNAL MEDICINE

## 2024-12-09 PROCEDURE — G8399 PT W/DXA RESULTS DOCUMENT: HCPCS | Performed by: INTERNAL MEDICINE

## 2024-12-09 PROCEDURE — 1123F ACP DISCUSS/DSCN MKR DOCD: CPT | Performed by: INTERNAL MEDICINE

## 2024-12-09 PROCEDURE — 1126F AMNT PAIN NOTED NONE PRSNT: CPT | Performed by: INTERNAL MEDICINE

## 2024-12-09 PROCEDURE — 99211 OFF/OP EST MAY X REQ PHY/QHP: CPT

## 2024-12-09 PROCEDURE — 1090F PRES/ABSN URINE INCON ASSESS: CPT | Performed by: INTERNAL MEDICINE

## 2024-12-09 PROCEDURE — 3017F COLORECTAL CA SCREEN DOC REV: CPT | Performed by: INTERNAL MEDICINE

## 2024-12-09 PROCEDURE — G8484 FLU IMMUNIZE NO ADMIN: HCPCS | Performed by: INTERNAL MEDICINE

## 2024-12-09 PROCEDURE — G8427 DOCREV CUR MEDS BY ELIG CLIN: HCPCS | Performed by: INTERNAL MEDICINE

## 2024-12-09 PROCEDURE — 1036F TOBACCO NON-USER: CPT | Performed by: INTERNAL MEDICINE

## 2024-12-09 PROCEDURE — 99213 OFFICE O/P EST LOW 20 MIN: CPT | Performed by: INTERNAL MEDICINE

## 2024-12-09 PROCEDURE — G8417 CALC BMI ABV UP PARAM F/U: HCPCS | Performed by: INTERNAL MEDICINE

## 2024-12-09 RX ORDER — ANASTROZOLE 1 MG/1
1 TABLET ORAL DAILY
Qty: 90 TABLET | Refills: 3 | Status: SHIPPED | OUTPATIENT
Start: 2024-12-09

## 2024-12-09 NOTE — PROGRESS NOTES
06/05/2024     06/05/2024     BMP:   Lab Results   Component Value Date/Time     06/15/2023 10:57 AM    K 4.8 06/15/2023 10:57 AM    K 4.7 09/19/2022 01:15 PM    CL 97 06/15/2023 10:57 AM    CO2 27 06/15/2023 10:57 AM    BUN 14 06/15/2023 10:57 AM    CREATININE 0.7 06/15/2023 10:57 AM    GLUCOSE 174 06/15/2023 10:57 AM    CALCIUM 10.6 06/15/2023 10:57 AM      LFT:   Lab Results   Component Value Date    ALT 19 06/15/2023    AST 30 06/15/2023    ALKPHOS 85 06/15/2023    BILITOT 0.6 06/15/2023      Assessment & Plan   Assessment and Plan:     1. HER2-positive carcinoma of left breast (HCC)  Screening mammogram 6/18/2020 (+) 2 lesions in left breast. S/p biopsy with final path (+) invasive, poorly differentiated ductal carcinoma. Pepe grade 3. ER/[place]/HER-2/jordan (+). She started neoadjuvant chemotherapy with Taxotere/carbo/Herceptin/Pertuzumab in July 2020. She underwent bilateral breast mastectomy on 11/30/2020. She started adjuvant Kadcyla on 1/11/2021 and stopped after 7 infusions, followed by initiation of Arimidex.     2. Visit for monitoring Arimidex therapy  She continues on Arimidex and tolerates well.  Continue at current dosing.    3. Lymphedema  Continue surveillance.    4. Erythrocytosis:  JAK2, NELIA R and MPL are negative.H&H within reference range on labs in June 2024.  Continue close surveillance, labs ordered for next clinic visit.    5. Encounter for follow-up surveillance of breast cancer  Management of breast cancer survivors who have completed active treatment and have no evidence of disease involve cancer surveillance and lifestyle modifications including pursuing a healthy exercise regimen, minimizing alcohol intake, and refraining from smoking.  Survivor follow up includes updated history and regular physical examination.  Radiological imaging to screen for distant recurrence is completed as needed according to patient report of new symptoms that may suggest disease recurrence or

## 2024-12-09 NOTE — PATIENT INSTRUCTIONS
Orders Placed This Encounter   Procedures    CBC with Auto Differential    Hepatic Function Panel    POC PANEL BMP W/IOCA   Return in about 6 months (around 6/9/2025).   ordered dexa scan in June 2024, please check on the scheduling. I dont see that it was scheduled.

## 2024-12-12 ENCOUNTER — HOSPITAL ENCOUNTER (OUTPATIENT)
Dept: WOMENS IMAGING | Age: 74
Discharge: HOME | End: 2024-12-12
Payer: MEDICARE

## 2024-12-12 DIAGNOSIS — M85.80 OSTEOPENIA, UNSPECIFIED LOCATION: ICD-10-CM

## 2024-12-12 DIAGNOSIS — Z09 ENCOUNTER FOR FOLLOW-UP EXAMINATION AFTER COMPLETED TREATMENT FOR CONDITIONS OTHER THAN MALIGNANT NEOPLASM: ICD-10-CM

## 2024-12-12 PROCEDURE — 77080 DXA BONE DENSITY AXIAL: CPT

## 2025-04-01 ENCOUNTER — TRANSCRIBE ORDERS (OUTPATIENT)
Dept: ADMINISTRATIVE | Age: 75
End: 2025-04-01

## 2025-04-01 ENCOUNTER — HOSPITAL ENCOUNTER (OUTPATIENT)
Dept: INTERVENTIONAL RADIOLOGY/VASCULAR | Age: 75
Discharge: HOME | End: 2025-04-03
Payer: MEDICARE

## 2025-04-01 DIAGNOSIS — M79.89 SWELLING OF EXTREMITY, RIGHT: Primary | ICD-10-CM

## 2025-04-01 DIAGNOSIS — M79.89 SWELLING OF EXTREMITY, RIGHT: ICD-10-CM

## 2025-04-01 PROCEDURE — 93971 EXTREMITY STUDY: CPT

## 2025-06-12 ENCOUNTER — HOSPITAL ENCOUNTER (OUTPATIENT)
Dept: PHYSICAL THERAPY | Age: 75
Setting detail: THERAPIES SERIES
Discharge: HOME | End: 2025-06-12
Payer: MEDICARE

## 2025-06-12 PROCEDURE — 97165 OT EVAL LOW COMPLEX 30 MIN: CPT

## 2025-06-12 NOTE — DISCHARGE SUMMARY
[facility]  OCCUPATIONAL THERAPY  DRIVING EVALUATION    PATIENT: Bhargavi Thorpe  YOB: 1950  GENDER:  female  CSN: [id number]     Referring Practitioner Lili Mitchell DO [id number]      Diagnosis Other amnesia   Treatment Diagnosis R41.89 Other Symptoms and Signs Involving Cognitive Functions and Awareness   Date of Evaluation 6/12/25   Additional Pertinent History Bhargavi Thorpe has a past medical history of Acquired hypothyroidism, Breast cancer (HCC), COVID-19, Depression, Hyperlipidemia, Numbness and tingling, Obese, and Osteopenia of multiple sites.  she has a past surgical history that includes Hysterectomy; Splenectomy (10/2014); hernia repair (10/2014); Cholecystectomy (10/2014); Tonsillectomy and adenoidectomy; Tubal ligation; Breast surgery (Left, 06/26/2020); Hemorrhoid surgery (03/30/2016); Colonoscopy; lipectomy (2014); Incisional hernia repair (2014); Abdominal exploration surgery (2014); [facility] US GUIDED BREAST BIOPSY W LOC DEVICE 1ST LESION LEFT (07/16/2020); Port Surgery (Right, 07/17/2020); Breast lumpectomy (Left, 11/2015); Mastectomy (Bilateral, 11/30/2020); other surgical history (08/17/2021); Chest Wall Resection (N/A, 09/08/2021); and hip surgery (09/2022).     Allergies Allergies   Allergen Reactions    Aluminum-Containing Compounds Anaphylaxis    Scopolamine      Other reaction(s): Dizziness    Adhesive Tape Other (See Comments)     Takes top layer of skin off      Medications   Current Outpatient Medications:     anastrozole (ARIMIDEX) 1 MG tablet, Take 1 tablet by mouth daily, Disp: 90 tablet, Rfl: 3    pioglitazone (ACTOS) 15 MG tablet, Take by mouth, Disp: , Rfl:     JANUVIA 100 MG tablet, Take 1 tablet by mouth daily (Patient not taking: Reported on 12/9/2024), Disp: , Rfl:     Probiotic Product (PROBIOTIC DAILY PO), Take by mouth daily (Patient not taking: Reported on 12/6/2023), Disp: , Rfl:     rosuvastatin (CRESTOR) 5 MG tablet, , Disp:

## 2025-06-16 ENCOUNTER — OFFICE VISIT (OUTPATIENT)
Dept: ONCOLOGY | Age: 75
End: 2025-06-16
Payer: MEDICARE

## 2025-06-16 ENCOUNTER — HOSPITAL ENCOUNTER (OUTPATIENT)
Dept: INFUSION THERAPY | Age: 75
Discharge: HOME | End: 2025-06-16
Payer: MEDICARE

## 2025-06-16 VITALS
SYSTOLIC BLOOD PRESSURE: 131 MMHG | HEIGHT: 67 IN | TEMPERATURE: 98 F | HEART RATE: 68 BPM | OXYGEN SATURATION: 93 % | DIASTOLIC BLOOD PRESSURE: 64 MMHG | WEIGHT: 212 LBS | RESPIRATION RATE: 18 BRPM | BODY MASS INDEX: 33.27 KG/M2

## 2025-06-16 VITALS
HEART RATE: 68 BPM | SYSTOLIC BLOOD PRESSURE: 131 MMHG | TEMPERATURE: 98 F | DIASTOLIC BLOOD PRESSURE: 64 MMHG | RESPIRATION RATE: 18 BRPM | OXYGEN SATURATION: 93 %

## 2025-06-16 DIAGNOSIS — Z17.31 HER2-POSITIVE CARCINOMA OF LEFT BREAST (HCC): Primary | ICD-10-CM

## 2025-06-16 DIAGNOSIS — C50.912 HER2-POSITIVE CARCINOMA OF LEFT BREAST (HCC): Primary | ICD-10-CM

## 2025-06-16 DIAGNOSIS — C50.912 HER2-POSITIVE CARCINOMA OF LEFT BREAST (HCC): ICD-10-CM

## 2025-06-16 DIAGNOSIS — Z17.31 HER2-POSITIVE CARCINOMA OF LEFT BREAST (HCC): ICD-10-CM

## 2025-06-16 LAB
ALBUMIN SERPL BCG-MCNC: 4.1 G/DL (ref 3.4–4.9)
ALP SERPL-CCNC: 73 U/L (ref 38–126)
ALT SERPL W/O P-5'-P-CCNC: 21 U/L (ref 10–35)
AST SERPL-CCNC: 31 U/L (ref 10–35)
BASOPHILS ABSOLUTE: 0.1 THOU/MM3 (ref 0–0.1)
BASOPHILS NFR BLD AUTO: 1 % (ref 0–3)
BILIRUB CONJ SERPL-MCNC: 0.2 MG/DL (ref 0–0.2)
BILIRUB SERPL-MCNC: 0.5 MG/DL (ref 0.3–1.2)
BUN BLDP-MCNC: 12 MG/DL (ref 8–26)
CHLORIDE BLD-SCNC: 102 MEQ/L (ref 98–109)
CREAT BLD-MCNC: 0.7 MG/DL (ref 0.5–1.2)
EOSINOPHIL NFR BLD AUTO: 1 % (ref 0–4)
EOSINOPHILS ABSOLUTE: 0.1 THOU/MM3 (ref 0–0.4)
ERYTHROCYTE [DISTWIDTH] IN BLOOD BY AUTOMATED COUNT: 12.4 % (ref 11.5–14.5)
GFR SERPL CREATININE-BSD FRML MDRD: 90 ML/MIN/1.73M2
GLUCOSE BLD-MCNC: 131 MG/DL (ref 70–108)
HCT VFR BLD AUTO: 47.3 % (ref 37–47)
HGB BLD-MCNC: 15.4 GM/DL (ref 12–16)
IMMATURE GRANULOCYTES %: 0 %
IMMATURE GRANULOCYTES ABSOLUTE: 0.03 THOU/MM3 (ref 0–0.07)
IONIZED CALCIUM, WHOLE BLOOD: 1.34 MMOL/L (ref 1.12–1.32)
LYMPHOCYTES ABSOLUTE: 3.5 THOU/MM3 (ref 1–4.8)
LYMPHOCYTES NFR BLD AUTO: 41 % (ref 15–47)
MCH RBC QN AUTO: 31.4 PG (ref 26–33)
MCHC RBC AUTO-ENTMCNC: 32.6 GM/DL (ref 32.2–35.5)
MCV RBC AUTO: 96 FL (ref 81–99)
MONOCYTES ABSOLUTE: 1 THOU/MM3 (ref 0.4–1.3)
MONOCYTES NFR BLD AUTO: 11 % (ref 0–12)
NEUTROPHILS ABSOLUTE: 3.9 THOU/MM3 (ref 1.8–7.7)
NEUTROPHILS NFR BLD AUTO: 45 % (ref 43–75)
PLATELET # BLD AUTO: 345 THOU/MM3 (ref 130–400)
PMV BLD AUTO: 10.5 FL (ref 9.4–12.4)
POTASSIUM BLD-SCNC: 4.4 MEQ/L (ref 3.5–4.9)
PROT SERPL-MCNC: 7.7 G/DL (ref 6.4–8.3)
RBC # BLD AUTO: 4.91 MILL/MM3 (ref 4.2–5.4)
SODIUM BLD-SCNC: 141 MEQ/L (ref 138–146)
TOTAL CO2, WHOLE BLOOD: 28 MEQ/L (ref 23–33)
WBC # BLD AUTO: 8.7 THOU/MM3 (ref 4.8–10.8)

## 2025-06-16 PROCEDURE — G8417 CALC BMI ABV UP PARAM F/U: HCPCS | Performed by: INTERNAL MEDICINE

## 2025-06-16 PROCEDURE — 1126F AMNT PAIN NOTED NONE PRSNT: CPT | Performed by: INTERNAL MEDICINE

## 2025-06-16 PROCEDURE — 80047 BASIC METABLC PNL IONIZED CA: CPT

## 2025-06-16 PROCEDURE — G8399 PT W/DXA RESULTS DOCUMENT: HCPCS | Performed by: INTERNAL MEDICINE

## 2025-06-16 PROCEDURE — 99211 OFF/OP EST MAY X REQ PHY/QHP: CPT

## 2025-06-16 PROCEDURE — 85025 COMPLETE CBC W/AUTO DIFF WBC: CPT

## 2025-06-16 PROCEDURE — 3017F COLORECTAL CA SCREEN DOC REV: CPT | Performed by: INTERNAL MEDICINE

## 2025-06-16 PROCEDURE — 80076 HEPATIC FUNCTION PANEL: CPT

## 2025-06-16 PROCEDURE — 1090F PRES/ABSN URINE INCON ASSESS: CPT | Performed by: INTERNAL MEDICINE

## 2025-06-16 PROCEDURE — 99214 OFFICE O/P EST MOD 30 MIN: CPT | Performed by: INTERNAL MEDICINE

## 2025-06-16 PROCEDURE — G8428 CUR MEDS NOT DOCUMENT: HCPCS | Performed by: INTERNAL MEDICINE

## 2025-06-16 PROCEDURE — 1123F ACP DISCUSS/DSCN MKR DOCD: CPT | Performed by: INTERNAL MEDICINE

## 2025-06-16 PROCEDURE — 1036F TOBACCO NON-USER: CPT | Performed by: INTERNAL MEDICINE

## 2025-06-16 RX ORDER — ONDANSETRON 8 MG/1
8 TABLET, FILM COATED ORAL EVERY 8 HOURS PRN
Qty: 30 TABLET | Refills: 2 | Status: SHIPPED | OUTPATIENT
Start: 2025-06-16 | End: 2025-06-16

## 2025-06-16 RX ORDER — GABAPENTIN 300 MG/1
300 CAPSULE ORAL EVERY EVENING
Qty: 30 CAPSULE | Refills: 3 | Status: SHIPPED | OUTPATIENT
Start: 2025-06-16 | End: 2025-06-16

## 2025-06-16 NOTE — PROGRESS NOTES
[facility] [address], [address]  [facility] [address]  Dept: [phone]  Dept Fax: [phone] Loc: [phone]      Visit Date:2025     Bhargavi Thorpe is a 74 y.o. female who presents today for:   Chief Complaint   Patient presents with    Follow-up     HER2-positive carcinoma of left breast         HPI:   Bhargavi Thorpe is a 74 y.o. female who follows in our office with history of breast cancer.  HPI per prior note in our office:  She had annual screening mammogram on 2020 that showed 2 lesions in the left breast.  Subsequently she underwent breast ultrasound followed by ultrasound guided biopsy of those 2 lesions.   The largest of these lesions is a lobulated mixed cystic and    solid appearing mass measuring approximately 3 x 2.1 x 2.7 cm.    This is located at the anterior depth upper inner quadrant.    Aspiration of the fluid component of this lesion and biopsy of    the solid component of this lesion was performed on 2020.    The smaller adjacent lesion is located at the middle depth of the     upper inner quadrant left breast measuring approximately 1.6 x    1.1 x 1.6 cm. Biopsy of this lesion was performed on 2020.       Final pathology report showed:   A-B.  Left breast, upper inner quadrant, anterior and middle depth,   barbell and tribell clips, multiple core needle biopsies:       Invasive, poorly differentiated ductal carcinoma, Ashton score 3.   Estrogen Receptor: Positive 100 % of cells (>10% of cells)   Progesterone Receptor:  Positive 90 % of cells   Ki-67 (clone 30-9)     Percentage of positive nuclei: 42 %   HER2 NEW POSITIVE      On 2020 the patient had breast MRI showin. A known biopsy-proven mass demonstrated within the upper     inner left breast anterior to middle depth. This measures 3 cm x     4.1 cm x 3.1 cm middle depth located 4  cm from the nipple, 0.9    cm from the skin, and 6.5 cm from the chest wall.

## 2025-06-16 NOTE — PATIENT INSTRUCTIONS
Return in about 4 weeks (around 7/14/2025).  Orders Placed This Encounter   Procedures    CT SOFT TISSUE NECK W CONTRAST   Please request the imaging studies from [facility]. [facility]

## 2025-06-20 ENCOUNTER — HOSPITAL ENCOUNTER (OUTPATIENT)
Dept: CT IMAGING | Age: 75
Discharge: HOME | End: 2025-06-20
Attending: INTERNAL MEDICINE
Payer: MEDICARE

## 2025-06-20 DIAGNOSIS — C50.912 HER2-POSITIVE CARCINOMA OF LEFT BREAST (HCC): ICD-10-CM

## 2025-06-20 DIAGNOSIS — Z17.31 HER2-POSITIVE CARCINOMA OF LEFT BREAST (HCC): ICD-10-CM

## 2025-06-20 PROCEDURE — 70491 CT SOFT TISSUE NECK W/DYE: CPT

## 2025-06-20 PROCEDURE — 6360000004 HC RX CONTRAST MEDICATION: Performed by: INTERNAL MEDICINE

## 2025-06-20 RX ORDER — IOPAMIDOL 755 MG/ML
65 INJECTION, SOLUTION INTRAVASCULAR
Status: COMPLETED | OUTPATIENT
Start: 2025-06-20 | End: 2025-06-20

## 2025-06-20 RX ADMIN — IOPAMIDOL 65 ML: 755 INJECTION, SOLUTION INTRAVENOUS at 08:50

## 2025-07-17 ENCOUNTER — OFFICE VISIT (OUTPATIENT)
Dept: ONCOLOGY | Age: 75
End: 2025-07-17
Payer: MEDICARE

## 2025-07-17 ENCOUNTER — HOSPITAL ENCOUNTER (OUTPATIENT)
Dept: INFUSION THERAPY | Age: 75
Discharge: HOME | End: 2025-07-17
Payer: MEDICARE

## 2025-07-17 VITALS
DIASTOLIC BLOOD PRESSURE: 66 MMHG | RESPIRATION RATE: 16 BRPM | TEMPERATURE: 97.7 F | OXYGEN SATURATION: 90 % | SYSTOLIC BLOOD PRESSURE: 141 MMHG | HEART RATE: 69 BPM

## 2025-07-17 VITALS
RESPIRATION RATE: 16 BRPM | HEART RATE: 69 BPM | BODY MASS INDEX: 33.05 KG/M2 | TEMPERATURE: 97.7 F | WEIGHT: 211 LBS | SYSTOLIC BLOOD PRESSURE: 141 MMHG | OXYGEN SATURATION: 90 % | DIASTOLIC BLOOD PRESSURE: 66 MMHG

## 2025-07-17 DIAGNOSIS — R59.9 LYMPH NODES ENLARGED: Primary | ICD-10-CM

## 2025-07-17 DIAGNOSIS — R93.0 ABNORMAL FINDINGS ON DIAGNOSTIC IMAGING OF SKULL AND HEAD, NOT ELSEWHERE CLASSIFIED: ICD-10-CM

## 2025-07-17 PROCEDURE — 1036F TOBACCO NON-USER: CPT | Performed by: INTERNAL MEDICINE

## 2025-07-17 PROCEDURE — G8427 DOCREV CUR MEDS BY ELIG CLIN: HCPCS | Performed by: INTERNAL MEDICINE

## 2025-07-17 PROCEDURE — 1090F PRES/ABSN URINE INCON ASSESS: CPT | Performed by: INTERNAL MEDICINE

## 2025-07-17 PROCEDURE — G8417 CALC BMI ABV UP PARAM F/U: HCPCS | Performed by: INTERNAL MEDICINE

## 2025-07-17 PROCEDURE — 1126F AMNT PAIN NOTED NONE PRSNT: CPT | Performed by: INTERNAL MEDICINE

## 2025-07-17 PROCEDURE — 3017F COLORECTAL CA SCREEN DOC REV: CPT | Performed by: INTERNAL MEDICINE

## 2025-07-17 PROCEDURE — 1123F ACP DISCUSS/DSCN MKR DOCD: CPT | Performed by: INTERNAL MEDICINE

## 2025-07-17 PROCEDURE — 1159F MED LIST DOCD IN RCRD: CPT | Performed by: INTERNAL MEDICINE

## 2025-07-17 PROCEDURE — G8399 PT W/DXA RESULTS DOCUMENT: HCPCS | Performed by: INTERNAL MEDICINE

## 2025-07-17 PROCEDURE — 99214 OFFICE O/P EST MOD 30 MIN: CPT | Performed by: INTERNAL MEDICINE

## 2025-07-17 PROCEDURE — 99211 OFF/OP EST MAY X REQ PHY/QHP: CPT

## 2025-07-17 NOTE — PATIENT INSTRUCTIONS
Orders Placed This Encounter   Procedures    PET CT SKULL BASE TO MID THIGH     Return in about 4 weeks (around 8/14/2025).MD visit to review the results of the PET

## 2025-07-17 NOTE — PROGRESS NOTES
[facility] [address], [address]  [facility] [address]  Dept: [phone]  Dept Fax: [phone] Loc: [phone]      Visit Date:2025     Bhargavi Thorpe is a 75 y.o. female who presents today for:   Chief Complaint   Patient presents with    Follow-up     HER2-positive carcinoma of left breast (HCC)        HPI:   Bhargavi Thorpe is a 75 y.o. female who follows in our office with history of breast cancer.  HPI per prior note in our office:  She had annual screening mammogram on 2020 that showed 2 lesions in the left breast.  Subsequently she underwent breast ultrasound followed by ultrasound guided biopsy of those 2 lesions.   The largest of these lesions is a lobulated mixed cystic and    solid appearing mass measuring approximately 3 x 2.1 x 2.7 cm.    This is located at the anterior depth upper inner quadrant.    Aspiration of the fluid component of this lesion and biopsy of    the solid component of this lesion was performed on 2020.    The smaller adjacent lesion is located at the middle depth of the     upper inner quadrant left breast measuring approximately 1.6 x    1.1 x 1.6 cm. Biopsy of this lesion was performed on 2020.       Final pathology report showed:   A-B.  Left breast, upper inner quadrant, anterior and middle depth,   barbell and tribell clips, multiple core needle biopsies:       Invasive, poorly differentiated ductal carcinoma, Pepe score 3.   Estrogen Receptor: Positive 100 % of cells (>10% of cells)   Progesterone Receptor:  Positive 90 % of cells   Ki-67 (clone 30-9)     Percentage of positive nuclei: 42 %   HER2 NEW POSITIVE      On 2020 the patient had breast MRI showin. A known biopsy-proven mass demonstrated within the upper     inner left breast anterior to middle depth. This measures 3 cm x     4.1 cm x 3.1 cm middle depth located 4  cm from the nipple, 0.9    cm from the skin, and 6.5 cm from the chest wall.

## 2025-08-01 ENCOUNTER — HOSPITAL ENCOUNTER (OUTPATIENT)
Dept: PET IMAGING | Age: 75
Discharge: HOME | End: 2025-08-01
Attending: INTERNAL MEDICINE
Payer: MEDICARE

## 2025-08-01 DIAGNOSIS — R59.9 LYMPH NODES ENLARGED: ICD-10-CM

## 2025-08-01 DIAGNOSIS — R93.0 ABNORMAL FINDINGS ON DIAGNOSTIC IMAGING OF SKULL AND HEAD, NOT ELSEWHERE CLASSIFIED: ICD-10-CM

## 2025-08-01 PROCEDURE — A9609 HC RX DIAGNOSTIC RADIOPHARMACEUTICAL: HCPCS | Performed by: INTERNAL MEDICINE

## 2025-08-01 PROCEDURE — 3430000000 HC RX DIAGNOSTIC RADIOPHARMACEUTICAL: Performed by: INTERNAL MEDICINE

## 2025-08-01 PROCEDURE — 78815 PET IMAGE W/CT SKULL-THIGH: CPT

## 2025-08-01 RX ORDER — FLUDEOXYGLUCOSE F 18 200 MCI/ML
11.5 INJECTION, SOLUTION INTRAVENOUS
Status: COMPLETED | OUTPATIENT
Start: 2025-08-01 | End: 2025-08-01

## 2025-08-01 RX ADMIN — FLUDEOXYGLUCOSE F 18 11.5 MILLICURIE: 200 INJECTION, SOLUTION INTRAVENOUS at 08:45

## 2025-09-02 ENCOUNTER — HOSPITAL ENCOUNTER (OUTPATIENT)
Dept: INFUSION THERAPY | Age: 75
Discharge: HOME | End: 2025-09-02
Payer: MEDICARE

## 2025-09-02 ENCOUNTER — OFFICE VISIT (OUTPATIENT)
Dept: ONCOLOGY | Age: 75
End: 2025-09-02
Payer: MEDICARE

## 2025-09-02 VITALS
DIASTOLIC BLOOD PRESSURE: 68 MMHG | HEART RATE: 67 BPM | RESPIRATION RATE: 16 BRPM | TEMPERATURE: 97.6 F | SYSTOLIC BLOOD PRESSURE: 130 MMHG | OXYGEN SATURATION: 93 %

## 2025-09-02 VITALS
RESPIRATION RATE: 16 BRPM | WEIGHT: 208.2 LBS | HEART RATE: 67 BPM | OXYGEN SATURATION: 93 % | DIASTOLIC BLOOD PRESSURE: 68 MMHG | HEIGHT: 67 IN | SYSTOLIC BLOOD PRESSURE: 130 MMHG | TEMPERATURE: 97.6 F | BODY MASS INDEX: 32.68 KG/M2

## 2025-09-02 DIAGNOSIS — C50.912 HER2-POSITIVE CARCINOMA OF LEFT BREAST (HCC): Primary | ICD-10-CM

## 2025-09-02 DIAGNOSIS — Z17.31 HER2-POSITIVE CARCINOMA OF LEFT BREAST (HCC): Primary | ICD-10-CM

## 2025-09-02 PROCEDURE — G8427 DOCREV CUR MEDS BY ELIG CLIN: HCPCS | Performed by: INTERNAL MEDICINE

## 2025-09-02 PROCEDURE — 3017F COLORECTAL CA SCREEN DOC REV: CPT | Performed by: INTERNAL MEDICINE

## 2025-09-02 PROCEDURE — G8417 CALC BMI ABV UP PARAM F/U: HCPCS | Performed by: INTERNAL MEDICINE

## 2025-09-02 PROCEDURE — 1036F TOBACCO NON-USER: CPT | Performed by: INTERNAL MEDICINE

## 2025-09-02 PROCEDURE — 1126F AMNT PAIN NOTED NONE PRSNT: CPT | Performed by: INTERNAL MEDICINE

## 2025-09-02 PROCEDURE — 99211 OFF/OP EST MAY X REQ PHY/QHP: CPT

## 2025-09-02 PROCEDURE — G8399 PT W/DXA RESULTS DOCUMENT: HCPCS | Performed by: INTERNAL MEDICINE

## 2025-09-02 PROCEDURE — 1159F MED LIST DOCD IN RCRD: CPT | Performed by: INTERNAL MEDICINE

## 2025-09-02 PROCEDURE — 1123F ACP DISCUSS/DSCN MKR DOCD: CPT | Performed by: INTERNAL MEDICINE

## 2025-09-02 PROCEDURE — 1090F PRES/ABSN URINE INCON ASSESS: CPT | Performed by: INTERNAL MEDICINE

## 2025-09-02 PROCEDURE — 99214 OFFICE O/P EST MOD 30 MIN: CPT | Performed by: INTERNAL MEDICINE

## 2025-09-02 RX ORDER — DONEPEZIL HYDROCHLORIDE 10 MG/1
TABLET, FILM COATED ORAL
COMMUNITY
Start: 2025-07-29

## (undated) DEVICE — SPONGE LAP W18XL18IN WHT COT 4 PLY FLD STRUNG RADPQ DISP ST

## (undated) DEVICE — COVER ARMBRD W13XL28.5IN IMPERV BLU FOR OP RM

## (undated) DEVICE — BREAST HERNIA PACK: Brand: MEDLINE INDUSTRIES, INC.

## (undated) DEVICE — PENCIL SMK EVAC ALL IN 1 DSGN ENH VISIBILITY IMPROVED AIR

## (undated) DEVICE — YANKAUER,BULB TIP,W/O VENT,RIGID,STERILE: Brand: MEDLINE

## (undated) DEVICE — TETRA-FLEX CF WOVEN LATEX FREE ELASTIC BANDAGE 6" X 5.5 YD: Brand: TETRA-FLEX™CF

## (undated) DEVICE — APPLIER LIG CLP M L11IN TI STR RNG HNDL FOR 20 CLP DISP

## (undated) DEVICE — ELECTRODE PT RET AD L9FT HI MOIST COND ADH HYDRGEL CORDED

## (undated) DEVICE — GAUZE,SPONGE,4"X4",12PLY,STERILE,LF,2'S: Brand: MEDLINE

## (undated) DEVICE — SUTURE VCRL + SZ 4-0 L27IN ABSRB WHT FS-2 3/8 CIR REV CUT VCP422H

## (undated) DEVICE — COVER,MAYO STAND,STERILE: Brand: MEDLINE

## (undated) DEVICE — GOWN,SIRUS,NON REINFRCD,LARGE,SET IN SL: Brand: MEDLINE

## (undated) DEVICE — APPLICATOR MEDICATED 26 CC SOLUTION HI LT ORNG CHLORAPREP

## (undated) DEVICE — BANDAGE,GAUZE,4.5"X4.1YD,STERILE,LF: Brand: MEDLINE

## (undated) DEVICE — 4-PORT MANIFOLD: Brand: NEPTUNE 2

## (undated) DEVICE — EVACUATOR SURG 100CC SIL BLB SUCT RESVR FOR CLS WND DRNGE

## (undated) DEVICE — SUTURE SILK 2-0 CP-1 30IN N ABSRB BRAID BLK 443H

## (undated) DEVICE — AGENT HEMSTAT 3GM OXIDIZED REGENERATED CELOS ABSRB FOR CONT (ORDER MULTIPLES OF 5EA)

## (undated) DEVICE — DRESSING TRNSPAR W5XL4.5IN FLM SHT SEMIPERMEABLE WIND

## (undated) DEVICE — COVER,LIGHT HANDLE,FLX,2/PK: Brand: MEDLINE INDUSTRIES, INC.

## (undated) DEVICE — SEALANT TISS 10 CC FIBRIN VISTASEAL

## (undated) DEVICE — SPONGE DRN W4XL4IN RAYON/POLYESTER 6 PLY NONWOVEN PRECUT

## (undated) DEVICE — COVER US PRB W5XL96IN LTX W/ GEL

## (undated) DEVICE — BLANKET THER AD W24XL60IN FAB COVERING SUP SFT ULT THN LTWT

## (undated) DEVICE — TUBING, SUCTION, 1/4" X 20', STRAIGHT: Brand: MEDLINE INDUSTRIES, INC.

## (undated) DEVICE — GLOVE SURG SZ 65 THK91MIL LTX FREE SYN POLYISOPRENE

## (undated) DEVICE — Device

## (undated) DEVICE — GLOVE ORANGE PI 7   MSG9070

## (undated) DEVICE — DRAIN,WOUND,15FR,3/16,FULL-FLUTED: Brand: MEDLINE

## (undated) DEVICE — GLOVE SURG SZ 7.5 L11.73IN FNGR THK9.8MIL STRW LTX POLYMER

## (undated) DEVICE — DRAPE C ARM W36XL30IN RECTANG BND BG AND TAPE